# Patient Record
Sex: FEMALE | Race: WHITE | Employment: UNEMPLOYED | URBAN - METROPOLITAN AREA
[De-identification: names, ages, dates, MRNs, and addresses within clinical notes are randomized per-mention and may not be internally consistent; named-entity substitution may affect disease eponyms.]

---

## 2018-06-19 ENCOUNTER — HOSPITAL ENCOUNTER (OUTPATIENT)
Dept: MAMMOGRAPHY | Age: 58
Discharge: HOME | End: 2018-06-19
Attending: OBSTETRICS & GYNECOLOGY | Admitting: OBSTETRICS & GYNECOLOGY

## 2018-06-19 DIAGNOSIS — Z12.31 ENCOUNTER FOR SCREENING MAMMOGRAM FOR BREAST CANCER: ICD-10-CM

## 2018-10-19 ENCOUNTER — HOSPITAL ENCOUNTER (OUTPATIENT)
Age: 58
Setting detail: OUTPATIENT SURGERY
Discharge: HOME | End: 2018-10-19
Attending: INTERNAL MEDICINE | Admitting: INTERNAL MEDICINE
Payer: COMMERCIAL

## 2018-10-19 VITALS
SYSTOLIC BLOOD PRESSURE: 111 MMHG | TEMPERATURE: 97 F | BODY MASS INDEX: 26.13 KG/M2 | WEIGHT: 142 LBS | RESPIRATION RATE: 16 BRPM | DIASTOLIC BLOOD PRESSURE: 90 MMHG | OXYGEN SATURATION: 98 % | HEART RATE: 70 BPM | HEIGHT: 62 IN

## 2018-10-19 LAB
GLUCOSE BLD-MCNC: 272 MG/DL (ref 70–99)
PERFORMED ON: ABNORMAL

## 2018-10-19 PROCEDURE — 6370000000 HC RX 637 (ALT 250 FOR IP): Performed by: INTERNAL MEDICINE

## 2018-10-19 PROCEDURE — C1773 RET DEV, INSERTABLE: HCPCS | Performed by: INTERNAL MEDICINE

## 2018-10-19 PROCEDURE — 6360000002 HC RX W HCPCS: Performed by: INTERNAL MEDICINE

## 2018-10-19 PROCEDURE — 7100000011 HC PHASE II RECOVERY - ADDTL 15 MIN: Performed by: INTERNAL MEDICINE

## 2018-10-19 PROCEDURE — 7100000010 HC PHASE II RECOVERY - FIRST 15 MIN: Performed by: INTERNAL MEDICINE

## 2018-10-19 PROCEDURE — 99152 MOD SED SAME PHYS/QHP 5/>YRS: CPT | Performed by: INTERNAL MEDICINE

## 2018-10-19 PROCEDURE — 99153 MOD SED SAME PHYS/QHP EA: CPT | Performed by: INTERNAL MEDICINE

## 2018-10-19 PROCEDURE — 88305 TISSUE EXAM BY PATHOLOGIST: CPT

## 2018-10-19 PROCEDURE — 3609010600 HC COLONOSCOPY POLYPECTOMY SNARE/COLD BIOPSY: Performed by: INTERNAL MEDICINE

## 2018-10-19 PROCEDURE — 2709999900 HC NON-CHARGEABLE SUPPLY: Performed by: INTERNAL MEDICINE

## 2018-10-19 PROCEDURE — 2580000003 HC RX 258: Performed by: INTERNAL MEDICINE

## 2018-10-19 RX ORDER — LIDOCAINE HYDROCHLORIDE 10 MG/ML
0.1 INJECTION, SOLUTION EPIDURAL; INFILTRATION; INTRACAUDAL; PERINEURAL
Status: DISCONTINUED | OUTPATIENT
Start: 2018-10-19 | End: 2018-10-19 | Stop reason: HOSPADM

## 2018-10-19 RX ORDER — GEMFIBROZIL 600 MG/1
600 TABLET, FILM COATED ORAL 2 TIMES DAILY
COMMUNITY
End: 2021-07-30

## 2018-10-19 RX ORDER — MIDAZOLAM HYDROCHLORIDE 5 MG/ML
INJECTION INTRAMUSCULAR; INTRAVENOUS PRN
Status: DISCONTINUED | OUTPATIENT
Start: 2018-10-19 | End: 2018-10-19 | Stop reason: HOSPADM

## 2018-10-19 RX ORDER — FENTANYL CITRATE 50 UG/ML
INJECTION, SOLUTION INTRAMUSCULAR; INTRAVENOUS PRN
Status: DISCONTINUED | OUTPATIENT
Start: 2018-10-19 | End: 2018-10-19 | Stop reason: HOSPADM

## 2018-10-19 RX ORDER — SODIUM CHLORIDE, SODIUM LACTATE, POTASSIUM CHLORIDE, CALCIUM CHLORIDE 600; 310; 30; 20 MG/100ML; MG/100ML; MG/100ML; MG/100ML
INJECTION, SOLUTION INTRAVENOUS ONCE
Status: COMPLETED | OUTPATIENT
Start: 2018-10-19 | End: 2018-10-19

## 2018-10-19 RX ORDER — SIMETHICONE 20 MG/.3ML
EMULSION ORAL PRN
Status: DISCONTINUED | OUTPATIENT
Start: 2018-10-19 | End: 2018-10-19 | Stop reason: HOSPADM

## 2018-10-19 RX ADMIN — SODIUM CHLORIDE, POTASSIUM CHLORIDE, SODIUM LACTATE AND CALCIUM CHLORIDE: 600; 310; 30; 20 INJECTION, SOLUTION INTRAVENOUS at 09:18

## 2018-10-19 ASSESSMENT — PAIN - FUNCTIONAL ASSESSMENT: PAIN_FUNCTIONAL_ASSESSMENT: 0-10

## 2018-10-19 ASSESSMENT — PAIN SCALES - GENERAL: PAINLEVEL_OUTOF10: 0

## 2018-10-19 NOTE — H&P
Pre-sedation Assessment    History and Physical / Pre-Sedation Assessment  Patient:  Jonathan Banuelos   :   1960     Intended Procedure: CLS      HPI: h/o polyps  Nurses notes reviewed and agreed. Medications reviewed  Allergies: Allergies   Allergen Reactions    Codeine Nausea And Vomiting    Phenergan [Promethazine Hcl] Nausea And Vomiting    Vicodin [Hydrocodone-Acetaminophen]      itching    Hydrocodone-Acetaminophen Nausea And Vomiting    Oxycodone Nausea And Vomiting     hallucianitions    Promethazine Nausea And Vomiting           Physical Exam:  Vital Signs: BP (!) 153/75   Pulse 87   Temp 97 °F (36.1 °C) (Temporal)   Resp 20   Ht 5' 2\" (1.575 m)   Wt 142 lb (64.4 kg)   LMP 2012   SpO2 100%   BMI 25.97 kg/m²  Body mass index is 25.97 kg/m². Airway:Normal  Cardiac:Normal  Pulmonary:Normal  Abdomen:Normal  Specific to procedure: Mallampati 3      Pre-Procedure Assessment/Plan:  ASA 2 - Patient with mild systemic disease with no functional limitations    Level of Sedation Plan: Moderate sedation    Post Procedure plan: Return to same level of care    I assessed the patient and find that the patient is in satisfactory condition to proceed with the planned procedure and sedation plan. I have explained the risk, benefits, and alternatives to the procedure. The patient understands and agrees to proceed.   Yes    Siddhartha Proctor  9:56 AM 10/19/2018

## 2018-11-28 ENCOUNTER — HOSPITAL ENCOUNTER (OUTPATIENT)
Age: 58
Discharge: HOME | End: 2018-11-28
Payer: COMMERCIAL

## 2018-11-28 ENCOUNTER — HOSPITAL ENCOUNTER (OUTPATIENT)
Dept: GENERAL RADIOLOGY | Age: 58
Discharge: HOME | End: 2018-11-28
Payer: COMMERCIAL

## 2018-11-28 DIAGNOSIS — R91.8 LUNG NODULES: ICD-10-CM

## 2018-11-28 PROCEDURE — 71046 X-RAY EXAM CHEST 2 VIEWS: CPT

## 2019-01-03 ENCOUNTER — HOSPITAL ENCOUNTER (OUTPATIENT)
Age: 59
Discharge: HOME | End: 2019-01-03
Payer: COMMERCIAL

## 2019-01-03 ENCOUNTER — HOSPITAL ENCOUNTER (OUTPATIENT)
Dept: GENERAL RADIOLOGY | Age: 59
Discharge: HOME | End: 2019-01-03
Payer: COMMERCIAL

## 2019-01-03 DIAGNOSIS — M54.5 LOW BACK PAIN, UNSPECIFIED BACK PAIN LATERALITY, UNSPECIFIED CHRONICITY, WITH SCIATICA PRESENCE UNSPECIFIED: ICD-10-CM

## 2019-01-03 DIAGNOSIS — M99.03 LUMBOSACRAL DYSFUNCTION: ICD-10-CM

## 2019-01-03 PROCEDURE — 72100 X-RAY EXAM L-S SPINE 2/3 VWS: CPT

## 2019-03-25 ENCOUNTER — APPOINTMENT (OUTPATIENT)
Dept: CT IMAGING | Age: 59
End: 2019-03-25
Payer: COMMERCIAL

## 2019-03-25 ENCOUNTER — HOSPITAL ENCOUNTER (EMERGENCY)
Age: 59
Discharge: HOME | End: 2019-03-25
Attending: EMERGENCY MEDICINE
Payer: COMMERCIAL

## 2019-03-25 VITALS
RESPIRATION RATE: 18 BRPM | OXYGEN SATURATION: 100 % | HEIGHT: 62 IN | DIASTOLIC BLOOD PRESSURE: 94 MMHG | TEMPERATURE: 99.1 F | HEART RATE: 83 BPM | SYSTOLIC BLOOD PRESSURE: 125 MMHG | WEIGHT: 140 LBS | BODY MASS INDEX: 25.76 KG/M2

## 2019-03-25 DIAGNOSIS — R51.9 FACIAL PAIN: Primary | ICD-10-CM

## 2019-03-25 LAB
A/G RATIO: 1.2 (ref 1.1–2.2)
ALBUMIN SERPL-MCNC: 4.1 G/DL (ref 3.4–5)
ALP BLD-CCNC: 103 U/L (ref 40–129)
ALT SERPL-CCNC: 14 U/L (ref 10–40)
ANION GAP SERPL CALCULATED.3IONS-SCNC: 12 MMOL/L (ref 3–16)
AST SERPL-CCNC: 13 U/L (ref 15–37)
BASOPHILS ABSOLUTE: 0.1 K/UL (ref 0–0.2)
BASOPHILS RELATIVE PERCENT: 0.6 %
BILIRUB SERPL-MCNC: 0.5 MG/DL (ref 0–1)
BILIRUBIN URINE: NEGATIVE
BLOOD, URINE: NEGATIVE
BUN BLDV-MCNC: 18 MG/DL (ref 7–20)
CALCIUM SERPL-MCNC: 9.6 MG/DL (ref 8.3–10.6)
CHLORIDE BLD-SCNC: 97 MMOL/L (ref 99–110)
CLARITY: CLEAR
CO2: 26 MMOL/L (ref 21–32)
COLOR: YELLOW
CREAT SERPL-MCNC: 0.6 MG/DL (ref 0.6–1.1)
EOSINOPHILS ABSOLUTE: 0.1 K/UL (ref 0–0.6)
EOSINOPHILS RELATIVE PERCENT: 1.5 %
GFR AFRICAN AMERICAN: >60
GFR NON-AFRICAN AMERICAN: >60
GLOBULIN: 3.3 G/DL
GLUCOSE BLD-MCNC: 196 MG/DL (ref 70–99)
GLUCOSE BLD-MCNC: 541 MG/DL (ref 70–99)
GLUCOSE URINE: >=1000 MG/DL
HCT VFR BLD CALC: 43.2 % (ref 36–48)
HEMOGLOBIN: 14.9 G/DL (ref 12–16)
KETONES, URINE: NEGATIVE MG/DL
LEUKOCYTE ESTERASE, URINE: NEGATIVE
LYMPHOCYTES ABSOLUTE: 1.7 K/UL (ref 1–5.1)
LYMPHOCYTES RELATIVE PERCENT: 17.1 %
MCH RBC QN AUTO: 29.4 PG (ref 26–34)
MCHC RBC AUTO-ENTMCNC: 34.5 G/DL (ref 31–36)
MCV RBC AUTO: 85 FL (ref 80–100)
MICROSCOPIC EXAMINATION: ABNORMAL
MONOCYTES ABSOLUTE: 0.5 K/UL (ref 0–1.3)
MONOCYTES RELATIVE PERCENT: 4.9 %
NEUTROPHILS ABSOLUTE: 7.8 K/UL (ref 1.7–7.7)
NEUTROPHILS RELATIVE PERCENT: 75.9 %
NITRITE, URINE: NEGATIVE
PDW BLD-RTO: 14 % (ref 12.4–15.4)
PERFORMED ON: ABNORMAL
PH UA: 6 (ref 5–8)
PLATELET # BLD: 358 K/UL (ref 135–450)
PMV BLD AUTO: 6.2 FL (ref 5–10.5)
POTASSIUM REFLEX MAGNESIUM: 4.5 MMOL/L (ref 3.5–5.1)
PROTEIN UA: NEGATIVE MG/DL
RBC # BLD: 5.08 M/UL (ref 4–5.2)
SODIUM BLD-SCNC: 135 MMOL/L (ref 136–145)
SPECIFIC GRAVITY UA: <=1.005 (ref 1–1.03)
TOTAL PROTEIN: 7.4 G/DL (ref 6.4–8.2)
URINE TYPE: ABNORMAL
UROBILINOGEN, URINE: 0.2 E.U./DL
WBC # BLD: 10.2 K/UL (ref 4–11)

## 2019-03-25 PROCEDURE — 99283 EMERGENCY DEPT VISIT LOW MDM: CPT

## 2019-03-25 PROCEDURE — 6370000000 HC RX 637 (ALT 250 FOR IP): Performed by: EMERGENCY MEDICINE

## 2019-03-25 PROCEDURE — 81003 URINALYSIS AUTO W/O SCOPE: CPT

## 2019-03-25 PROCEDURE — 6360000002 HC RX W HCPCS: Performed by: EMERGENCY MEDICINE

## 2019-03-25 PROCEDURE — 80053 COMPREHEN METABOLIC PANEL: CPT

## 2019-03-25 PROCEDURE — 96361 HYDRATE IV INFUSION ADD-ON: CPT

## 2019-03-25 PROCEDURE — 70450 CT HEAD/BRAIN W/O DYE: CPT

## 2019-03-25 PROCEDURE — 96375 TX/PRO/DX INJ NEW DRUG ADDON: CPT

## 2019-03-25 PROCEDURE — 96374 THER/PROPH/DIAG INJ IV PUSH: CPT

## 2019-03-25 PROCEDURE — 85025 COMPLETE CBC W/AUTO DIFF WBC: CPT

## 2019-03-25 PROCEDURE — 2580000003 HC RX 258: Performed by: EMERGENCY MEDICINE

## 2019-03-25 RX ORDER — KETOROLAC TROMETHAMINE 30 MG/ML
15 INJECTION, SOLUTION INTRAMUSCULAR; INTRAVENOUS ONCE
Status: COMPLETED | OUTPATIENT
Start: 2019-03-25 | End: 2019-03-25

## 2019-03-25 RX ORDER — 0.9 % SODIUM CHLORIDE 0.9 %
1000 INTRAVENOUS SOLUTION INTRAVENOUS ONCE
Status: COMPLETED | OUTPATIENT
Start: 2019-03-25 | End: 2019-03-25

## 2019-03-25 RX ADMIN — SODIUM CHLORIDE 1000 ML: 9 INJECTION, SOLUTION INTRAVENOUS at 12:37

## 2019-03-25 RX ADMIN — KETOROLAC TROMETHAMINE 15 MG: 30 INJECTION, SOLUTION INTRAMUSCULAR; INTRAVENOUS at 12:59

## 2019-03-25 RX ADMIN — INSULIN HUMAN 5 UNITS: 100 INJECTION, SOLUTION PARENTERAL at 12:37

## 2019-03-25 ASSESSMENT — ENCOUNTER SYMPTOMS
VOMITING: 0
CHEST TIGHTNESS: 0
SHORTNESS OF BREATH: 0
STRIDOR: 0
WHEEZING: 0
ABDOMINAL PAIN: 0
TROUBLE SWALLOWING: 0
DIARRHEA: 0
RHINORRHEA: 0
SORE THROAT: 0
COUGH: 0

## 2019-03-25 ASSESSMENT — PAIN SCALES - GENERAL: PAINLEVEL_OUTOF10: 8

## 2019-03-25 ASSESSMENT — PAIN SCALES - WONG BAKER: WONGBAKER_NUMERICALRESPONSE: 8

## 2019-03-25 ASSESSMENT — PAIN DESCRIPTION - LOCATION: LOCATION: FACE

## 2020-04-28 LAB
ALBUMIN SERPL-MCNC: 4.8 G/DL
ALP BLD-CCNC: 128 U/L
ALT SERPL-CCNC: 13 U/L
ANION GAP SERPL CALCULATED.3IONS-SCNC: NORMAL MMOL/L
AST SERPL-CCNC: 19 U/L
BASOPHILS ABSOLUTE: 0.1 /ΜL
BASOPHILS RELATIVE PERCENT: 1 %
BILIRUB SERPL-MCNC: 0.4 MG/DL (ref 0.1–1.4)
BUN BLDV-MCNC: 16 MG/DL
CALCIUM SERPL-MCNC: 10 MG/DL
CHLORIDE BLD-SCNC: 97 MMOL/L
CO2: 24 MMOL/L
CREAT SERPL-MCNC: 0.66 MG/DL
EOSINOPHILS ABSOLUTE: 0.3 /ΜL
EOSINOPHILS RELATIVE PERCENT: 3 %
GFR CALCULATED: NORMAL
GLUCOSE BLD-MCNC: 176 MG/DL
HCT VFR BLD CALC: 44.3 % (ref 36–46)
HEMOGLOBIN: 15.1 G/DL (ref 12–16)
LYMPHOCYTES ABSOLUTE: 3.5 /ΜL
LYMPHOCYTES RELATIVE PERCENT: 34 %
MCH RBC QN AUTO: 28.3 PG
MCHC RBC AUTO-ENTMCNC: 34.1 G/DL
MCV RBC AUTO: 83 FL
MONOCYTES ABSOLUTE: 0.6 /ΜL
MONOCYTES RELATIVE PERCENT: 6 %
NEUTROPHILS ABSOLUTE: 5.8 /ΜL
NEUTROPHILS RELATIVE PERCENT: 56 %
PLATELET # BLD: 421 K/ΜL
PMV BLD AUTO: NORMAL FL
POTASSIUM SERPL-SCNC: 4.9 MMOL/L
RBC # BLD: 5.33 10^6/ΜL
SODIUM BLD-SCNC: 138 MMOL/L
TOTAL PROTEIN: 7.7
TROPONIN: 0.01
WBC # BLD: 10.2 10^3/ML

## 2020-04-29 ENCOUNTER — HOSPITAL ENCOUNTER (OUTPATIENT)
Age: 60
Discharge: HOME | End: 2020-04-29
Payer: MEDICARE

## 2020-04-29 ENCOUNTER — VIRTUAL VISIT (OUTPATIENT)
Dept: CARDIOLOGY CLINIC | Age: 60
End: 2020-04-29
Payer: MEDICARE

## 2020-04-29 ENCOUNTER — APPOINTMENT (OUTPATIENT)
Dept: GENERAL RADIOLOGY | Age: 60
End: 2020-04-29
Payer: MEDICARE

## 2020-04-29 ENCOUNTER — HOSPITAL ENCOUNTER (OUTPATIENT)
Dept: GENERAL RADIOLOGY | Age: 60
Discharge: HOME | End: 2020-04-29
Payer: MEDICARE

## 2020-04-29 VITALS — WEIGHT: 139 LBS | HEIGHT: 62 IN | BODY MASS INDEX: 25.58 KG/M2

## 2020-04-29 PROBLEM — R07.2 PRECORDIAL PAIN: Status: ACTIVE | Noted: 2020-04-29

## 2020-04-29 PROBLEM — R42 DIZZINESS: Status: ACTIVE | Noted: 2020-04-29

## 2020-04-29 PROBLEM — R94.31 ABNORMAL EKG: Status: ACTIVE | Noted: 2020-04-29

## 2020-04-29 LAB
PRO-BNP: 15 PG/ML (ref 0–124)
SEDIMENTATION RATE, ERYTHROCYTE: 24 MM/HR (ref 0–30)

## 2020-04-29 PROCEDURE — 36415 COLL VENOUS BLD VENIPUNCTURE: CPT

## 2020-04-29 PROCEDURE — 83880 ASSAY OF NATRIURETIC PEPTIDE: CPT

## 2020-04-29 PROCEDURE — 85652 RBC SED RATE AUTOMATED: CPT

## 2020-04-29 PROCEDURE — 99244 OFF/OP CNSLTJ NEW/EST MOD 40: CPT | Performed by: INTERNAL MEDICINE

## 2020-04-29 PROCEDURE — 71046 X-RAY EXAM CHEST 2 VIEWS: CPT

## 2020-04-29 PROCEDURE — G8427 DOCREV CUR MEDS BY ELIG CLIN: HCPCS | Performed by: INTERNAL MEDICINE

## 2020-04-29 NOTE — PATIENT INSTRUCTIONS
Plan:   Stress test- Olga Myoview   Echocardiogram   Chest Xray   Labs- Sed rate and BNP   Continue current medications   Check blood pressure at home weekly  Regular exercise and following a healthy diet encouraged   Follow up with me in 3 weeks

## 2020-04-29 NOTE — PROGRESS NOTES
MD Simona   Cholecalciferol (VITAMIN D) 2000 UNITS CAPS capsule Take 2,000 Units by mouth daily. Yes Historical Provider, MD   lisinopril (PRINIVIL;ZESTRIL) 2.5 MG tablet Take 2.5 mg by mouth daily. Yes Historical Provider, MD   Calcium Carbonate-Vitamin D (CALCIUM + D PO) Take  by mouth 2 times daily. 600mg calcium with vitamin d3 Yes Historical Provider, MD   metformin (GLUCOPHAGE) 500 MG tablet Take 1,000 mg by mouth 2 times daily (with meals).    Yes Historical Provider, MD   Insulin Detemir (LEVEMIR SC) Inject into the skin 30 units in am and 30 units hs  Historical Provider, MD       Social History     Tobacco Use    Smoking status: Never Smoker    Smokeless tobacco: Never Used   Substance Use Topics    Alcohol use: No    Drug use: No      Ht 5' 2\" (1.575 m)   Wt 139 lb (63 kg)   LMP 2012   BMI 25.42 kg/m²     Allergies   Allergen Reactions    Codeine Nausea And Vomiting    Phenergan [Promethazine Hcl] Nausea And Vomiting    Vicodin [Hydrocodone-Acetaminophen]      itching    Hydrocodone-Acetaminophen Nausea And Vomiting    Oxycodone Nausea And Vomiting     hallucianitions    Promethazine Nausea And Vomiting   ,   Past Medical History:   Diagnosis Date    Choking sensation     Hyperlipidemia     Nausea & vomiting     Prolonged emergence from general anesthesia    ,   Past Surgical History:   Procedure Laterality Date     SECTION      x3    CHOLECYSTECTOMY      COLONOSCOPY      COLONOSCOPY  13    normal    COLONOSCOPY  10/19/2018    1 polyp    COLONOSCOPY N/A 10/19/2018    COLONOSCOPY POLYPECTOMY SNARE/COLD BIOPSY performed by Tima Martinez MD at [address] [facility] HYSTEROSCOPY  2013    Laprascopic supracervical hysterectomy   ,   Social History     Tobacco Use    Smoking status: Never Smoker    Smokeless tobacco: Never Used   Substance Use Topics    Alcohol use: No    Drug use: No   ,   Family History   Problem Relation Age of Onset healthy diet encouraged   Follow up with me in 3 weeks           Puma Monaco is a 62 y.o. female being evaluated by a Virtual Visit (video visit) encounter to address concerns as mentioned above. A caregiver was present when appropriate. Due to this being a TeleHealth encounter (During UPIA-83 public health emergency), evaluation of the following organ systems was limited: Vitals/Constitutional/EENT/Resp/CV/GI//MS/Neuro/Skin/Heme-Lymph-Imm. Pursuant to the emergency declaration under the [address] [place] [address] authority and the [employer] Act, this Virtual Visit was conducted with patient's (and/or legal guardian's) consent, to reduce the patient's risk of exposure to COVID-19 and provide necessary medical care. The patient (and/or legal guardian) has also been advised to contact this office for worsening conditions or problems, and seek emergency medical treatment and/or call 911 if deemed necessary. Services were provided through a video synchronous discussion virtually to substitute for in-person clinic visit. Scribe's attestation: This note was scribed in the presence of Dr. Ilda Herrera M.D. By Zen Randall RN    The scribes documentation has been prepared under my direction and personally reviewed by me in its entirety. I confirm that the note above accurately reflects all work, treatment, procedures, and medical decision making performed by me. Dr. Ilda Herrera MD      I am seeing the patient today from my office and the patient from their home. --Zen Randall RN on 4/29/2020 at 2:32 PM    An electronic signature was used to authenticate this note.

## 2020-04-29 NOTE — LETTER
Argenis Bowman MD   Cholecalciferol (VITAMIN D) 2000 UNITS CAPS capsule Take 2,000 Units by mouth daily. Yes Historical Provider, MD   lisinopril (PRINIVIL;ZESTRIL) 2.5 MG tablet Take 2.5 mg by mouth daily. Yes Historical Provider, MD   Calcium Carbonate-Vitamin D (CALCIUM + D PO) Take  by mouth 2 times daily. 600mg calcium with vitamin d3 Yes Historical Provider, MD   metformin (GLUCOPHAGE) 500 MG tablet Take 1,000 mg by mouth 2 times daily (with meals).    Yes Historical Provider, MD   Insulin Detemir (LEVEMIR SC) Inject into the skin 30 units in am and 30 units hs  Historical Provider, MD       Social History     Tobacco Use    Smoking status: Never Smoker    Smokeless tobacco: Never Used   Substance Use Topics    Alcohol use: No    Drug use: No      Ht 5' 2\" (1.575 m)   Wt 139 lb (63 kg)   LMP 2012   BMI 25.42 kg/m²      Allergies   Allergen Reactions    Codeine Nausea And Vomiting    Phenergan [Promethazine Hcl] Nausea And Vomiting    Vicodin [Hydrocodone-Acetaminophen]      itching    Hydrocodone-Acetaminophen Nausea And Vomiting    Oxycodone Nausea And Vomiting     hallucianitions    Promethazine Nausea And Vomiting   ,   Past Medical History:   Diagnosis Date    Choking sensation     Hyperlipidemia     Nausea & vomiting     Prolonged emergence from general anesthesia    ,   Past Surgical History:   Procedure Laterality Date     SECTION      x3    CHOLECYSTECTOMY      COLONOSCOPY      COLONOSCOPY  13    normal    COLONOSCOPY  10/19/2018    1 polyp    COLONOSCOPY N/A 10/19/2018    COLONOSCOPY POLYPECTOMY SNARE/COLD BIOPSY performed by Shawna Champion MD at [address] [facility] HYSTEROSCOPY  2013    Laprascopic supracervical hysterectomy   ,   Social History     Tobacco Use    Smoking status: Never Smoker    Smokeless tobacco: Never Used   Substance Use Topics    Alcohol use: No    Drug use: No   ,   Family History [] Abnormal-       Neurological:        [x] No Facial Asymmetry (Cranial nerve 7 motor function) (limited exam to video visit)          [x] No gaze palsy        [] Abnormal-         Skin:        [x] No significant exanthematous lesions or discoloration noted on facial skin         [] Abnormal-            Psychiatric:       [x] Normal Affect [] No Hallucinations        [] Abnormal-     Other pertinent observable physical exam findings-       Stress echocardiogram 2012  Resting echocardiogram shows normal left ventricular systolic  function with an estimated ejection fraction of 60%. There were no  wall motion abnormalities. Following exercise the left ventricle  became uniformly hyperkinetic. There were no wall motion  abnormalities. There was appropriate increase in the ejection  fraction. ECHOCARDIOGRAPHIC CONCLUSION-  Normal exercise echocardiogram  negative for ischemia. Carotid dopplers 2014  Opinion: Normal study. No significant atherosclerotic disease noted in the neck. No significant common carotid artery or internal carotid artery stenosis suspected in the neck. Echocardiogram 2014  Summary   Overall left ventricular function is normal.   Ejection fraction is visually estimated to be 55-60 %. Diastolic filling parameters suggests grade I diastolic dysfunction . No obvious cardiac source of emboli noted. There is a small pericardial effusion noted.     Carotid dopplers 2/17/16  CONCLUSIONS    No significant obstructive lesions noted in the extracranial carotid system, bilaterally.     Vertebral arteries are patent demonstrating antegrade flow, bilaterally    Assessment:   Abnormal EKG - reviewed w/ pt  Chest pain - typical/atypical  Shortness of breath - possible cardiac  Dizziness   Hyperlipidemia   Family history of heart disease       Plan:   Stress test- Olga Myoview   Echocardiogram   Chest Xray   Labs- Sed rate and BNP   Continue current medications

## 2020-04-30 ENCOUNTER — TELEPHONE (OUTPATIENT)
Dept: CARDIOLOGY CLINIC | Age: 60
End: 2020-04-30

## 2020-05-08 ENCOUNTER — HOSPITAL ENCOUNTER (INPATIENT)
Age: 60
LOS: 4 days | Discharge: HOME | DRG: 175 | End: 2020-05-12
Attending: EMERGENCY MEDICINE | Admitting: INTERNAL MEDICINE
Payer: MEDICARE

## 2020-05-08 ENCOUNTER — APPOINTMENT (OUTPATIENT)
Dept: GENERAL RADIOLOGY | Age: 60
DRG: 175 | End: 2020-05-08
Payer: MEDICARE

## 2020-05-08 ENCOUNTER — HOSPITAL ENCOUNTER (OUTPATIENT)
Dept: CARDIOLOGY | Age: 60
Discharge: HOME | DRG: 175 | End: 2020-05-08
Payer: MEDICARE

## 2020-05-08 ENCOUNTER — TELEPHONE (OUTPATIENT)
Dept: CARDIOLOGY CLINIC | Age: 60
End: 2020-05-08

## 2020-05-08 PROBLEM — R07.9 CHEST PAIN: Status: ACTIVE | Noted: 2020-05-08

## 2020-05-08 LAB
A/G RATIO: 1.5 (ref 1.1–2.2)
ALBUMIN SERPL-MCNC: 4.3 G/DL (ref 3.4–5)
ALP BLD-CCNC: 96 U/L (ref 40–129)
ALT SERPL-CCNC: 10 U/L (ref 10–40)
ANION GAP SERPL CALCULATED.3IONS-SCNC: 10 MMOL/L (ref 3–16)
AST SERPL-CCNC: 12 U/L (ref 15–37)
BASOPHILS ABSOLUTE: 0 K/UL (ref 0–0.2)
BASOPHILS RELATIVE PERCENT: 0.4 %
BILIRUB SERPL-MCNC: 0.3 MG/DL (ref 0–1)
BUN BLDV-MCNC: 18 MG/DL (ref 7–20)
CALCIUM SERPL-MCNC: 9.1 MG/DL (ref 8.3–10.6)
CHLORIDE BLD-SCNC: 98 MMOL/L (ref 99–110)
CO2: 26 MMOL/L (ref 21–32)
CREAT SERPL-MCNC: <0.5 MG/DL (ref 0.6–1.1)
EOSINOPHILS ABSOLUTE: 0.3 K/UL (ref 0–0.6)
EOSINOPHILS RELATIVE PERCENT: 2.8 %
GFR AFRICAN AMERICAN: >60
GFR NON-AFRICAN AMERICAN: >60
GLOBULIN: 2.8 G/DL
GLUCOSE BLD-MCNC: 320 MG/DL (ref 70–99)
HCT VFR BLD CALC: 40 % (ref 36–48)
HEMOGLOBIN: 13.8 G/DL (ref 12–16)
INR BLD: 1.43 (ref 0.86–1.14)
LV EF: 55 %
LV EF: 60 %
LVEF MODALITY: NORMAL
LVEF MODALITY: NORMAL
LYMPHOCYTES ABSOLUTE: 3.1 K/UL (ref 1–5.1)
LYMPHOCYTES RELATIVE PERCENT: 27.9 %
MCH RBC QN AUTO: 29.1 PG (ref 26–34)
MCHC RBC AUTO-ENTMCNC: 34.6 G/DL (ref 31–36)
MCV RBC AUTO: 84 FL (ref 80–100)
MONOCYTES ABSOLUTE: 0.7 K/UL (ref 0–1.3)
MONOCYTES RELATIVE PERCENT: 6.5 %
NEUTROPHILS ABSOLUTE: 7 K/UL (ref 1.7–7.7)
NEUTROPHILS RELATIVE PERCENT: 62.4 %
PDW BLD-RTO: 13.7 % (ref 12.4–15.4)
PLATELET # BLD: 315 K/UL (ref 135–450)
PMV BLD AUTO: 6.2 FL (ref 5–10.5)
POTASSIUM REFLEX MAGNESIUM: 3.7 MMOL/L (ref 3.5–5.1)
PRO-BNP: 42 PG/ML (ref 0–124)
PROTHROMBIN TIME: 16.7 SEC (ref 10–13.2)
RBC # BLD: 4.76 M/UL (ref 4–5.2)
SODIUM BLD-SCNC: 134 MMOL/L (ref 136–145)
TOTAL PROTEIN: 7.1 G/DL (ref 6.4–8.2)
TROPONIN: <0.01 NG/ML
WBC # BLD: 11.2 K/UL (ref 4–11)

## 2020-05-08 PROCEDURE — 85025 COMPLETE CBC W/AUTO DIFF WBC: CPT

## 2020-05-08 PROCEDURE — 93306 TTE W/DOPPLER COMPLETE: CPT

## 2020-05-08 PROCEDURE — 71045 X-RAY EXAM CHEST 1 VIEW: CPT

## 2020-05-08 PROCEDURE — A9502 TC99M TETROFOSMIN: HCPCS | Performed by: INTERNAL MEDICINE

## 2020-05-08 PROCEDURE — 6360000002 HC RX W HCPCS: Performed by: INTERNAL MEDICINE

## 2020-05-08 PROCEDURE — 84484 ASSAY OF TROPONIN QUANT: CPT

## 2020-05-08 PROCEDURE — 96375 TX/PRO/DX INJ NEW DRUG ADDON: CPT

## 2020-05-08 PROCEDURE — 3430000000 HC RX DIAGNOSTIC RADIOPHARMACEUTICAL: Performed by: INTERNAL MEDICINE

## 2020-05-08 PROCEDURE — 93005 ELECTROCARDIOGRAM TRACING: CPT | Performed by: EMERGENCY MEDICINE

## 2020-05-08 PROCEDURE — 80053 COMPREHEN METABOLIC PANEL: CPT

## 2020-05-08 PROCEDURE — 6360000002 HC RX W HCPCS: Performed by: EMERGENCY MEDICINE

## 2020-05-08 PROCEDURE — 99285 EMERGENCY DEPT VISIT HI MDM: CPT

## 2020-05-08 PROCEDURE — 1200000000 HC SEMI PRIVATE

## 2020-05-08 PROCEDURE — 96374 THER/PROPH/DIAG INJ IV PUSH: CPT

## 2020-05-08 PROCEDURE — 83880 ASSAY OF NATRIURETIC PEPTIDE: CPT

## 2020-05-08 PROCEDURE — 78452 HT MUSCLE IMAGE SPECT MULT: CPT

## 2020-05-08 PROCEDURE — 85610 PROTHROMBIN TIME: CPT

## 2020-05-08 PROCEDURE — 93017 CV STRESS TEST TRACING ONLY: CPT

## 2020-05-08 RX ORDER — MORPHINE SULFATE 4 MG/ML
4 INJECTION, SOLUTION INTRAMUSCULAR; INTRAVENOUS ONCE
Status: COMPLETED | OUTPATIENT
Start: 2020-05-08 | End: 2020-05-08

## 2020-05-08 RX ORDER — ONDANSETRON 2 MG/ML
4 INJECTION INTRAMUSCULAR; INTRAVENOUS ONCE
Status: COMPLETED | OUTPATIENT
Start: 2020-05-08 | End: 2020-05-08

## 2020-05-08 RX ADMIN — ONDANSETRON 4 MG: 2 INJECTION INTRAMUSCULAR; INTRAVENOUS at 23:33

## 2020-05-08 RX ADMIN — TETROFOSMIN 32.1 MILLICURIE: 1.38 INJECTION, POWDER, LYOPHILIZED, FOR SOLUTION INTRAVENOUS at 08:45

## 2020-05-08 RX ADMIN — REGADENOSON 0.4 MG: 0.08 INJECTION, SOLUTION INTRAVENOUS at 08:45

## 2020-05-08 RX ADMIN — Medication 2 MG: at 23:32

## 2020-05-08 RX ADMIN — TETROFOSMIN 11.4 MILLICURIE: 1.38 INJECTION, POWDER, LYOPHILIZED, FOR SOLUTION INTRAVENOUS at 07:45

## 2020-05-08 ASSESSMENT — PAIN DESCRIPTION - LOCATION: LOCATION: CHEST;BACK

## 2020-05-08 ASSESSMENT — PAIN DESCRIPTION - PAIN TYPE: TYPE: ACUTE PAIN

## 2020-05-08 ASSESSMENT — PAIN SCALES - GENERAL
PAINLEVEL_OUTOF10: 1
PAINLEVEL_OUTOF10: 2

## 2020-05-08 ASSESSMENT — PAIN DESCRIPTION - ORIENTATION: ORIENTATION: MID;UPPER

## 2020-05-08 NOTE — TELEPHONE ENCOUNTER
Spoke with patient. Patient is scheduled with Dr. Forrest Miner for Left Heart Cath on 5/14/20 at [facility], arrival time of 7:30am to the Cath Lab. Please have patient arrive to the main entrance of [facility] at 7:15am and check in with the registration desk. Remind patient to be NPO after midnight (8 hours prior). Do not apply lotions/creams on skin the day of procedure. COVID-19 test scheduled 5/11.

## 2020-05-08 NOTE — TELEPHONE ENCOUNTER
Spoke with pt. Discussed results and recommendations. She is agreeable to an angiogram. Discussed medication instructions and COVID testing.

## 2020-05-09 LAB
EKG ATRIAL RATE: 89 BPM
EKG DIAGNOSIS: NORMAL
EKG P AXIS: 51 DEGREES
EKG P-R INTERVAL: 144 MS
EKG Q-T INTERVAL: 340 MS
EKG QRS DURATION: 82 MS
EKG QTC CALCULATION (BAZETT): 413 MS
EKG R AXIS: 16 DEGREES
EKG T AXIS: 105 DEGREES
EKG VENTRICULAR RATE: 89 BPM
GLUCOSE BLD-MCNC: 373 MG/DL (ref 70–99)
GLUCOSE BLD-MCNC: 377 MG/DL (ref 70–99)
GLUCOSE BLD-MCNC: 405 MG/DL (ref 70–99)
GLUCOSE BLD-MCNC: 93 MG/DL (ref 70–99)
HCT VFR BLD CALC: 38.5 % (ref 36–48)
HEMOGLOBIN: 13.5 G/DL (ref 12–16)
MCH RBC QN AUTO: 29.1 PG (ref 26–34)
MCHC RBC AUTO-ENTMCNC: 35 G/DL (ref 31–36)
MCV RBC AUTO: 83.1 FL (ref 80–100)
PDW BLD-RTO: 13.7 % (ref 12.4–15.4)
PERFORMED ON: ABNORMAL
PERFORMED ON: NORMAL
PLATELET # BLD: 349 K/UL (ref 135–450)
PMV BLD AUTO: 6.4 FL (ref 5–10.5)
RBC # BLD: 4.63 M/UL (ref 4–5.2)
TROPONIN: <0.01 NG/ML
TROPONIN: <0.01 NG/ML
WBC # BLD: 13.1 K/UL (ref 4–11)

## 2020-05-09 PROCEDURE — 99223 1ST HOSP IP/OBS HIGH 75: CPT | Performed by: INTERNAL MEDICINE

## 2020-05-09 PROCEDURE — 2580000003 HC RX 258: Performed by: INTERNAL MEDICINE

## 2020-05-09 PROCEDURE — 84484 ASSAY OF TROPONIN QUANT: CPT

## 2020-05-09 PROCEDURE — 6360000002 HC RX W HCPCS: Performed by: INTERNAL MEDICINE

## 2020-05-09 PROCEDURE — 6370000000 HC RX 637 (ALT 250 FOR IP): Performed by: INTERNAL MEDICINE

## 2020-05-09 PROCEDURE — 85027 COMPLETE CBC AUTOMATED: CPT

## 2020-05-09 PROCEDURE — 1200000000 HC SEMI PRIVATE

## 2020-05-09 PROCEDURE — 83036 HEMOGLOBIN GLYCOSYLATED A1C: CPT

## 2020-05-09 PROCEDURE — 93010 ELECTROCARDIOGRAM REPORT: CPT | Performed by: INTERNAL MEDICINE

## 2020-05-09 RX ORDER — NITROGLYCERIN 40 MG/1
1 PATCH TRANSDERMAL DAILY
Status: DISCONTINUED | OUTPATIENT
Start: 2020-05-09 | End: 2020-05-09

## 2020-05-09 RX ORDER — ATORVASTATIN CALCIUM 40 MG/1
40 TABLET, FILM COATED ORAL NIGHTLY
Status: DISCONTINUED | OUTPATIENT
Start: 2020-05-09 | End: 2020-05-12 | Stop reason: HOSPADM

## 2020-05-09 RX ORDER — ONDANSETRON 2 MG/ML
4 INJECTION INTRAMUSCULAR; INTRAVENOUS EVERY 6 HOURS PRN
Status: DISCONTINUED | OUTPATIENT
Start: 2020-05-09 | End: 2020-05-12 | Stop reason: HOSPADM

## 2020-05-09 RX ORDER — DEXTROSE MONOHYDRATE 25 G/50ML
12.5 INJECTION, SOLUTION INTRAVENOUS PRN
Status: DISCONTINUED | OUTPATIENT
Start: 2020-05-09 | End: 2020-05-12 | Stop reason: HOSPADM

## 2020-05-09 RX ORDER — SODIUM CHLORIDE 0.9 % (FLUSH) 0.9 %
10 SYRINGE (ML) INJECTION PRN
Status: DISCONTINUED | OUTPATIENT
Start: 2020-05-09 | End: 2020-05-12 | Stop reason: HOSPADM

## 2020-05-09 RX ORDER — NICOTINE POLACRILEX 4 MG
15 LOZENGE BUCCAL PRN
Status: DISCONTINUED | OUTPATIENT
Start: 2020-05-09 | End: 2020-05-12 | Stop reason: HOSPADM

## 2020-05-09 RX ORDER — NITROGLYCERIN 0.4 MG/1
0.4 TABLET SUBLINGUAL EVERY 5 MIN PRN
Status: DISCONTINUED | OUTPATIENT
Start: 2020-05-09 | End: 2020-05-12 | Stop reason: HOSPADM

## 2020-05-09 RX ORDER — SODIUM CHLORIDE 0.9 % (FLUSH) 0.9 %
10 SYRINGE (ML) INJECTION EVERY 12 HOURS SCHEDULED
Status: DISCONTINUED | OUTPATIENT
Start: 2020-05-09 | End: 2020-05-12 | Stop reason: HOSPADM

## 2020-05-09 RX ORDER — ATORVASTATIN CALCIUM 10 MG/1
10 TABLET, FILM COATED ORAL NIGHTLY
Status: DISCONTINUED | OUTPATIENT
Start: 2020-05-09 | End: 2020-05-10

## 2020-05-09 RX ORDER — GEMFIBROZIL 600 MG/1
600 TABLET, FILM COATED ORAL 2 TIMES DAILY WITH MEALS
Status: DISCONTINUED | OUTPATIENT
Start: 2020-05-09 | End: 2020-05-12 | Stop reason: HOSPADM

## 2020-05-09 RX ORDER — DEXTROSE MONOHYDRATE 50 MG/ML
100 INJECTION, SOLUTION INTRAVENOUS PRN
Status: DISCONTINUED | OUTPATIENT
Start: 2020-05-09 | End: 2020-05-12 | Stop reason: HOSPADM

## 2020-05-09 RX ORDER — LISINOPRIL 2.5 MG/1
2.5 TABLET ORAL DAILY
Status: DISCONTINUED | OUTPATIENT
Start: 2020-05-09 | End: 2020-05-12 | Stop reason: HOSPADM

## 2020-05-09 RX ORDER — POLYETHYLENE GLYCOL 3350 17 G/17G
17 POWDER, FOR SOLUTION ORAL DAILY PRN
Status: DISCONTINUED | OUTPATIENT
Start: 2020-05-09 | End: 2020-05-12 | Stop reason: HOSPADM

## 2020-05-09 RX ORDER — PROMETHAZINE HYDROCHLORIDE 25 MG/1
12.5 TABLET ORAL EVERY 6 HOURS PRN
Status: DISCONTINUED | OUTPATIENT
Start: 2020-05-09 | End: 2020-05-09

## 2020-05-09 RX ADMIN — ASPIRIN 325 MG: 325 TABLET, COATED ORAL at 11:57

## 2020-05-09 RX ADMIN — GEMFIBROZIL 600 MG: 600 TABLET ORAL at 11:57

## 2020-05-09 RX ADMIN — ENOXAPARIN SODIUM 40 MG: 40 INJECTION SUBCUTANEOUS at 11:57

## 2020-05-09 RX ADMIN — ATORVASTATIN CALCIUM 10 MG: 10 TABLET, FILM COATED ORAL at 20:53

## 2020-05-09 RX ADMIN — ATORVASTATIN CALCIUM 40 MG: 40 TABLET, FILM COATED ORAL at 20:54

## 2020-05-09 RX ADMIN — Medication 10 ML: at 20:55

## 2020-05-09 RX ADMIN — LISINOPRIL 2.5 MG: 2.5 TABLET ORAL at 11:57

## 2020-05-09 RX ADMIN — Medication 10 ML: at 08:29

## 2020-05-09 RX ADMIN — NITROGLYCERIN 0.5 INCH: 20 OINTMENT TOPICAL at 15:25

## 2020-05-09 RX ADMIN — ATORVASTATIN CALCIUM 40 MG: 40 TABLET, FILM COATED ORAL at 01:13

## 2020-05-09 RX ADMIN — INSULIN LISPRO 3 UNITS: 100 INJECTION, SOLUTION INTRAVENOUS; SUBCUTANEOUS at 20:57

## 2020-05-09 RX ADMIN — GEMFIBROZIL 600 MG: 600 TABLET ORAL at 18:06

## 2020-05-09 RX ADMIN — NITROGLYCERIN 0.5 INCH: 20 OINTMENT TOPICAL at 20:57

## 2020-05-09 RX ADMIN — INSULIN LISPRO 5 UNITS: 100 INJECTION, SOLUTION INTRAVENOUS; SUBCUTANEOUS at 18:06

## 2020-05-09 ASSESSMENT — PAIN SCALES - GENERAL
PAINLEVEL_OUTOF10: 2
PAINLEVEL_OUTOF10: 0
PAINLEVEL_OUTOF10: 0

## 2020-05-09 ASSESSMENT — PAIN DESCRIPTION - LOCATION: LOCATION: BACK;CHEST

## 2020-05-09 ASSESSMENT — PAIN DESCRIPTION - ORIENTATION: ORIENTATION: MID;UPPER

## 2020-05-09 ASSESSMENT — PAIN DESCRIPTION - PAIN TYPE: TYPE: ACUTE PAIN

## 2020-05-09 NOTE — ED PROVIDER NOTES
CHIEF COMPLAINT  Chest Pain (pt reports stress test this morning with abnormal results, pt instructed to follow up; pt reports chest pain on and off today; 324 asa and 1 nitro given by EMS )      [facility]  Gail Mendoza is a 62 y.o. female who presents to the ED with remittent chest and back pain. She had a stress test today that showed abnormal results in her nuclear stress test of perfusion defect in the apex of her heart. Dr. Shari Sloan is a cardiologist that reviewed this and is recommend the patient needs to be scheduled for cardiac cath. Patient is telling me that she is scheduled to have a cardiac catheterization on May 14, 2020 but was told to come to the ER if her symptoms are continuing or worsening which she said they are. Is mostly chest pain but sometimes into her back as well. No current shortness of breath or nausea vomiting or headache or chills or fevers recent travel or known exposure to COVID-19 positive individuals. No other complaints, modifying factors or associated symptoms. I have reviewed the following from the nursing documentation.     Past Medical History:   Diagnosis Date    Choking sensation     Hyperlipidemia     Nausea & vomiting     Prolonged emergence from general anesthesia      Past Surgical History:   Procedure Laterality Date     SECTION      x3    CHOLECYSTECTOMY      COLONOSCOPY      COLONOSCOPY  13    normal    COLONOSCOPY  10/19/2018    1 polyp    COLONOSCOPY N/A 10/19/2018    COLONOSCOPY POLYPECTOMY SNARE/COLD BIOPSY performed by Reyna Tapia MD at [facility] HYSTEROSCOPY  2013    Laprascopic supracervical hysterectomy     Family History   Problem Relation Age of Onset    Diabetes Mother     Heart Disease Mother     Cancer Father         colon     Social History     Socioeconomic History    Marital status:      Spouse name: Not on file    Number of children: Not on file    Years of education: Not on file    Highest education level: Not on file   Occupational History    Not on file   Social Needs    Financial resource strain: Not on file    Food insecurity     Worry: Not on file     Inability: Not on file    Transportation needs     Medical: Not on file     Non-medical: Not on file   Tobacco Use    Smoking status: Never Smoker    Smokeless tobacco: Never Used   Substance and Sexual Activity    Alcohol use: No    Drug use: No    Sexual activity: Not on file   Lifestyle    Physical activity     Days per week: Not on file     Minutes per session: Not on file    Stress: Not on file   Relationships    Social connections     Talks on phone: Not on file     Gets together: Not on file     Attends Yarsani service: Not on file     Active member of club or organization: Not on file     Attends meetings of clubs or organizations: Not on file     Relationship status: Not on file    Intimate partner violence     Fear of current or ex partner: Not on file     Emotionally abused: Not on file     Physically abused: Not on file     Forced sexual activity: Not on file   Other Topics Concern    Not on file   Social History Narrative    Not on file     Current Facility-Administered Medications   Medication Dose Route Frequency Provider Last Rate Last Dose    morphine (PF) injection 4 mg  4 mg Intravenous Once Finisar Drivers V, DO        ondansetron [facility]) injection 4 mg  4 mg Intravenous Once Jovita Cocking, DO         Current Outpatient Medications   Medication Sig Dispense Refill    Insulin Glargine (BASAGLAR KWIKPEN SC) Inject into the skin      Insulin Detemir (LEVEMIR SC) Inject into the skin 30 units in am and 30 units hs      gemfibrozil (LOPID) 600 MG tablet Take 600 mg by mouth      Misc. Devices (ACAPELLA) MISC 1 Device by Does not apply route 4 times daily. 1 each 0    aspirin 325 MG EC tablet Take 1 tablet by mouth daily.  30 tablet 3    famotidine (PEPCID) 20 MG tablet Take 1 tablet by mouth daily. 60 tablet 3    Cholecalciferol (VITAMIN D) 2000 UNITS CAPS capsule Take 2,000 Units by mouth daily.  lisinopril (PRINIVIL;ZESTRIL) 2.5 MG tablet Take 2.5 mg by mouth daily.  Calcium Carbonate-Vitamin D (CALCIUM + D PO) Take  by mouth 2 times daily. 600mg calcium with vitamin d3      metformin (GLUCOPHAGE) 500 MG tablet Take 1,000 mg by mouth 2 times daily (with meals). Allergies   Allergen Reactions    Codeine Nausea And Vomiting    Phenergan [Promethazine Hcl] Nausea And Vomiting    Vicodin [Hydrocodone-Acetaminophen]      itching    Hydrocodone-Acetaminophen Nausea And Vomiting    Oxycodone Nausea And Vomiting     hallucianitions    Promethazine Nausea And Vomiting       REVIEW OF SYSTEMS  10 systems reviewed, pertinent positives per HPI otherwise noted to be negative. PHYSICAL EXAM  /82   Pulse 83   Temp 97.8 °F (36.6 °C) (Oral)   Resp 16   Ht 5' 2\" (1.575 m)   Wt 139 lb (63 kg)   LMP 12/21/2012   SpO2 95%   BMI 25.42 kg/m²   GENERAL APPEARANCE: Awake and alert. Cooperative. No acute distress. HEAD: Normocephalic. Atraumatic. EYES: PERRL. EOM's grossly intact. ENT: Mucous membranes are moist.   NECK: Supple. HEART: RRR. CHEST/LUNGS: Chest atraumatic, nontender, respirations unlabored. CTAB. Good air exchange. Speaking comfortably in full sentences. BACK: No midline spinal tenderness or step-off. ABDOMEN: Soft. Non-distended. Non-tender. No guarding or rebound. Normal bowel sounds. EXTREMITIES: No peripheral edema. Moves all extremities equally. All extremities neurovascularly intact. RECTAL/: Deferred  SKIN: Warm and dry. No acute rashes. NEUROLOGICAL: Alert and oriented. CN 2-12 intact, No gross facial drooping. Strength 5/5, sensation intact. Normal coordination. Gait normal.   PSYCHIATRIC: Normal mood and affect. LABS  I have reviewed all labs for this visit.    Results for orders placed or performed °C), temperature source Oral, resp. rate 16, height 5' 2\" (1.575 m), weight 139 lb (63 kg), last menstrual period 12/21/2012, SpO2 95 %. [address] was admitted in stable condition. This chart was generated in part by using Dragon Dictation system and may contain errors related to that system including errors in grammar, punctuation, and spelling, as well as words and phrases that may be inappropriate. When dictating, effort is made to correct spelling/grammar errors. If there are any questions or concerns please feel free to contact the dictating provider for clarification.      Benson Dunn DO  ATTENDING, [address] [place] [address],   05/08/20 7851

## 2020-05-09 NOTE — CONSULTS
[address]  Advanced CHF/Pulmonary Hypertension   Cardiac Evaluation      Sharon Gillespie  YOB: 1960    Requesting PHysician:  Dr. Diana Mitchell      Chief Complaint   Patient presents with    Chest Pain     pt reports stress test this morning with abnormal results, pt instructed to follow up; pt reports chest pain on and off today; 324 asa and 1 nitro given by EMS         History of Present Illness:  Erika Givens is a 61 yo female who presented to the ED with intermittent chest and back pain. She was seen in our office earlier this week for the same symptoms and underwent stress testing yesterday with echo. The results are below, but basically the stress test showed a perfusion defect in the apex. Based upon the results of the stress test, she was being set up for cardiac cath this week. She was told to come to the ER if symptoms recurred/worsened. Last evening, the pain recurred with radiation into her back. No shortness of breath or nausea. No vomiting, headache, chills, or fever. No recent exposure to [address]. She is currently pain free. Her troponins are negative. EKG shows new T wave inversion in lateral leads. We are consulted for chest pain and abnormal stress. Her chest pain started in January of this year. It is described as a pressure with radiation to back and shoulders. Pain is worse at night when she lays down. The pain has been increasing in frequency over the past couple of months. She has a family history of heart disease in her mother. Denies weight gain, edema, palpitations, and syncope. Stress Myoview:  5/8/20:   Normal LVEF >60%    Anteroseptal/apical hypokinesis    Mixed anteroseptal/apical defect consistent with mixed ischemia/scar of this    territory        Overall, this would be considered an abnormal, high risk, study     Echo:  5/8/20:   Normal left ventricle systolic function with an estimated ejection fraction   of 55%.    No regional COLONOSCOPY      COLONOSCOPY  8/19/13    normal    COLONOSCOPY  10/19/2018    1 polyp    COLONOSCOPY N/A 10/19/2018    COLONOSCOPY POLYPECTOMY SNARE/COLD BIOPSY performed by Renetta Moctezuma MD at [facility] HYSTEROSCOPY  1/25/2013    Laprascopic supracervical hysterectomy     Family History   Problem Relation Age of Onset    Diabetes Mother     Heart Disease Mother     Cancer Father         colon     Social History     Socioeconomic History    Marital status:      Spouse name: Not on file    Number of children: Not on file    Years of education: Not on file    Highest education level: Not on file   Occupational History    Not on file   Social Needs    Financial resource strain: Not on file    Food insecurity     Worry: Not on file     Inability: Not on file    Transportation needs     Medical: Not on file     Non-medical: Not on file   Tobacco Use    Smoking status: Never Smoker    Smokeless tobacco: Never Used   Substance and Sexual Activity    Alcohol use: No    Drug use: No    Sexual activity: Not on file   Lifestyle    Physical activity     Days per week: Not on file     Minutes per session: Not on file    Stress: Not on file   Relationships    Social connections     Talks on phone: Not on file     Gets together: Not on file     Attends [employer]ism service: Not on file     Active member of club or organization: Not on file     Attends meetings of clubs or organizations: Not on file     Relationship status: Not on file    Intimate partner violence     Fear of current or ex partner: Not on file     Emotionally abused: Not on file     Physically abused: Not on file     Forced sexual activity: Not on file   Other Topics Concern    Not on file   Social History Narrative    Not on file       Review of Systems:   · Constitutional: there has been no unanticipated weight loss. There's been no change in energy level, sleep pattern, or activity level.

## 2020-05-09 NOTE — H&P
Prophylaxis: lovenox   Diet: Diet NPO, After Midnight  Code Status: Full Code      Dispo - inpt. Narayan Harris MD    Thank you Marga Benjamin for the opportunity to be involved in this patient's care. If you have any questions or concerns please feel free to contact me at [phone].

## 2020-05-09 NOTE — ED NOTES
[facility] @ 2830   Re: CP, recent abnormal stress  Dr. Rakesh Hamilton @ [facility]  05/08/20 [id number] [phone]

## 2020-05-10 LAB
ALBUMIN SERPL-MCNC: 4.1 G/DL (ref 3.4–5)
ANION GAP SERPL CALCULATED.3IONS-SCNC: 12 MMOL/L (ref 3–16)
BUN BLDV-MCNC: 16 MG/DL (ref 7–20)
CALCIUM SERPL-MCNC: 9.6 MG/DL (ref 8.3–10.6)
CHLORIDE BLD-SCNC: 100 MMOL/L (ref 99–110)
CO2: 26 MMOL/L (ref 21–32)
CREAT SERPL-MCNC: <0.5 MG/DL (ref 0.6–1.1)
ESTIMATED AVERAGE GLUCOSE: 243.2 MG/DL
GFR AFRICAN AMERICAN: >60
GFR NON-AFRICAN AMERICAN: >60
GLUCOSE BLD-MCNC: 288 MG/DL (ref 70–99)
GLUCOSE BLD-MCNC: 294 MG/DL (ref 70–99)
GLUCOSE BLD-MCNC: 299 MG/DL (ref 70–99)
GLUCOSE BLD-MCNC: 304 MG/DL (ref 70–99)
GLUCOSE BLD-MCNC: 354 MG/DL (ref 70–99)
HBA1C MFR BLD: 10.1 %
HCT VFR BLD CALC: 41 % (ref 36–48)
HEMOGLOBIN: 14.2 G/DL (ref 12–16)
MCH RBC QN AUTO: 29 PG (ref 26–34)
MCHC RBC AUTO-ENTMCNC: 34.6 G/DL (ref 31–36)
MCV RBC AUTO: 83.8 FL (ref 80–100)
PDW BLD-RTO: 13.8 % (ref 12.4–15.4)
PERFORMED ON: ABNORMAL
PHOSPHORUS: 3.3 MG/DL (ref 2.5–4.9)
PLATELET # BLD: 347 K/UL (ref 135–450)
PMV BLD AUTO: 6.7 FL (ref 5–10.5)
POTASSIUM SERPL-SCNC: 4.6 MMOL/L (ref 3.5–5.1)
RBC # BLD: 4.9 M/UL (ref 4–5.2)
SODIUM BLD-SCNC: 138 MMOL/L (ref 136–145)
WBC # BLD: 13.1 K/UL (ref 4–11)

## 2020-05-10 PROCEDURE — 1200000000 HC SEMI PRIVATE

## 2020-05-10 PROCEDURE — 2580000003 HC RX 258: Performed by: INTERNAL MEDICINE

## 2020-05-10 PROCEDURE — 6370000000 HC RX 637 (ALT 250 FOR IP): Performed by: NURSE PRACTITIONER

## 2020-05-10 PROCEDURE — 36415 COLL VENOUS BLD VENIPUNCTURE: CPT

## 2020-05-10 PROCEDURE — 2580000003 HC RX 258: Performed by: NURSE PRACTITIONER

## 2020-05-10 PROCEDURE — 6370000000 HC RX 637 (ALT 250 FOR IP): Performed by: INTERNAL MEDICINE

## 2020-05-10 PROCEDURE — 85027 COMPLETE CBC AUTOMATED: CPT

## 2020-05-10 PROCEDURE — 80069 RENAL FUNCTION PANEL: CPT

## 2020-05-10 PROCEDURE — 6360000002 HC RX W HCPCS: Performed by: INTERNAL MEDICINE

## 2020-05-10 PROCEDURE — 99232 SBSQ HOSP IP/OBS MODERATE 35: CPT | Performed by: NURSE PRACTITIONER

## 2020-05-10 RX ORDER — SODIUM CHLORIDE 9 MG/ML
INJECTION, SOLUTION INTRAVENOUS CONTINUOUS
Status: DISCONTINUED | OUTPATIENT
Start: 2020-05-11 | End: 2020-05-11

## 2020-05-10 RX ORDER — SODIUM CHLORIDE 0.9 % (FLUSH) 0.9 %
10 SYRINGE (ML) INJECTION PRN
Status: DISCONTINUED | OUTPATIENT
Start: 2020-05-10 | End: 2020-05-12 | Stop reason: HOSPADM

## 2020-05-10 RX ORDER — SODIUM CHLORIDE 0.9 % (FLUSH) 0.9 %
10 SYRINGE (ML) INJECTION EVERY 12 HOURS SCHEDULED
Status: DISCONTINUED | OUTPATIENT
Start: 2020-05-10 | End: 2020-05-12 | Stop reason: HOSPADM

## 2020-05-10 RX ADMIN — ASPIRIN 325 MG: 325 TABLET, COATED ORAL at 08:06

## 2020-05-10 RX ADMIN — NITROGLYCERIN 0.5 INCH: 20 OINTMENT TOPICAL at 22:40

## 2020-05-10 RX ADMIN — SODIUM CHLORIDE: 9 INJECTION, SOLUTION INTRAVENOUS at 23:53

## 2020-05-10 RX ADMIN — ONDANSETRON 4 MG: 2 INJECTION INTRAMUSCULAR; INTRAVENOUS at 22:48

## 2020-05-10 RX ADMIN — INSULIN LISPRO 3 UNITS: 100 INJECTION, SOLUTION INTRAVENOUS; SUBCUTANEOUS at 20:44

## 2020-05-10 RX ADMIN — METOPROLOL TARTRATE 12.5 MG: 25 TABLET, FILM COATED ORAL at 20:43

## 2020-05-10 RX ADMIN — Medication 10 ML: at 20:41

## 2020-05-10 RX ADMIN — ATORVASTATIN CALCIUM 40 MG: 40 TABLET, FILM COATED ORAL at 20:39

## 2020-05-10 RX ADMIN — LISINOPRIL 2.5 MG: 2.5 TABLET ORAL at 08:06

## 2020-05-10 RX ADMIN — GEMFIBROZIL 600 MG: 600 TABLET ORAL at 08:06

## 2020-05-10 RX ADMIN — ENOXAPARIN SODIUM 40 MG: 40 INJECTION SUBCUTANEOUS at 08:06

## 2020-05-10 RX ADMIN — INSULIN LISPRO 3 UNITS: 100 INJECTION, SOLUTION INTRAVENOUS; SUBCUTANEOUS at 12:05

## 2020-05-10 RX ADMIN — NITROGLYCERIN 0.5 INCH: 20 OINTMENT TOPICAL at 14:38

## 2020-05-10 RX ADMIN — INSULIN LISPRO 4 UNITS: 100 INJECTION, SOLUTION INTRAVENOUS; SUBCUTANEOUS at 08:06

## 2020-05-10 RX ADMIN — NITROGLYCERIN 0.5 INCH: 20 OINTMENT TOPICAL at 05:50

## 2020-05-10 RX ADMIN — Medication 10 ML: at 09:01

## 2020-05-10 RX ADMIN — GEMFIBROZIL 600 MG: 600 TABLET ORAL at 16:56

## 2020-05-10 RX ADMIN — Medication 10 ML: at 20:42

## 2020-05-10 RX ADMIN — INSULIN LISPRO 3 UNITS: 100 INJECTION, SOLUTION INTRAVENOUS; SUBCUTANEOUS at 16:57

## 2020-05-10 ASSESSMENT — ENCOUNTER SYMPTOMS
BACK PAIN: 1
SHORTNESS OF BREATH: 0

## 2020-05-10 NOTE — PROGRESS NOTES
Secure page sent to Aline Mederos NP \"Per [facility], pt had 9 beats of Vtach. Pt is asymptomatic. Please advise.  Thanks\"

## 2020-05-11 ENCOUNTER — APPOINTMENT (OUTPATIENT)
Dept: CARDIAC CATH/INVASIVE PROCEDURES | Age: 60
DRG: 175 | End: 2020-05-11
Payer: MEDICARE

## 2020-05-11 LAB
ALBUMIN SERPL-MCNC: 4 G/DL (ref 3.4–5)
ANION GAP SERPL CALCULATED.3IONS-SCNC: 12 MMOL/L (ref 3–16)
BUN BLDV-MCNC: 16 MG/DL (ref 7–20)
CALCIUM SERPL-MCNC: 9.5 MG/DL (ref 8.3–10.6)
CHLORIDE BLD-SCNC: 100 MMOL/L (ref 99–110)
CO2: 23 MMOL/L (ref 21–32)
CREAT SERPL-MCNC: <0.5 MG/DL (ref 0.6–1.1)
GFR AFRICAN AMERICAN: >60
GFR NON-AFRICAN AMERICAN: >60
GLUCOSE BLD-MCNC: 198 MG/DL (ref 70–99)
GLUCOSE BLD-MCNC: 233 MG/DL (ref 70–99)
GLUCOSE BLD-MCNC: 260 MG/DL (ref 70–99)
GLUCOSE BLD-MCNC: 266 MG/DL (ref 70–99)
GLUCOSE BLD-MCNC: 281 MG/DL (ref 70–99)
HCT VFR BLD CALC: 39.1 % (ref 36–48)
HEMOGLOBIN: 13.5 G/DL (ref 12–16)
LV EF: 60 %
LVEF MODALITY: NORMAL
MCH RBC QN AUTO: 28.9 PG (ref 26–34)
MCHC RBC AUTO-ENTMCNC: 34.4 G/DL (ref 31–36)
MCV RBC AUTO: 84 FL (ref 80–100)
PDW BLD-RTO: 13.7 % (ref 12.4–15.4)
PERFORMED ON: ABNORMAL
PHOSPHORUS: 3.4 MG/DL (ref 2.5–4.9)
PLATELET # BLD: 341 K/UL (ref 135–450)
PMV BLD AUTO: 6.5 FL (ref 5–10.5)
POTASSIUM SERPL-SCNC: 4.1 MMOL/L (ref 3.5–5.1)
RBC # BLD: 4.65 M/UL (ref 4–5.2)
SODIUM BLD-SCNC: 135 MMOL/L (ref 136–145)
WBC # BLD: 12.9 K/UL (ref 4–11)

## 2020-05-11 PROCEDURE — B2111ZZ FLUOROSCOPY OF MULTIPLE CORONARY ARTERIES USING LOW OSMOLAR CONTRAST: ICD-10-PCS | Performed by: INTERNAL MEDICINE

## 2020-05-11 PROCEDURE — C1874 STENT, COATED/COV W/DEL SYS: HCPCS

## 2020-05-11 PROCEDURE — 6370000000 HC RX 637 (ALT 250 FOR IP): Performed by: INTERNAL MEDICINE

## 2020-05-11 PROCEDURE — 6370000000 HC RX 637 (ALT 250 FOR IP): Performed by: NURSE PRACTITIONER

## 2020-05-11 PROCEDURE — C1769 GUIDE WIRE: HCPCS

## 2020-05-11 PROCEDURE — 027034Z DILATION OF CORONARY ARTERY, ONE ARTERY WITH DRUG-ELUTING INTRALUMINAL DEVICE, PERCUTANEOUS APPROACH: ICD-10-PCS | Performed by: INTERNAL MEDICINE

## 2020-05-11 PROCEDURE — B2151ZZ FLUOROSCOPY OF LEFT HEART USING LOW OSMOLAR CONTRAST: ICD-10-PCS | Performed by: INTERNAL MEDICINE

## 2020-05-11 PROCEDURE — 85027 COMPLETE CBC AUTOMATED: CPT

## 2020-05-11 PROCEDURE — 92928 PRQ TCAT PLMT NTRAC ST 1 LES: CPT | Performed by: INTERNAL MEDICINE

## 2020-05-11 PROCEDURE — 92928 PRQ TCAT PLMT NTRAC ST 1 LES: CPT

## 2020-05-11 PROCEDURE — 1200000000 HC SEMI PRIVATE

## 2020-05-11 PROCEDURE — 6360000002 HC RX W HCPCS: Performed by: INTERNAL MEDICINE

## 2020-05-11 PROCEDURE — 93458 L HRT ARTERY/VENTRICLE ANGIO: CPT | Performed by: INTERNAL MEDICINE

## 2020-05-11 PROCEDURE — C1894 INTRO/SHEATH, NON-LASER: HCPCS

## 2020-05-11 PROCEDURE — 4A023N7 MEASUREMENT OF CARDIAC SAMPLING AND PRESSURE, LEFT HEART, PERCUTANEOUS APPROACH: ICD-10-PCS | Performed by: INTERNAL MEDICINE

## 2020-05-11 PROCEDURE — 36415 COLL VENOUS BLD VENIPUNCTURE: CPT

## 2020-05-11 PROCEDURE — 6360000002 HC RX W HCPCS

## 2020-05-11 PROCEDURE — 2500000003 HC RX 250 WO HCPCS

## 2020-05-11 PROCEDURE — 85347 COAGULATION TIME ACTIVATED: CPT

## 2020-05-11 PROCEDURE — C1725 CATH, TRANSLUMIN NON-LASER: HCPCS

## 2020-05-11 PROCEDURE — 2580000003 HC RX 258: Performed by: INTERNAL MEDICINE

## 2020-05-11 PROCEDURE — 2709999900 HC NON-CHARGEABLE SUPPLY

## 2020-05-11 PROCEDURE — 93458 L HRT ARTERY/VENTRICLE ANGIO: CPT

## 2020-05-11 PROCEDURE — 80069 RENAL FUNCTION PANEL: CPT

## 2020-05-11 RX ORDER — SODIUM CHLORIDE 0.9 % (FLUSH) 0.9 %
10 SYRINGE (ML) INJECTION PRN
Status: DISCONTINUED | OUTPATIENT
Start: 2020-05-11 | End: 2020-05-12 | Stop reason: HOSPADM

## 2020-05-11 RX ORDER — MIDAZOLAM HYDROCHLORIDE 5 MG/ML
INJECTION INTRAMUSCULAR; INTRAVENOUS
Status: COMPLETED | OUTPATIENT
Start: 2020-05-11 | End: 2020-05-11

## 2020-05-11 RX ORDER — SODIUM CHLORIDE 0.9 % (FLUSH) 0.9 %
10 SYRINGE (ML) INJECTION EVERY 12 HOURS SCHEDULED
Status: DISCONTINUED | OUTPATIENT
Start: 2020-05-11 | End: 2020-05-12 | Stop reason: HOSPADM

## 2020-05-11 RX ORDER — ASPIRIN 81 MG/1
81 TABLET ORAL DAILY
Status: DISCONTINUED | OUTPATIENT
Start: 2020-05-12 | End: 2020-05-12 | Stop reason: HOSPADM

## 2020-05-11 RX ORDER — FENTANYL CITRATE 50 UG/ML
INJECTION, SOLUTION INTRAMUSCULAR; INTRAVENOUS
Status: COMPLETED | OUTPATIENT
Start: 2020-05-11 | End: 2020-05-11

## 2020-05-11 RX ADMIN — ASPIRIN 325 MG: 325 TABLET, COATED ORAL at 08:11

## 2020-05-11 RX ADMIN — ONDANSETRON 4 MG: 2 INJECTION INTRAMUSCULAR; INTRAVENOUS at 14:24

## 2020-05-11 RX ADMIN — FENTANYL CITRATE 25 MCG: 50 INJECTION, SOLUTION INTRAMUSCULAR; INTRAVENOUS at 11:17

## 2020-05-11 RX ADMIN — GEMFIBROZIL 600 MG: 600 TABLET ORAL at 16:44

## 2020-05-11 RX ADMIN — ONDANSETRON 4 MG: 2 INJECTION INTRAMUSCULAR; INTRAVENOUS at 14:25

## 2020-05-11 RX ADMIN — METOPROLOL TARTRATE 12.5 MG: 25 TABLET, FILM COATED ORAL at 20:39

## 2020-05-11 RX ADMIN — TICAGRELOR 180 MG: 90 TABLET ORAL at 12:01

## 2020-05-11 RX ADMIN — MIDAZOLAM HYDROCHLORIDE 2 MG: 5 INJECTION INTRAMUSCULAR; INTRAVENOUS at 11:17

## 2020-05-11 RX ADMIN — INSULIN LISPRO 3 UNITS: 100 INJECTION, SOLUTION INTRAVENOUS; SUBCUTANEOUS at 16:37

## 2020-05-11 RX ADMIN — GEMFIBROZIL 600 MG: 600 TABLET ORAL at 08:11

## 2020-05-11 RX ADMIN — Medication 10 ML: at 20:39

## 2020-05-11 RX ADMIN — LISINOPRIL 2.5 MG: 2.5 TABLET ORAL at 08:11

## 2020-05-11 RX ADMIN — ATORVASTATIN CALCIUM 40 MG: 40 TABLET, FILM COATED ORAL at 20:39

## 2020-05-11 RX ADMIN — TICAGRELOR 90 MG: 90 TABLET ORAL at 20:38

## 2020-05-11 RX ADMIN — INSULIN LISPRO 2 UNITS: 100 INJECTION, SOLUTION INTRAVENOUS; SUBCUTANEOUS at 20:39

## 2020-05-11 RX ADMIN — METOPROLOL TARTRATE 12.5 MG: 25 TABLET, FILM COATED ORAL at 08:11

## 2020-05-11 RX ADMIN — NITROGLYCERIN 0.5 INCH: 20 OINTMENT TOPICAL at 07:33

## 2020-05-11 ASSESSMENT — PAIN SCALES - GENERAL
PAINLEVEL_OUTOF10: 0

## 2020-05-11 NOTE — PROGRESS NOTES
Hospitalist Progress Note      PCP: Shad Moreno    Date of Admission: 5/8/2020    Chief Complaint: Chest Pain     Subjective: Pending angiogram this AM      Medications:  Reviewed    Infusion Medications    sodium chloride 75 mL/hr at 05/10/20 2353    dextrose       Scheduled Medications    [START ON 5/12/2020] aspirin  81 mg Oral Daily    metoprolol tartrate  12.5 mg Oral BID    sodium chloride flush  10 mL Intravenous 2 times per day    gemfibrozil  600 mg Oral BID WC    lisinopril  2.5 mg Oral Daily    sodium chloride flush  10 mL Intravenous 2 times per day    atorvastatin  40 mg Oral Nightly    enoxaparin  40 mg Subcutaneous Daily    insulin lispro  0-6 Units Subcutaneous TID WC    insulin lispro  0-3 Units Subcutaneous Nightly    nitroglycerin  0.5 inch Topical 3 times per day     PRN Meds: sodium chloride flush, sodium chloride flush, polyethylene glycol, [DISCONTINUED] promethazine **OR** ondansetron, nitroGLYCERIN, glucose, dextrose, glucagon (rDNA), dextrose      Intake/Output Summary (Last 24 hours) at 5/11/2020 1220  Last data filed at 5/10/2020 1845  Gross per 24 hour   Intake 360 ml   Output --   Net 360 ml       Physical Exam Performed:    /67   Pulse 78   Temp 98.1 °F (36.7 °C) (Oral)   Resp 16   Ht 5' 2\" (1.575 m)   Wt 138 lb (62.6 kg)   LMP 12/21/2012   SpO2 97%   BMI 25.24 kg/m²     General appearance: No apparent distress, appears stated age and cooperative. HEENT: Pupils equal, round, and reactive to light. Conjunctivae/corneas clear. Neck: Supple, with full range of motion. No jugular venous distention. Trachea midline. Respiratory:  Normal respiratory effort. Clear to auscultation, bilaterally without Rales/Wheezes/Rhonchi. Cardiovascular: Regular rate and rhythm with normal S1/S2 without murmurs, rubs or gallops. Abdomen: Soft, non-tender, non-distended with normal bowel sounds. Musculoskeletal: No clubbing, cyanosis or edema bilaterally.   Full range and med adjustment w/ PCP     Leukocytosis - of unclear etiology w/ equivocal CXR. Will follow clinically of ABX. Will continue to follow serial labs. Reviewed and documented as above.     DM2 - controlled on home oral antiGlycemics - held. Follow FSBS/SSI low regimen. Last HbA1c 8.7% dated Sept 2014. Anticipate resuming/continuing home regimen at discharge.        DVT Prophylaxis: LMWH  Diet: Diet NPO Time Specified  Code Status: Full Code      PT/OT Eval Status: not yet ordered. Dispo - Likely Monday/Tues pending Cardiology recs re: possible Cath.      DEV Curiel - CNP

## 2020-05-11 NOTE — PROCEDURES
CARDIAC CATHETERIZATION REPORT     Procedure Date:  2020  Patient Name: Karon Fisher  MRN: [id number] : 1960      INDICATION     Unstable angina    PROCEDURES PERFORMED     Left heart catheterization  LVgram  Coronary angiogam  Coronary cath  PCI of LAD with single drug eluting stent          PROCEDURE DESCRIPTION   Risks/benefits/alternatives/outcomes were discussed with patient and/or family and informed consent was obtained. Using the [facility] scale, the patient's right radial artery was found to be a level B. Patient was prepped draped in the usual sterile fashion. Local anaesthetic was applied over puncture site. Using a front wall technique, a 4/5 [place] Terumo sheath was inserted into right radial artery. Verapamil, nitroglycerin, nicardipine were administered through the sheath. Heparin was administered. Diagnostic 5fr [employer] pigtail and ultra catheters were used for diagnostic angiograms. Left sided angio could not be performed with diagnostic catheters, as such, guide catheter was used and attentino turned to PCI as noted below. At the conclusion of the procedure, a TR band was placed over the puncture site and hemostasis was obtained. There were no immediate complications. I supervised sedation with versed 2 mg/fentanyl 25 Mcg during the procedure. 200 cc contrast was utilized. <20cc EBL. I called  at number in chart, no answer, voicemail not set up. FINDINGS       LVGRAM    LVEDP  7   GRADIENT ACROSS AORTIC VALVE  none   LV FUNCTION EF 60 %   WALL MOTION  normal   MITRAL REGURGITATION  mild       CORONARY ARTERIES    LM  Short vessel, less than 10% rfrmmxml-oau-xfyizm stenosis       LAD Small to medium caliber vessel, there is proximal-mid 90% stenosis. Distal vessel is tortuous with a 50% stenosis. LCX  Large vessel, dominant, less than 10% desembli-nit-uoipiq stenosis.        RCA Nondominant, small, proximal spasm is noted which improved with IC

## 2020-05-11 NOTE — PLAN OF CARE
Pt A&Ox4 lying in bed. Pt orientated to room and call light. Bed is in lowest position with wheels locked. 2/4 siderails raised. Non-slip socks are on. Room is well lit. Call light within reach, pt educated on falls protocol, pt refuses bed check. Started pt on PO metoprolol, pt educated on side effects. Educated pt to call for assistance if any dizziness or light headedness, and not to ambulate unless pt feels she can do so safely without assistance. Pt verbalizes understanding and states will call RN if needs assistance ambulating. Will continue to monitor.

## 2020-05-11 NOTE — PROGRESS NOTES
on room air    Sedation/Anesthesia Plan:  Guard the patient's safety and welfare. Minimize physical discomfort and pain. Minimize negative psychological responses to treatment by providing sedation and analgesia and maximize the potential amnesia. Patient to meet pre-procedure discharge plan.     Medication Planned:  midazolam intravenously and fentanyl intravenously    Patient is an appropriate candidate for plan of sedation: yes      Electronically signed by Alex Boggs MD on 5/11/2020 at 9:13 AM

## 2020-05-12 ENCOUNTER — TELEPHONE (OUTPATIENT)
Dept: CARDIOLOGY | Age: 60
End: 2020-05-12

## 2020-05-12 VITALS
DIASTOLIC BLOOD PRESSURE: 74 MMHG | BODY MASS INDEX: 25.14 KG/M2 | WEIGHT: 136.6 LBS | HEIGHT: 62 IN | OXYGEN SATURATION: 98 % | RESPIRATION RATE: 18 BRPM | TEMPERATURE: 98 F | SYSTOLIC BLOOD PRESSURE: 119 MMHG | HEART RATE: 81 BPM

## 2020-05-12 PROBLEM — E11.9 TYPE 2 DIABETES MELLITUS WITHOUT COMPLICATION, WITH LONG-TERM CURRENT USE OF INSULIN (HCC): Status: ACTIVE | Noted: 2020-05-12

## 2020-05-12 PROBLEM — I25.110 CORONARY ARTERY DISEASE INVOLVING NATIVE CORONARY ARTERY OF NATIVE HEART WITH UNSTABLE ANGINA PECTORIS (HCC): Status: ACTIVE | Noted: 2020-05-12

## 2020-05-12 PROBLEM — E78.2 MIXED HYPERLIPIDEMIA: Status: ACTIVE | Noted: 2020-05-12

## 2020-05-12 PROBLEM — Z79.4 TYPE 2 DIABETES MELLITUS WITHOUT COMPLICATION, WITH LONG-TERM CURRENT USE OF INSULIN (HCC): Status: ACTIVE | Noted: 2020-05-12

## 2020-05-12 LAB
ANION GAP SERPL CALCULATED.3IONS-SCNC: 12 MMOL/L (ref 3–16)
BUN BLDV-MCNC: 13 MG/DL (ref 7–20)
CALCIUM SERPL-MCNC: 9.3 MG/DL (ref 8.3–10.6)
CHLORIDE BLD-SCNC: 101 MMOL/L (ref 99–110)
CHOLESTEROL, TOTAL: 138 MG/DL (ref 0–199)
CO2: 23 MMOL/L (ref 21–32)
CREAT SERPL-MCNC: <0.5 MG/DL (ref 0.6–1.1)
EKG ATRIAL RATE: 79 BPM
EKG DIAGNOSIS: NORMAL
EKG P AXIS: 55 DEGREES
EKG P-R INTERVAL: 138 MS
EKG Q-T INTERVAL: 370 MS
EKG QRS DURATION: 74 MS
EKG QTC CALCULATION (BAZETT): 424 MS
EKG R AXIS: 29 DEGREES
EKG T AXIS: 77 DEGREES
EKG VENTRICULAR RATE: 79 BPM
GFR AFRICAN AMERICAN: >60
GFR NON-AFRICAN AMERICAN: >60
GLUCOSE BLD-MCNC: 258 MG/DL (ref 70–99)
GLUCOSE BLD-MCNC: 261 MG/DL (ref 70–99)
GLUCOSE BLD-MCNC: 270 MG/DL (ref 70–99)
HCT VFR BLD CALC: 38.2 % (ref 36–48)
HDLC SERPL-MCNC: 37 MG/DL (ref 40–60)
HEMOGLOBIN: 13.3 G/DL (ref 12–16)
LDL CHOLESTEROL CALCULATED: 76 MG/DL
MCH RBC QN AUTO: 29 PG (ref 26–34)
MCHC RBC AUTO-ENTMCNC: 34.7 G/DL (ref 31–36)
MCV RBC AUTO: 83.6 FL (ref 80–100)
PDW BLD-RTO: 13.5 % (ref 12.4–15.4)
PERFORMED ON: ABNORMAL
PERFORMED ON: ABNORMAL
PLATELET # BLD: 323 K/UL (ref 135–450)
PMV BLD AUTO: 6.2 FL (ref 5–10.5)
POTASSIUM SERPL-SCNC: 4.1 MMOL/L (ref 3.5–5.1)
RBC # BLD: 4.57 M/UL (ref 4–5.2)
SODIUM BLD-SCNC: 136 MMOL/L (ref 136–145)
TRIGL SERPL-MCNC: 123 MG/DL (ref 0–150)
VLDLC SERPL CALC-MCNC: 25 MG/DL
WBC # BLD: 12.9 K/UL (ref 4–11)

## 2020-05-12 PROCEDURE — 6370000000 HC RX 637 (ALT 250 FOR IP): Performed by: INTERNAL MEDICINE

## 2020-05-12 PROCEDURE — 93005 ELECTROCARDIOGRAM TRACING: CPT | Performed by: INTERNAL MEDICINE

## 2020-05-12 PROCEDURE — 2580000003 HC RX 258: Performed by: INTERNAL MEDICINE

## 2020-05-12 PROCEDURE — 80048 BASIC METABOLIC PNL TOTAL CA: CPT

## 2020-05-12 PROCEDURE — 6370000000 HC RX 637 (ALT 250 FOR IP): Performed by: NURSE PRACTITIONER

## 2020-05-12 PROCEDURE — 99233 SBSQ HOSP IP/OBS HIGH 50: CPT | Performed by: NURSE PRACTITIONER

## 2020-05-12 PROCEDURE — 2580000003 HC RX 258: Performed by: NURSE PRACTITIONER

## 2020-05-12 PROCEDURE — 6360000002 HC RX W HCPCS: Performed by: INTERNAL MEDICINE

## 2020-05-12 PROCEDURE — 93010 ELECTROCARDIOGRAM REPORT: CPT | Performed by: INTERNAL MEDICINE

## 2020-05-12 PROCEDURE — 36415 COLL VENOUS BLD VENIPUNCTURE: CPT

## 2020-05-12 PROCEDURE — 80061 LIPID PANEL: CPT

## 2020-05-12 PROCEDURE — 85027 COMPLETE CBC AUTOMATED: CPT

## 2020-05-12 RX ORDER — ROSUVASTATIN CALCIUM 10 MG/1
10 TABLET, COATED ORAL DAILY
Qty: 30 TABLET | Refills: 3 | Status: SHIPPED | OUTPATIENT
Start: 2020-05-12 | End: 2020-09-09

## 2020-05-12 RX ORDER — ASPIRIN 81 MG/1
81 TABLET ORAL DAILY
Qty: 30 TABLET | Refills: 3 | Status: SHIPPED | OUTPATIENT
Start: 2020-05-13 | End: 2020-09-09

## 2020-05-12 RX ORDER — NITROGLYCERIN 0.4 MG/1
TABLET SUBLINGUAL
Qty: 25 TABLET | Refills: 3 | Status: SHIPPED | OUTPATIENT
Start: 2020-05-12 | End: 2021-07-07 | Stop reason: SDUPTHER

## 2020-05-12 RX ADMIN — METOPROLOL TARTRATE 12.5 MG: 25 TABLET, FILM COATED ORAL at 09:20

## 2020-05-12 RX ADMIN — LISINOPRIL 2.5 MG: 2.5 TABLET ORAL at 09:20

## 2020-05-12 RX ADMIN — INSULIN LISPRO 3 UNITS: 100 INJECTION, SOLUTION INTRAVENOUS; SUBCUTANEOUS at 12:33

## 2020-05-12 RX ADMIN — Medication 10 ML: at 09:23

## 2020-05-12 RX ADMIN — TICAGRELOR 90 MG: 90 TABLET ORAL at 09:20

## 2020-05-12 RX ADMIN — Medication 10 ML: at 09:21

## 2020-05-12 RX ADMIN — ASPIRIN 81 MG: 81 TABLET, COATED ORAL at 09:20

## 2020-05-12 RX ADMIN — GEMFIBROZIL 600 MG: 600 TABLET ORAL at 07:55

## 2020-05-12 RX ADMIN — INSULIN LISPRO 3 UNITS: 100 INJECTION, SOLUTION INTRAVENOUS; SUBCUTANEOUS at 07:56

## 2020-05-12 ASSESSMENT — PAIN SCALES - GENERAL: PAINLEVEL_OUTOF10: 0

## 2020-05-12 NOTE — TELEPHONE ENCOUNTER
I made a virtual visit appointment. Below information put on patient's AVS. Follow-up appointment on May 28th at [address] [place] [address] with Aristides Cruz. This will be a virtual visit the office will call you with instructions. You will need a smart phone or device with a video camera and microphone. You will need this link [email or web address] for your appointment. If possible check your temperature, weight, and blood pressure and have the information available. Also have a list of your current medications. If it is changed to an office visit you and your one visitor will need to have your mouth and nose covered. This can be with a mask, bandana, scarf, or T shirt. No children please. [facility],  [facility], [address] [place] [address], [address] [place] [address]. Office #: [phone]. If you are unable to make this appointment, please call to reschedule.

## 2020-05-12 NOTE — PLAN OF CARE
Problem: Falls - Risk of:  Goal: Will remain free from falls  Description: Will remain free from falls  5/12/2020 0959 by Mayank Gallegos RN  Outcome: Ongoing  5/11/2020 2149 by Sharad Draper RN  Outcome: Ongoing  Goal: Absence of physical injury  Description: Absence of physical injury  5/11/2020 2149 by Sharad Draper RN  Outcome: Ongoing     Problem: Pain:  Goal: Control of acute pain  Description: Control of acute pain  Outcome: Ongoing     Problem: Metabolic:  Goal: Ability to maintain appropriate glucose levels will improve  Description: Ability to maintain appropriate glucose levels will improve  Outcome: Ongoing

## 2020-05-12 NOTE — PROGRESS NOTES
[facility] 82   Daily Progress Note    Admit Date:  5/8/2020  HPI:    Chief Complaint   Patient presents with    Chest Pain     pt reports stress test this morning with abnormal results, pt instructed to follow up; pt reports chest pain on and off today; 324 asa and 1 nitro given by EMS       Presented with chest pain and back pain, negative troponin's, ECG with non-specific ST/ T wave changes. Was seen (VV) in office with Dr. Aziza Burrows last week leading to echo and stress test.  The stress test showed anterior/ apical defect. [facility] with PCI to LAD on 5/11/20. Subjective:  Ms. David Solomon sitting up in bed, complains of weakness and tiredness though improved over last 24 hours. Denies chest pain, back pain or shortness of breath. Tolerating medicines so far. Has multiple questions - spent > 25 minutes in direct discussion with patient. Objective:   /62   Pulse 89   Temp 98 °F (36.7 °C) (Oral)   Resp 18   Ht 5' 2\" (1.575 m)   Wt 136 lb 9.6 oz (62 kg)   LMP 12/21/2012   SpO2 100%   BMI 24.98 kg/m²       Intake/Output Summary (Last 24 hours) at 5/12/2020 0924  Last data filed at 5/12/2020 0749  Gross per 24 hour   Intake 360 ml   Output 1750 ml   Net -1390 ml     Wt Readings from Last 3 Encounters:   05/12/20 136 lb 9.6 oz (62 kg)   04/29/20 139 lb (63 kg)   03/25/19 140 lb (63.5 kg)       ASSESSMENT:   1. CAD with [place] - s/p PCI to LAD, on dual antiplatelet therapy, statin, BB  2. HTN  - stable  3. HLD - previously intolerant to Lipitor due to nausea, has been on Lopid per PCP  4. DM- stable, per IM/ PCP      PLAN:  1. Change atorvastatin to rosuvastatin 10 mg daily due to prior intolerance  2. Hold metformin for 48 hours post contrast  3. Okay for discharge from cardiac perspective. Will review cardiac medications on discharge medication reconciliation form. Patient has follow up appointment with Dr. Aziza Burrows in 2 weeks.     4. Cardiac rehab referral once re-opens (currently closed due to COVID19 pandemic)    DEV Loza - CNP, 5/12/2020, 9:24 AM  [facility]   [phone]       Telemetry: SR , isolated PVC's  NYHA: III    Physical Exam:  General:  Awake, alert, NAD  Skin:  Warm and dry  Neck:  JVP 8 cm  Chest:  Clear to auscultation   Cardiovascular:  RRR, normal S1S2, no m/g/r  Abdomen:  Soft, nontender, +bowel sounds  Extremities:  No BLE edema  right radial site without ooze or hematoma, mild ecchymosis, dressing C,D,I, 2+ pulse      Medications:    sodium chloride flush  10 mL Intravenous 2 times per day    ticagrelor  90 mg Oral BID    aspirin  81 mg Oral Daily    metoprolol tartrate  12.5 mg Oral BID    sodium chloride flush  10 mL Intravenous 2 times per day    gemfibrozil  600 mg Oral BID WC    lisinopril  2.5 mg Oral Daily    sodium chloride flush  10 mL Intravenous 2 times per day    atorvastatin  40 mg Oral Nightly    enoxaparin  40 mg Subcutaneous Daily    insulin lispro  0-6 Units Subcutaneous TID WC    insulin lispro  0-3 Units Subcutaneous Nightly    nitroglycerin  0.5 inch Topical 3 times per day      dextrose         Lab Data: Lab results independently reviewed by myself 5/12/20   CBC:   Recent Labs     05/10/20  0545 05/11/20  0532 05/12/20  0457   WBC 13.1* 12.9* 12.9*   HGB 14.2 13.5 13.3    341 323     BMP:    Recent Labs     05/10/20  0545 05/11/20  0532 05/12/20  0457    135* 136   K 4.6 4.1 4.1   CO2 26 23 23   BUN 16 16 13   CREATININE <0.5* <0.5* <0.5*     INR:  No results for input(s): INR in the last 72 hours. BNP:  No results for input(s): PROBNP in the last 72 hours. Cardiac Enzymes: No results for input(s): TROPONINI in the last 72 hours.   Lipids:   Lab Results   Component Value Date    TRIG 223 09/04/2014    HDL 44 09/04/2014    LDLCALC 112 09/04/2014       Cardiac Imaging:   CARDIAC CATH / PCI 5/12/20:   FINDINGS     LVGRAM   LVEDP  7   GRADIENT ACROSS AORTIC VALVE  none   LV FUNCTION EF 60 %   WALL MOTION

## 2020-05-12 NOTE — PROGRESS NOTES
Pt discharged home per order. IV removed, scripts sent to [facility], meds reviewed. Pt fully understand d/c instructions. Pt has ambulated to the exit with all personal belongings and left with spouse.

## 2020-05-12 NOTE — FLOWSHEET NOTE
05/12/20 0759   Assessment   Charting Type Shift assessment   Neurological   Neuro (WDL) WDL   Level of Consciousness 0   Orientation Level Oriented X4   Cognition Appropriate judgement; Appropriate safety awareness; Appropriate attention/concentration; Appropriate for developmental age; Follows commands   Language Clear   Gag Present   [place] Coma Scale   Eye Opening 4   Best Verbal Response 5   Best Motor Response 6   [place] Coma Scale Score 15   HEENT   HEENT (WDL) X   Right Eye Intact; Impaired vision   Left Eye Intact; Impaired vision   Respiratory   Respiratory (WDL) X   Respiratory Pattern Regular   Respiratory Depth Normal   Respiratory Quality/Effort Unlabored   Chest Assessment Chest expansion symmetrical;Trachea midline   L Breath Sounds Diminished   R Breath Sounds Diminished   Breath Sounds   Right Upper Lobe Diminished   Right Middle Lobe Diminished   Right Lower Lobe Diminished   Left Upper Lobe Diminished   Left Lower Lobe Diminished   Cardiac   Cardiac (WDL) WDL   Cardiac Regularity Regular   Heart Sounds S1, S2   Cardiac Rhythm NSR   Cardiac Monitor   Telemetry Monitor On Yes   Telemetry Audible Yes   Telemetry Alarms Set Yes   Gastrointestinal   Abdominal (WDL) WDL   Abdomen Inspection Rounded; Soft   Peripheral Vascular   Peripheral Vascular (WDL) X   Edema None   Skin Color/Condition   Skin Color/Condition (WDL) WDL   Skin Integrity   Skin Integrity (WDL) WDL   Musculoskeletal   Musculoskeletal (WDL) WDL   Genitourinary   Genitourinary (WDL) WDL   Flank Tenderness No   Suprapubic Tenderness No   Dysuria No   Anus/Rectum   Anus/Rectum (WDL) WDL  (per pt)   Psychosocial   Psychosocial (WDL) WDL

## 2020-05-13 LAB
POC ACT LR: 134 SEC
POC ACT LR: 204 SEC
POC ACT LR: >400 SEC

## 2020-05-17 NOTE — DISCHARGE SUMMARY
the examination, evaluation, counseling and review of medications and discharge plan. Signed:    DEV Garcia - CNP   5/17/2020      Thank you Ronaldo Haile for the opportunity to be involved in this patient's care. If you have any questions or concerns please feel free to contact me at [phone].

## 2020-05-28 ENCOUNTER — TELEPHONE (OUTPATIENT)
Dept: CARDIOLOGY CLINIC | Age: 60
End: 2020-05-28

## 2020-05-28 ENCOUNTER — VIRTUAL VISIT (OUTPATIENT)
Dept: CARDIOLOGY CLINIC | Age: 60
End: 2020-05-28
Payer: MEDICARE

## 2020-05-28 ENCOUNTER — HOSPITAL ENCOUNTER (OUTPATIENT)
Age: 60
Discharge: HOME | End: 2020-05-28
Payer: MEDICARE

## 2020-05-28 VITALS
WEIGHT: 136 LBS | HEART RATE: 75 BPM | DIASTOLIC BLOOD PRESSURE: 68 MMHG | SYSTOLIC BLOOD PRESSURE: 147 MMHG | HEIGHT: 62 IN | BODY MASS INDEX: 25.03 KG/M2

## 2020-05-28 LAB
ANION GAP SERPL CALCULATED.3IONS-SCNC: 13 MMOL/L (ref 3–16)
BUN BLDV-MCNC: 18 MG/DL (ref 7–20)
CALCIUM SERPL-MCNC: 10.1 MG/DL (ref 8.3–10.6)
CHLORIDE BLD-SCNC: 101 MMOL/L (ref 99–110)
CO2: 22 MMOL/L (ref 21–32)
CREAT SERPL-MCNC: 0.7 MG/DL (ref 0.6–1.1)
GFR AFRICAN AMERICAN: >60
GFR NON-AFRICAN AMERICAN: >60
GLUCOSE BLD-MCNC: 328 MG/DL (ref 70–99)
HCT VFR BLD CALC: 39.4 % (ref 36–48)
HEMOGLOBIN: 13.5 G/DL (ref 12–16)
MCH RBC QN AUTO: 28.9 PG (ref 26–34)
MCHC RBC AUTO-ENTMCNC: 34.2 G/DL (ref 31–36)
MCV RBC AUTO: 84.3 FL (ref 80–100)
PDW BLD-RTO: 13.4 % (ref 12.4–15.4)
PLATELET # BLD: 433 K/UL (ref 135–450)
PMV BLD AUTO: 7 FL (ref 5–10.5)
POTASSIUM SERPL-SCNC: 4.5 MMOL/L (ref 3.5–5.1)
PRO-BNP: 42 PG/ML (ref 0–124)
RBC # BLD: 4.68 M/UL (ref 4–5.2)
SODIUM BLD-SCNC: 136 MMOL/L (ref 136–145)
WBC # BLD: 10.2 K/UL (ref 4–11)

## 2020-05-28 PROCEDURE — 83880 ASSAY OF NATRIURETIC PEPTIDE: CPT

## 2020-05-28 PROCEDURE — 1111F DSCHRG MED/CURRENT MED MERGE: CPT | Performed by: INTERNAL MEDICINE

## 2020-05-28 PROCEDURE — G8427 DOCREV CUR MEDS BY ELIG CLIN: HCPCS | Performed by: INTERNAL MEDICINE

## 2020-05-28 PROCEDURE — 80048 BASIC METABOLIC PNL TOTAL CA: CPT

## 2020-05-28 PROCEDURE — 3017F COLORECTAL CA SCREEN DOC REV: CPT | Performed by: INTERNAL MEDICINE

## 2020-05-28 PROCEDURE — 85027 COMPLETE CBC AUTOMATED: CPT

## 2020-05-28 PROCEDURE — 99214 OFFICE O/P EST MOD 30 MIN: CPT | Performed by: INTERNAL MEDICINE

## 2020-05-28 PROCEDURE — 36415 COLL VENOUS BLD VENIPUNCTURE: CPT

## 2020-05-28 NOTE — PROGRESS NOTES
2020    Follow-up; Shortness of Breath (had 2 Zpacks); and Other (BP elevated. has not had AM meds)       TELEHEALTH EVALUATION -- Audio/Visual (During [facility]- public health emergency)    HPI: Marcella Talamantes is a NEW patient consult for interventional cardiology, referred by Leah Nunes for an abnormal EKG, shortness of breath, hyperlipidemia. She had an abnormal stress test on 2020. An echocardiogram showed her EF at 55%. He underwent a left heart cath with Dr. Rikki Hill on 2020, PCI of LAD with single drug eluting stent. Today she states she has been feeling well overall. She states she has been having SOB. No chest pain since her stent was placed. Leg swelling has also resolved. Her PCP prescribed her a Z pack and ordered a CT of the chest due to her SOB. Chest Xray showed possible fluid. She was having issues sleeping but this has now resolved. .Patient currently denies any weight gain,palpitations, chest pain, dizziness, and syncope. Marcella Talamantes (:  1960) has requested an audio/video evaluation for the following concern(s): new patient for abnormal EKG, S/P cath, chest pain. [x] Medications and dosages reviewed. ROS:  [x]Full ROS obtained and negative except as mentioned in HPI    Prior to Visit Medications    Medication Sig Taking? Authorizing Provider   aspirin 81 MG EC tablet Take 1 tablet by mouth daily Yes DEV Funk CNP   nitroGLYCERIN (NITROSTAT) 0.4 MG SL tablet up to max of 3 total doses. If no relief after 1 dose, call 911.  Yes DEV Funk CNP   metoprolol tartrate (LOPRESSOR) 25 MG tablet Take 0.5 tablets by mouth 2 times daily Yes DEV Funk CNP   ticagrelor (BRILINTA) 90 MG TABS tablet Take 1 tablet by mouth 2 times daily Yes DEV Funk CNP   rosuvastatin (CRESTOR) 10 MG tablet Take 1 tablet by mouth daily Yes DEV Funk CNP   Insulin Glargine (BASAGLAR KWIKPEN SC) Inject into the skin  HYSTEROSCOPY  1/25/2013    Laprascopic supracervical hysterectomy   ,   Social History     Tobacco Use    Smoking status: Never Smoker    Smokeless tobacco: Never Used   Substance Use Topics    Alcohol use: No    Drug use: No   ,   Family History   Problem Relation Age of Onset    Diabetes Mother     Heart Disease Mother     Cancer Father         colon   ,   There is no immunization history on file for this patient.,   Health Maintenance   Topic Date Due    Hepatitis C screen  1960    Pneumococcal 0-64 years Vaccine (1 of 1 - PPSV23) 04/15/1967    Diabetic foot exam  04/15/1971    Diabetic retinal exam  04/15/1971    HIV screen  04/15/1976    Diabetic microalbuminuria test  04/15/1979    Hepatitis B vaccine (1 of 3 - Risk 3-dose series) 04/15/1980    Cervical cancer screen  04/15/1982    Shingles Vaccine (1 of 2) 04/15/2011    Breast cancer screen  06/19/2020    A1C test (Diabetic or Prediabetic)  08/09/2020    Flu vaccine (Season Ended) 09/01/2020    Lipid screen  05/12/2021    Potassium monitoring  05/12/2021    Creatinine monitoring  05/12/2021    DTaP/Tdap/Td vaccine (3 - Td) 06/20/2027    Colon cancer screen colonoscopy  10/19/2028    Hepatitis A vaccine  Aged Out    Hib vaccine  Aged Out    Meningococcal (ACWY) vaccine  Aged Out       BP (!) 147/68   Pulse 75   Ht 5' 2\" (1.575 m)   Wt 136 lb (61.7 kg)   LMP 12/21/2012   BMI 24.87 kg/m²      PHYSICAL EXAMINATION:  [ INSTRUCTIONS:  \"[x]\" Indicates a positive item  \"[]\" Indicates a negative item  -- DELETE ALL ITEMS NOT EXAMINED]  Vital Signs: (As obtained by patient/caregiver or practitioner observation)    Blood pressure- 147/68 Heart rate- 75   Respiratory rate-    Temperature-  Pulse oximetry-     Constitutional: [x] Appears well-developed and well-nourished [] No apparent distress      [] Abnormal-   Mental status  [x] Alert and awake  [] Oriented to person/place/time []Able to follow commands      Eyes:  EOM noted. Carotid dopplers 2/17/16  CONCLUSIONS    No significant obstructive lesions noted in the extracranial carotid system, bilaterally.     Vertebral arteries are patent demonstrating antegrade flow, bilaterally    Stress test 5/8/2020  Summary  Normal LVEF >60%  Anteroseptal/apical hypokinesis  Mixed anteroseptal/apical defect consistent with mixed ischemia/scar of this  territory     Echocardiogram 5/8/2020  Summary   Normal left ventricle systolic function with an estimated ejection fraction   of 55%. No regional wall motion abnormalities are seen. Grade I diastolic dysfunction with normal filing pressure. Mild (eccentric) mitral regurgitation. Right ventricular hypertrophy. Trivial pericardial effusion with pericardial fat pad localized anteriorly      Left heart cath Dr. Shay Centers 5/11/2020  LVGRAM     LVEDP  7   GRADIENT ACROSS AORTIC VALVE  none   LV FUNCTION EF 60 %   WALL MOTION  normal   MITRAL REGURGITATION  mild         CORONARY ARTERIES     LM  Short vessel, less than 10% fswoonps-yjc-ifckqj stenosis         LAD Small to medium caliber vessel, there is proximal-mid 90% stenosis. Distal vessel is tortuous with a 50% stenosis.       LCX  Large vessel, dominant, less than 10% gnevioqs-zyt-scykhq stenosis.       RCA Nondominant, small, proximal spasm is noted which improved with IC vasodilators as well as withdrawal of catheter, there is less than 10% cgfpfyxs-ers-ksctue stenosis. Successful PCI of the LAD with single drug-eluting stent       Assessment:   Chest pain - typical/atypical  Shortness of breath - no sig change post PCI. Consider other noncardiac. Scheduled for CT per PCP  Dizziness   Hyperlipidemia - stable.  Results reviewed  CAD - stable post PCI LAD 5/2020  Angina - resolved post PCI      Plan:   Agree with CT of the chest   Labs- BNP, BMP and CBC  Continue current medications   Check blood pressure at home weekly  Regular exercise and following a healthy diet encouraged

## 2020-05-29 ENCOUNTER — TELEPHONE (OUTPATIENT)
Dept: CARDIOLOGY CLINIC | Age: 60
End: 2020-05-29

## 2020-05-29 NOTE — TELEPHONE ENCOUNTER
Created telephone encounter. Spoke with Fayne Phalen relayed message per [address] regarding labs. Pt verbalized understanding.

## 2020-05-29 NOTE — TELEPHONE ENCOUNTER
----- Message from Uriah Patel MD sent at 5/29/2020  9:44 AM EDT -----  Call  Blood sugar elev - should d/w pcp  O/w labs look ok

## 2020-06-01 ENCOUNTER — TELEPHONE (OUTPATIENT)
Dept: CARDIOLOGY CLINIC | Age: 60
End: 2020-06-01

## 2020-06-04 ENCOUNTER — HOSPITAL ENCOUNTER (OUTPATIENT)
Dept: CT IMAGING | Age: 60
Discharge: HOME | End: 2020-06-04
Payer: MEDICARE

## 2020-06-04 PROCEDURE — 71250 CT THORAX DX C-: CPT

## 2020-08-24 ENCOUNTER — OFFICE VISIT (OUTPATIENT)
Dept: ORTHOPEDIC SURGERY | Age: 60
End: 2020-08-24
Payer: MEDICARE

## 2020-08-24 VITALS — BODY MASS INDEX: 25.03 KG/M2 | HEIGHT: 62 IN | WEIGHT: 136 LBS

## 2020-08-24 PROBLEM — M19.049 HAND ARTHRITIS: Status: ACTIVE | Noted: 2020-08-24

## 2020-08-24 PROBLEM — M65.331 TRIGGER FINGER, RIGHT MIDDLE FINGER: Status: ACTIVE | Noted: 2020-08-24

## 2020-08-24 PROCEDURE — G8427 DOCREV CUR MEDS BY ELIG CLIN: HCPCS | Performed by: ORTHOPAEDIC SURGERY

## 2020-08-24 PROCEDURE — 99243 OFF/OP CNSLTJ NEW/EST LOW 30: CPT | Performed by: ORTHOPAEDIC SURGERY

## 2020-08-24 PROCEDURE — G8420 CALC BMI NORM PARAMETERS: HCPCS | Performed by: ORTHOPAEDIC SURGERY

## 2020-08-24 NOTE — PROGRESS NOTES
HISTORY OF PRESENT ILLNESS: The patient is a 41-year-old right-hand-dominant female who presents today for a new hand surgery specialty consultation at the request of Niru Parekh CNP, regarding her right hand. She has a long history of diabetes and also hand arthritis but is been noticing triggering and stiffness especially involving the right long finger for several months. She states that she has been working very diligently on all of her health issues including her diabetes management. More recently she has been awakening in finding it necessary to manually unlock her right long finger. She feels that she has become significantly stiff overall in the right hand. PAST MEDICAL HISTORY: Patient's medications, allergies, past medical, surgical, social and family histories were reviewed and updated as appropriate. ROS: Pertinent items are noted in HPI. Review of systems reviewed from patient history form dated on 8/24/20 and available in the patient's chart under the media tab. Denies fever, chills, confusion, bowel/bladder active change. PHYSICAL EXAMINATION: Examination reveals a pleasant individual in no acute distress. There appears to be satisfactory pain-free range of motion, strength, and stability of the cervical spine, shoulders, and elbows. Skin is intact without lymphadenopathy, discoloration, or abnormal temperature. There is intact, symmetric circulation in both upper extremities. Wrist, hand, and digital range of motion is satisfactory bilaterally in the unaffected digits. Tenderness exists at the A1 pulley of the right long finger with grade 3 triggering. DIAGNOSTIC TESTING: 3 views are taken today of the right hand. Findings demonstrate some widespread arthritic change mostly at multiple IP joints but without any acute fracture.       IMPRESSION AND PLAN: Right hand pain and stiffness with combination of baseline small joint arthritis but also a fairly established and stiff right long trigger digit. I talked with her about the options moving forward ranging from cortisone injection to definitive surgery. At this juncture she would favor just moving forward with outpatient right long trigger finger release surgery, and we would involve early postop hand therapy. She does have some history of medical issues and if these would prevent her from having her requested intraoperative sedation, she does understand that a straight local anesthetic technique might be more appropriate. We discussed the risks, benefits, limitations, alternatives, and postop recovery following the proposed procedure. We will begin the process of scheduling surgery if there are no other preoperative medical contraindications. Please note that this transcription was created using voice recognition software. Any errors are unintentional and may be due to voice recognition transcription.

## 2020-08-25 ENCOUNTER — OFFICE VISIT (OUTPATIENT)
Dept: PRIMARY CARE CLINIC | Age: 60
End: 2020-08-25
Payer: MEDICARE

## 2020-08-25 PROCEDURE — G8428 CUR MEDS NOT DOCUMENT: HCPCS | Performed by: NURSE PRACTITIONER

## 2020-08-25 PROCEDURE — G8420 CALC BMI NORM PARAMETERS: HCPCS | Performed by: NURSE PRACTITIONER

## 2020-08-25 PROCEDURE — 99211 OFF/OP EST MAY X REQ PHY/QHP: CPT | Performed by: NURSE PRACTITIONER

## 2020-08-26 ENCOUNTER — TELEPHONE (OUTPATIENT)
Dept: ORTHOPEDIC SURGERY | Age: 60
End: 2020-08-26

## 2020-08-26 LAB — SARS-COV-2, NAA: NOT DETECTED

## 2020-08-26 NOTE — PROGRESS NOTES
Obstructive Sleep Apnea (LIANG) Screening     Patient:  Cecilia Zarate    YOB: 1960      Medical Record #:  [id number]                     Date:  8/26/2020     1. Are you a loud and/or regular snorer? [x]  Yes       [] No    2. Have you been observed to gasp or stop breathing during sleep? [x]  Yes       [] No    3. Do you feel tired or groggy upon awakening or do you awaken with a headache?           []  Yes       [x] No    4. Are you often tired or fatigued during the wake time hours? []  Yes       [x] No    5. Do you fall asleep sitting, reading, watching TV or driving? [x]  Yes       [] No    6. Do you often have problems with memory or concentration? [x]  Yes       [] No    **If patient's score is ? 3 they are considered high risk for LIANG. An Anesthesia provider will evaluate the patient and develop a plan of care the day of surgery. Note:  If the patient's BMI is more than 35 kg m¯² , has neck circumference > 40 cm, and/or high blood pressure the risk is greater (© American Sleep Apnea Association, 2006).

## 2020-08-26 NOTE — PROGRESS NOTES
Clementeen Seashore    Age 62 y.o.    female    1960    MRN [id number]    8/31/2020  Arrival Time_____________  OR Time____________[address]     Procedure(s):  RIGHT LONG TRIGGER FINGER RELEASE                      Local    Surgeon(s):  Katarina Bran, MD       Phone [phone] ([place])     [address]  Cell Work  _________________________________________________________________  _________________________________________________________________  _________________________________________________________________  _________________________________________________________________  _________________________________________________________________      PCP _____________________________ Phone_________________       H&P__________________Bringing    Chart            Epic  DOS     Called_______  EKG__________________Bringing    Chart            Epic  DOS     Called_______  LAB__________________ Bringing    Chart            Epic  DOS     Called_______  Cardiac Clearance_______Bringing    Chart            Epic      DOS       Called_______    Cardiologist________________________ Phone___________________________      ? Cheondoism concerns / Waiver on Chart            [employer]_____________  ? Pre-op Instructions Given [place]          ______________________________  ? Directions to Surgery Center                          ______________________________  ? Transportation Home_______________      _______________________________  ?  Crutches/Walker__________________        _______________________________      ________Pre-op Orders   _______Transcribed    _______Wt.  ________Pharmacy          _______SCD  ______VTE     ______Beta Blocker  ________Consent             ________TED [place]

## 2020-08-27 ENCOUNTER — HOSPITAL ENCOUNTER (OUTPATIENT)
Dept: WOMENS IMAGING | Age: 60
Discharge: HOME | End: 2020-08-27
Payer: MEDICARE

## 2020-08-27 PROCEDURE — 77063 BREAST TOMOSYNTHESIS BI: CPT

## 2020-08-30 NOTE — H&P
HISTORY & PHYSICAL FOR LOCAL CASES    There were no vitals filed for this visit. History of present illness:  Right long trigger digit    All allergies & meds reviewed  RELEVANT EXAMS:  Airway:  normal    Pulmonary: normal    Cardiovascular: normal    Abdominal: normal    Specific to procedure: right long trigger    I have reviewed with the patient and/or family the risks, benefits and alternatives to the procedure.   ASA Class:  2    The patient condition is acceptable for planned local anesthesia:

## 2020-08-30 NOTE — OP NOTE
[address] [facility]  Procedure Note  [facility]  8/31/2020    PREOPERATIVE DIAGNOSIS(ES)   Right Middle Finger trigger digit     POSTOPERATIVE DIAGNOSIS(ES)   Same    PROCEDURE(S)     Right Middle Finger trigger release    SURGEON CHRIS HOANG    ANESTHESIA Local    EBL: less than 10TP    COMPLICATIONS None    INDICATIONS FOR PROCEDURE The patient has clinical evidence of triggering of the affected digit(s) and has failed appropriate conservative management. The patient understands the relevant risks, benefits, limitations, and healing process of the procedure. PROCEDURE The patient was brought to the operating room and anesthetized with local anesthetic. The identified and marked extremity was then prepped and draped in a standard fashion. A well-padded tourniquet was applied to the upper arm. The arm was exsanguinated and the tourniquet elevated to 250 mmHg. A standard transverse incision was made at the A-1 pulley level of the affected digit(s). Dissection was carried down carefully through the skin and subcutaneous tissue. Care was taken to protect the digital neurovascular bundles on both sides of the identified A-1 pulley(s). In each affected digit the pulley was incised longitudinally under direct visualization. Complete proximal and distal release was completed. The flexor tendon structures were withdrawn with a retractor and demonstrated full passive excursion. The tourniquet was released and the wound(s) irrigated with sterile saline. Skin was closed with Nylon suture. A soft, bulky dressing was applied and the patient transported to the recovery area in stable condition with good perfusion to all fingertips and no active triggering.         Aaron. Shania Fofana 134

## 2020-08-31 ENCOUNTER — HOSPITAL ENCOUNTER (OUTPATIENT)
Age: 60
Setting detail: OUTPATIENT SURGERY
Discharge: HOME | End: 2020-08-31
Attending: ORTHOPAEDIC SURGERY | Admitting: ORTHOPAEDIC SURGERY
Payer: MEDICARE

## 2020-08-31 VITALS
WEIGHT: 136 LBS | HEIGHT: 62 IN | RESPIRATION RATE: 16 BRPM | SYSTOLIC BLOOD PRESSURE: 151 MMHG | DIASTOLIC BLOOD PRESSURE: 51 MMHG | HEART RATE: 85 BPM | TEMPERATURE: 96.8 F | BODY MASS INDEX: 25.03 KG/M2 | OXYGEN SATURATION: 100 %

## 2020-08-31 LAB
GLUCOSE BLD-MCNC: 384 MG/DL (ref 70–99)
PERFORMED ON: ABNORMAL

## 2020-08-31 PROCEDURE — 7100000011 HC PHASE II RECOVERY - ADDTL 15 MIN: Performed by: ORTHOPAEDIC SURGERY

## 2020-08-31 PROCEDURE — 2580000003 HC RX 258: Performed by: ORTHOPAEDIC SURGERY

## 2020-08-31 PROCEDURE — 7100000010 HC PHASE II RECOVERY - FIRST 15 MIN: Performed by: ORTHOPAEDIC SURGERY

## 2020-08-31 PROCEDURE — 2500000003 HC RX 250 WO HCPCS: Performed by: ORTHOPAEDIC SURGERY

## 2020-08-31 PROCEDURE — 3600000012 HC SURGERY LEVEL 2 ADDTL 15MIN: Performed by: ORTHOPAEDIC SURGERY

## 2020-08-31 PROCEDURE — 3600000002 HC SURGERY LEVEL 2 BASE: Performed by: ORTHOPAEDIC SURGERY

## 2020-08-31 PROCEDURE — 2709999900 HC NON-CHARGEABLE SUPPLY: Performed by: ORTHOPAEDIC SURGERY

## 2020-08-31 RX ORDER — BUPIVACAINE HYDROCHLORIDE 5 MG/ML
INJECTION, SOLUTION EPIDURAL; INTRACAUDAL PRN
Status: DISCONTINUED | OUTPATIENT
Start: 2020-08-31 | End: 2020-08-31 | Stop reason: ALTCHOICE

## 2020-08-31 RX ORDER — IBUPROFEN 600 MG/1
600 TABLET ORAL EVERY 6 HOURS PRN
Qty: 60 TABLET | Refills: 1 | Status: SHIPPED | OUTPATIENT
Start: 2020-08-31 | End: 2021-05-02

## 2020-08-31 RX ORDER — MAGNESIUM HYDROXIDE 1200 MG/15ML
LIQUID ORAL CONTINUOUS PRN
Status: COMPLETED | OUTPATIENT
Start: 2020-08-31 | End: 2020-08-31

## 2020-08-31 ASSESSMENT — PAIN - FUNCTIONAL ASSESSMENT: PAIN_FUNCTIONAL_ASSESSMENT: 0-10

## 2020-08-31 NOTE — PROGRESS NOTES
Pt very nervous about local for this procedure. Did not tolerate the local injection well in pre op. Pre and post operative expectations and routines explained to patient, verbalized understanding.

## 2020-08-31 NOTE — PROGRESS NOTES
POCT      Blood Glucose:    384      (Normal Range 70-99)    PT:      (Normal Range 9.8 - 13)    INR:      (Normal Range 0.86-1.14)

## 2020-08-31 NOTE — PROGRESS NOTES
Dr. Pepper Connelly aware of  and pt. ate [employer] and doritos on the way in. No new orders obtained.   Pt instructed to take insulin as soon as she gets home per MD

## 2020-09-09 RX ORDER — ROSUVASTATIN CALCIUM 10 MG/1
10 TABLET, COATED ORAL DAILY
Qty: 90 TABLET | Refills: 3 | Status: SHIPPED | OUTPATIENT
Start: 2020-09-09 | End: 2021-07-30 | Stop reason: SDUPTHER

## 2020-09-09 RX ORDER — ASPIRIN 81 MG
TABLET,CHEWABLE ORAL
Qty: 90 TABLET | Refills: 3 | Status: SHIPPED | OUTPATIENT
Start: 2020-09-09 | End: 2021-07-30 | Stop reason: SDUPTHER

## 2020-09-11 ENCOUNTER — OFFICE VISIT (OUTPATIENT)
Dept: ORTHOPEDIC SURGERY | Age: 60
End: 2020-09-11

## 2020-09-11 VITALS — RESPIRATION RATE: 12 BRPM | BODY MASS INDEX: 25.03 KG/M2 | HEIGHT: 62 IN | WEIGHT: 136.02 LBS

## 2020-09-11 PROCEDURE — 99024 POSTOP FOLLOW-UP VISIT: CPT | Performed by: ORTHOPAEDIC SURGERY

## 2020-09-11 RX ORDER — INSULIN DEGLUDEC INJECTION 100 U/ML
INJECTION, SOLUTION SUBCUTANEOUS
COMMUNITY
Start: 2020-07-28

## 2020-09-11 RX ORDER — ATORVASTATIN CALCIUM 10 MG/1
TABLET, FILM COATED ORAL
COMMUNITY
Start: 2020-05-26 | End: 2020-11-19

## 2020-09-11 RX ORDER — PANTOPRAZOLE SODIUM 40 MG/1
TABLET, DELAYED RELEASE ORAL
COMMUNITY
Start: 2020-07-23

## 2020-09-14 ENCOUNTER — HOSPITAL ENCOUNTER (OUTPATIENT)
Dept: OCCUPATIONAL THERAPY | Age: 60
Setting detail: THERAPIES SERIES
Discharge: HOME | End: 2020-09-14
Payer: MEDICARE

## 2020-09-14 PROCEDURE — 97165 OT EVAL LOW COMPLEX 30 MIN: CPT | Performed by: OCCUPATIONAL THERAPIST

## 2020-09-14 PROCEDURE — 97110 THERAPEUTIC EXERCISES: CPT | Performed by: OCCUPATIONAL THERAPIST

## 2020-09-14 PROCEDURE — 97140 MANUAL THERAPY 1/> REGIONS: CPT | Performed by: OCCUPATIONAL THERAPIST

## 2020-09-14 NOTE — PLAN OF CARE
[place] [facility] [facility] [facility], [address]  Phone: [phone]  Fax [phone]    Occupational Nicole Claire Certification  Dear Referring Practitioner: Dr. Barber Alves    We had the pleasure of evaluating the following patient for occupational therapy services at [address] [place] [address]. A summary of our findings can be found in the initial assessment below. This includes our plan of care. If you have any questions or concerns regarding these findings, please do not hesitate to contact me at the office phone number checked above. Thank you for the referral.     Physician Signature:_______________________________Date:__________________  By signing above (or electronic signature), therapists plan is approved by physician      Patient: Rubens Wong   : 1960   MRN: [id number]  Referring Physician: Referring Practitioner: Dr. Barber Alves      Evaluation Date: 2020      Medical Diagnosis Information:  Diagnosis: I96.216 (ICD-10-CM) - Trigger finger, right middle finger   Treatment Diagnosis: M79.641 right hand pain                                         Insurance information:      Date of Injury: several month history of triggering prior to surgery  Date of Surgery: 20   Right Middle Finger trigger release    Date of Patient follow up with Physician: none scheduled    RESTRICTIONS/PRECAUTIONS: none    Latex Allergy:  [x]No      []Yes  Pacemaker:  [x] No       [] Yes     Preferred Language for Healthcare:   [x]English       []other:     Functional Scale: 64 % (Quick DASH)   Date assessed:  2020    SUBJECTIVE: Last week had infection in toe and saw family MD and he put her on an antibiotic. Also helped her hand feel.     Patient reported deficits/history of current problem: several month history of triggering prior to surgery     Pain Scale: 2-8/10  [x]Constant      [x]Intermittent    []other:  Pain Location:  Right scar and middle finger  Easing factors: rest  Provocative factors: movement     [x] Patient reported history, allergies, and medications reviewed - see intake form. Occupational Profile:  Home Enviroment: lives with  [x] spouse,  [x] family,  [] alone,  [] significant other,   [x] other: two teenage nieces she is raising    Occupation/School: unemployed    Recreational Activities/Meaningful Interests: nothing specific    Prior Level of Function: [x] Independent with ADLs/IADLs     [] Assistance needed (describe):    Patient-Identified Primary Performance Deficits (to be addressed in POC):   [] bathing    [x] household tasks (cooking/cleaning) - reports she cant open anything hard to grasp items, doesn't have full rom drops things   [] dressing    [] self feeding   [] grooming    [] work/education   [] functional mobility   [] sleeping/rest   [] toileting/hygiene   [] recreational activities   [] driving    [x] community/social participation  - shopping, hard to pick things up   [] other:     Comorbidities Affecting Functional Performance:     []Anxiety (F41.9)/Depression (F32.9)   [x]Diabetes Type 1(E10.65) or 2 (E11.65)   []Rheumatoid Arthritis (M05.9)  []Fibromyalgia (M79.7)  []Neuropathy(G60.9)  []Osteoarthritis(M19.91)  []None   [x]Other: sarcoidosis, heart stent    OBJECTIVE:   Date:  Hand Dominance:     []  Right    [] Left 2020     Objective Measures:    PAIN 2-8/10   Quick DASH  64%                              Long MP: 15/75  PIP: 5/75  DIP: 5/45    Digits tip to DPFC in cm Index: 1.5  Lon.0  Rin.0  Small:1.0   Edema in cm circumf. MCPJs R: 19.4  L:   Edema in cm circumf.   P1 middle R: 6.5  L:    strength in lbs R: defer at this time  L:   Pinch Strengthin lbs: lat  R:  L:   Pinch Strength in lbs:  3 point R:  L:     MMT:     Observations:  (including splints, bandages, incisions, scars): Healed incision in palm of hand with some scab and dry peeling skin evaluation/treatment:    [] Excellent [] Good [] Fair  [] Poor    PLAN OF CARE:  Interventions:   [x] Therapeutic Exercise [x] Therapeutic Activity    [x] Activities of Daily Living [x] Neuromuscular Re-education      [x] Patient Education  [x] Manual Therapy      [x] Modalities as needed, and not otherwise contraindicated, including: ultrasound,paraffin,moist heat/cold pack, electrical stimulation, contrast bath, iontophoresis  [] Splinting    Frequency/Duration:  1-2 days per week for 4-8 weeks      GOALS:  Patient stated goal: learn what to do for hand    [] Progressing: [] Met: [] Not Met: [] Adjusted    Therapist goals for Patient:   Short Term Goals: To be achieved in: 2 weeks  1. Independent in HEP and progression per patient tolerance, in order to prevent re-injury. [] Progressing: [] Met: [] Not Met: [] Adjusted   2. Patient will have a decrease in pain to facilitate improvement in movement, function, and ADLs as indicated by Functional Deficits. [] Progressing: [] Met: [] Not Met: [] Adjusted    Long Term Goals to be achieved in 8 weeks (through *11/9/20, including patient directed goals to address patient identified performance deficits:  1) Pt to be independent in graded HEP progression with a good level of effort and compliance. [] Progressing: [] Met: [] Not Met: [] Adjusted   2) Pt to report a score of </= 20 % on the Quick DASH disability questionnaire for increased performance with carrying, moving, and handling objects. [] Progressing: [] Met: [] Not Met: [] Adjusted   3) Pt will demonstrate increased ROM to right middle to 220 for improved independence with grasping small objects in hand. .  [] Progressing: [] Met: [] Not Met: [] Adjusted   4) Pt will demonstrate increased strength to right  70% of left for improved independence with holding onto household objects without dropping them.   [] Progressing: [] Met: [] Not Met: [] Adjusted   5) Pt will have a decrease in pain to 2/10 to facilitate shopping. [] Progressing: [] Met: [] Not Met: [] Adjusted        OCCUPATIONAL THERAPY EVALUATION COMPLEXITY JUSTIFICATION:    [] An occupational profile and medical/therapy history, which includes:   [x] a brief history including medical and/or therapy records relating to the     presenting problem   [] an expanded review of medical and/or therapy records and additional review     of physical, cognitive or psychosocial history related to current functional    performance   [] an extensive additional review of review of medical and/or therapy records   and physical, cognitive, or psychosocial history related to current    functional performance    [] An assessment that identifies performance deficits (relating to physical, cognitive, or psychosocial skills) that result in activity limitations and/or participation restrictions:   [x] 1-3 performance deficits   [] 3-5 performance deficits   [] 5 or more performance deficits    [] Clinical decision making of:   [x] low complexity, including analysis of occupational profile, data analysis from problem focused assessment, and consideration of a limited number of treatment options. No comorbidities affect occupational performance. No task modifications or assistance needed to complete evaluation. [] moderate complexity, including analysis of occupational profile, data analysis from detailed assessment and consideration of several treatment options. Comorbidities that affect occupational performance may be present. Minimal to moderate task modifications or assistance needed to complete assessment. [] high complexity, including analysis of occupational profile, analysis of data from comprehensive assessment and consideration of multiple treatment options. Multiple comorbidities present that affect occupational performance. Significant task modifications or assistance needed to complete assessment.     Evaluation Code:  [x] Low Complexity ABBE 71164 (typically 30 minutes face to face)  [] Mod Complexity EVAL 13363 (typically 45 minutes face to face)  [] High Complexity EVAL 27213 (typically 60 minutes face to face)    Electronically signed by:  Daniel Gayle March, [address] [place] [address]

## 2020-09-14 NOTE — FLOWSHEET NOTE
Also helped her hand feel.     Patient reported deficits/history of current problem: several month history of triggering prior to surgery      Pain Scale: 2-8/10       [x]? Constant                [x]? Intermittent              []?other:  Pain Location:  Right scar and middle finger  Easing factors: rest  Provocative factors: movement        OBJECTIVE:     Date:  Hand Dominance:     []? Right    []? Left 2020      Objective Measures:     PAIN 2-8/10   Quick DASH  64%                              Long MP: 15/75  PIP: 5/75  DIP: 5/45    Digits tip to DPFC in cm Index: 1.5  Lon.0  Rin.0  Small:1.0   Edema in cm circumf. MCPJs R: 19.4  L:   Edema in cm circumf. P1 middle R: 6.5  L:    strength in lbs R: defer at this time  L:   Pinch Strengthin lbs: lat  R:  L:   Pinch Strength in lbs:  3 point R:  L:      MMT:      Observations:  (including splints, bandages, incisions, scars): Healed incision in palm of hand with some scab and dry peeling          MODALITIES: 20      Fluidotherapy (21260)      Estim (61157/50057)      Paraffin (95044)      US (87638)      Iontophoresis (21804)      Hot Pack 10'     Cold Pack            INTERVENTIONS:      Therapeutic Exercise (66709) PROM digital flex/ext. AROM all tendon gliding positions to right hand                             Therapeutic Activity (22983)                  Manual Therapy (70411) Scar mobilization     (IASTM, Dry Needling, manual mobilization)            Neuromuscular Reeducation (70602)                  ADL Training (67821)                  HEP Training/Review See sheet(s)                 Splinting      Lcode:      Orthotic Mgmt, Subsequent Enc (35989)      Orthotic Mgmt & Training (31459)            Other: Trimmed some peeling skin away from incision.        Therapeutic Exercise & NMR:  [x] (21079) Provided verbal/tactile cueing for activities related to strengthening, flexibility, endurance, ROM  for improvements in scapular, scapulothoracic and UE control with self care, reaching, carrying, lifting, house/yardwork, driving/computer work.    [] (45709) Provided verbal/tactile cueing for activities related to improving balance, coordination, kinesthetic sense, posture, motor skill, proprioception  to assist with  scapular, scapulothoracic and UE control with self care, reaching, carrying, lifting, house/yardwork, driving/computer work.     Therapeutic Activities & NMR:    [] (17153 or 18527) Provided verbal/tactile cueing for activities related to improving balance, coordination, kinesthetic sense, posture, motor skill, proprioception and motor activation to allow for proper function of scapular, scapulothoracic and UE control with self care, carrying, lifting, driving/computer work    Home Exercise Program:    [] (29999) Reviewed/Progressed HEP activities related to strengthening, flexibility, endurance, ROM of scapular, scapulothoracic and UE control with self care, reaching, carrying, lifting, house/yardwork, driving/computer work  [] (45277) Reviewed/Progressed HEP activities related to improving balance, coordination, kinesthetic sense, posture, motor skill, proprioception of scapular, scapulothoracic and UE control with self care, reaching, carrying, lifting, house/yardwork, driving/computer work      Manual Treatments:  PROM / STM / Oscillations-Mobs:  G-I, II, III, IV (PA's, Inf., Post.)  [x] (16792) Provided manual therapy to mobilize soft tissue/joints of cervical/CT, scapular GHJ and UE for the purpose of modulating pain, promoting relaxation,  increasing ROM, reducing/eliminating soft tissue swelling/inflammation/restriction, improving soft tissue extensibility and allowing for proper ROM for normal function with self care, reaching, carrying, lifting, house/yardwork, driving/computer work    ADL Training:  [] (24061) Provided self-care/home management training related to activities of daily living and compensatory training, and/or use of adaptive equipment      Charges:  Timed Code Treatment Minutes: 25   Total Treatment Minutes: 45   Worker's Comp: Time In/Time Out     [x] EVAL (LOW) 17201 (typically 20 minutes face-to-face)    [] EVAL (MOD) 88904 (typically 30 minutes face-to-face)  [] EVAL (HIGH) 57710 (typically 45 minutes face-to-face)  [] OT Re-eval (64498)       [x] Magaly [phone]) x 1    [] LUGKF(53595)  [] NMR (60168) x      [] Estim (attended) (11215)   [x] Manual ([id number]) x  1    [] US (51514)  [] TA (84389) x      [] Paraffin (06868)  [] ADL  (45912) x     [] Splint/L code:    [] Estim (unattended) (19928)  [] Fluidotherapy (03534)  [] DN 1-2 (35549)   [] DN 3+ (00226)  [] Orthotic Mgmt, Subsequent Enc (12151)  [] Orthotic Mgmt & Training (32350)  [] Other:    ASSESSMENT:      GOALS: Patient stated goal: learn what to do for hand    []? Progressing: []? Met: []? Not Met: []? Adjusted     Therapist goals for Patient:   Short Term Goals: To be achieved in: 2 weeks  1. Independent in HEP and progression per patient tolerance, in order to prevent re-injury. []? Progressing: []? Met: []? Not Met: []? Adjusted   2. Patient will have a decrease in pain to facilitate improvement in movement, function, and ADLs as indicated by Functional Deficits. []? Progressing: []? Met: []? Not Met: []? Adjusted     Long Term Goals to be achieved in 8 weeks (through *11/9/20, including patient directed goals to address patient identified performance deficits:  1) Pt to be independent in graded HEP progression with a good level of effort and compliance. []? Progressing: []? Met: []? Not Met: []? Adjusted   2) Pt to report a score of </= 20 % on the Quick DASH disability questionnaire for increased performance with carrying, moving, and handling objects. []? Progressing: []? Met: []? Not Met: []? Adjusted   3) Pt will demonstrate increased ROM to right middle to 220 for improved independence with grasping small objects in hand. .  []? Progressing: []?  Met: []? Not Met: []? Adjusted   4) Pt will demonstrate increased strength to right  70% of left for improved independence with holding onto household objects without dropping them. []? Progressing: []? Met: []? Not Met: []? Adjusted   5) Pt will have a decrease in pain to 2/10 to facilitate shopping. []? Progressing: []? Met: []? Not Met: []? Adjusted  Overall Progression Towards Functional Goals/Treatment Progress Update:  [] Patient is progressing as expected towards functional goals listed. [] Progression is slowed due to complexities/impairments listed. [] Progression has been slowed due to co-morbidities. [x] Plan just implemented, too soon to assess goals progression <30 days  [] Goals require adjustment due to lack of progress  [] Patient is not progressing as expected and requires additional follow up with physician  [] All goals are met  [] Other:     Prognosis for POC: [x] Good [] Fair  [] Poor    Patient requires continued skilled intervention: [x] Yes  [] No    Treatment/Activity Tolerance:  [x] Patient able to complete treatment  [] Patient limited by fatigue  [] Patient limited by pain    [] Patient limited by other medical complications  [] Other:                  PLAN: See eval  [] Continue per plan of care [] Alter current plan (see comments above)  [x] Plan of care initiated [] Hold pending MD visit [] Discharge    Electronically signed by:  Fior Riley OTR\L, T - [id number]      Note: If patient does not return for scheduled/ recommended follow up visits, this note will serve as a discharge from care along with most recent update on progress.

## 2020-09-18 ENCOUNTER — HOSPITAL ENCOUNTER (OUTPATIENT)
Dept: OCCUPATIONAL THERAPY | Age: 60
Setting detail: THERAPIES SERIES
Discharge: HOME | End: 2020-09-18
Payer: MEDICARE

## 2020-09-18 PROCEDURE — 97110 THERAPEUTIC EXERCISES: CPT | Performed by: OCCUPATIONAL THERAPIST

## 2020-09-18 PROCEDURE — 97140 MANUAL THERAPY 1/> REGIONS: CPT | Performed by: OCCUPATIONAL THERAPIST

## 2020-09-18 PROCEDURE — 97112 NEUROMUSCULAR REEDUCATION: CPT | Performed by: OCCUPATIONAL THERAPIST

## 2020-09-18 PROCEDURE — 97035 APP MDLTY 1+ULTRASOUND EA 15: CPT | Performed by: OCCUPATIONAL THERAPIST

## 2020-09-18 NOTE — FLOWSHEET NOTE
[address] [facility], [address] [place] [address] Marin Fox  Phone: [phone]  Fax [phone]    Occupational Therapy Treatment Note/ Progress Report:     Is this a Progress Report:     []  Yes  [x]  No      If Yes:  Date Range for reporting period:  Beginning 20  Ending 2020  Progress report will be due (10 Rx or 30 days whichever is less): *    Recertification will be due (POC Duration  / 90 days whichever is less): 20   Date:  2020  Patient Name:  David Merino    :  1960  MRN: [id number]  Medical/Treatment Diagnosis Information:  · Diagnosis: M65.331 (ICD-10-CM) - Trigger finger, right middle finger   · Treatment Diagnosis: M79.641 right hand pain     Insurance/Certification information:     Physician Information:  Referring Practitioner: Dr. Sriram Parham  Has the plan of care been signed (Y/N):        []  Yes  [x]  No   Comorbidities Affecting Functional Performance:             []?Anxiety (F41.9)/Depression (F32.9)             [x]? Diabetes Type 1(E10.65) or 2 (E11.65)       []? Rheumatoid Arthritis (M05.9)  []? Fibromyalgia (M79.7)  []? Neuropathy(G60.9)  []? Osteoarthritis(M19.91)  []? None              [x]? Other: sarcoidosis, heart stent    Visit # Insurance Allowable Auth Required   2  []  Yes []  No    From 20  to 2020    Date of Injury: several month history of triggering prior to surgery  Date of Surgery: 20   Right Middle Finger trigger release     Date of Patient follow up with Physician: none scheduled     RESTRICTIONS/PRECAUTIONS: none     Latex Allergy:  [x]? No      []? Yes                    Pacemaker:  [x]? No       []? Yes      Preferred Language for Healthcare:   [x]? English       []? other:      Functional Scale: 64 % (Quick DASH)                                Date assessed:  2020     SUBJECTIVE: Pt reports cont stiffness and pain 2-5/10.  Pain increases with use.     Patient reported deficits/history of current problem: several month history of triggering prior to surgery      Pain Scale: 2-8/10       [x]? Constant                [x]? Intermittent              []?other:  Pain Location:  Right scar and middle finger  Easing factors: rest  Provocative factors: movement        OBJECTIVE:     Date:  Hand Dominance:     [x]? Right    []? Left 2020      Objective Measures:     PAIN 2-8/10   Quick DASH  64%                              Long MP: 15/75  PIP: 5/75  DIP: 5/45    Digits tip to DPFC in cm Index: 1.5  Lon.0  Rin.0  Small:1.0   Edema in cm circumf. MCPJs R: 19.4  L:   Edema in cm circumf. P1 middle R: 6.5  L:    strength in lbs R: defer at this time  L:   Pinch Strengthin lbs: lat  R:  L:   Pinch Strength in lbs:  3 point R:  L:      MMT:      Observations:  (including splints, bandages, incisions, scars): Healed incision in palm of hand with some scab and dry peeling          MODALITIES: 20    Fluidotherapy (42401)      Estim (67993/94638)      Paraffin (86602)      US (28063)  8'    Iontophoresis (87730)      Hot Pack 10' 5''    Cold Pack            INTERVENTIONS:      Therapeutic Exercise (70298) PROM digital flex/ext. AROM all tendon gliding positions to right hand PROM      Putty rolling, shaping      Power web      Kneading beans          Therapeutic Activity (99973)                  Manual Therapy (98121) Scar mobilization Scar mob, STM    (IASTM, Dry Needling, manual mobilization)            Neuromuscular Reeducation (25008)  Cues for exercise technique and HEP                ADL Training (13083)                  HEP Training/Review See sheet(s) Reviewed HEP                Splinting      Lcode:      Orthotic Mgmt, Subsequent Enc (88664)      Orthotic Mgmt & Training (35922)            Other: Trimmed some peeling skin away from incision.        Therapeutic Exercise & NMR:  [x] (61009) Provided verbal/tactile cueing for activities related to strengthening, flexibility, endurance, ROM  for improvements in scapular, scapulothoracic and UE control with self care, reaching, carrying, lifting, house/yardwork, driving/computer work.    [] (34860) Provided verbal/tactile cueing for activities related to improving balance, coordination, kinesthetic sense, posture, motor skill, proprioception  to assist with  scapular, scapulothoracic and UE control with self care, reaching, carrying, lifting, house/yardwork, driving/computer work.     Therapeutic Activities & NMR:    [] (97660 or 28307) Provided verbal/tactile cueing for activities related to improving balance, coordination, kinesthetic sense, posture, motor skill, proprioception and motor activation to allow for proper function of scapular, scapulothoracic and UE control with self care, carrying, lifting, driving/computer work    Home Exercise Program:    [] (55331) Reviewed/Progressed HEP activities related to strengthening, flexibility, endurance, ROM of scapular, scapulothoracic and UE control with self care, reaching, carrying, lifting, house/yardwork, driving/computer work  [] (19851) Reviewed/Progressed HEP activities related to improving balance, coordination, kinesthetic sense, posture, motor skill, proprioception of scapular, scapulothoracic and UE control with self care, reaching, carrying, lifting, house/yardwork, driving/computer work      Manual Treatments:  PROM / STM / Oscillations-Mobs:  G-I, II, III, IV (PA's, Inf., Post.)  [x] (80124) Provided manual therapy to mobilize soft tissue/joints of cervical/CT, scapular GHJ and UE for the purpose of modulating pain, promoting relaxation,  increasing ROM, reducing/eliminating soft tissue swelling/inflammation/restriction, improving soft tissue extensibility and allowing for proper ROM for normal function with self care, reaching, carrying, lifting, house/yardwork, driving/computer work    ADL Training:  [] (08492) Provided self-care/home management training related to activities of daily living and compensatory training, and/or use of adaptive equipment      Charges:  Timed Code Treatment Minutes: 54'   Total Treatment Minutes: 54'   Worker's Comp: Time In/Time Out     [] EVAL (LOW) 68024 (typically 20 minutes face-to-face)    [] EVAL (MOD) 43038 (typically 30 minutes face-to-face)  [] EVAL (HIGH) [phone] (typically 45 minutes face-to-face)  [] OT Re-eval (92662)       [x] Magaly [phone]) x 1    [] VYLIB(10202)  [x] NMR (01932) x   1   [] Estim (attended) (46321)   [x] Manual ([phone]) x  1    [x] US (81697)  [] TA (93337) x      [] Paraffin (58705)  [] ADL  (70547) x     [] Splint/L code:    [] Estim (unattended) [phone]  [] Fluidotherapy (07093)  [] DN 1-2 (10320)   [] DN 3+ (76012)  [] Orthotic Mgmt, Subsequent Enc (69896)  [] Orthotic Mgmt & Training (78474)  [] Other:    ASSESSMENT:      GOALS: Patient stated goal: learn what to do for hand    []? Progressing: []? Met: []? Not Met: []? Adjusted     Therapist goals for Patient:   Short Term Goals: To be achieved in: 2 weeks  1. Independent in HEP and progression per patient tolerance, in order to prevent re-injury. []? Progressing: []? Met: []? Not Met: []? Adjusted   2. Patient will have a decrease in pain to facilitate improvement in movement, function, and ADLs as indicated by Functional Deficits. []? Progressing: []? Met: []? Not Met: []? Adjusted     Long Term Goals to be achieved in 8 weeks (through *11/9/20, including patient directed goals to address patient identified performance deficits:  1) Pt to be independent in graded HEP progression with a good level of effort and compliance. []? Progressing: []? Met: []? Not Met: []? Adjusted   2) Pt to report a score of </= 20 % on the Quick DASH disability questionnaire for increased performance with carrying, moving, and handling objects. []? Progressing: []? Met: []? Not Met: []?  Adjusted   3) Pt will demonstrate increased ROM to right middle to 220 for improved independence with grasping small objects in hand. .  []? Progressing: []? Met: []? Not Met: []? Adjusted   4) Pt will demonstrate increased strength to right  70% of left for improved independence with holding onto household objects without dropping them. []? Progressing: []? Met: []? Not Met: []? Adjusted   5) Pt will have a decrease in pain to 2/10 to facilitate shopping. []? Progressing: []? Met: []? Not Met: []? Adjusted  Overall Progression Towards Functional Goals/Treatment Progress Update:  [] Patient is progressing as expected towards functional goals listed. [] Progression is slowed due to complexities/impairments listed. [] Progression has been slowed due to co-morbidities. [x] Plan just implemented, too soon to assess goals progression <30 days  [] Goals require adjustment due to lack of progress  [] Patient is not progressing as expected and requires additional follow up with physician  [] All goals are met  [] Other:     Prognosis for POC: [x] Good [] Fair  [] Poor    Patient requires continued skilled intervention: [x] Yes  [] No    Treatment/Activity Tolerance:  [x] Patient able to complete treatment  [] Patient limited by fatigue  [] Patient limited by pain    [] Patient limited by other medical complications  [] Other:                  PLAN: See eval  [] Continue per plan of care [] Alter current plan (see comments above)  [x] Plan of care initiated [] Hold pending MD visit [] Discharge    Electronically signed by:  Carlotta Madrigal OT/HIGINIO [id number]      Note: If patient does not return for scheduled/ recommended follow up visits, this note will serve as a discharge from care along with most recent update on progress.

## 2020-09-25 ENCOUNTER — HOSPITAL ENCOUNTER (OUTPATIENT)
Dept: OCCUPATIONAL THERAPY | Age: 60
Setting detail: THERAPIES SERIES
Discharge: HOME | End: 2020-09-25
Payer: MEDICARE

## 2020-09-25 PROCEDURE — 97112 NEUROMUSCULAR REEDUCATION: CPT | Performed by: OCCUPATIONAL THERAPIST

## 2020-09-25 PROCEDURE — 97035 APP MDLTY 1+ULTRASOUND EA 15: CPT | Performed by: OCCUPATIONAL THERAPIST

## 2020-09-25 PROCEDURE — 97140 MANUAL THERAPY 1/> REGIONS: CPT | Performed by: OCCUPATIONAL THERAPIST

## 2020-09-25 PROCEDURE — 97110 THERAPEUTIC EXERCISES: CPT | Performed by: OCCUPATIONAL THERAPIST

## 2020-09-25 NOTE — FLOWSHEET NOTE
[address] [facility], [address] [place] [address] [place] Fox  Phone: [phone]  Fax [phone]    Occupational Therapy Treatment Note/ Progress Report:     Is this a Progress Report:     []  Yes  [x]  No      If Yes:  Date Range for reporting period:  Beginning 20  Ending 2020  Progress report will be due (10 Rx or 30 days whichever is less): *    Recertification will be due (POC Duration  / 90 days whichever is less): 20   Date:  2020  Patient Name:  Rubens Wong    :  1960  MRN: [id number]  Medical/Treatment Diagnosis Information:  · Diagnosis: M65.331 (ICD-10-CM) - Trigger finger, right middle finger   · Treatment Diagnosis: M79.641 right hand pain     Insurance/Certification information:     Physician Information:  Referring Practitioner: Dr. Blandon Laser  Has the plan of care been signed (Y/N):        []  Yes  [x]  No   Comorbidities Affecting Functional Performance:             []?Anxiety (F41.9)/Depression (F32.9)             [x]? Diabetes Type 1(E10.65) or 2 (E11.65)       []? Rheumatoid Arthritis (M05.9)  []? Fibromyalgia (M79.7)  []? Neuropathy(G60.9)  []? Osteoarthritis(M19.91)  []? None              [x]? Other: sarcoidosis, heart stent    Visit # Insurance Allowable Auth Required   3  []  Yes []  No    From 20  to 2020    Date of Injury: several month history of triggering prior to surgery  Date of Surgery: 20   Right Middle Finger trigger release     Date of Patient follow up with Physician: none scheduled     RESTRICTIONS/PRECAUTIONS: none     Latex Allergy:  [x]? No      []? Yes                    Pacemaker:  [x]? No       []? Yes      Preferred Language for Healthcare:   [x]? English       []? other:      Functional Scale: 64 % (Quick DASH)                                Date assessed:  2020     SUBJECTIVE: Pt reports pain however somewhat better than last session     Patient reported deficits/history of current problem: several month history of triggering prior to surgery      Pain Scale: 2-8/10       [x]? Constant                [x]? Intermittent              []?other:  Pain Location:  Right scar and middle finger  Easing factors: rest  Provocative factors: movement        OBJECTIVE:     Date:  Hand Dominance:     [x]? Right    []? Left 2020   Objective Measures:      PAIN 2-8/10 2-4/10   Quick DASH  64%                               Long MP: 15  PIP: 5  DIP: 5/45     Digits tip to DPFC in cm Index: 1.5  Lon.0  Rin.0  Small:1.0    Edema in cm circumf. MCPJs R: 19.4  L:    Edema in cm circumf. P1 middle R: 6.5  L:     strength in lbs R: defer at this time  L:    Pinch Strengthin lbs: lat  R:  L:    Pinch Strength in lbs:  3 point R:  L:       MMT:       Observations:  (including splints, bandages, incisions, scars): Healed incision in palm of hand with some scab and dry peeling           MODALITIES: 20   Fluidotherapy (10861)  10' 10'   Estim (06381/20232)      Paraffin (71021)      US (63350)  8' 8'   Iontophoresis (45582)      Hot Pack 10' 5''    Cold Pack            INTERVENTIONS:      Therapeutic Exercise (95604) PROM digital flex/ext. AROM all tendon gliding positions to right hand PROM PROM fingers, wrist     Putty rolling, shaping Putty rolling, shaping     Power web      Kneading beans digiflex yellow x25      flexbar Red wrist and forearm x25   Therapeutic Activity (04910)                  Manual Therapy (14974) Scar mobilization Scar mob, STM Scar mob, STM   (IASTM, Dry Needling, manual mobilization)            Neuromuscular Reeducation (79478)  Cues for exercise technique and HEP Cues for exercise technique and  HEP               ADL Training ()                  HEP Training/Review See sheet(s) Reviewed HEP                Splinting      Lcode:      Orthotic Mgmt, Subsequent Enc (76446)      Orthotic Mgmt & Training (16416) Other: Trimmed some peeling skin away from incision. Therapeutic Exercise & NMR:  [x] (01298) Provided verbal/tactile cueing for activities related to strengthening, flexibility, endurance, ROM  for improvements in scapular, scapulothoracic and UE control with self care, reaching, carrying, lifting, house/yardwork, driving/computer work.    [] (02988) Provided verbal/tactile cueing for activities related to improving balance, coordination, kinesthetic sense, posture, motor skill, proprioception  to assist with  scapular, scapulothoracic and UE control with self care, reaching, carrying, lifting, house/yardwork, driving/computer work.     Therapeutic Activities & NMR:    [] (67231 or 81874) Provided verbal/tactile cueing for activities related to improving balance, coordination, kinesthetic sense, posture, motor skill, proprioception and motor activation to allow for proper function of scapular, scapulothoracic and UE control with self care, carrying, lifting, driving/computer work    Home Exercise Program:    [] (61769) Reviewed/Progressed HEP activities related to strengthening, flexibility, endurance, ROM of scapular, scapulothoracic and UE control with self care, reaching, carrying, lifting, house/yardwork, driving/computer work  [] (34990) Reviewed/Progressed HEP activities related to improving balance, coordination, kinesthetic sense, posture, motor skill, proprioception of scapular, scapulothoracic and UE control with self care, reaching, carrying, lifting, house/yardwork, driving/computer work      Manual Treatments:  PROM / STM / Oscillations-Mobs:  G-I, II, III, IV (PA's, Inf., Post.)  [x] (57592) Provided manual therapy to mobilize soft tissue/joints of cervical/CT, scapular GHJ and UE for the purpose of modulating pain, promoting relaxation,  increasing ROM, reducing/eliminating soft tissue swelling/inflammation/restriction, improving soft tissue extensibility and allowing for proper ROM for % on the Quick DASH disability questionnaire for increased performance with carrying, moving, and handling objects. []? Progressing: []? Met: []? Not Met: []? Adjusted   3) Pt will demonstrate increased ROM to right middle to 220 for improved independence with grasping small objects in hand. .  []? Progressing: []? Met: []? Not Met: []? Adjusted   4) Pt will demonstrate increased strength to right  70% of left for improved independence with holding onto household objects without dropping them. []? Progressing: []? Met: []? Not Met: []? Adjusted   5) Pt will have a decrease in pain to 2/10 to facilitate shopping. []? Progressing: []? Met: []? Not Met: []? Adjusted  Overall Progression Towards Functional Goals/Treatment Progress Update:  [] Patient is progressing as expected towards functional goals listed. [] Progression is slowed due to complexities/impairments listed. [] Progression has been slowed due to co-morbidities. [x] Plan just implemented, too soon to assess goals progression <30 days  [] Goals require adjustment due to lack of progress  [] Patient is not progressing as expected and requires additional follow up with physician  [] All goals are met  [] Other:     Prognosis for POC: [x] Good [] Fair  [] Poor    Patient requires continued skilled intervention: [x] Yes  [] No    Treatment/Activity Tolerance:  [x] Patient able to complete treatment  [] Patient limited by fatigue  [] Patient limited by pain    [] Patient limited by other medical complications  [] Other:                  PLAN: See eval  [] Continue per plan of care [] Alter current plan (see comments above)  [x] Plan of care initiated [] Hold pending MD visit [] Discharge    Electronically signed by:  Melissa Ceja OT/L [id number]      Note: If patient does not return for scheduled/ recommended follow up visits, this note will serve as a discharge from care along with most recent update on progress.

## 2020-09-30 ENCOUNTER — HOSPITAL ENCOUNTER (EMERGENCY)
Age: 60
Discharge: HOME | End: 2020-10-01
Payer: MEDICARE

## 2020-09-30 ENCOUNTER — APPOINTMENT (OUTPATIENT)
Dept: GENERAL RADIOLOGY | Age: 60
End: 2020-09-30
Payer: MEDICARE

## 2020-09-30 LAB
BASOPHILS ABSOLUTE: 0.1 K/UL (ref 0–0.2)
BASOPHILS RELATIVE PERCENT: 0.8 %
EOSINOPHILS ABSOLUTE: 0.3 K/UL (ref 0–0.6)
EOSINOPHILS RELATIVE PERCENT: 2.8 %
HCT VFR BLD CALC: 35.4 % (ref 36–48)
HEMOGLOBIN: 12.2 G/DL (ref 12–16)
LYMPHOCYTES ABSOLUTE: 2.9 K/UL (ref 1–5.1)
LYMPHOCYTES RELATIVE PERCENT: 25.6 %
MCH RBC QN AUTO: 28.8 PG (ref 26–34)
MCHC RBC AUTO-ENTMCNC: 34.5 G/DL (ref 31–36)
MCV RBC AUTO: 83.5 FL (ref 80–100)
MONOCYTES ABSOLUTE: 0.8 K/UL (ref 0–1.3)
MONOCYTES RELATIVE PERCENT: 6.9 %
NEUTROPHILS ABSOLUTE: 7.2 K/UL (ref 1.7–7.7)
NEUTROPHILS RELATIVE PERCENT: 63.9 %
PDW BLD-RTO: 14.3 % (ref 12.4–15.4)
PLATELET # BLD: 326 K/UL (ref 135–450)
PMV BLD AUTO: 6.5 FL (ref 5–10.5)
RBC # BLD: 4.25 M/UL (ref 4–5.2)
WBC # BLD: 11.3 K/UL (ref 4–11)

## 2020-09-30 PROCEDURE — 83605 ASSAY OF LACTIC ACID: CPT

## 2020-09-30 PROCEDURE — 85025 COMPLETE CBC W/AUTO DIFF WBC: CPT

## 2020-09-30 PROCEDURE — 99283 EMERGENCY DEPT VISIT LOW MDM: CPT

## 2020-09-30 PROCEDURE — 73630 X-RAY EXAM OF FOOT: CPT

## 2020-09-30 PROCEDURE — 80053 COMPREHEN METABOLIC PANEL: CPT

## 2020-09-30 RX ORDER — CEPHALEXIN 500 MG/1
CAPSULE ORAL
COMMUNITY
Start: 2020-09-08 | End: 2020-11-19 | Stop reason: ALTCHOICE

## 2020-09-30 ASSESSMENT — PAIN DESCRIPTION - PROGRESSION: CLINICAL_PROGRESSION: GRADUALLY WORSENING

## 2020-09-30 ASSESSMENT — PAIN SCALES - GENERAL: PAINLEVEL_OUTOF10: 4

## 2020-09-30 ASSESSMENT — PAIN DESCRIPTION - ONSET: ONSET: ON-GOING

## 2020-09-30 ASSESSMENT — PAIN DESCRIPTION - PAIN TYPE: TYPE: CHRONIC PAIN

## 2020-09-30 ASSESSMENT — PAIN DESCRIPTION - LOCATION: LOCATION: FOOT

## 2020-09-30 ASSESSMENT — PAIN DESCRIPTION - ORIENTATION: ORIENTATION: RIGHT;LEFT

## 2020-09-30 ASSESSMENT — PAIN DESCRIPTION - DESCRIPTORS: DESCRIPTORS: ACHING

## 2020-09-30 ASSESSMENT — PAIN DESCRIPTION - FREQUENCY: FREQUENCY: CONTINUOUS

## 2020-10-01 VITALS
BODY MASS INDEX: 24.84 KG/M2 | HEART RATE: 80 BPM | OXYGEN SATURATION: 99 % | TEMPERATURE: 97.8 F | DIASTOLIC BLOOD PRESSURE: 70 MMHG | RESPIRATION RATE: 15 BRPM | SYSTOLIC BLOOD PRESSURE: 136 MMHG | WEIGHT: 135 LBS | HEIGHT: 62 IN

## 2020-10-01 LAB
A/G RATIO: 1.6 (ref 1.1–2.2)
ALBUMIN SERPL-MCNC: 4.4 G/DL (ref 3.4–5)
ALP BLD-CCNC: 95 U/L (ref 40–129)
ALT SERPL-CCNC: 13 U/L (ref 10–40)
ANION GAP SERPL CALCULATED.3IONS-SCNC: 11 MMOL/L (ref 3–16)
AST SERPL-CCNC: 17 U/L (ref 15–37)
BILIRUB SERPL-MCNC: 0.3 MG/DL (ref 0–1)
BUN BLDV-MCNC: 17 MG/DL (ref 7–20)
CALCIUM SERPL-MCNC: 9.5 MG/DL (ref 8.3–10.6)
CHLORIDE BLD-SCNC: 100 MMOL/L (ref 99–110)
CO2: 22 MMOL/L (ref 21–32)
CREAT SERPL-MCNC: 0.8 MG/DL (ref 0.6–1.1)
GFR AFRICAN AMERICAN: >60
GFR NON-AFRICAN AMERICAN: >60
GLOBULIN: 2.8 G/DL
GLUCOSE BLD-MCNC: 329 MG/DL (ref 70–99)
LACTIC ACID: 1.6 MMOL/L (ref 0.4–2)
POTASSIUM REFLEX MAGNESIUM: 3.7 MMOL/L (ref 3.5–5.1)
SODIUM BLD-SCNC: 133 MMOL/L (ref 136–145)
TOTAL PROTEIN: 7.2 G/DL (ref 6.4–8.2)

## 2020-10-01 PROCEDURE — 6370000000 HC RX 637 (ALT 250 FOR IP): Performed by: NURSE PRACTITIONER

## 2020-10-01 RX ORDER — SULFAMETHOXAZOLE AND TRIMETHOPRIM 800; 160 MG/1; MG/1
1 TABLET ORAL ONCE
Status: COMPLETED | OUTPATIENT
Start: 2020-10-01 | End: 2020-10-01

## 2020-10-01 RX ORDER — SULFAMETHOXAZOLE AND TRIMETHOPRIM 800; 160 MG/1; MG/1
1 TABLET ORAL 2 TIMES DAILY
Qty: 20 TABLET | Refills: 0 | Status: SHIPPED | OUTPATIENT
Start: 2020-10-01 | End: 2020-10-11

## 2020-10-01 RX ADMIN — SULFAMETHOXAZOLE AND TRIMETHOPRIM 1 TABLET: 800; 160 TABLET ORAL at 01:17

## 2020-10-01 NOTE — ED NOTES
Discharge instructions reviewed with patient. Medications discussed. Encouraged to take medication exactly as prescribed and until the entire antibiotic prescription is finished. Patient advised to not stop the medication even if they begin feeling better. Patient voiced understanding.      Saqib Fletcher RN  10/01/20 0788

## 2020-10-01 NOTE — ED PROVIDER NOTES
Evaluated by [id number] [facility] Provider    [facility]  ED    CHIEF COMPLAINT  Leg Swelling (patient reports red streak and sores bilaterally to back of legs. Patient reports she is a diabetic and wore the wrong shoes to [employer]. Patient reports she is swelling because she retains water. hx of cellulitis )    HISTORY OF PRESENT ILLNESS  Jose G Koch is a 62 y.o. female who presents to the ED with complaints of redness/erythema to the bilateral heels. Has holes in her ankles from wearing bad shoes on . Today she saw a red streak on the right ankle and left ankle. Has swelling in her feet/ankles, they swell every day. Had a crack in her toe and was on antibiotics started on the  for cellulitis. Should have finished on the , has 6 pills left. She quit taking them but shouldn't have. Denies fevers, chills, sweats. Denies nausea, vomiting, diarrhea. She has been using bandaids and neosporin. The patient is currently rating their pain as 3/10 and describes it as an aching type of pain. Treatments tried prior to arrival in the ED: none. The patient denies other complaints, modifying factors or associated symptoms. The patient arrived to the ED via private car. Patient is accompanied by SO who is/are bedside for the visit. Nursing notes reviewed.    Past Medical History:   Diagnosis Date    CAD (coronary artery disease)     1 stent    Cerebral artery occlusion with cerebral infarction [facility]) 2013    Choking sensation     [facility] mellitus [facility]     DM2 (diabetes mellitus, type 2) ([facility]     Hyperlipidemia     Prolonged emergence from general anesthesia     Sarcoid     lungs     Past Surgical History:   Procedure Laterality Date    CARDIAC SURGERY  2020    stent     SECTION      x3    CHOLECYSTECTOMY      COLONOSCOPY      COLONOSCOPY  13    normal    COLONOSCOPY  10/19/2018    1 polyp    COLONOSCOPY N/A 10/19/2018    COLONOSCOPY POLYPECTOMY SNARE/COLD BIOPSY performed by Darnell Veliz MD at [facility]  [address] Right 8/31/2020    RIGHT LONG TRIGGER FINGER RELEASE performed by Rosa Rojas MD at [facility] HYSTEROSCOPY  1/25/2013    Laprascopic supracervical hysterectomy     Family History   Problem Relation Age of Onset    Diabetes Mother     Heart Disease Mother     Cancer Father         colon     Social History     Socioeconomic History    Marital status:      Spouse name: Not on file    Number of children: Not on file    Years of education: Not on file    Highest education level: Not on file   Occupational History    Not on file   Social Needs    Financial resource strain: Not on file    Food insecurity     Worry: Not on file     Inability: Not on file    Transportation needs     Medical: Not on file     Non-medical: Not on file   Tobacco Use    Smoking status: Never Smoker    Smokeless tobacco: Never Used   Substance and Sexual Activity    Alcohol use: No    Drug use: No    Sexual activity: Not on file   Lifestyle    Physical activity     Days per week: Not on file     Minutes per session: Not on file    Stress: Not on file   Relationships    Social connections     Talks on phone: Not on file     Gets together: Not on file     Attends Latter day service: Not on file     Active member of club or organization: Not on file     Attends meetings of clubs or organizations: Not on file     Relationship status: Not on file    Intimate partner violence     Fear of current or ex partner: Not on file     Emotionally abused: Not on file     Physically abused: Not on file     Forced sexual activity: Not on file   Other Topics Concern    Not on file   Social History Narrative    Not on file     No current facility-administered medications for this encounter.       Current Outpatient Medications   Medication Sig Dispense Refill    sulfamethoxazole-trimethoprim (BACTRIM DS) 800-160 MG per tablet Take 1 tablet by mouth 2 times daily for 10 days 20 tablet 0    TRESIBA FLEXTOUCH 100 UNIT/ML SOPN       atorvastatin (LIPITOR) 10 MG tablet Take by mouth      pantoprazole (PROTONIX) 40 MG tablet       rosuvastatin (CRESTOR) 10 MG tablet TAKE 1 TABLET BY MOUTH DAILY 90 tablet 3    ASPIRIN LOW DOSE 81 MG chewable tablet TAKE 1 TABLET BY MOUTH DAILY 90 tablet 3    ibuprofen (ADVIL;MOTRIN) 600 MG tablet Take 1 tablet by mouth every 6 hours as needed for Pain 60 tablet 1    nitroGLYCERIN (NITROSTAT) 0.4 MG SL tablet up to max of 3 total doses. If no relief after 1 dose, call 911. 25 tablet 3    metoprolol tartrate (LOPRESSOR) 25 MG tablet Take 0.5 tablets by mouth 2 times daily 60 tablet 3    ticagrelor (BRILINTA) 90 MG TABS tablet Take 1 tablet by mouth 2 times daily 60 tablet 11    Insulin Glargine (BASAGLAR KWIKPEN SC) Inject 60 Units into the skin every morning       gemfibrozil (LOPID) 600 MG tablet Take 600 mg by mouth 2 times daily       Misc. Devices (ACAPELLA) MISC 1 Device by Does not apply route 4 times daily. 1 each 0    famotidine (PEPCID) 20 MG tablet Take 1 tablet by mouth daily. 60 tablet 3    Cholecalciferol (VITAMIN D) 2000 UNITS CAPS capsule Take 2,000 Units by mouth daily.  lisinopril (PRINIVIL;ZESTRIL) 2.5 MG tablet Take 2.5 mg by mouth daily.       metformin (GLUCOPHAGE) 500 MG tablet Take 500 mg by mouth 2 times daily (with meals)       cephALEXin (KEFLEX) 500 MG capsule        Allergies   Allergen Reactions    Codeine Nausea And Vomiting    Phenergan [Promethazine Hcl] Nausea And Vomiting    Influenza Vaccines      Arm hot , red pain, sick    Pneumococcal Vaccines      Arm hot pain, sick    Vicodin [Hydrocodone-Acetaminophen]      itching    Hydrocodone-Acetaminophen Nausea And Vomiting    Oxycodone Nausea And Vomiting     hallucianitions    Promethazine Nausea And Vomiting       REVIEW OF SYSTEMS    10 systems reviewed, pertinent positives per HPI otherwise noted to be negative    PHYSICAL EXAM  Vitals:    10/01/20 0120   BP: 136/70   Pulse: 80   Resp: 15   Temp: 97.8 °F (36.6 °C)   SpO2: 99%       GENERAL: Patient is well-developed, well-nourished. Awake and alert. Cooperative. Resting in bed. No apparent distress. HEENT:  Normocephalic, atraumatic. Conjunctiva appear normal. Sclera is non-icteric. External ears are normal. Mucous membranes moist.  NECK: Supple with normal ROM. Trachea midline  LUNGS: Equal and symmetric chest rise. Breathing is unlabored. Speaking comfortably in full sentences. CADIOVASCULAR:  Regular rate and rhythm. GI: Non-distended. EXTREMITIES: Normal ROM, no edema, no tenderness, or deformity. Distal pulses palpable, +2. No gross deformities or trauma noted. Moving all extremities equally and appropriately. BACK: No tenderness. SKIN: Warm/dry. Skin is intact. To the bilateral posterior heels there is small open wounds without depth, these are both <0.5 cm in size. Wound beds are red and there is no active discharge. To both there is mild surrounding erythema with a small amount of streaking that is horizontal in nature, the streaking does not extend proximally into the legs. To the left heel there is maceration to the donovan-wound. PSYCHIATRIC: Mood and affect appropriate. Speech is clear and articulate. NEUROLOGIC: Alert and oriented. No focal motor or sensory deficits.  Gait was observed and is normal.    PROCEDURES  None    LABS  Results for orders placed or performed during the hospital encounter of 09/30/20   CBC Auto Differential   Result Value Ref Range    WBC 11.3 (H) 4.0 - 11.0 K/uL    RBC 4.25 4.00 - 5.20 M/uL    Hemoglobin 12.2 12.0 - 16.0 g/dL    Hematocrit 35.4 (L) 36.0 - 48.0 %    MCV 83.5 80.0 - 100.0 fL    MCH 28.8 26.0 - 34.0 pg    MCHC 34.5 31.0 - 36.0 g/dL    RDW 14.3 12.4 - 15.4 %    Platelets 808 500 - 120 K/uL    MPV 6.5 5.0 - 10.5 fL    Neutrophils % 63.9 %    Lymphocytes % 25.6 %    Monocytes % 6.9 %    Eosinophils % 2.8 %    Basophils % 0.8 %    Neutrophils Absolute 7.2 1.7 - 7.7 K/uL    Lymphocytes Absolute 2.9 1.0 - 5.1 K/uL    Monocytes Absolute 0.8 0.0 - 1.3 K/uL    Eosinophils Absolute 0.3 0.0 - 0.6 K/uL    Basophils Absolute 0.1 0.0 - 0.2 K/uL   Comprehensive Metabolic Panel w/ Reflex to MG   Result Value Ref Range    Sodium 133 (L) 136 - 145 mmol/L    Potassium reflex Magnesium 3.7 3.5 - 5.1 mmol/L    Chloride 100 99 - 110 mmol/L    CO2 22 21 - 32 mmol/L    Anion Gap 11 3 - 16    Glucose 329 (H) 70 - 99 mg/dL    BUN 17 7 - 20 mg/dL    CREATININE 0.8 0.6 - 1.1 mg/dL    GFR Non-African American >60 >60    GFR African American >60 >60    Calcium 9.5 8.3 - 10.6 mg/dL    Total Protein 7.2 6.4 - 8.2 g/dL    Alb 4.4 3.4 - 5.0 g/dL    Albumin/Globulin Ratio 1.6 1.1 - 2.2    Total Bilirubin 0.3 0.0 - 1.0 mg/dL    Alkaline Phosphatase 95 40 - 129 U/L    ALT 13 10 - 40 U/L    AST 17 15 - 37 U/L    Globulin 2.8 g/dL   Lactic Acid, Plasma   Result Value Ref Range    Lactic Acid 1.6 0.4 - 2.0 mmol/L       RADIOLOGY    Xr Foot Left (min 3 Views)    Result Date: 9/30/2020  EXAMINATION: THREE XRAY VIEWS OF THE LEFT FOOT 9/30/2020 10:39 pm COMPARISON: None. HISTORY: ORDERING SYSTEM PROVIDED HISTORY: wound TECHNOLOGIST PROVIDED HISTORY: Reason for exam:->wound Reason for Exam: sore on heel Acuity: Acute Type of Exam: Initial FINDINGS: Mild degenerative osteoarthritic change seen within the interphalangeal joints. The metatarsal-phalangeal joints appear intact. The Lisfranc joint appears intact. No fracture or dislocation is identified in the foot. Insertional enthesophytes at the Achilles insertion. Minimal osteophyte noted at the plantar aponeurosis. No osseous destructive process is identified. No metallic foreign body is seen. No significant soft tissue gas or radiopaque foreign body is identified. Calcaneal spurring.      Xr Foot Right (min 3 Views)    Result Date: 9/30/2020  EXAMINATION: XRAY VIEWS OF THE RIGHT FOOT 9/30/2020 10:39 pm COMPARISON: None. HISTORY: ORDERING SYSTEM PROVIDED HISTORY: wound TECHNOLOGIST PROVIDED HISTORY: Reason for exam:->wound Reason for Exam: sore on heel Acuity: Acute Type of Exam: Initial FINDINGS: No acute osseous fracture is identified. The Lisfranc joint appears intact. No osseous destructive changes are identified. No radiopaque foreign body is identified. No soft tissue gas is identified. Achilles insertional enthesophytes. No fracture, soft tissue gas or radiopaque foreign body is identified. Achilles insertional enthesophytes. ED COURSE/MDM  Patient seen and evaluated. Old records reviewed. Diagnostic testing reviewed and results discussed. I have evaluated this patient. My supervising physician was available for consultation. Donald Winters presented to the ED today with above noted complaints. Physical exam does reveal a erythematous area to the bilateral heels with what appears to be blisters that have opened. There is mild erythema to both areas. To the left heel there is maceration of the donovan-wound. The wounds are quite small in size. There is no streaking erythema proximally, there is some red streaking noted vertical to the wounds and appears to be consistent on both sides. consistent with cellulitis. This is without fluctuance or induration upon palpation. The patient is currently afebrile, without tachycardia, and BP is normotensive. Blood work shows a slight leukocytosis is WBC elevated at 11.3. No differential shift. Hemoglobin is normal.  Hematocrit low but stable at 35.4. No significant electrolyte abnormality. Glucose is elevated at 329 but anion gap is normal.  I feel risk of DKA is low given this finding. There is no evidence of MIGUEL ANGEL or transaminitis. Lactic acid level is normal.  X-ray of the right and left foot was obtained and without acute findings in either 1.   The patient will be started on Bactrim for empiric antibiotic coverage. The patient received first dose in the ED. I did refer the patient to the wound center as she is diabetic and may benefit from follow-up with them for wound healing. The patient was instructed on signs and symptoms of increasing infection, and when to return to a health care provider. Patient was given the following medications or treatments in the ED:   Medications   sulfamethoxazole-trimethoprim (BACTRIM DS;SEPTRA DS) 800-160 MG per tablet 1 tablet (1 tablet Oral Given 10/1/20 0117)       At this point I do not feel the patient requires further work up and it is reasonable to discharge the patient. Please refer to AVS for further details regarding discharge instructions. A discussion was had with the patient regarding diagnosis, diagnostic testing results, treatment/ plan of care, and follow up. All questions were answered. Patient will follow up as directed for further evaluation/treatment. The patient was given strict return precautions as we discussed symptoms that would necessitate return to the ED. Patient will return to ED for new/worsening symptoms. The patient verbalized their understanding and agreement with the above plan. Patient was sent home with a prescription for below medication/s. I did [place] patient on appropriate use of these medication. Discharge Medication List as of 10/1/2020  1:06 AM      START taking these medications    Details   sulfamethoxazole-trimethoprim (BACTRIM DS) 800-160 MG per tablet Take 1 tablet by mouth 2 times daily for 10 days, Disp-20 tablet,R-0Print             I estimate there is LOW risk for CELLULITIS, COMPARTMENT SYNDROME, NECROTIZING FASCIITIS, TENDON OR NEUROVASCULAR INJURY, or FOREIGN BODY, thus I consider the discharge disposition reasonable. Also, there is no evidence or peritonitis, sepsis, or toxicity. [address] and I have discussed the diagnosis and risks, and we agree with discharging home to follow-up with their primary doctor. We also discussed returning to the Emergency Department immediately if new or worsening symptoms occur. We have discussed the symptoms which are most concerning (e.g., changing or worsening pain, fever, numbness, weakness, cool or painful digits) that necessitate immediate return. CLINICAL IMPRESSION    1. Blister of left heel, initial encounter    2. Blister of right heel, initial encounter    3. Cellulitis of left lower extremity    4. Cellulitis of right lower extremity    5. Type 2 diabetes mellitus with other skin ulcer, without long-term current use of insulin ([facility])           Discharge Vitals:  Blood pressure 136/70, pulse 80, temperature 97.8 °F (36.6 °C), temperature source Oral, resp. rate 15, height 5' 2\" (1.575 m), weight 135 lb (61.2 kg), last menstrual period 12/21/2012, SpO2 99 %. [place] [address]  [phone]  Call in 1 day  For further evaluation    [place] [address]  [phone]    Call in 1 day  For further evaluation    [facility]  ED  [address]  Go to   If symptoms worsen      DISPOSITION  Patient was discharged to home in good condition. Comment: Please note this report has been produced using speech recognition software and may contain errors related to that system including errors in grammar, punctuation, and spelling, as well as words and phrases that may be inappropriate. If there are any questions or concerns please feel free to contact the dictating provider for clarification.               DEV Watson CNP  10/01/20 7124

## 2020-10-02 ENCOUNTER — HOSPITAL ENCOUNTER (OUTPATIENT)
Dept: OCCUPATIONAL THERAPY | Age: 60
Setting detail: THERAPIES SERIES
Discharge: HOME | End: 2020-10-02
Payer: MEDICARE

## 2020-10-02 PROCEDURE — 97022 WHIRLPOOL THERAPY: CPT | Performed by: OCCUPATIONAL THERAPIST

## 2020-10-02 PROCEDURE — 97035 APP MDLTY 1+ULTRASOUND EA 15: CPT | Performed by: OCCUPATIONAL THERAPIST

## 2020-10-02 PROCEDURE — 97110 THERAPEUTIC EXERCISES: CPT | Performed by: OCCUPATIONAL THERAPIST

## 2020-10-02 PROCEDURE — 97140 MANUAL THERAPY 1/> REGIONS: CPT | Performed by: OCCUPATIONAL THERAPIST

## 2020-10-02 NOTE — FLOWSHEET NOTE
[address] [facility], [address] [place] [address] [place]  Phone: [phone]  Fax [phone]    Occupational Therapy Treatment Note/ Progress Report:     Is this a Progress Report:     []  Yes  [x]  No      If Yes:  Date Range for reporting period:  Beginning 20  Ending 10/2/2020  Progress report will be due (10 Rx or 30 days whichever is less): *    Recertification will be due (POC Duration  / 90 days whichever is less): 20   Date:  10/2/2020  Patient Name:  Roni Short    :  1960  MRN: [id number]  Medical/Treatment Diagnosis Information:  · Diagnosis: M65.331 (ICD-10-CM) - Trigger finger, right middle finger   · Treatment Diagnosis: M79.641 right hand pain     Insurance/Certification information:     Physician Information:  Referring Practitioner: Dr. Mahnaz Zuniga  Has the plan of care been signed (Y/N):        []  Yes  [x]  No   Comorbidities Affecting Functional Performance:             []?Anxiety (F41.9)/Depression (F32.9)             [x]? Diabetes Type 1(E10.65) or 2 (E11.65)       []? Rheumatoid Arthritis (M05.9)  []? Fibromyalgia (M79.7)  []? Neuropathy(G60.9)  []? Osteoarthritis(M19.91)  []? None              [x]? Other: sarcoidosis, heart stent    Visit # Insurance Allowable Auth Required   4  []  Yes []  No    From 20  to 10/2/2020    Date of Injury: several month history of triggering prior to surgery  Date of Surgery: 20   Right Middle Finger trigger release     Date of Patient follow up with Physician: none scheduled     RESTRICTIONS/PRECAUTIONS: none     Latex Allergy:  [x]? No      []? Yes                    Pacemaker:  [x]? No       []? Yes      Preferred Language for Healthcare:   [x]? English       []? other:      Functional Scale: 64 % (Quick DASH)                                Date assessed:  2020     SUBJECTIVE: Pt reports she is using her hand more.  It is easier to do self care, drive etc.     Patient reported deficits/history of current problem: several month history of triggering prior to surgery      Pain Scale: 2-8/10       [x]? Constant                [x]? Intermittent              []?other:  Pain Location:  Right scar and middle finger  Easing factors: rest  Provocative factors: movement        OBJECTIVE:     Date:  Hand Dominance:     [x]? Right    []? Left 2020    Objective Measures:       PAIN 2-810 2-4/10    Quick DASH  64%                                Long MP: 15  PIP:   DIP: 45      Digits tip to DPFC in cm Index: 1.5  Lon.0  Rin.0  Small:1.0     Edema in cm circumf. MCPJs R: 19.4  L:     Edema in cm circumf. P1 middle R: 6.5  L:      strength in lbs R: defer at this time  L:     Pinch Strengthin lbs: lat  R:  L:     Pinch Strength in lbs:  3 point R:  L:        MMT:        Observations:  (including splints, bandages, incisions, scars): Healed incision in palm of hand with some scab and dry peeling            MODALITIES: 9/14/20  9/18/20 9/25/20 10/2/20   Fluidotherapy (45235)  10' 10' 15'   Estim (49268/55694)       Paraffin (40750)       US (90332)  8' 8' 8'   Iontophoresis (42346)       Hot Pack 10' 5''     Cold Pack              INTERVENTIONS:       Therapeutic Exercise (77420) PROM digital flex/ext. AROM all tendon gliding positions to right hand PROM PROM fingers, wrist PROM fingers, wrist     Putty rolling, shaping Putty rolling, shaping Putty rolling, shaping, mallet     Power web       Kneading beans digiflex yellow x25       flexbar Red wrist and forearm x25 flexbar Red wrist and forearm x25   Therapeutic Activity (55239)                     Manual Therapy (25954) Scar mobilization Scar mob, STM Scar mob, STM Scar mob, STM   (IASTM, Dry Needling, manual mobilization)              Neuromuscular Reeducation (85373)  Cues for exercise technique and HEP Cues for exercise technique and  HEP Verbal and tactile cues for technique ADL Training (00676)                     HEP Training/Review See sheet(s) Reviewed HEP                   Splinting       Lcode:       Orthotic Mgmt, Subsequent Enc (27702)       Orthotic Mgmt & Training (29309)              Other: Trimmed some peeling skin away from incision. Therapeutic Exercise & NMR:  [x] (13152) Provided verbal/tactile cueing for activities related to strengthening, flexibility, endurance, ROM  for improvements in scapular, scapulothoracic and UE control with self care, reaching, carrying, lifting, house/yardwork, driving/computer work.    [] (49055) Provided verbal/tactile cueing for activities related to improving balance, coordination, kinesthetic sense, posture, motor skill, proprioception  to assist with  scapular, scapulothoracic and UE control with self care, reaching, carrying, lifting, house/yardwork, driving/computer work.     Therapeutic Activities & NMR:    [] (26433 or 63246) Provided verbal/tactile cueing for activities related to improving balance, coordination, kinesthetic sense, posture, motor skill, proprioception and motor activation to allow for proper function of scapular, scapulothoracic and UE control with self care, carrying, lifting, driving/computer work    Home Exercise Program:    [] (04496) Reviewed/Progressed HEP activities related to strengthening, flexibility, endurance, ROM of scapular, scapulothoracic and UE control with self care, reaching, carrying, lifting, house/yardwork, driving/computer work  [] (07382) Reviewed/Progressed HEP activities related to improving balance, coordination, kinesthetic sense, posture, motor skill, proprioception of scapular, scapulothoracic and UE control with self care, reaching, carrying, lifting, house/yardwork, driving/computer work      Manual Treatments:  PROM / STM / Oscillations-Mobs:  G-I, II, III, IV (PA's, Inf., Post.)  [x] (39274) Provided manual therapy to mobilize soft tissue/joints of cervical/CT, scapular patient identified performance deficits:  1) Pt to be independent in graded HEP progression with a good level of effort and compliance. []? Progressing: []? Met: []? Not Met: []? Adjusted   2) Pt to report a score of </= 20 % on the Quick DASH disability questionnaire for increased performance with carrying, moving, and handling objects. []? Progressing: []? Met: []? Not Met: []? Adjusted   3) Pt will demonstrate increased ROM to right middle to 220 for improved independence with grasping small objects in hand. .  []? Progressing: []? Met: []? Not Met: []? Adjusted   4) Pt will demonstrate increased strength to right  70% of left for improved independence with holding onto household objects without dropping them. []? Progressing: []? Met: []? Not Met: []? Adjusted   5) Pt will have a decrease in pain to 2/10 to facilitate shopping. []? Progressing: []? Met: []? Not Met: []? Adjusted  Overall Progression Towards Functional Goals/Treatment Progress Update:  [] Patient is progressing as expected towards functional goals listed. [] Progression is slowed due to complexities/impairments listed. [] Progression has been slowed due to co-morbidities.   [x] Plan just implemented, too soon to assess goals progression <30 days  [] Goals require adjustment due to lack of progress  [] Patient is not progressing as expected and requires additional follow up with physician  [] All goals are met  [] Other:     Prognosis for POC: [x] Good [] Fair  [] Poor    Patient requires continued skilled intervention: [x] Yes  [] No    Treatment/Activity Tolerance:  [x] Patient able to complete treatment  [] Patient limited by fatigue  [] Patient limited by pain    [] Patient limited by other medical complications  [] Other:                  PLAN: See eval  [] Continue per plan of care [] Alter current plan (see comments above)  [x] Plan of care initiated [] Hold pending MD visit [] Discharge    Electronically signed by:  Clarisa Casiano OT/L [id number]      Note: If patient does not return for scheduled/ recommended follow up visits, this note will serve as a discharge from care along with most recent update on progress.

## 2020-10-09 ENCOUNTER — HOSPITAL ENCOUNTER (OUTPATIENT)
Dept: OCCUPATIONAL THERAPY | Age: 60
Setting detail: THERAPIES SERIES
Discharge: HOME | End: 2020-10-09
Payer: MEDICARE

## 2020-10-09 PROCEDURE — 97022 WHIRLPOOL THERAPY: CPT | Performed by: OCCUPATIONAL THERAPIST

## 2020-10-09 PROCEDURE — 97035 APP MDLTY 1+ULTRASOUND EA 15: CPT | Performed by: OCCUPATIONAL THERAPIST

## 2020-10-09 PROCEDURE — 97140 MANUAL THERAPY 1/> REGIONS: CPT | Performed by: OCCUPATIONAL THERAPIST

## 2020-10-09 PROCEDURE — 97110 THERAPEUTIC EXERCISES: CPT | Performed by: OCCUPATIONAL THERAPIST

## 2020-10-09 NOTE — FLOWSHEET NOTE
[address] [facility], [address] [place] [address] Marin Fox  Phone: [phone]  Fax [phone]    Occupational Therapy Treatment Note/ Progress Report:     Is this a Progress Report:     []  Yes  [x]  No      If Yes:  Date Range for reporting period:  Beginning 20  Ending 10/9/2020  Progress report will be due (10 Rx or 30 days whichever is less): *    Recertification will be due (POC Duration  / 90 days whichever is less): 20   Date:  10/9/2020  Patient Name:  Beni Ayers    :  1960  MRN: [id number]  Medical/Treatment Diagnosis Information:  · Diagnosis: M65.331 (ICD-10-CM) - Trigger finger, right middle finger   · Treatment Diagnosis: M79.641 right hand pain     Insurance/Certification information:     Physician Information:  Referring Practitioner: Dr. Stephanie Benavides  Has the plan of care been signed (Y/N):        []  Yes  [x]  No   Comorbidities Affecting Functional Performance:             []?Anxiety (F41.9)/Depression (F32.9)             [x]? Diabetes Type 1(E10.65) or 2 (E11.65)       []? Rheumatoid Arthritis (M05.9)  []? Fibromyalgia (M79.7)  []? Neuropathy(G60.9)  []? Osteoarthritis(M19.91)  []? None              [x]? Other: sarcoidosis, heart stent    Date of Injury: several month history of triggering prior to surgery  Date of Surgery: 20   Right Middle Finger trigger release     Date of Patient follow up with Physician: none scheduled     RESTRICTIONS/PRECAUTIONS: none     Latex Allergy:  [x]? No      []? Yes                    Pacemaker:  [x]? No       []? Yes      Preferred Language for Healthcare:   [x]? English       []? other:      Functional Scale: 64 % (Quick DASH)                                Date assessed:  2020    Visit # Insurance Allowable Auth Required   5  []  Yes []  No    From 20  to 10/9/2020     SUBJECTIVE: Pt reports her finger is numb and has been since surgery.  Stated it was not numb prior to surgery.      Patient reported deficits/history of current problem: several month history of triggering prior to surgery      Pain Scale: 2-8/10       [x]? Constant                [x]? Intermittent              []?other:  Pain Location:  Right scar and middle finger  Easing factors: rest  Provocative factors: movement        OBJECTIVE:     Date:  Hand Dominance:     [x]? Right    []? Left 2020    9/25/20 10/9/20    Objective Measures:       PAIN 2-810 2-4/10    Quick DASH  64%                                Long MP: 15/75  PIP:   DIP:    /  /  /   Digits tip to DPFC in cm Index: 1.5  Lon.0  Rin.0  Small:1.0     Edema in cm circumf. MCPJs R: 19.4  L:     Edema in cm circumf. P1 middle R: 6.5  L:      strength in lbs R: defer at this time  L:     Pinch Strengthin lbs: lat  R:  L:     Pinch Strength in lbs:  3 point R:  L:        MMT:        Observations:  (including splints, bandages, incisions, scars): Healed incision in palm of hand with some scab and dry peeling          MODALITIES: 9/14/20  9/18/20 9/25/20 10/2/20 10/9/20    Fluidotherapy (84137)  10' 10' 15' 12   Estim (66092/49515)        Paraffin (18970)        US (68947)  8' 8' 8' 8' 100% 1.4 w/cm2    Iontophoresis (73066)        Hot Pack 10' 5''      Cold Pack                INTERVENTIONS:        Therapeutic Exercise (76229) PROM digital flex/ext. AROM all tendon gliding positions to right hand PROM PROM fingers, wrist PROM fingers, wrist PROM fingers, wrist     Putty rolling, shaping Putty rolling, shaping Putty rolling, shaping, mallet Pink putty roll, pinch, press with mallet     Power web        Kneading beans digiflex yellow x25        flexbar Red wrist and forearm x25 flexbar Red wrist and forearm x25 Green flex bar   Therapeutic Activity (09569)                        Manual Therapy (67888) Scar mobilization Scar mob, STM Scar mob, STM Scar mob, STM Scar mob, STM   (IASTM, Dry Needling, manual mobilization) Neuromuscular Reeducation (37831)  Cues for exercise technique and HEP Cues for exercise technique and  HEP Verbal and tactile cues for technique                    ADL Training (50451)                        HEP Training/Review See sheet(s) Reviewed HEP                      Splinting        Lcode:        Orthotic Mgmt, Subsequent Enc (55822)        Orthotic Mgmt & Training (51489)                Other: Trimmed some peeling skin away from incision. Therapeutic Exercise & NMR:  [x] (99867) Provided verbal/tactile cueing for activities related to strengthening, flexibility, endurance, ROM  for improvements in scapular, scapulothoracic and UE control with self care, reaching, carrying, lifting, house/yardwork, driving/computer work.    [] (66017) Provided verbal/tactile cueing for activities related to improving balance, coordination, kinesthetic sense, posture, motor skill, proprioception  to assist with  scapular, scapulothoracic and UE control with self care, reaching, carrying, lifting, house/yardwork, driving/computer work.     Therapeutic Activities & NMR:    [] (70490 or 22010) Provided verbal/tactile cueing for activities related to improving balance, coordination, kinesthetic sense, posture, motor skill, proprioception and motor activation to allow for proper function of scapular, scapulothoracic and UE control with self care, carrying, lifting, driving/computer work    Home Exercise Program:    [] (16721) Reviewed/Progressed HEP activities related to strengthening, flexibility, endurance, ROM of scapular, scapulothoracic and UE control with self care, reaching, carrying, lifting, house/yardwork, driving/computer work  [] (55591) Reviewed/Progressed HEP activities related to improving balance, coordination, kinesthetic sense, posture, motor skill, proprioception of scapular, scapulothoracic and UE control with self care, reaching, carrying, lifting, house/yardwork, driving/computer work Adjusted     Long Term Goals to be achieved in 8 weeks (through *11/9/20, including patient directed goals to address patient identified performance deficits:  1) Pt to be independent in graded HEP progression with a good level of effort and compliance. []? Progressing: []? Met: []? Not Met: []? Adjusted   2) Pt to report a score of </= 20 % on the Quick DASH disability questionnaire for increased performance with carrying, moving, and handling objects. []? Progressing: []? Met: []? Not Met: []? Adjusted   3) Pt will demonstrate increased ROM to right middle to 220 for improved independence with grasping small objects in hand. .  []? Progressing: []? Met: []? Not Met: []? Adjusted   4) Pt will demonstrate increased strength to right  70% of left for improved independence with holding onto household objects without dropping them. []? Progressing: []? Met: []? Not Met: []? Adjusted   5) Pt will have a decrease in pain to 2/10 to facilitate shopping. []? Progressing: []? Met: []? Not Met: []? Adjusted      Overall Progression Towards Functional Goals/Treatment Progress Update:  [x] Patient is progressing as expected towards functional goals listed. [] Progression is slowed due to complexities/impairments listed. [] Progression has been slowed due to co-morbidities.   [] Plan just implemented, too soon to assess goals progression <30 days  [] Goals require adjustment due to lack of progress  [] Patient is not progressing as expected and requires additional follow up with physician  [] All goals are met  [] Other:     Prognosis for POC: [x] Good [] Fair  [] Poor    Patient requires continued skilled intervention: [x] Yes  [] No    Treatment/Activity Tolerance:  [x] Patient able to complete treatment  [] Patient limited by fatigue  [] Patient limited by pain    [] Patient limited by other medical complications  [] Other:                  PLAN: See eval  [] Continue per plan of care [] Alter current plan (see comments above)  [x] Plan of care initiated [] Hold pending MD visit [] Discharge    Electronically signed by:  Lucia Covington OTR/HIGINIO, DOMINIQUE [id number]      Note: If patient does not return for scheduled/ recommended follow up visits, this note will serve as a discharge from care along with most recent update on progress.

## 2020-10-16 ENCOUNTER — HOSPITAL ENCOUNTER (OUTPATIENT)
Dept: OCCUPATIONAL THERAPY | Age: 60
Setting detail: THERAPIES SERIES
Discharge: HOME | End: 2020-10-16
Payer: MEDICARE

## 2020-10-16 PROCEDURE — 97022 WHIRLPOOL THERAPY: CPT | Performed by: OCCUPATIONAL THERAPIST

## 2020-10-16 PROCEDURE — 97110 THERAPEUTIC EXERCISES: CPT | Performed by: OCCUPATIONAL THERAPIST

## 2020-10-16 PROCEDURE — 97140 MANUAL THERAPY 1/> REGIONS: CPT | Performed by: OCCUPATIONAL THERAPIST

## 2020-10-16 NOTE — FLOWSHEET NOTE
[address] [facility], [address] [place] [address]  Phone: [phone]  Fax [phone]    Occupational Therapy Treatment Note/ Progress Report:     Is this a Progress Report:     []  Yes  [x]  No      If Yes:  Date Range for reporting period:  Beginning 20  Ending 10/16/2020  Progress report will be due (10 Rx or 30 days whichever is less): *    Recertification will be due (POC Duration  / 90 days whichever is less): 20   Date:  10/16/2020  Patient Name:  Lida Cueva    :  1960  MRN: [id number]  Medical/Treatment Diagnosis Information:  · Diagnosis: M65.331 (ICD-10-CM) - Trigger finger, right middle finger   · Treatment Diagnosis: M79.641 right hand pain     Insurance/Certification information:     Physician Information:  Referring Practitioner: Dr. Britni Molina  Has the plan of care been signed (Y/N):        []  Yes  [x]  No   Comorbidities Affecting Functional Performance:             []?Anxiety (F41.9)/Depression (F32.9)             [x]? Diabetes Type 1(E10.65) or 2 (E11.65)       []? Rheumatoid Arthritis (M05.9)  []? Fibromyalgia (M79.7)  []? Neuropathy(G60.9)  []? Osteoarthritis(M19.91)  []? None              [x]? Other: sarcoidosis, heart stent    Date of Injury: several month history of triggering prior to surgery  Date of Surgery: 20   Right Middle Finger trigger release     Date of Patient follow up with Physician: none scheduled     RESTRICTIONS/PRECAUTIONS: none     Latex Allergy:  [x]? No      []? Yes                    Pacemaker:  [x]? No       []? Yes      Preferred Language for Healthcare:   [x]? English       []? other:      Functional Scale: 64 % (Quick DASH)                                Date assessed:  2020    Visit # Insurance Allowable Auth Required   6  []  Yes []  No    From 20  to 10/9/2020     SUBJECTIVE: Using my hand more.  I can open cans now with can opener       Patient reported deficits/history of current problem: several month history of triggering prior to surgery      Pain Scale: 3-5/10       [x]? Constant                [x]? Intermittent              []?other:  Pain Location:  Right scar and middle finger  Easing factors: rest  Provocative factors: movement        OBJECTIVE:     Date:  Hand Dominance:     [x]? Right    []? Left 2020    9/25/20 10/9/20  10/16/20    Objective Measures:        PAIN 2-810 2-4/10     Quick DASH  64%                                 Long MP: 15/75  PIP:   DIP:   /  /    Digits tip to DPFC in cm Index: 1.5  Lon.0  Rin.0  Small:1.0      Edema in cm circumf. MCPJs R: 19.4  L:      Edema in cm circumf. P1 middle R: 6.5  L:       strength in lbs R: defer at this time  L:   R: 31#  L: 35#   Pinch Strengthin lbs: lat  R:  L:      Pinch Strength in lbs:  3 point R:  L:         MMT:         Observations:  (including splints, bandages, incisions, scars): Healed incision in palm of hand with some scab and dry peeling           MODALITIES: 9/14/20  9/18/20 9/25/20 10/2/20 10/9/20  10/16/20    Fluidotherapy (75103)  10' 10' 15' 12 12   Estim (19980/41344)         Paraffin (53951)         US (48558)  8' 8' 8' 8' 100% 1.4 w/cm2     Iontophoresis (64165)         Hot Pack 10' 5''       Cold Pack                  INTERVENTIONS:         Therapeutic Exercise (97552) PROM digital flex/ext. AROM all tendon gliding positions to right hand PROM PROM fingers, wrist PROM fingers, wrist PROM fingers, wrist PROM wrist fingers      Power web red x 10     Red digiflex x 20      Putty rolling, shaping Putty rolling, shaping Putty rolling, shaping, mallet Pink putty roll, pinch, press with mallet Putty with mallet and  5'     Power web         Kneading beans digiflex yellow x25         flexbar Red wrist and forearm x25 flexbar Red wrist and forearm x25 Green flex bar Green x 20    Therapeutic Activity (26929)                           Manual Therapy (74123) Scar mobilization Scar mob, STM Scar mob, STM Scar mob, STM Scar mob, STM 10'   (IASTM, Dry Needling, manual mobilization)                  Neuromuscular Reeducation (25254)  Cues for exercise technique and HEP Cues for exercise technique and  HEP Verbal and tactile cues for technique                       ADL Training (93662)                           HEP Training/Review See sheet(s) Reviewed HEP                         Splinting         Lcode:         Orthotic Mgmt, Subsequent Enc (74839)         Orthotic Mgmt & Training (94289)                  Other: Trimmed some peeling skin away from incision. Therapeutic Exercise & NMR:  [x] (76249) Provided verbal/tactile cueing for activities related to strengthening, flexibility, endurance, ROM  for improvements in scapular, scapulothoracic and UE control with self care, reaching, carrying, lifting, house/yardwork, driving/computer work.    [] (71905) Provided verbal/tactile cueing for activities related to improving balance, coordination, kinesthetic sense, posture, motor skill, proprioception  to assist with  scapular, scapulothoracic and UE control with self care, reaching, carrying, lifting, house/yardwork, driving/computer work.     Therapeutic Activities & NMR:    [] (64947 or 69218) Provided verbal/tactile cueing for activities related to improving balance, coordination, kinesthetic sense, posture, motor skill, proprioception and motor activation to allow for proper function of scapular, scapulothoracic and UE control with self care, carrying, lifting, driving/computer work    Home Exercise Program:    [] (44878) Reviewed/Progressed HEP activities related to strengthening, flexibility, endurance, ROM of scapular, scapulothoracic and UE control with self care, reaching, carrying, lifting, house/yardwork, driving/computer work  [] (79189) Reviewed/Progressed HEP activities related to improving balance, coordination, kinesthetic sense, posture, motor skill, proprioception of scapular, scapulothoracic and UE control with self care, reaching, carrying, lifting, house/yardwork, driving/computer work      Manual Treatments:  PROM / STM / Oscillations-Mobs:  G-I, II, III, IV (PA's, Inf., Post.)  [x] (97918) Provided manual therapy to mobilize soft tissue/joints of cervical/CT, scapular GHJ and UE for the purpose of modulating pain, promoting relaxation,  increasing ROM, reducing/eliminating soft tissue swelling/inflammation/restriction, improving soft tissue extensibility and allowing for proper ROM for normal function with self care, reaching, carrying, lifting, house/yardwork, driving/computer work    ADL Training:  [] (16474) Provided self-care/home management training related to activities of daily living and compensatory training, and/or use of adaptive equipment      Charges:  Timed Code Treatment Minutes: 30   Total Treatment Minutes: 45   Worker's Comp: Time In/Time Out     [] EVAL (LOW) 70632 (typically 20 minutes face-to-face)    [] EVAL (MOD) 56085 (typically 30 minutes face-to-face)  [] EVAL (HIGH) 15788 (typically 45 minutes face-to-face)  [] OT Re-eval (32852)       [x] Magaly ([id number]) x 1    [] FZNCH(24827)  [] NMR (32692) x    [] Estim (attended) (77182)   [x] Manual ([address] [facility]) x  1    [] US (25550)  [] TA (35422) x      [] Paraffin (88490)  [] ADL  (39 [phone]) x     [] Splint/L code:    [] Estim (unattended) 33 93 31)  [x] Fluidotherapy (66896)  [] DN 1-2 (22476)   [] DN 3+ (25061)  [] Orthotic Mgmt, Subsequent Enc (19903)  [] Orthotic Mgmt & Training (90593)  [] Other:    ASSESSMENT:      GOALS: Patient stated goal: learn what to do for hand    []? Progressing: []? Met: []? Not Met: []? Adjusted     Therapist goals for Patient:   Short Term Goals: To be achieved in: 2 weeks  1. Independent in HEP and progression per patient tolerance, in order to prevent re-injury. [x]? Progressing: []? Met: []? Not Met: []? Adjusted   2.  Patient will have a decrease in pain Patient limited by other medical complications  [] Other:                  PLAN: See eval  [x] Continue per plan of care [] Alter current plan (see comments above)  [] Plan of care initiated [] Hold pending MD visit [] Discharge    Electronically signed by:  CHARLY Palacios/HIGINIO [id number]      Note: If patient does not return for scheduled/ recommended follow up visits, this note will serve as a discharge from care along with most recent update on progress.

## 2020-10-19 ENCOUNTER — APPOINTMENT (OUTPATIENT)
Dept: OCCUPATIONAL THERAPY | Age: 60
End: 2020-10-19
Payer: MEDICARE

## 2020-10-23 ENCOUNTER — HOSPITAL ENCOUNTER (OUTPATIENT)
Dept: OCCUPATIONAL THERAPY | Age: 60
Setting detail: THERAPIES SERIES
Discharge: HOME | End: 2020-10-23
Payer: MEDICARE

## 2020-10-23 NOTE — FLOWSHEET NOTE
[facility],  [address]  Phone: [phone]  Fax [phone]      Occupational Therapy  Cancellation/No-show Note  Patient Name:  Joanne Castillo   :  1960   Date:  10/23/2020  Cancelled visits to date: 1  No-shows to date: 0    For today's appointment patient:  [x]    Cancelled  []    Rescheduled appointment  []    No-show     Reason given by patient:  []    Patient ill  []    Conflicting appointment  []    No transportation    []    Conflict with work  [x]    No reason given  []    Other:     Comments:      Electronically signed by:  Jey Lancaster, OTR/L [id number]

## 2020-10-30 ENCOUNTER — HOSPITAL ENCOUNTER (OUTPATIENT)
Dept: OCCUPATIONAL THERAPY | Age: 60
Setting detail: THERAPIES SERIES
Discharge: HOME | End: 2020-10-30
Payer: MEDICARE

## 2020-10-30 PROCEDURE — 97140 MANUAL THERAPY 1/> REGIONS: CPT | Performed by: OCCUPATIONAL THERAPIST

## 2020-10-30 PROCEDURE — 97018 PARAFFIN BATH THERAPY: CPT | Performed by: OCCUPATIONAL THERAPIST

## 2020-10-30 PROCEDURE — 97110 THERAPEUTIC EXERCISES: CPT | Performed by: OCCUPATIONAL THERAPIST

## 2020-10-30 NOTE — FLOWSHEET NOTE
problem: several month history of triggering prior to surgery      Pain Scale: 3-5/10       [x]? Constant                [x]? Intermittent              []?other:  Pain Location:  Right scar and middle finger  Easing factors: rest  Provocative factors: movement        OBJECTIVE:     Date:  Hand Dominance:     [x]? Right    []? Left 2020    9/25/20 10/9/20  10/16/20  10/30/20    Objective Measures:         PAIN 2-810 2-4/10      Quick DASH  64%                                  Long MP: 15/75  PIP:   DIP:   /     Digits tip to DPFC in cm Index: 1.5  Lon.0  Rin.0  Small:1.0       Edema in cm circumf. MCPJs R: 19.4  L:       Edema in cm circumf. P1 middle R: 6.5  L:        strength in lbs R: defer at this time  L:   R: 31#  L: 35# 48.9  53.7   Pinch Strengthin lbs: lat  R:  L:       Pinch Strength in lbs:  3 point R:  L:          MMT:          Observations:  (including splints, bandages, incisions, scars): Healed incision in palm of hand with some scab and dry peeling            MODALITIES: 9/14/20  9/18/20 9/25/20 10/2/20 10/9/20  10/16/20  10/30/20    Fluidotherapy (30534)  10' 10' 15' 12 12    Estim (28104/18546)          Paraffin (23315)       10'   US (02700)  8' 8' 8' 8' 100% 1.4 w/cm2      Iontophoresis (21196)          Hot Pack 10' 5''        Cold Pack                    INTERVENTIONS:          Therapeutic Exercise (77940) PROM digital flex/ext. AROM all tendon gliding positions to right hand PROM PROM fingers, wrist PROM fingers, wrist PROM fingers, wrist PROM wrist fingers      Power web red x 10     Red digiflex x 20  PROM wrist and fingers           Green x 20      Putty rolling, shaping Putty rolling, shaping Putty rolling, shaping, mallet Pink putty roll, pinch, press with mallet Putty with mallet and  5' tspn with foam blocks     Prayer stretch x 10      Power web          Kneading beans digiflex yellow x25          flexbar Red wrist and forearm x25 flexbar Red wrist and forearm x25 Green flex bar Green x 20  Green x 20    Therapeutic Activity (18680)                              Manual Therapy (62788) Scar mobilization Scar mob, STM Scar mob, STM Scar mob, STM Scar mob, STM 10' 10'   (IASTM, Dry Needling, manual mobilization)                    Neuromuscular Reeducation (73188)  Cues for exercise technique and HEP Cues for exercise technique and  HEP Verbal and tactile cues for technique                          ADL Training (78833)                              HEP Training/Review See sheet(s) Reviewed HEP                            Splinting          Lcode:          Orthotic Mgmt, Subsequent Enc (69774)          Orthotic Mgmt & Training (49580)                    Other: Trimmed some peeling skin away from incision. Therapeutic Exercise & NMR:  [x] (48819) Provided verbal/tactile cueing for activities related to strengthening, flexibility, endurance, ROM  for improvements in scapular, scapulothoracic and UE control with self care, reaching, carrying, lifting, house/yardwork, driving/computer work.    [] (41379) Provided verbal/tactile cueing for activities related to improving balance, coordination, kinesthetic sense, posture, motor skill, proprioception  to assist with  scapular, scapulothoracic and UE control with self care, reaching, carrying, lifting, house/yardwork, driving/computer work.     Therapeutic Activities & NMR:    [] (69778 or 63069) Provided verbal/tactile cueing for activities related to improving balance, coordination, kinesthetic sense, posture, motor skill, proprioception and motor activation to allow for proper function of scapular, scapulothoracic and UE control with self care, carrying, lifting, driving/computer work    Home Exercise Program:    [] (56745) Reviewed/Progressed HEP activities related to strengthening, flexibility, endurance, ROM of scapular, scapulothoracic and UE control with self care, reaching, carrying, lifting, Independent in HEP and progression per patient tolerance, in order to prevent re-injury. [x]? Progressing: []? Met: []? Not Met: []? Adjusted   2. Patient will have a decrease in pain to facilitate improvement in movement, function, and ADLs as indicated by Functional Deficits. [x]? Progressing: []? Met: []? Not Met: []? Adjusted     Long Term Goals to be achieved in 8 weeks (through *11/9/20, including patient directed goals to address patient identified performance deficits:  1) Pt to be independent in graded HEP progression with a good level of effort and compliance. [x]? Progressing: []? Met: []? Not Met: []? Adjusted   2) Pt to report a score of </= 20 % on the Quick DASH disability questionnaire for increased performance with carrying, moving, and handling objects. []? Progressing: []? Met: []? Not Met: []? Adjusted   3) Pt will demonstrate increased ROM to right middle to 220 for improved independence with grasping small objects in hand. .  []? Progressing: []? Met: []? Not Met: []? Adjusted   4) Pt will demonstrate increased strength to right  70% of left for improved independence with holding onto household objects without dropping them. []? Progressing: [x]? Met: []? Not Met: []? Adjusted   5) Pt will have a decrease in pain to 2/10 to facilitate shopping. [x]? Progressing: []? Met: []? Not Met: []? Adjusted      Overall Progression Towards Functional Goals/Treatment Progress Update:  [x] Patient is progressing as expected towards functional goals listed. [] Progression is slowed due to complexities/impairments listed. [] Progression has been slowed due to co-morbidities.   [] Plan just implemented, too soon to assess goals progression <30 days  [] Goals require adjustment due to lack of progress  [] Patient is not progressing as expected and requires additional follow up with physician  [] All goals are met  [] Other:     Prognosis for POC: [x] Good [] Fair  [] Poor    Patient requires continued skilled intervention: [x] Yes  [] No    Treatment/Activity Tolerance:  [x] Patient able to complete treatment  [] Patient limited by fatigue  [] Patient limited by pain    [] Patient limited by other medical complications  [] Other:                  PLAN: See eval  [x] Continue per plan of care [] Alter current plan (see comments above)  [] Plan of care initiated [] Hold pending MD visit [] Discharge    Electronically signed by:  Nena EdwardsOTR/L [id number]      Note: If patient does not return for scheduled/ recommended follow up visits, this note will serve as a discharge from care along with most recent update on progress.

## 2020-11-06 ENCOUNTER — HOSPITAL ENCOUNTER (OUTPATIENT)
Dept: OCCUPATIONAL THERAPY | Age: 60
Setting detail: THERAPIES SERIES
Discharge: HOME | End: 2020-11-06
Payer: MEDICARE

## 2020-11-06 PROCEDURE — 97018 PARAFFIN BATH THERAPY: CPT | Performed by: OCCUPATIONAL THERAPIST

## 2020-11-06 PROCEDURE — 97140 MANUAL THERAPY 1/> REGIONS: CPT | Performed by: OCCUPATIONAL THERAPIST

## 2020-11-06 PROCEDURE — 97110 THERAPEUTIC EXERCISES: CPT | Performed by: OCCUPATIONAL THERAPIST

## 2020-11-06 NOTE — FLOWSHEET NOTE
[address] [facility], [address] [place] [address] Marin Fox  Phone: [phone]  Fax [phone]    Occupational Therapy Treatment Note/ Progress Report:     Is this a Progress Report:     [x]  Yes  []  No      If Yes:  Date Range for reporting period:  Beginning 20  Ending 2020  Progress report will be due (10 Rx or 30 days whichever is less): *    Recertification will be due (POC Duration  / 90 days whichever is less): 20   Date:  2020  Patient Name:  Ethan Court    :  1960  MRN: [id number]  Medical/Treatment Diagnosis Information:  · Diagnosis: M65.331 (ICD-10-CM) - Trigger finger, right middle finger   · Treatment Diagnosis: M79.641 right hand pain     Insurance/Certification information:     Physician Information:  Referring Practitioner: Dr. Priti Tovar  Has the plan of care been signed (Y/N):        []  Yes  [x]  No   Comorbidities Affecting Functional Performance:             []?Anxiety (F41.9)/Depression (F32.9)             [x]? Diabetes Type 1(E10.65) or 2 (E11.65)       []? Rheumatoid Arthritis (M05.9)  []? Fibromyalgia (M79.7)  []? Neuropathy(G60.9)  []? Osteoarthritis(M19.91)  []? None              [x]? Other: sarcoidosis, heart stent    Date of Injury: several month history of triggering prior to surgery  Date of Surgery: 20   Right Middle Finger trigger release     Date of Patient follow up with Physician: none scheduled     RESTRICTIONS/PRECAUTIONS: none     Latex Allergy:  [x]? No      []? Yes                    Pacemaker:  [x]? No       []? Yes      Preferred Language for Healthcare:   [x]? English       []? other:      Functional Scale: 64 % (Quick DASH)                                Date assessed:  2020    Visit # Insurance Allowable Auth Required   8  []  Yes []  No    From 20  to 10/9/2020     SUBJECTIVE: tingling in middle finger and index finger        Patient reported deficits/history of current problem: several month history of triggering prior to surgery      Pain Scale: 3-5/10       [x]? Constant                [x]? Intermittent              []?other:  Pain Location:  Right scar and middle finger  Easing factors: rest  Provocative factors: movement        OBJECTIVE:     Date:  Hand Dominance:     [x]? Right    []? Left 2020    9/25/20 10/9/20  10/16/20  10/30/20  11/6/20    Objective Measures:          PAIN 2-8/10 2-410    010   Quick DASH  64%     9%                              Long MP: 15/75  PIP:   DIP:   /  /55      Digits tip to DPFC in cm Index: 1.5  Lon.0  Rin.0  Small:1.0        Edema in cm circumf. MCPJs R: 19.4  L:        Edema in cm circumf. P1 middle R: 6.5  L:         strength in lbs R: defer at this time  L:   R: 31#  L: 35# 48.9  53.7    Pinch Strengthin lbs: lat  R:  L:        Pinch Strength in lbs:  3 point R:  L:           MMT:           Observations:  (including splints, bandages, incisions, scars): Healed incision in palm of hand with some scab and dry peeling             MODALITIES: 9/14/20  9/18/20 9/25/20 10/2/20 10/9/20  10/16/20  10/30/20  11/6/20    Fluidotherapy (50380)  10' 10' 15' 12 12     Estim (83461/90193)           Paraffin (58309)       10' 10'   US (01028)  8' 8' 8' 8' 100% 1.4 w/cm2       Iontophoresis (44924)           Hot Pack 10' 5''         Cold Pack                      INTERVENTIONS:           Therapeutic Exercise (70094) PROM digital flex/ext. AROM all tendon gliding positions to right hand PROM PROM fingers, wrist PROM fingers, wrist PROM fingers, wrist PROM wrist fingers      Power web red x 10     Red digiflex x 20  PROM wrist and fingers           Green x 20  PROM fingers             Green x 20          Putty rolling, shaping Putty rolling, shaping Putty rolling, shaping, mallet Pink putty roll, pinch, press with mallet Putty with mallet and  5' tspn with foam blocks     Prayer stretch x 10 X 5'      Prayer stretch x 10      Power web           Kneading beans digiflex yellow x25           flexbar Red wrist and forearm x25 flexbar Red wrist and forearm x25 Green flex bar Green x 20  Green x 20  Green x 20    Therapeutic Activity (35749)                                 Manual Therapy (86159) Scar mobilization Scar mob, STM Scar mob, STM Scar mob, STM Scar mob, STM 10' 10' 10'   (IASTM, Dry Needling, manual mobilization)                      Neuromuscular Reeducation (69279)  Cues for exercise technique and HEP Cues for exercise technique and  HEP Verbal and tactile cues for technique                             ADL Training (21300)                                 HEP Training/Review See sheet(s) Reviewed HEP                               Splinting           Lcode:           Orthotic Mgmt, Subsequent Enc (51933)           Orthotic Mgmt & Training (15731)                      Other: Trimmed some peeling skin away from incision. Therapeutic Exercise & NMR:  [x] (07524) Provided verbal/tactile cueing for activities related to strengthening, flexibility, endurance, ROM  for improvements in scapular, scapulothoracic and UE control with self care, reaching, carrying, lifting, house/yardwork, driving/computer work.    [] (98868) Provided verbal/tactile cueing for activities related to improving balance, coordination, kinesthetic sense, posture, motor skill, proprioception  to assist with  scapular, scapulothoracic and UE control with self care, reaching, carrying, lifting, house/yardwork, driving/computer work.     Therapeutic Activities & NMR:    [] (25034 or 43487) Provided verbal/tactile cueing for activities related to improving balance, coordination, kinesthetic sense, posture, motor skill, proprioception and motor activation to allow for proper function of scapular, scapulothoracic and UE control with self care, carrying, lifting, driving/computer work    Home Exercise Program:    [] (71001) goal: learn what to do for hand    []? Progressing: [x]? Met: []? Not Met: []? Adjusted     Therapist goals for Patient:   Short Term Goals: To be achieved in: 2 weeks  1. Independent in HEP and progression per patient tolerance, in order to prevent re-injury. [x]? Progressing: [x]? Met: []? Not Met: []? Adjusted   2. Patient will have a decrease in pain to facilitate improvement in movement, function, and ADLs as indicated by Functional Deficits. [x]? Progressing: [x]? Met: []? Not Met: []? Adjusted     Long Term Goals to be achieved in 8 weeks (through *11/9/20, including patient directed goals to address patient identified performance deficits:  1) Pt to be independent in graded HEP progression with a good level of effort and compliance. [x]? Progressing: [x]? Met: []? Not Met: []? Adjusted   2) Pt to report a score of </= 20 % on the Quick DASH disability questionnaire for increased performance with carrying, moving, and handling objects. []? Progressing: [x]? Met: []? Not Met: []? Adjusted   3) Pt will demonstrate increased ROM to right middle to 220 for improved independence with grasping small objects in hand. .  []? Progressing: [x]? Met: []? Not Met: []? Adjusted   4) Pt will demonstrate increased strength to right  70% of left for improved independence with holding onto household objects without dropping them. []? Progressing: [x]? Met: []? Not Met: []? Adjusted   5) Pt will have a decrease in pain to 2/10 to facilitate shopping. [x]? Progressing: [x]? Met: []? Not Met: []? Adjusted      Overall Progression Towards Functional Goals/Treatment Progress Update:  [] Patient is progressing as expected towards functional goals listed. [] Progression is slowed due to complexities/impairments listed. [] Progression has been slowed due to co-morbidities.   [] Plan just implemented, too soon to assess goals progression <30 days  [] Goals require adjustment due to lack of progress  [] Patient is not progressing as expected and requires additional follow up with physician  [x] All goals are met  [] Other:     Prognosis for POC: [x] Good [] Fair  [] Poor    Patient requires continued skilled intervention: [x] Yes  [] No    Treatment/Activity Tolerance:  [x] Patient able to complete treatment  [] Patient limited by fatigue  [] Patient limited by pain    [] Patient limited by other medical complications  [] Other:                  PLAN: See eval  [] Continue per plan of care [] Alter current plan (see comments above)  [] Plan of care initiated [] Hold pending MD visit [x] Discharge    Electronically signed by:  Gloria Lopes OTR/HIGINIO [id number]      Note: If patient does not return for scheduled/ recommended follow up visits, this note will serve as a discharge from care along with most recent update on progress.

## 2020-11-19 ENCOUNTER — OFFICE VISIT (OUTPATIENT)
Dept: CARDIOLOGY CLINIC | Age: 60
End: 2020-11-19
Payer: MEDICARE

## 2020-11-19 VITALS
OXYGEN SATURATION: 100 % | WEIGHT: 137 LBS | HEIGHT: 62 IN | SYSTOLIC BLOOD PRESSURE: 106 MMHG | DIASTOLIC BLOOD PRESSURE: 70 MMHG | HEART RATE: 87 BPM | BODY MASS INDEX: 25.21 KG/M2

## 2020-11-19 PROBLEM — I10 ESSENTIAL HYPERTENSION: Status: ACTIVE | Noted: 2020-11-19

## 2020-11-19 PROCEDURE — 99213 OFFICE O/P EST LOW 20 MIN: CPT | Performed by: NURSE PRACTITIONER

## 2020-11-19 RX ORDER — INSULIN GLARGINE 100 [IU]/ML
INJECTION, SOLUTION SUBCUTANEOUS NIGHTLY
COMMUNITY
End: 2021-05-07

## 2020-11-19 ASSESSMENT — ENCOUNTER SYMPTOMS
BLOOD IN STOOL: 0
SHORTNESS OF BREATH: 1
COUGH: 1
WHEEZING: 1

## 2020-11-19 NOTE — LETTER
[address]   Cardiac Evaluation    Primary Care Doctor:  Alexx Wagner    Chief Complaint   Patient presents with    6 Month Follow-Up     no cardiac complaints     Sarcoidosis     recently diagnosed by Chito Mast M.D. History of Present Illness:   I had the pleasure of seeing Catalina Mitchell in follow up for CAD s/p PCILAD in May 2020, hypertension, hyperlipidemia, diabetes mellitus. At last visit she continued to have shortness of breath. She was undergoing further testing for PCP with a CT of her chest and referral to pulmonology. Blood work in the interim (May in September) reveals stable renal function liver function and blood counts. She reports being diagnosed with pulmonary sarcoidosis since last visit. She has not been started on new treatment. She still has shortness of breath worse at night. This is improved with nicks vapor rub under her nose. She had blood work per PCP about 4 months ago, A1c coming down. Also had lipids done. She is not currently working. 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 She has dyspnea on exertion with walking in from car. She has had 2 falls in last 3 months (knocked down by dog on a walk and tripped over toy). She is doing some yoga and stability exercises as well as walking and pedal.  No chest pain with this activity. Has not ever taken a dose of sl nitro. Her appetite is okay. She drinks hot lemon water with honey. She takes ibuprofen only as needed, rare though did take it over the weekend for cold symptoms, no fever. She was treated for cellulitis of right heal with antibiotics in September. Catalina Mitchell describes symptoms including dyspnea, fatigue but denies chest pain, palpitations, orthopnea, PND, early saiety, edema, syncope.      NYHA:   IIb  ACC/ AHA Stage:    C    Past Medical History: has a past medical history of CAD (coronary artery disease), Cerebral artery occlusion with cerebral infarction ([facility] [address] Choking sensation, Diabetes mellitus ([facility] [address] DM2 (diabetes mellitus, type 2) ([facility] [address] Hyperlipidemia, Prolonged emergence from general anesthesia, and Sarcoid. Surgical History:   has a past surgical history that includes Cholecystectomy;  section; hysteroscopy (2013); Hysterectomy; Colonoscopy; Colonoscopy (13); Colonoscopy (10/19/2018); Colonoscopy (N/A, 10/19/2018); Cardiac surgery (2020); and Finger trigger release (Right, 2020). Social History:   reports that she has never smoked. She has never used smokeless tobacco. She reports that she does not drink alcohol or use drugs. Family History:   Family History   Problem Relation Age of Onset    Diabetes Mother     Heart Disease Mother     Cancer Father         colon       Home Medications:  Prior to Admission medications    Medication Sig Start Date End Date Taking? Authorizing Provider   insulin glargine (LANTUS) 100 UNIT/ML injection vial Inject into the skin nightly   Yes Historical Provider, MD   cephALEXin (KEFLEX) 500 MG capsule  20  Yes Historical Provider, MD   TRESIBA FLEXTOUCH 100 UNIT/ML SOPN  20  Yes Historical Provider, MD   atorvastatin (LIPITOR) 10 MG tablet Take by mouth 20  Yes Historical Provider, MD   pantoprazole (PROTONIX) 40 MG tablet  20  Yes Historical Provider, MD   rosuvastatin (CRESTOR) 10 MG tablet TAKE 1 TABLET BY MOUTH DAILY 20  Yes Chad Sprague MD   ASPIRIN LOW DOSE 81 MG chewable tablet TAKE 1 TABLET BY MOUTH DAILY 20  Yes Chad Sprageu MD   ibuprofen (ADVIL;MOTRIN) 600 MG tablet Take 1 tablet by mouth every 6 hours as needed for Pain 20  Yes Silva Villarreal MD   nitroGLYCERIN (NITROSTAT) 0.4 MG SL tablet up to max of 3 total doses.  If no relief after 1 dose, call 911. 20  Yes Rodríguez Serrano, APRN - CNP metoprolol tartrate (LOPRESSOR) 25 MG tablet Take 0.5 tablets by mouth 2 times daily 5/12/20  Yes Tish Vang Juan LuisDEV - CNP   ticagrelor (BRILINTA) 90 MG TABS tablet Take 1 tablet by mouth 2 times daily 5/12/20  Yes Tish HIGINIO King, APRN - CNP   gemfibrozil (LOPID) 600 MG tablet Take 600 mg by mouth 2 times daily    Yes Historical Provider, MD [employer]. Devices (ACAPELLA) MISC 1 Device by Does not apply route 4 times daily. 9/18/14  Yes Joaquín Bridges MD   famotidine (PEPCID) 20 MG tablet Take 1 tablet by mouth daily. 9/6/14  Yes Gabi Ritchie MD   Cholecalciferol (VITAMIN D) 2000 UNITS CAPS capsule Take 2,000 Units by mouth daily. Yes Historical Provider, MD   lisinopril (PRINIVIL;ZESTRIL) 2.5 MG tablet Take 2.5 mg by mouth daily. Yes Historical Provider, MD   metformin (GLUCOPHAGE) 500 MG tablet Take 500 mg by mouth 2 times daily (with meals)    Yes Historical Provider, MD   Insulin Glargine (BASAGLAR KWIKPEN SC) Inject 60 Units into the skin every morning     Historical Provider, MD        Allergies:  Codeine; Phenergan [promethazine hcl]; Influenza vaccines; Pneumococcal vaccines; Vicodin [hydrocodone-acetaminophen]; Hydrocodone-acetaminophen; Oxycodone; and Promethazine     Review of Systems:   Review of Systems   Constitutional: Positive for fatigue. Negative for activity change, appetite change, diaphoresis and fever. HENT: Negative for hearing loss and nosebleeds. Respiratory: Positive for cough, shortness of breath and wheezing. Cardiovascular: Positive for leg swelling. Negative for chest pain and palpitations. Gastrointestinal: Negative for blood in stool. Genitourinary: Negative for hematuria. Musculoskeletal: Positive for arthralgias. Neurological: Positive for weakness. Negative for dizziness, syncope and light-headedness. Psychiatric/Behavioral: Negative for sleep disturbance.         Physical Examination:    Vitals:    11/19/20 0951   BP: 106/70   Pulse: 87   SpO2: 100% Weight: 137 lb (62.1 kg)   Height: 5' 2\" (1.575 m)        Constitutional and General Appearance: Warm and dry, no apparent distress, normal coloration  HEENT:  Normocephalic, atraumatic  Respiratory:  · Normal excursion and expansion without use of accessory muscles  · Resp Auscultation: Clear to auscultation   Cardiovascular:  · The apical impulses not displaced  · Heart tones are crisp and normal  · JVP 8 cm H2O  · Regular rate and rhythm, normal S1S2, no m/g/r  · Peripheral pulses are symmetrical and full  · There is no clubbing, cyanosis of the extremities.   · No BLE edema  · Pedal Pulses: 2+ and equal   Abdomen:  · No masses or tenderness  · Liver/Spleen: No Abnormalities Noted  Neurological/Psychiatric:  · Alert and oriented in all spheres  · Moves all extremities well  · Exhibits normal gait balance and coordination  · No abnormalities of mood, affect, memory, mentation, or behavior are noted    Lab Data:  Most recent lab results below reviewed in office    CBC:   Lab Results   Component Value Date    WBC 11.3 09/30/2020    WBC 10.2 05/28/2020    WBC 12.9 05/12/2020    RBC 4.25 09/30/2020    RBC 4.68 05/28/2020    RBC 4.57 05/12/2020    HGB 12.2 09/30/2020    HGB 13.5 05/28/2020    HGB 13.3 05/12/2020    HCT 35.4 09/30/2020    HCT 39.4 05/28/2020    HCT 38.2 05/12/2020    MCV 83.5 09/30/2020    MCV 84.3 05/28/2020    MCV 83.6 05/12/2020    RDW 14.3 09/30/2020    RDW 13.4 05/28/2020    RDW 13.5 05/12/2020     09/30/2020     05/28/2020     05/12/2020     BMP:  Lab Results   Component Value Date     09/30/2020     05/28/2020     05/12/2020    K 3.7 09/30/2020    K 4.5 05/28/2020    K 4.1 05/12/2020    K 4.1 05/11/2020    K 3.7 05/08/2020    K 4.5 03/25/2019     09/30/2020     05/28/2020     05/12/2020    CO2 22 09/30/2020    CO2 22 05/28/2020    CO2 23 05/12/2020    PHOS 3.4 05/11/2020    PHOS 3.3 05/10/2020    PHOS 4.2 01/22/2013    BUN 17 09/30/2020 BUN 18 05/28/2020    BUN 13 05/12/2020    CREATININE 0.8 09/30/2020    CREATININE 0.7 05/28/2020    CREATININE <0.5 05/12/2020     BNP:   Lab Results   Component Value Date    PROBNP 42 05/28/2020    PROBNP 42 05/08/2020    PROBNP 15 04/29/2020     LIPID:   Lab Results   Component Value Date    TRIG 123 05/12/2020    TRIG 223 09/04/2014    HDL 37 05/12/2020    HDL 44 09/04/2014    LDLCALC 76 05/12/2020    LDLCALC 112 09/04/2014       Cardiac Imaging:  CARDIAC CATH / PCI 5/12/20:   FINDINGS      LVGRAM   LVEDP  7   GRADIENT ACROSS AORTIC VALVE  none   LV FUNCTION EF 60 %   WALL MOTION  normal   MITRAL REGURGITATION  mild      CORONARY ARTERIES   LM  Short vessel, less than 10% proximalmiddistal stenosis         LAD Small to medium caliber vessel, there is proximalmid 90% stenosis. Distal vessel is tortuous with a 50% stenosis. LCX  Large vessel, dominant, less than 10% proximalmiddistal stenosis. RCA Nondominant, small, proximal spasm is noted which improved with IC vasodilators as well as withdrawal of catheter, there is less than 10% proximalmiddistal stenosis. PERCUTANEOUS INTERVENTION DESCRIPTION   Heparin was used for anticoagulation, a 6 Solicorera MLA 3.5 guiding catheter was used to intubate the left-sided coronary vessels, this selectively cannulated the left circumflex artery and was able to be pulled back for diagnostic angiography. Then a choice floppy wire was advanced into the circumflex for anchoring and with this the guide catheter was withdrawn and then a Medtronic cougar wire was used to cross into the LAD once the guide was withdrawn from the left main. Wire was used to jump the gap between the guide catheter in the left main and then was advanced successfully into the LAD across the lesion. Lesion in the LAD was then dilated with a 2.5 mm balloon and then the lesion was then stented with a Abbott Xience Ирина 2.5 x 28 mm drug-eluting stent.   After ballooning, a LAD dissection was noted which was not unexpected from balloon dilation and this resolved once stenting was accomplished. Stent was postdilated with a 2.5 mm noncompliant balloon. There was 0% residual stenosis. There was NATI 3 flow before and after PCI. Distal LAD and the tortuous segment was felt to be best treated medically     CONCLUSIONS:   Successful PCI of the LAD with single drug-eluting stent    Echo 5/8/2020:   Summary   Normal left ventricle systolic function with an estimated ejection fraction   of 55%. No regional wall motion abnormalities are seen. Grade I diastolic dysfunction with normal filing pressure. Mild (eccentric) mitral regurgitation. Right ventricular hypertrophy. Trivial pericardial effusion with pericardial fat pad localized anteriorly. Stress Test 5/9/2020:  Summary  Normal LVEF >60%  Anteroseptal/apical hypokinesis  Mixed anteroseptal/apical defect consistent with mixed ischemia/scar of this  territory    Overall, this would be considered an abnormal, high risk, study        Carotid dopplers 2/17/16  CONCLUSIONS    No significant obstructive lesions noted in the extracranial carotid system, bilaterally. Vertebral arteries are patent demonstrating antegrade flow, bilaterally    Carotid dopplers 2014  Opinion: Normal study. No significant atherosclerotic disease noted in the neck. No significant common carotid artery or internal carotid artery stenosis suspected in the neck. Echocardiogram 2014  Summary   Overall left ventricular function is normal.   Ejection fraction is visually estimated to be 55-60 %. Diastolic filling parameters suggests grade I diastolic dysfunction . No obvious cardiac source of emboli noted. There is a small pericardial effusion noted. Stress echocardiogram 2012  Resting echocardiogram shows normal left ventricular systolic  function with an estimated ejection fraction of 60%.   There were no wall motion abnormalities. Following exercise the left ventricle  became uniformly hyperkinetic. There were no wall motion  abnormalities. There was appropriate increase in the ejection  fraction. ECHOCARDIOGRAPHIC CONCLUSION-  Normal exercise echocardiogram  negative for ischemia. Assessment:    1. Coronary artery disease involving native coronary artery of native heart without angina pectoris    2. Essential hypertension    3. Mixed hyperlipidemia    4. Type 2 diabetes mellitus without complication, with long-term current use of insulin ([address]          Plan:   1. Will call and get recent blood test results from PCP  2. No change in heart medicines  3. Follow up with me or Dr. Arpit Martinez in 6 months - consider decreasing or stopping Brilinta (1 year post)       I appreciate the opportunity of cooperating in the care of this individual.    Barrera Garcia, APRN - CNP, 11/19/2020, 10:02 AM       QUALITY MEASURES  1. Tobacco Cessation Counseling: NA  2. Retake of BP if >140/90:   NA  3. Documentation to PCP/referring for new patient:  Sent to PCP at close of office visit  4. CAD patient on anti-platelet: Yes  5. CAD patient on STATIN therapy:  Yes  6.  Patient with CHF and aFib on anticoagulation:  NA

## 2020-11-19 NOTE — PROGRESS NOTES
[address]   Cardiac Evaluation    Primary Care Doctor:  Alexx Wagner    Chief Complaint   Patient presents with    6 Month Follow-Up     no cardiac complaints     Sarcoidosis     recently diagnosed by Chito Mast M.D. History of Present Illness:   I had the pleasure of seeing Catalina Mitchell in follow up for CAD s/p PCI-LAD in May 2020, hypertension, hyperlipidemia, diabetes mellitus. At last visit she continued to have shortness of breath. She was undergoing further testing for PCP with a CT of her chest and referral to pulmonology. Blood work in the interim (May in September) reveals stable renal function liver function and blood counts. She reports being diagnosed with pulmonary sarcoidosis since last visit. She has not been started on new treatment. She still has shortness of breath worse at night. This is improved with nicks vapor rub under her nose. She had blood work per PCP about 4 months ago, A1c coming down. Also had lipids done. She is not currently working. 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 Had to stop this due to swelling and shortness of breath. She has dyspnea on exertion with walking in from car. She has had 2 falls in last 3 months (knocked down by dog on a walk and tripped over toy). She is doing some yoga and stability exercises as well as walking and pedal.  No chest pain with this activity. Has not ever taken a dose of sl nitro. Her appetite is okay. She drinks hot lemon water with honey. She takes ibuprofen only as needed, rare though did take it over the weekend for cold symptoms, no fever. She was treated for cellulitis of right heal with antibiotics in September. Catalina Mitchell describes symptoms including dyspnea, fatigue but denies chest pain, palpitations, orthopnea, PND, early saiety, edema, syncope.      NYHA:   IIb  ACC/ AHA Stage:    C    Past Medical History:   has a past medical history of CAD (coronary artery disease), Cerebral artery occlusion with cerebral infarction ([facility] [address] Choking sensation, Diabetes mellitus ([facility] [address] DM2 (diabetes mellitus, type 2) ([facility] [address] Hyperlipidemia, Prolonged emergence from general anesthesia, and Sarcoid. Surgical History:   has a past surgical history that includes Cholecystectomy;  section; hysteroscopy (2013); Hysterectomy; Colonoscopy; Colonoscopy (13); Colonoscopy (10/19/2018); Colonoscopy (N/A, 10/19/2018); Cardiac surgery (2020); and Finger trigger release (Right, 2020). Social History:   reports that she has never smoked. She has never used smokeless tobacco. She reports that she does not drink alcohol or use drugs. Family History:   Family History   Problem Relation Age of Onset    Diabetes Mother     Heart Disease Mother     Cancer Father         colon       Home Medications:  Prior to Admission medications    Medication Sig Start Date End Date Taking? Authorizing Provider   insulin glargine (LANTUS) 100 UNIT/ML injection vial Inject into the skin nightly   Yes Historical Provider, MD   cephALEXin (KEFLEX) 500 MG capsule  20  Yes Historical Provider, MD HERNANDEZ FLEXTOUCH 100 UNIT/ML SOPN  20  Yes Historical Provider, MD   atorvastatin (LIPITOR) 10 MG tablet Take by mouth 20  Yes Historical Provider, MD   pantoprazole (PROTONIX) 40 MG tablet  20  Yes Historical Provider, MD   rosuvastatin (CRESTOR) 10 MG tablet TAKE 1 TABLET BY MOUTH DAILY 20  Yes Elizabeth Mccall MD   ASPIRIN LOW DOSE 81 MG chewable tablet TAKE 1 TABLET BY MOUTH DAILY 20  Yes Elizabeth Mccall MD   ibuprofen (ADVIL;MOTRIN) 600 MG tablet Take 1 tablet by mouth every 6 hours as needed for Pain 20  Yes Sal Trevizo MD   nitroGLYCERIN (NITROSTAT) 0.4 MG SL tablet up to max of 3 total doses.  If no relief after 1 dose, call 911. 20  Yes DEV Arteaga CNP   metoprolol tartrate (LOPRESSOR) 25 MG tablet Take 0.5 tablets by mouth 2 times daily 5/12/20  Yes Tish Saddie Flattery, APRN - CNP   ticagrelor (BRILINTA) 90 MG TABS tablet Take 1 tablet by mouth 2 times daily 5/12/20  Yes Tish Saddie Flattery, APRN - CNP   gemfibrozil (LOPID) 600 MG tablet Take 600 mg by mouth 2 times daily    Yes Historical Provider, MD [employer]. Devices (ACAPELLA) MISC 1 Device by Does not apply route 4 times daily. 9/18/14  Yes Joaquín Bridges MD   famotidine (PEPCID) 20 MG tablet Take 1 tablet by mouth daily. 9/6/14  Yes Gabi Ritchie MD   Cholecalciferol (VITAMIN D) 2000 UNITS CAPS capsule Take 2,000 Units by mouth daily. Yes Historical Provider, MD   lisinopril (PRINIVIL;ZESTRIL) 2.5 MG tablet Take 2.5 mg by mouth daily. Yes Historical Provider, MD   metformin (GLUCOPHAGE) 500 MG tablet Take 500 mg by mouth 2 times daily (with meals)    Yes Historical Provider, MD   Insulin Glargine (BASAGLAR KWIKPEN SC) Inject 60 Units into the skin every morning     Historical Provider, MD        Allergies:  Codeine; Phenergan [promethazine hcl]; Influenza vaccines; Pneumococcal vaccines; Vicodin [hydrocodone-acetaminophen]; Hydrocodone-acetaminophen; Oxycodone; and Promethazine     Review of Systems:   Review of Systems   Constitutional: Positive for fatigue. Negative for activity change, appetite change, diaphoresis and fever. HENT: Negative for hearing loss and nosebleeds. Respiratory: Positive for cough, shortness of breath and wheezing. Cardiovascular: Positive for leg swelling. Negative for chest pain and palpitations. Gastrointestinal: Negative for blood in stool. Genitourinary: Negative for hematuria. Musculoskeletal: Positive for arthralgias. Neurological: Positive for weakness. Negative for dizziness, syncope and light-headedness. Psychiatric/Behavioral: Negative for sleep disturbance.         Physical Examination:    Vitals:    11/19/20 0951   BP: 106/70   Pulse: 87   SpO2: 100%   Weight: 137 lb (62.1 kg)   Height: 5' 2\" (1.575 m)        Constitutional and General Appearance: Warm and dry, no apparent distress, normal coloration  HEENT:  Normocephalic, atraumatic  Respiratory:  · Normal excursion and expansion without use of accessory muscles  · Resp Auscultation: Clear to auscultation   Cardiovascular:  · The apical impulses not displaced  · Heart tones are crisp and normal  · JVP 8 cm H2O  · Regular rate and rhythm, normal S1S2, no m/g/r  · Peripheral pulses are symmetrical and full  · There is no clubbing, cyanosis of the extremities.   · No BLE edema  · Pedal Pulses: 2+ and equal   Abdomen:  · No masses or tenderness  · Liver/Spleen: No Abnormalities Noted  Neurological/Psychiatric:  · Alert and oriented in all spheres  · Moves all extremities well  · Exhibits normal gait balance and coordination  · No abnormalities of mood, affect, memory, mentation, or behavior are noted    Lab Data:  Most recent lab results below reviewed in office    CBC:   Lab Results   Component Value Date    WBC 11.3 09/30/2020    WBC 10.2 05/28/2020    WBC 12.9 05/12/2020    RBC 4.25 09/30/2020    RBC 4.68 05/28/2020    RBC 4.57 05/12/2020    HGB 12.2 09/30/2020    HGB 13.5 05/28/2020    HGB 13.3 05/12/2020    HCT 35.4 09/30/2020    HCT 39.4 05/28/2020    HCT 38.2 05/12/2020    MCV 83.5 09/30/2020    MCV 84.3 05/28/2020    MCV 83.6 05/12/2020    RDW 14.3 09/30/2020    RDW 13.4 05/28/2020    RDW 13.5 05/12/2020     09/30/2020     05/28/2020     05/12/2020     BMP:  Lab Results   Component Value Date     09/30/2020     05/28/2020     05/12/2020    K 3.7 09/30/2020    K 4.5 05/28/2020    K 4.1 05/12/2020    K 4.1 05/11/2020    K 3.7 05/08/2020    K 4.5 03/25/2019     09/30/2020     05/28/2020     05/12/2020    CO2 22 09/30/2020    CO2 22 05/28/2020    CO2 23 05/12/2020    PHOS 3.4 05/11/2020    PHOS 3.3 05/10/2020    PHOS 4.2 01/22/2013    BUN 17 09/30/2020    BUN 18 05/28/2020    BUN 13 05/12/2020    CREATININE 0.8 09/30/2020 CREATININE 0.7 05/28/2020    CREATININE <0.5 05/12/2020     BNP:   Lab Results   Component Value Date    PROBNP 42 05/28/2020    PROBNP 42 05/08/2020    PROBNP 15 04/29/2020     LIPID:   Lab Results   Component Value Date    TRIG 123 05/12/2020    TRIG 223 09/04/2014    HDL 37 05/12/2020    HDL 44 09/04/2014    LDLCALC 76 05/12/2020    LDLCALC 112 09/04/2014       Cardiac Imaging:  CARDIAC CATH / PCI 5/12/20:   FINDINGS      LVGRAM   LVEDP  7   GRADIENT ACROSS AORTIC VALVE  none   LV FUNCTION EF 60 %   WALL MOTION  normal   MITRAL REGURGITATION  mild      CORONARY ARTERIES   LM  Short vessel, less than 10% [id number] stenosis         LAD Small to medium caliber vessel, there is proximal-mid 90% stenosis. Distal vessel is tortuous with a 50% stenosis. LCX  Large vessel, dominant, less than 10% dmqsfzad-cav-cdsjsh stenosis. RCA Nondominant, small, proximal spasm is noted which improved with IC vasodilators as well as withdrawal of catheter, there is less than 10% sxnvzwvs-ada-lrxvji stenosis. PERCUTANEOUS INTERVENTION DESCRIPTION   Heparin was used for anticoagulation, a 6 [employer] MLA 3.5 guiding catheter was used to intubate the left-sided coronary vessels, this selectively cannulated the left circumflex artery and was able to be pulled back for diagnostic angiography. Then a choice floppy wire was advanced into the circumflex for anchoring and with this the guide catheter was withdrawn and then a BTCJam cougar wire was used to cross into the LAD once the guide was withdrawn from the left main. Wire was used to jump the gap between the guide catheter in the left main and then was advanced successfully into the LAD across the lesion. Lesion in the LAD was then dilated with a 2.5 mm balloon and then the lesion was then stented with a Abbott Xience Ирина 2.5 x 28 mm drug-eluting stent.   After ballooning, a LAD dissection was noted which was not unexpected from balloon dilation and this resolved once stenting was accomplished. Stent was postdilated with a 2.5 mm noncompliant balloon. There was 0% residual stenosis. There was NATI 3 flow before and after PCI. Distal LAD and the tortuous segment was felt to be best treated medically     CONCLUSIONS:   Successful PCI of the LAD with single drug-eluting stent    Echo 5/8/2020:   Summary   Normal left ventricle systolic function with an estimated ejection fraction   of 55%. No regional wall motion abnormalities are seen. Grade I diastolic dysfunction with normal filing pressure. Mild (eccentric) mitral regurgitation. Right ventricular hypertrophy. Trivial pericardial effusion with pericardial fat pad localized anteriorly. Stress Test 5/9/2020:  Summary  Normal LVEF >60%  Anteroseptal/apical hypokinesis  Mixed anteroseptal/apical defect consistent with mixed ischemia/scar of this  territory    Overall, this would be considered an abnormal, high risk, study        Carotid dopplers 2/17/16  CONCLUSIONS    No significant obstructive lesions noted in the extracranial carotid system, bilaterally. Vertebral arteries are patent demonstrating antegrade flow, bilaterally    Carotid dopplers 2014  Opinion: Normal study. No significant atherosclerotic disease noted in the neck. No significant common carotid artery or internal carotid artery stenosis suspected in the neck. Echocardiogram 2014  Summary   Overall left ventricular function is normal.   Ejection fraction is visually estimated to be 55-60 %. Diastolic filling parameters suggests grade I diastolic dysfunction . No obvious cardiac source of emboli noted. There is a small pericardial effusion noted. Stress echocardiogram 2012  Resting echocardiogram shows normal left ventricular systolic  function with an estimated ejection fraction of 60%. There were no  wall motion abnormalities. Following exercise the left ventricle  became uniformly hyperkinetic.   There were no wall motion  abnormalities. There was appropriate increase in the ejection  fraction. ECHOCARDIOGRAPHIC CONCLUSION-  Normal exercise echocardiogram  negative for ischemia. Assessment:    1. Coronary artery disease involving native coronary artery of native heart without angina pectoris    2. Essential hypertension    3. Mixed hyperlipidemia    4. Type 2 diabetes mellitus without complication, with long-term current use of insulin ([facility] [address]          Plan:   1. Will call and get recent blood test results from PCP  2. No change in heart medicines  3. Follow up with me or Dr. Zafar Ortiz in 6 months - consider decreasing or stopping Brilinta (1 year post)       I appreciate the opportunity of cooperating in the care of this individual.    Aspen Frey, APRN - CNP, 11/19/2020, 10:02 AM       QUALITY MEASURES  1. Tobacco Cessation Counseling: NA  2. Retake of BP if >140/90:   NA  3. Documentation to PCP/referring for new patient:  Sent to PCP at close of office visit  4. CAD patient on anti-platelet: Yes  5. CAD patient on STATIN therapy:  Yes  6.  Patient with CHF and aFib on anticoagulation:  NA

## 2021-03-30 LAB
CHOLESTEROL, TOTAL: 160 MG/DL
CHOLESTEROL/HDL RATIO: NORMAL
HDLC SERPL-MCNC: 59 MG/DL (ref 35–70)
LDL CHOLESTEROL CALCULATED: 77 MG/DL (ref 0–160)
NONHDLC SERPL-MCNC: NORMAL MG/DL
TRIGL SERPL-MCNC: 137 MG/DL
VLDLC SERPL CALC-MCNC: 24 MG/DL

## 2021-05-02 ENCOUNTER — HOSPITAL ENCOUNTER (EMERGENCY)
Age: 61
Discharge: HOME | End: 2021-05-02
Attending: EMERGENCY MEDICINE
Payer: MEDICARE

## 2021-05-02 VITALS
DIASTOLIC BLOOD PRESSURE: 81 MMHG | TEMPERATURE: 97.8 F | HEART RATE: 62 BPM | SYSTOLIC BLOOD PRESSURE: 118 MMHG | RESPIRATION RATE: 18 BRPM | OXYGEN SATURATION: 100 %

## 2021-05-02 DIAGNOSIS — R42 DIZZINESS: ICD-10-CM

## 2021-05-02 DIAGNOSIS — H72.91 PERFORATION OF RIGHT TYMPANIC MEMBRANE: ICD-10-CM

## 2021-05-02 DIAGNOSIS — R42 VERTIGO: Primary | ICD-10-CM

## 2021-05-02 LAB
GLUCOSE BLD-MCNC: 150 MG/DL (ref 70–99)
PERFORMED ON: ABNORMAL

## 2021-05-02 PROCEDURE — 6370000000 HC RX 637 (ALT 250 FOR IP): Performed by: EMERGENCY MEDICINE

## 2021-05-02 PROCEDURE — 99284 EMERGENCY DEPT VISIT MOD MDM: CPT

## 2021-05-02 RX ORDER — MECLIZINE HCL 12.5 MG/1
25 TABLET ORAL ONCE
Status: COMPLETED | OUTPATIENT
Start: 2021-05-02 | End: 2021-05-02

## 2021-05-02 RX ORDER — MECLIZINE HCL 12.5 MG/1
12.5 TABLET ORAL 3 TIMES DAILY PRN
Qty: 15 TABLET | Refills: 0 | Status: SHIPPED | OUTPATIENT
Start: 2021-05-02 | End: 2021-05-12

## 2021-05-02 RX ORDER — IBUPROFEN 600 MG/1
600 TABLET ORAL EVERY 8 HOURS PRN
Qty: 40 TABLET | Refills: 0 | Status: SHIPPED | OUTPATIENT
Start: 2021-05-02

## 2021-05-02 RX ADMIN — MECLIZINE 25 MG: 12.5 TABLET ORAL at 11:40

## 2021-05-02 ASSESSMENT — ENCOUNTER SYMPTOMS
COUGH: 0
VOMITING: 0
WHEEZING: 0
RHINORRHEA: 0
NAUSEA: 0
PHOTOPHOBIA: 0
BACK PAIN: 0
SHORTNESS OF BREATH: 0
DIARRHEA: 0
ABDOMINAL DISTENTION: 0

## 2021-05-02 ASSESSMENT — PAIN - FUNCTIONAL ASSESSMENT: PAIN_FUNCTIONAL_ASSESSMENT: ACTIVITIES ARE NOT PREVENTED

## 2021-05-02 ASSESSMENT — PAIN DESCRIPTION - ORIENTATION: ORIENTATION: RIGHT

## 2021-05-02 ASSESSMENT — PAIN SCALES - GENERAL: PAINLEVEL_OUTOF10: 4

## 2021-05-02 ASSESSMENT — PAIN DESCRIPTION - FREQUENCY: FREQUENCY: CONTINUOUS

## 2021-05-02 ASSESSMENT — PAIN DESCRIPTION - ONSET: ONSET: GRADUAL

## 2021-05-02 ASSESSMENT — PAIN DESCRIPTION - PAIN TYPE: TYPE: ACUTE PAIN

## 2021-05-02 ASSESSMENT — PAIN DESCRIPTION - LOCATION: LOCATION: EAR

## 2021-05-02 NOTE — ED NOTES
Pt wanted to wait to leave until the medication started working. Pt D/C ambulatory. Went over D/C instructions and medications with pt, pt verbalized understanding and all questions were answered.         Preet Sherman RN  05/02/21 [id number]

## 2021-05-02 NOTE — ED PROVIDER NOTES
Emergency Department Provider Note  Location: [facility]  ED  5/2/2021     Patient Identification  Jimy El is a 62 y.o. female    Chief Complaint  Otalgia (states her ear needs irrigated and she feels dizzy like vertigo ) and Dizziness          HPI  (History provided by patient)  Patient is a 11year-old female who presents with irritation and ongoing symptoms in her right ear and episode of vertigo this morning. Patient woke up this morning sat up and had sensation of room spinning around her. Resolve spontaneously for the time she came to the hospital.  She had one episode of vomiting. Nonbloody nonbilious. Patient denies any numbness weakness paralysis or persistent vision changes. Unsure if is positional.  She reports that 3 weeks ago she had her right ear irrigated and had onset of pain and ringing in her ears following the procedure. She was given eardrops and sent home. Patient reports she has been partially compliant with the eardrops. She is unsure if she was told that she had a perforated eardrum from the irrigation 3 weeks ago. I have reviewed the following nursing documentation:  Allergies: Allergies   Allergen Reactions    Codeine Nausea And Vomiting    Phenergan [Promethazine Hcl] Nausea And Vomiting    Influenza Vaccines      Arm hot , red pain, sick    Pneumococcal Vaccines      Arm hot pain, sick    Vicodin [Hydrocodone-Acetaminophen]      itching    Hydrocodone-Acetaminophen Nausea And Vomiting    Oxycodone Nausea And Vomiting     hallucianitions    Promethazine Nausea And Vomiting       Past medical history:  has a past medical history of CAD (coronary artery disease), Cerebral artery occlusion with cerebral infarction ([address] (2013), Choking sensation, Diabetes mellitus ([address] DM2 (diabetes mellitus, type 2) ([address] Hyperlipidemia, Prolonged emergence from general anesthesia, and Sarcoid.     Past surgical history:  has a past surgical history that includes Cholecystectomy;  section; hysteroscopy (2013); Hysterectomy; Colonoscopy; Colonoscopy (13); Colonoscopy (10/19/2018); Colonoscopy (N/A, 10/19/2018); Cardiac surgery (2020); and Finger trigger release (Right, 2020). Home medications:   Prior to Admission medications    Medication Sig Start Date End Date Taking? Authorizing Provider   ibuprofen (ADVIL;MOTRIN) 600 MG tablet Take 1 tablet by mouth every 8 hours as needed for Pain 21  Yes Tiffanie Narayanan MD   meclizine (ANTIVERT) 12.5 MG tablet Take 1 tablet by mouth 3 times daily as needed for Dizziness or Nausea 21 Yes Tiffanie Narayanan MD   insulin glargine (LANTUS) 100 UNIT/ML injection vial Inject into the skin nightly    Historical Provider, MD   metoprolol tartrate (LOPRESSOR) 25 MG tablet Take 0.5 tablets by mouth 2 times daily 20   DEV Rendon CNP   TRESIBA FLEXTOUCH 100 UNIT/ML SOPN  20   Historical Provider, MD   pantoprazole (PROTONIX) 40 MG tablet  20   Historical Provider, MD   rosuvastatin (CRESTOR) 10 MG tablet TAKE 1 TABLET BY MOUTH DAILY 20   Ilda Mckay MD   ASPIRIN LOW DOSE 81 MG chewable tablet TAKE 1 TABLET BY MOUTH DAILY 20   Ilda Mckay MD   nitroGLYCERIN (NITROSTAT) 0.4 MG SL tablet up to max of 3 total doses. If no relief after 1 dose, call 911. 20   DEV Rendon CNP   ticagrelor (BRILINTA) 90 MG TABS tablet Take 1 tablet by mouth 2 times daily 20   DEV Rendon CNP   Insulin Glargine (BASAGLAR KWIKPEN SC) Inject 60 Units into the skin every morning     Historical Provider, MD   gemfibrozil (LOPID) 600 MG tablet Take 600 mg by mouth 2 times daily     Historical Provider, MD   Misc. Devices (ACAPELLA) MISC 1 Device by Does not apply route 4 times daily. 14   Marquis Merlyn MD   famotidine (PEPCID) 20 MG tablet Take 1 tablet by mouth daily.  14   Alexy Catalan MD   Cholecalciferol (VITAMIN D) 2000 UNITS CAPS capsule Take 2,000 Units by mouth daily. Historical Provider, MD   lisinopril (PRINIVIL;ZESTRIL) 2.5 MG tablet Take 2.5 mg by mouth daily. Historical Provider, MD   metformin (GLUCOPHAGE) 500 MG tablet Take 500 mg by mouth 2 times daily (with meals)     Historical Provider, MD       Social history:  reports that she has never smoked. She has never used smokeless tobacco. She reports that she does not drink alcohol or use drugs. Family history:    Family History   Problem Relation Age of Onset    Diabetes Mother     Heart Disease Mother     Cancer Father         colon         ROS  Review of Systems   Constitutional: Negative for chills and fever. HENT: Negative for congestion and rhinorrhea. Eyes: Negative for photophobia and visual disturbance. Respiratory: Negative for cough, shortness of breath and wheezing. Cardiovascular: Negative for chest pain and palpitations. Gastrointestinal: Negative for abdominal distention, diarrhea, nausea and vomiting. Genitourinary: Negative for dysuria and hematuria. Musculoskeletal: Negative for back pain and neck pain. Skin: Negative for rash and wound. Neurological: Positive for dizziness. Negative for syncope, speech difficulty, weakness, light-headedness and numbness. Psychiatric/Behavioral: Negative for agitation and confusion. Exam  ED Triage Vitals [05/02/21 1037]   BP Temp Temp Source Pulse Resp SpO2 Height Weight   138/70 97.8 °F (36.6 °C) Oral 70 18 100 % -- --       Physical Exam  Vitals signs and nursing note reviewed. Constitutional:       General: She is not in acute distress. Appearance: She is well-developed. HENT:      Head: Normocephalic and atraumatic. Right Ear: Ear canal normal.      Left Ear: Tympanic membrane and ear canal normal.      Ears:      Comments: The right TM has scab and what appears to be partially healed perforation.   There is no redness noted     Nose: Nose normal. No congestion. Eyes:      Extraocular Movements: Extraocular movements intact. Pupils: Pupils are equal, round, and reactive to light. Comments: No nystagmus noted   Neck:      Musculoskeletal: Normal range of motion and neck supple. Cardiovascular:      Rate and Rhythm: Normal rate and regular rhythm. Heart sounds: No murmur. Pulmonary:      Effort: Pulmonary effort is normal.      Breath sounds: Normal breath sounds. Abdominal:      General: There is no distension. Palpations: Abdomen is soft. Tenderness: There is no abdominal tenderness. Musculoskeletal: Normal range of motion. General: No deformity. Skin:     General: Skin is warm. Findings: No rash. Neurological:      Mental Status: She is alert and oriented to person, place, and time. Motor: No abnormal muscle tone. Coordination: Coordination normal.      Comments: NIH stroke scale 0, no cranial nerve deficits appreciated, strength 5 out of 5 all extremities, sensation is intact, no central ataxia, no cerebellar signs present, no deviation of vertical skew. Psychiatric:         Mood and Affect: Mood normal.         Behavior: Behavior normal.           ED Course    ED Medication Orders (From admission, onward)    Start Ordered     Status Ordering Provider    05/02/21 1115 05/02/21 1111  meclizine (ANTIVERT) tablet 25 mg  ONCE      Last MAR action: Given - by Marco A Malik on 05/02/21 at [address] [facility], [id number] [address]            Radiology  No results found. Labs  Results for orders placed or performed during the hospital encounter of 05/02/21   POCT Glucose   Result Value Ref Range    POC Glucose 150 (H) 70 - 99 mg/dl    Performed on ACCU-CHEK          MDM  Patient seen and evaluated. Relevant records reviewed. 16-year-old female who presents with episode of vertigo now improved in the setting of recent sinus congestion symptoms and recent ear irrigation.   On exam here she is well-appearing no acute distress reassuring vital signs. She is currently without symptoms. She has no focal deficits on exam.  She has evidence of a healing tympanic rupture on the right side that I suspect is causing inflammatory changes and responsible for her vertigo. No concern for central vertigo at this point. I did discuss obtaining basic labs as a screening measure for patient however she is declined this and wishes and is agreeable to only 2 fingerstick glucose. Given meclizine, ENT follow-up return precautions discussed. Patient agreeable to plan expressed understanding of plan. She is ambulatory in the emergency department. Clinical Impression:  1. Vertigo    2. Dizziness    3. Perforation of right tympanic membrane          Disposition:  Discharge to home in good condition. Blood pressure 138/70, pulse 70, temperature 97.8 °F (36.6 °C), temperature source Oral, resp. rate 18, last menstrual period 12/21/2012, SpO2 100 %. Patient was given scripts for the following medications. I counseled patient how to take these medications. New Prescriptions    IBUPROFEN (ADVIL;MOTRIN) 600 MG TABLET    Take 1 tablet by mouth every 8 hours as needed for Pain    MECLIZINE (ANTIVERT) 12.5 MG TABLET    Take 1 tablet by mouth 3 times daily as needed for Dizziness or Nausea       Disposition referral (if applicable):  Christopher Arias  [address] [address]  [phone]    Schedule an appointment as soon as possible for a visit       Kaykay Hernandez [id number] [address]  [address] [place] [address] [id number]    Schedule an appointment as soon as possible for a visit           Total critical care time is 0 minutes, which excludes separately billable procedures and updating family. Time spent is specifically for management of the presenting complaint and symptoms initially, direct bedside care, reevaluation, review of records, and consultation.   There was a high probability of clinically significant life-threatening deterioration in the patient's condition, which required my urgent intervention. This chart was generated in part by using Dragon Dictation system and may contain errors related to that system including errors in grammar, punctuation, and spelling, as well as words and phrases that may be inappropriate. If there are any questions or concerns please feel free to contact the dictating provider for clarification.      Kylah Brady MD  [address] MD  05/02/21 [id number]

## 2021-05-02 NOTE — ED TRIAGE NOTES
Pt came in for complaints of ear pain. States that she has been being treated for an ear infection in her right ear. Pt states that she has been using ear drops for the past 3 weeks. PT states that today she woke up nauseous and dizzy. Thinks its like vertigo.  States that she stopped the ear drops once she started feeling better instead of Using the full prescription

## 2021-05-02 NOTE — ED NOTES
Pt D/C ambulatory. Went over D/C instructions and medications with pt, pt verbalized understanding and all questions were answered.  CECILIA Montenegro RN  05/02/21 [id number]

## 2021-05-07 ENCOUNTER — OFFICE VISIT (OUTPATIENT)
Dept: ENT CLINIC | Age: 61
End: 2021-05-07
Payer: MEDICARE

## 2021-05-07 VITALS
TEMPERATURE: 97.3 F | DIASTOLIC BLOOD PRESSURE: 59 MMHG | WEIGHT: 144.4 LBS | SYSTOLIC BLOOD PRESSURE: 101 MMHG | HEIGHT: 61 IN | HEART RATE: 88 BPM | BODY MASS INDEX: 27.26 KG/M2

## 2021-05-07 DIAGNOSIS — H92.01 OTALGIA, RIGHT: ICD-10-CM

## 2021-05-07 DIAGNOSIS — H91.91 HEARING LOSS OF RIGHT EAR, UNSPECIFIED HEARING LOSS TYPE: ICD-10-CM

## 2021-05-07 DIAGNOSIS — H72.91 PERFORATION OF RIGHT TYMPANIC MEMBRANE: Primary | ICD-10-CM

## 2021-05-07 PROCEDURE — 99203 OFFICE O/P NEW LOW 30 MIN: CPT | Performed by: OTOLARYNGOLOGY

## 2021-05-07 PROCEDURE — G8419 CALC BMI OUT NRM PARAM NOF/U: HCPCS | Performed by: OTOLARYNGOLOGY

## 2021-05-07 PROCEDURE — 3017F COLORECTAL CA SCREEN DOC REV: CPT | Performed by: OTOLARYNGOLOGY

## 2021-05-07 PROCEDURE — G8427 DOCREV CUR MEDS BY ELIG CLIN: HCPCS | Performed by: OTOLARYNGOLOGY

## 2021-05-07 PROCEDURE — 1036F TOBACCO NON-USER: CPT | Performed by: OTOLARYNGOLOGY

## 2021-05-07 ASSESSMENT — ENCOUNTER SYMPTOMS
SORE THROAT: 0
VOICE CHANGE: 0
SINUS PRESSURE: 0
TROUBLE SWALLOWING: 0
FACIAL SWELLING: 0
SHORTNESS OF BREATH: 0
APNEA: 0
EYE ITCHING: 0
COUGH: 0

## 2021-05-07 NOTE — PROGRESS NOTES
Shonda Paul [address], [address], [address] [place] [address]  P: [phone]       Patient     Angela Decker  1960    ChiefComplaint     Chief Complaint   Patient presents with    Ear Problem     States got ears cleaned, since then has had pain in ear. right ear is the worse. History of Present Illness     Mao Soriano is a 21-year-old female here today for evaluation of right ear. Proximally 1 month ago her PCP irrigated her ears she noted immediate right-sided ear pain with irrigation and sudden change in hearing. PCP informed her of new perforation was prescribed eardrops. Pain and hearing change have persisted. Denies previous ear surgery or hearing loss.     Past Medical History     Past Medical History:   Diagnosis Date    CAD (coronary artery disease)     1 stent    Cerebral artery occlusion with cerebral infarction [facility])     Choking sensation     Diabetes mellitus ([address]     DM2 (diabetes mellitus, type 2) ([address]     Hyperlipidemia     Prolonged emergence from general anesthesia     Sarcoid     lungs       Past Surgical History     Past Surgical History:   Procedure Laterality Date    CARDIAC SURGERY  2020    stent     SECTION      x3    CHOLECYSTECTOMY      COLONOSCOPY      COLONOSCOPY  13    normal    COLONOSCOPY  10/19/2018    1 polyp    COLONOSCOPY N/A 10/19/2018    COLONOSCOPY POLYPECTOMY SNARE/COLD BIOPSY performed by Wero Alves MD at [address] Right 2020    RIGHT LONG TRIGGER FINGER RELEASE performed by Jose R Ball MD at [facility] HYSTEROSCOPY  2013    Laprascopic supracervical hysterectomy       Family History     Family History   Problem Relation Age of Onset    Diabetes Mother     Heart Disease Mother     Cancer Father         colon       Social History     Social History     Tobacco Use    Smoking status: Never Smoker    Smokeless tobacco: Never Used Substance Use Topics    Alcohol use: No    Drug use: No        Allergies     Allergies   Allergen Reactions    Codeine Nausea And Vomiting    Phenergan [Promethazine Hcl] Nausea And Vomiting    Influenza Vaccines      Arm hot , red pain, sick    Pneumococcal Vaccines      Arm hot pain, sick    Vicodin [Hydrocodone-Acetaminophen]      itching    Hydrocodone-Acetaminophen Nausea And Vomiting    Oxycodone Nausea And Vomiting     hallucianitions    Promethazine Nausea And Vomiting       Medications     Current Outpatient Medications   Medication Sig Dispense Refill    ibuprofen (ADVIL;MOTRIN) 600 MG tablet Take 1 tablet by mouth every 8 hours as needed for Pain 40 tablet 0    meclizine (ANTIVERT) 12.5 MG tablet Take 1 tablet by mouth 3 times daily as needed for Dizziness or Nausea 15 tablet 0    metoprolol tartrate (LOPRESSOR) 25 MG tablet Take 0.5 tablets by mouth 2 times daily 90 tablet 3    TRESIBA FLEXTOUCH 100 UNIT/ML SOPN       pantoprazole (PROTONIX) 40 MG tablet       rosuvastatin (CRESTOR) 10 MG tablet TAKE 1 TABLET BY MOUTH DAILY 90 tablet 3    ASPIRIN LOW DOSE 81 MG chewable tablet TAKE 1 TABLET BY MOUTH DAILY 90 tablet 3    nitroGLYCERIN (NITROSTAT) 0.4 MG SL tablet up to max of 3 total doses. If no relief after 1 dose, call 911. 25 tablet 3    ticagrelor (BRILINTA) 90 MG TABS tablet Take 1 tablet by mouth 2 times daily 60 tablet 11    gemfibrozil (LOPID) 600 MG tablet Take 600 mg by mouth 2 times daily       famotidine (PEPCID) 20 MG tablet Take 1 tablet by mouth daily. 60 tablet 3    Cholecalciferol (VITAMIN D) 2000 UNITS CAPS capsule Take 2,000 Units by mouth daily.  lisinopril (PRINIVIL;ZESTRIL) 2.5 MG tablet Take 2.5 mg by mouth daily.  metformin (GLUCOPHAGE) 500 MG tablet Take 500 mg by mouth 2 times daily (with meals)        No current facility-administered medications for this visit.         Review of Systems     Review of Systems   Constitutional: Negative for motion without pain. Thyroid: No thyroid mass or thyromegaly. Trachea: Trachea and phonation normal.   Cardiovascular:      Pulses: Normal pulses. Pulmonary:      Effort: Pulmonary effort is normal. No accessory muscle usage or respiratory distress. Breath sounds: No stridor. Lymphadenopathy:      Head:      Right side of head: No submental or submandibular adenopathy. Left side of head: No submental or submandibular adenopathy. Cervical: No cervical adenopathy. Right cervical: No superficial, deep or posterior cervical adenopathy. Left cervical: No superficial, deep or posterior cervical adenopathy. Skin:     General: Skin is warm and dry. Neurological:      Mental Status: She is alert and oriented to person, place, and time. Cranial Nerves: No cranial nerve deficit. Coordination: Coordination normal.      Gait: Gait normal.   Psychiatric:         Thought Content: Thought content normal.           Assessment and Plan     1. Perforation of right tympanic membrane  -Secondary traumatic irrigation 1 month ago  -Pain improving and no otorrhea  -Discussed 90% will heal within 3 months without intervention    2. Hearing loss of right ear, unspecified hearing loss type  -Likely secondary to perforation    3. Otalgia, right  -Improving since traumatic event  -Use NSAIDs as needed      Return in 2 months for reevaluation. Will obtain hearing test at that time. If perforation still present we will proceed with an office myringoplasty. Jasvir Patton,   5/7/21      Portions of this note were dictated using Dragon.  There may be linguistic errors secondary to the use of this program.

## 2021-05-19 NOTE — PROGRESS NOTES
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 She ran out of Brilinta last week. Has been 1 year since last PCI. She is also out of gemfibrozil and PCP did not refill. She is having some shortness of breath but felt due to her sarcoidosis. Worse over the past 2 months   No chest pain. She has feet and leg edema. , fatigue and abdominal swelling/ distention. The feet swelling gets better overnight and worse throughout the day. She had ears irrigated by PCP with complication of ruptured ear drum on right, will take 3 months to heal.  Can't take antivert due to side effects. Diabetes is under good control. Appetite is good. Had blood work at PCP office last month. Sravanthi Givens describes symptoms including dyspnea, fatigue, edema but denies chest pain, palpitations, orthopnea, PND, early saiety, syncope. NYHA:   III  ACC/ AHA Stage:    C    Past Medical History:   has a past medical history of CAD (coronary artery disease), Cerebral artery occlusion with cerebral infarction ([address] Choking sensation, Diabetes mellitus ([address] DM2 (diabetes mellitus, type 2) ([address] Hyperlipidemia, Prolonged emergence from general anesthesia, and Sarcoid. Surgical History:   has a past surgical history that includes Cholecystectomy;  section; hysteroscopy (2013); Hysterectomy; Colonoscopy; Colonoscopy (13); Colonoscopy (10/19/2018); Colonoscopy (N/A, 10/19/2018); Cardiac surgery (2020); and Finger trigger release (Right, 2020). Social History:   reports that she has never smoked.  She has never used smokeless tobacco. She reports that she does not drink alcohol and does not use drugs. Family History:   Family History   Problem Relation Age of Onset    Diabetes Mother     Heart Disease Mother     Cancer Father         colon       Home Medications:  Prior to Admission medications    Medication Sig Start Date End Date Taking? Authorizing Provider   ibuprofen (ADVIL;MOTRIN) 600 MG tablet Take 1 tablet by mouth every 8 hours as needed for Pain 5/2/21  Yes Zaheer Stafford MD   metoprolol tartrate (LOPRESSOR) 25 MG tablet Take 0.5 tablets by mouth 2 times daily 11/19/20  Yes Colletta Roads, APRN - CNP   pantoprazole (PROTONIX) 40 MG tablet  7/23/20  Yes Historical Provider, MD   rosuvastatin (CRESTOR) 10 MG tablet TAKE 1 TABLET BY MOUTH DAILY 9/9/20  Yes Estefani Parry MD   ASPIRIN LOW DOSE 81 MG chewable tablet TAKE 1 TABLET BY MOUTH DAILY 9/9/20  Yes Estefani Parry MD   nitroGLYCERIN (NITROSTAT) 0.4 MG SL tablet up to max of 3 total doses. If no relief after 1 dose, call 911. 5/12/20  Yes Colletta Roads, APRN - CNP   ticagrelor (BRILINTA) 90 MG TABS tablet Take 1 tablet by mouth 2 times daily 5/12/20  Yes DEV Webber CNP   gemfibrozil (LOPID) 600 MG tablet Take 600 mg by mouth 2 times daily    Yes Historical Provider, MD   famotidine (PEPCID) 20 MG tablet Take 1 tablet by mouth daily. 9/6/14  Yes Michael Ortega MD   Cholecalciferol (VITAMIN D) 2000 UNITS CAPS capsule Take 2,000 Units by mouth daily. Yes Historical Provider, MD   lisinopril (PRINIVIL;ZESTRIL) 2.5 MG tablet Take 2.5 mg by mouth daily.    Yes Historical Provider, MD   metformin (GLUCOPHAGE) 500 MG tablet Take 500 mg by mouth 2 times daily (with meals)    Yes Historical Provider, MD HERNANDEZ FLEXTOUCH 100 UNIT/ML SOPN  7/28/20   Historical Provider, MD        Allergies:  Antivert [meclizine], Codeine, Phenergan [promethazine hcl], Influenza vaccines, Pneumococcal vaccines, Vicodin [hydrocodone-acetaminophen], Hydrocodone-acetaminophen, Oxycodone, and Promethazine     Physical Examination:    Vitals:    05/20/21 0955   BP: 124/60   Pulse: 73   SpO2: 98%   Weight: 144 lb (65.3 kg)   Height: 5' 0.6\" (1.539 m)        Constitutional and General Appearance: Warm and dry, no apparent distress, normal coloration  HEENT:  Normocephalic, atraumatic  Respiratory:  · Normal excursion and expansion without use of accessory muscles  · Resp Auscultation: Clear to auscultation   Cardiovascular:  · The apical impulses not displaced  · Heart tones are crisp and normal  · JVP 8 cm H2O  · Regular rate and rhythm, normal S1S2, no m/g/r  · Peripheral pulses are symmetrical and full  · There is no clubbing, cyanosis of the extremities.   · 1+ pitting BLE pretibial edema  · Pedal Pulses: 2+ and equal   Abdomen:  · No masses or tenderness, soft but distended  · Liver/Spleen: No Abnormalities Noted  Neurological/Psychiatric:  · Alert and oriented in all spheres  · Moves all extremities well  · Exhibits normal gait balance and coordination  · No abnormalities of mood, affect, memory, mentation, or behavior are noted    Lab Data:  Most recent lab results below reviewed in office    CBC:   Lab Results   Component Value Date    WBC 11.3 09/30/2020    WBC 10.2 05/28/2020    WBC 12.9 05/12/2020    RBC 4.25 09/30/2020    RBC 4.68 05/28/2020    RBC 4.57 05/12/2020    HGB 12.2 09/30/2020    HGB 13.5 05/28/2020    HGB 13.3 05/12/2020    HCT 35.4 09/30/2020    HCT 39.4 05/28/2020    HCT 38.2 05/12/2020    MCV 83.5 09/30/2020    MCV 84.3 05/28/2020    MCV 83.6 05/12/2020    RDW 14.3 09/30/2020    RDW 13.4 05/28/2020    RDW 13.5 05/12/2020     09/30/2020     05/28/2020     05/12/2020     BMP:  Lab Results   Component Value Date     09/30/2020     05/28/2020     05/12/2020    K 3.7 09/30/2020    K 4.5 05/28/2020    K 4.1 05/12/2020    K 4.1 05/11/2020    K 3.7 05/08/2020    K 4.5 03/25/2019     09/30/2020     05/28/2020     05/12/2020    CO2 22 09/30/2020    CO2 22 05/28/2020    CO2 23 05/12/2020    PHOS 3.4 05/11/2020    PHOS 3.3 05/10/2020    PHOS 4.2 01/22/2013    BUN 17 09/30/2020    BUN 18 05/28/2020    BUN 13 05/12/2020    CREATININE 0.8 09/30/2020    CREATININE 0.7 05/28/2020    CREATININE <0.5 05/12/2020     BNP:   Lab Results   Component Value Date    PROBNP 42 05/28/2020    PROBNP 42 05/08/2020    PROBNP 15 04/29/2020     LIPID:   Lab Results   Component Value Date    TRIG 123 05/12/2020    TRIG 223 09/04/2014    HDL 37 05/12/2020    HDL 44 09/04/2014    LDLCALC 76 05/12/2020    LDLCALC 112 09/04/2014       Cardiac Imaging:  CARDIAC CATH / PCI 5/12/20:   FINDINGS      LVGRAM   LVEDP  7   GRADIENT ACROSS AORTIC VALVE  none   LV FUNCTION EF 60 %   WALL MOTION  normal   MITRAL REGURGITATION  mild      CORONARY ARTERIES   LM  Short vessel, less than 10% proximalmiddistal stenosis         LAD Small to medium caliber vessel, there is proximalmid 90% stenosis. Distal vessel is tortuous with a 50% stenosis. LCX  Large vessel, dominant, less than 10% proximalmiddistal stenosis. RCA Nondominant, small, proximal spasm is noted which improved with IC vasodilators as well as withdrawal of catheter, there is less than 10% proximalmiddistal stenosis. PERCUTANEOUS INTERVENTION DESCRIPTION   Heparin was used for anticoagulation, a 6 [place] MLA 3.5 guiding catheter was used to intubate the left-sided coronary vessels, this selectively cannulated the left circumflex artery and was able to be pulled back for diagnostic angiography. Then a choice floppy wire was advanced into the circumflex for anchoring and with this the guide catheter was withdrawn and then a [place] wire was used to cross into the LAD once the guide was withdrawn from the left main.   Wire was used to jump the gap between the guide catheter in the left main and then was advanced successfully into the LAD across the echocardiogram 2012  Resting echocardiogram shows normal left ventricular systolic function with an estimated ejection fraction of 60%. There were no wall motion abnormalities. Following exercise the left ventricle became uniformly hyperkinetic. There were no wall motion abnormalities. There was appropriate increase in the ejection fraction. ECHOCARDIOGRAPHIC CONCLUSION-  Normal exercise echocardiogram negative for ischemia. Assessment:    1. Coronary artery disease involving native coronary artery of native heart without angina pectoris    2. Essential hypertension    3. Mixed hyperlipidemia    4. Type 2 diabetes mellitus without complication, with long-term current use of insulin ([address]          Plan:   1. Start clopidogrel (Plavix) 75 mg once daily instead of the Brilinta  2. Continue the aspirin 81 mg daily, metoprolol 25 mg half tablet twice daily and rosuvastatin (Crestor) 10 mg daily  3. Continue the lisinopril as well  4. Will call and get blood test results from [facility]   5. Echocardiogram to look at heart function, heart pressures, valves  6.  Follow up with me in 3 months      I appreciate the opportunity of cooperating in the care of this individual.    Alan Root, DEV - CNP, 5/20/2021, 10:21 AM

## 2021-05-20 ENCOUNTER — OFFICE VISIT (OUTPATIENT)
Dept: CARDIOLOGY CLINIC | Age: 61
End: 2021-05-20
Payer: MEDICARE

## 2021-05-20 VITALS
OXYGEN SATURATION: 98 % | DIASTOLIC BLOOD PRESSURE: 60 MMHG | HEIGHT: 61 IN | HEART RATE: 73 BPM | SYSTOLIC BLOOD PRESSURE: 124 MMHG | WEIGHT: 144 LBS | BODY MASS INDEX: 27.19 KG/M2

## 2021-05-20 DIAGNOSIS — I10 ESSENTIAL HYPERTENSION: ICD-10-CM

## 2021-05-20 DIAGNOSIS — I25.10 CORONARY ARTERY DISEASE INVOLVING NATIVE CORONARY ARTERY OF NATIVE HEART WITHOUT ANGINA PECTORIS: Primary | ICD-10-CM

## 2021-05-20 DIAGNOSIS — Z79.4 TYPE 2 DIABETES MELLITUS WITHOUT COMPLICATION, WITH LONG-TERM CURRENT USE OF INSULIN (HCC): ICD-10-CM

## 2021-05-20 DIAGNOSIS — E78.2 MIXED HYPERLIPIDEMIA: ICD-10-CM

## 2021-05-20 DIAGNOSIS — E11.9 TYPE 2 DIABETES MELLITUS WITHOUT COMPLICATION, WITH LONG-TERM CURRENT USE OF INSULIN (HCC): ICD-10-CM

## 2021-05-20 PROCEDURE — 3046F HEMOGLOBIN A1C LEVEL >9.0%: CPT | Performed by: NURSE PRACTITIONER

## 2021-05-20 PROCEDURE — 2022F DILAT RTA XM EVC RTNOPTHY: CPT | Performed by: NURSE PRACTITIONER

## 2021-05-20 PROCEDURE — 3017F COLORECTAL CA SCREEN DOC REV: CPT | Performed by: NURSE PRACTITIONER

## 2021-05-20 PROCEDURE — G8427 DOCREV CUR MEDS BY ELIG CLIN: HCPCS | Performed by: NURSE PRACTITIONER

## 2021-05-20 PROCEDURE — G8419 CALC BMI OUT NRM PARAM NOF/U: HCPCS | Performed by: NURSE PRACTITIONER

## 2021-05-20 PROCEDURE — 1036F TOBACCO NON-USER: CPT | Performed by: NURSE PRACTITIONER

## 2021-05-20 PROCEDURE — 99214 OFFICE O/P EST MOD 30 MIN: CPT | Performed by: NURSE PRACTITIONER

## 2021-05-20 RX ORDER — CLOPIDOGREL BISULFATE 75 MG/1
75 TABLET ORAL DAILY
Qty: 90 TABLET | Refills: 3 | Status: SHIPPED | OUTPATIENT
Start: 2021-05-20 | End: 2021-07-30 | Stop reason: SDUPTHER

## 2021-05-20 NOTE — LETTER
[facility]   Cardiac Evaluation    Primary Care Doctor:  Chavo Fulton    Chief Complaint   Patient presents with    6 Month Follow-Up    Shortness of Breath     Scarcodius    Fatigue    Edema        History of Present Illness:   I had the pleasure of seeing Jimy El in follow up for CAD s/p PCI to LAD in May 2020, hypertension, hyperlipidemia as well as diabetes mellitus and questionable pulmonary sarcoid. She ran out of Brilinta last week. Has been 1 year since last PCI. She is also out of gemfibrozil and PCP did not refill. She is having some shortness of breath but felt due to her sarcoidosis. Worse over the past 2 months   No chest pain. She has feet and leg edema. , fatigue and abdominal swelling/ distention. The feet swelling gets better overnight and worse throughout the day. She had ears irrigated by PCP with complication of ruptured ear drum on right, will take 3 months to heal.  Can't take antivert due to side effects. Diabetes is under good control. Appetite is good. Had blood work at PCP office last month. Jimy El describes symptoms including dyspnea, fatigue, edema but denies chest pain, palpitations, orthopnea, PND, early saiety, syncope. NYHA:   III  ACC/ AHA Stage:    C    Past Medical History:   has a past medical history of CAD (coronary artery disease), Cerebral artery occlusion with cerebral infarction ([address] Choking sensation, Diabetes mellitus ([address] DM2 (diabetes mellitus, type 2) ([address] Hyperlipidemia, Prolonged emergence from general anesthesia, and Sarcoid. Surgical History:   has a past surgical history that includes Cholecystectomy;  section; hysteroscopy (2013); Hysterectomy; Colonoscopy; Colonoscopy (13); Colonoscopy (10/19/2018); Colonoscopy (N/A, 10/19/2018); Cardiac surgery (2020); and Finger trigger release (Right, 2020). Social History:   reports that she has never smoked.  She has never used smokeless tobacco. She reports that she does not drink alcohol and does not use drugs. Family History:   Family History   Problem Relation Age of Onset    Diabetes Mother     Heart Disease Mother     Cancer Father         colon       Home Medications:  Prior to Admission medications    Medication Sig Start Date End Date Taking? Authorizing Provider   ibuprofen (ADVIL;MOTRIN) 600 MG tablet Take 1 tablet by mouth every 8 hours as needed for Pain 5/2/21  Yes Huber Arias MD   metoprolol tartrate (LOPRESSOR) 25 MG tablet Take 0.5 tablets by mouth 2 times daily 11/19/20  Yes DEV Cochran CNP   pantoprazole (PROTONIX) 40 MG tablet  7/23/20  Yes Historical Provider, MD   rosuvastatin (CRESTOR) 10 MG tablet TAKE 1 TABLET BY MOUTH DAILY 9/9/20  Yes Estrellita Schmidt MD   ASPIRIN LOW DOSE 81 MG chewable tablet TAKE 1 TABLET BY MOUTH DAILY 9/9/20  Yes Estrellita Schmidt MD   nitroGLYCERIN (NITROSTAT) 0.4 MG SL tablet up to max of 3 total doses. If no relief after 1 dose, call 911. 5/12/20  Yes DEV Cochran CNP   ticagrelor (BRILINTA) 90 MG TABS tablet Take 1 tablet by mouth 2 times daily 5/12/20  Yes DEV Chiang CNP   gemfibrozil (LOPID) 600 MG tablet Take 600 mg by mouth 2 times daily    Yes Historical Provider, MD   famotidine (PEPCID) 20 MG tablet Take 1 tablet by mouth daily. 9/6/14  Yes Renetta Fishman MD   Cholecalciferol (VITAMIN D) 2000 UNITS CAPS capsule Take 2,000 Units by mouth daily. Yes Historical Provider, MD   lisinopril (PRINIVIL;ZESTRIL) 2.5 MG tablet Take 2.5 mg by mouth daily.    Yes Historical Provider, MD   metformin (GLUCOPHAGE) 500 MG tablet Take 500 mg by mouth 2 times daily (with meals)    Yes Historical Provider, MD HERNANDEZ FLEXTOUCH 100 UNIT/ML SOPN  7/28/20   Historical Provider, MD        Allergies:  Antivert [meclizine], Codeine, Phenergan [promethazine hcl], Influenza vaccines, Pneumococcal vaccines, Vicodin [hydrocodone-acetaminophen], Hydrocodone-acetaminophen, Oxycodone, and Promethazine     Physical Examination:    Vitals:    05/20/21 0955   BP: 124/60   Pulse: 73   SpO2: 98%   Weight: 144 lb (65.3 kg)   Height: 5' 0.6\" (1.539 m)        Constitutional and General Appearance: Warm and dry, no apparent distress, normal coloration  HEENT:  Normocephalic, atraumatic  Respiratory:  · Normal excursion and expansion without use of accessory muscles  · Resp Auscultation: Clear to auscultation   Cardiovascular:  · The apical impulses not displaced  · Heart tones are crisp and normal  · JVP 8 cm H2O  · Regular rate and rhythm, normal S1S2, no m/g/r  · Peripheral pulses are symmetrical and full  · There is no clubbing, cyanosis of the extremities.   · 1+ pitting BLE pretibial edema  · Pedal Pulses: 2+ and equal   Abdomen:  · No masses or tenderness, soft but distended  · Liver/Spleen: No Abnormalities Noted  Neurological/Psychiatric:  · Alert and oriented in all spheres  · Moves all extremities well  · Exhibits normal gait balance and coordination  · No abnormalities of mood, affect, memory, mentation, or behavior are noted    Lab Data:  Most recent lab results below reviewed in office    CBC:   Lab Results   Component Value Date    WBC 11.3 09/30/2020    WBC 10.2 05/28/2020    WBC 12.9 05/12/2020    RBC 4.25 09/30/2020    RBC 4.68 05/28/2020    RBC 4.57 05/12/2020    HGB 12.2 09/30/2020    HGB 13.5 05/28/2020    HGB 13.3 05/12/2020    HCT 35.4 09/30/2020    HCT 39.4 05/28/2020    HCT 38.2 05/12/2020    MCV 83.5 09/30/2020    MCV 84.3 05/28/2020    MCV 83.6 05/12/2020    RDW 14.3 09/30/2020    RDW 13.4 05/28/2020    RDW 13.5 05/12/2020     09/30/2020     05/28/2020     05/12/2020     BMP:  Lab Results   Component Value Date     09/30/2020     05/28/2020     05/12/2020    K 3.7 09/30/2020    K 4.5 05/28/2020    K 4.1 05/12/2020    K 4.1 05/11/2020    K 3.7 05/08/2020    K 4.5 03/25/2019  09/30/2020     05/28/2020     05/12/2020    CO2 22 09/30/2020    CO2 22 05/28/2020    CO2 23 05/12/2020    PHOS 3.4 05/11/2020    PHOS 3.3 05/10/2020    PHOS 4.2 01/22/2013    BUN 17 09/30/2020    BUN 18 05/28/2020    BUN 13 05/12/2020    CREATININE 0.8 09/30/2020    CREATININE 0.7 05/28/2020    CREATININE <0.5 05/12/2020     BNP:   Lab Results   Component Value Date    PROBNP 42 05/28/2020    PROBNP 42 05/08/2020    PROBNP 15 04/29/2020     LIPID:   Lab Results   Component Value Date    TRIG 123 05/12/2020    TRIG 223 09/04/2014    HDL 37 05/12/2020    HDL 44 09/04/2014    LDLCALC 76 05/12/2020    LDLCALC 112 09/04/2014       Cardiac Imaging:  CARDIAC CATH / PCI 5/12/20:   FINDINGS      LVGRAM   LVEDP  7   GRADIENT ACROSS AORTIC VALVE  none   LV FUNCTION EF 60 %   WALL MOTION  normal   MITRAL REGURGITATION  mild      CORONARY ARTERIES   LM  Short vessel, less than 10% proximalmiddistal stenosis         LAD Small to medium caliber vessel, there is proximalmid 90% stenosis. Distal vessel is tortuous with a 50% stenosis. LCX  Large vessel, dominant, less than 10% proximalmiddistal stenosis. RCA Nondominant, small, proximal spasm is noted which improved with IC vasodilators as well as withdrawal of catheter, there is less than 10% proximalmiddistal stenosis. PERCUTANEOUS INTERVENTION DESCRIPTION   Heparin was used for anticoagulation, a 6 [place] MLA 3.5 guiding catheter was used to intubate the left-sided coronary vessels, this selectively cannulated the left circumflex artery and was able to be pulled back for diagnostic angiography. Then a choice floppy wire was advanced into the circumflex for anchoring and with this the guide catheter was withdrawn and then a [employer] wire was used to cross into the LAD once the guide was withdrawn from the left main.   Wire was used to jump the gap between the guide catheter in the left main and then was advanced successfully into the LAD across the lesion. Lesion in the LAD was then dilated with a 2.5 mm balloon and then the lesion was then stented with a Abbott Xience Ирина 2.5 x 28 mm drug-eluting stent. After ballooning, a LAD dissection was noted which was not unexpected from balloon dilation and this resolved once stenting was accomplished. Stent was postdilated with a 2.5 mm noncompliant balloon. There was 0% residual stenosis. There was NATI 3 flow before and after PCI. Distal LAD and the tortuous segment was felt to be best treated medically   CONCLUSIONS:   Successful PCI of the LAD with single drug-eluting stent    Stress Test 5/9/2020:  Summary  Normal LVEF >60%  Anteroseptal/apical hypokinesis  Mixed anteroseptal/apical defect consistent with mixed ischemia/scar of this  territory    Overall, this would be considered an abnormal, high risk, study       Echo 5/8/2020:   Summary   Normal left ventricle systolic function with an estimated ejection fraction of 55%. No regional wall motion abnormalities are seen. Grade I diastolic dysfunction with normal filing pressure. Mild (eccentric) mitral regurgitation. Right ventricular hypertrophy. Trivial pericardial effusion with pericardial fat pad localized anteriorly. Carotid dopplers 2/17/16  CONCLUSIONS    No significant obstructive lesions noted in the extracranial carotid system, bilaterally. Vertebral arteries are patent demonstrating antegrade flow, bilaterally     Carotid dopplers 2014  Opinion: Normal study. No significant atherosclerotic disease noted in the neck. No significant common carotid artery or internal carotid artery stenosis suspected in the neck. Echocardiogram 2014  Summary   Overall left ventricular function is normal.   Ejection fraction is visually estimated to be 55-60 %. Diastolic filling parameters suggests grade I diastolic dysfunction . No obvious cardiac source of emboli noted.    There is a small pericardial effusion noted.    Stress echocardiogram 2012  Resting echocardiogram shows normal left ventricular systolic function with an estimated ejection fraction of 60%. There were no wall motion abnormalities. Following exercise the left ventricle became uniformly hyperkinetic. There were no wall motion abnormalities. There was appropriate increase in the ejection fraction. ECHOCARDIOGRAPHIC CONCLUSION-  Normal exercise echocardiogram negative for ischemia. Assessment:    1. Coronary artery disease involving native coronary artery of native heart without angina pectoris    2. Essential hypertension    3. Mixed hyperlipidemia    4. Type 2 diabetes mellitus without complication, with long-term current use of insulin ([address]          Plan:   1. Start clopidogrel (Plavix) 75 mg once daily instead of the Brilinta  2. Continue the aspirin 81 mg daily, metoprolol 25 mg half tablet twice daily and rosuvastatin (Crestor) 10 mg daily  3. Continue the lisinopril as well  4. Will call and get blood test results from [facility]   5. Echocardiogram to look at heart function, heart pressures, valves  6.  Follow up with me in 3 months      I appreciate the opportunity of cooperating in the care of this individual.    DEV Chambers - CAROL, 5/20/2021, 10:21 AM

## 2021-05-21 ENCOUNTER — TELEPHONE (OUTPATIENT)
Dept: CARDIOLOGY CLINIC | Age: 61
End: 2021-05-21

## 2021-05-21 NOTE — TELEPHONE ENCOUNTER
Called pt and informed her Triglycerides are now normal, That she does NOT need the Gemfibrozil, but to continue the rouvstatin  10 mg daily for cholesterol. Ivonne wanted you to know she has her Echocardiogram scheduled for next Friday.

## 2021-05-28 ENCOUNTER — HOSPITAL ENCOUNTER (OUTPATIENT)
Dept: CARDIOLOGY | Age: 61
Discharge: HOME | End: 2021-05-28
Payer: MEDICARE

## 2021-05-28 DIAGNOSIS — I10 ESSENTIAL HYPERTENSION: ICD-10-CM

## 2021-05-28 DIAGNOSIS — I25.10 CORONARY ARTERY DISEASE INVOLVING NATIVE CORONARY ARTERY OF NATIVE HEART WITHOUT ANGINA PECTORIS: ICD-10-CM

## 2021-05-28 DIAGNOSIS — E78.2 MIXED HYPERLIPIDEMIA: ICD-10-CM

## 2021-05-28 LAB
LV EF: 55 %
LVEF MODALITY: NORMAL

## 2021-05-28 PROCEDURE — 93306 TTE W/DOPPLER COMPLETE: CPT

## 2021-05-28 NOTE — RESULT ENCOUNTER NOTE
Covering for [employer]. Please call patient. Heart function is normal on echo. Mild right ventricle enlargement similar to prior echo. Small fluid around the heart which is not new. Mild to moderate leaking of the mitral valve. No interventions needed at this time. Continue current regimen and keep follow up.

## 2021-06-01 ENCOUNTER — TELEPHONE (OUTPATIENT)
Dept: CARDIOLOGY CLINIC | Age: 61
End: 2021-06-01

## 2021-06-01 NOTE — TELEPHONE ENCOUNTER
----- Message from DEV Vieira CNP sent at 5/28/2021  4:53 PM EDT -----  Covering for [employer]. Please call patient. Heart function is normal on echo. Mild right ventricle enlargement similar to prior echo. Small fluid around the heart which is not new. Mild to moderate leaking of the mitral valve. No interventions needed at this time. Continue current regimen and keep follow up.

## 2021-06-07 ENCOUNTER — TELEPHONE (OUTPATIENT)
Dept: CARDIOLOGY CLINIC | Age: 61
End: 2021-06-07

## 2021-06-07 NOTE — TELEPHONE ENCOUNTER
Pt returned call to office. She had a few questions regarding the message left by Cecil Graham on 6/1. Answered questions to the best of my ability. Pt seemed to be pleased with my answers given.

## 2021-07-07 ENCOUNTER — OFFICE VISIT (OUTPATIENT)
Dept: ENT CLINIC | Age: 61
End: 2021-07-07
Payer: MEDICARE

## 2021-07-07 VITALS — HEIGHT: 61 IN | BODY MASS INDEX: 26.62 KG/M2 | WEIGHT: 141 LBS | TEMPERATURE: 97.4 F

## 2021-07-07 DIAGNOSIS — H92.01 OTALGIA, RIGHT: ICD-10-CM

## 2021-07-07 DIAGNOSIS — M26.609 TMJ (TEMPOROMANDIBULAR JOINT DISORDER): ICD-10-CM

## 2021-07-07 DIAGNOSIS — H72.91 PERFORATION OF RIGHT TYMPANIC MEMBRANE: Primary | ICD-10-CM

## 2021-07-07 DIAGNOSIS — E78.2 MIXED HYPERLIPIDEMIA: ICD-10-CM

## 2021-07-07 DIAGNOSIS — H91.91 HEARING LOSS OF RIGHT EAR, UNSPECIFIED HEARING LOSS TYPE: ICD-10-CM

## 2021-07-07 DIAGNOSIS — I10 ESSENTIAL HYPERTENSION: ICD-10-CM

## 2021-07-07 DIAGNOSIS — I25.110 CORONARY ARTERY DISEASE INVOLVING NATIVE CORONARY ARTERY OF NATIVE HEART WITH UNSTABLE ANGINA PECTORIS (HCC): Primary | ICD-10-CM

## 2021-07-07 DIAGNOSIS — R06.02 SOB (SHORTNESS OF BREATH): ICD-10-CM

## 2021-07-07 PROCEDURE — 99214 OFFICE O/P EST MOD 30 MIN: CPT | Performed by: OTOLARYNGOLOGY

## 2021-07-07 PROCEDURE — 1036F TOBACCO NON-USER: CPT | Performed by: OTOLARYNGOLOGY

## 2021-07-07 PROCEDURE — G8419 CALC BMI OUT NRM PARAM NOF/U: HCPCS | Performed by: OTOLARYNGOLOGY

## 2021-07-07 PROCEDURE — G8427 DOCREV CUR MEDS BY ELIG CLIN: HCPCS | Performed by: OTOLARYNGOLOGY

## 2021-07-07 PROCEDURE — 3017F COLORECTAL CA SCREEN DOC REV: CPT | Performed by: OTOLARYNGOLOGY

## 2021-07-07 RX ORDER — NITROGLYCERIN 0.4 MG/1
TABLET SUBLINGUAL
Qty: 25 TABLET | Refills: 3 | Status: SHIPPED | OUTPATIENT
Start: 2021-07-07

## 2021-07-07 ASSESSMENT — ENCOUNTER SYMPTOMS
COUGH: 0
SINUS PRESSURE: 0
SORE THROAT: 0
TROUBLE SWALLOWING: 0
APNEA: 0
VOICE CHANGE: 0
FACIAL SWELLING: 0
SHORTNESS OF BREATH: 0
EYE ITCHING: 0

## 2021-07-07 NOTE — TELEPHONE ENCOUNTER
Pt stopped into office, since Sunday, she has been having sharp pains in her chest, in the middle of her chest, sometimes radiates out towards her breast some. On Sunday the episodes sometimes last 30 min, yesterday and today lasted around 5 min. Pt rubs the painful areas to try to help it. Feels like its coming from the inside. Did have some SOB when the episodes were occurring. Hasn't taken any medications to help with the pain. Pt stated that when she is having the incident she is having some radial pain in her left arm from elbow to wrist.     Pt also noted that she has been having some leg cramps since starting the plavix. Did speak with Nneka RN about symptoms, did say that since she isn't actively having symptoms, wouldn't necessarily go to the ER, but did suggest if she starts having symptoms to go to ER. Pt VU. Pt wanted to moved 8/24 appt with [employer] appt sooner, but no availability. Wanted to make sure she was supposed to be on plavix and aspirin (her bottle of plavix says that she shouldn't be on both)    Please advise.

## 2021-07-07 NOTE — PROGRESS NOTES
[address], [address], [address] [place] [address]  P: [phone]       Patient     Shaneka Berg  1960    ChiefComplaint     Chief Complaint   Patient presents with    Follow-up     Right tm perforation. She states her symptoms have improved, but not resolved. She complains of hearing loss. History of Present Illness     Emelina Cuevas 11year-old female here today for follow-up regarding right spinning membrane perforation and hearing loss. Since last seen by me she reports improvement in her hearing and believes perforation has resolved. She does have ongoing intermittent ear pain that is worsened with certain jaw motions. She is able to cause significant clicking and grinding of the joint which she does intentionally. Pain is reproduced with this motion.     Past Medical History     Past Medical History:   Diagnosis Date    CAD (coronary artery disease)     1 stent    Cerebral artery occlusion with cerebral infarction [facility]) 2013    Choking sensation     Diabetes mellitus [address] [address]     DM2 (diabetes mellitus, type 2) ([facility] [address]     Hyperlipidemia     Prolonged emergence from general anesthesia     Sarcoid     lungs       Past Surgical History     Past Surgical History:   Procedure Laterality Date    CARDIAC SURGERY  2020    stent     SECTION      x3    CHOLECYSTECTOMY      COLONOSCOPY      COLONOSCOPY  13    normal    COLONOSCOPY  10/19/2018    1 polyp    COLONOSCOPY N/A 10/19/2018    COLONOSCOPY POLYPECTOMY SNARE/COLD BIOPSY performed by Guerda Carrera MD at [facility] Right 2020    RIGHT LONG TRIGGER FINGER RELEASE performed by Nesha Davila MD at [facility] HYSTEROSCOPY  2013    Laprascopic supracervical hysterectomy       Family History     Family History   Problem Relation Age of Onset    Diabetes Mother     Heart Disease Mother     Cancer Father         colon       Social History     Social History     Tobacco Use    Smoking status: Never Smoker    Smokeless tobacco: Never Used   Vaping Use    Vaping Use: Never used   Substance Use Topics    Alcohol use: No    Drug use: No        Allergies     Allergies   Allergen Reactions    Antivert [Meclizine]     Codeine Nausea And Vomiting    Phenergan [Promethazine Hcl] Nausea And Vomiting    Influenza Vaccines      Arm hot , red pain, sick    Pneumococcal Vaccines      Arm hot pain, sick    Vicodin [Hydrocodone-Acetaminophen]      itching    Hydrocodone-Acetaminophen Nausea And Vomiting    Oxycodone Nausea And Vomiting     hallucianitions    Promethazine Nausea And Vomiting       Medications     Current Outpatient Medications   Medication Sig Dispense Refill    clopidogrel (PLAVIX) 75 MG tablet Take 1 tablet by mouth daily 90 tablet 3    ibuprofen (ADVIL;MOTRIN) 600 MG tablet Take 1 tablet by mouth every 8 hours as needed for Pain 40 tablet 0    metoprolol tartrate (LOPRESSOR) 25 MG tablet Take 0.5 tablets by mouth 2 times daily 90 tablet 3    TRESIBA FLEXTOUCH 100 UNIT/ML SOPN       pantoprazole (PROTONIX) 40 MG tablet       rosuvastatin (CRESTOR) 10 MG tablet TAKE 1 TABLET BY MOUTH DAILY 90 tablet 3    ASPIRIN LOW DOSE 81 MG chewable tablet TAKE 1 TABLET BY MOUTH DAILY 90 tablet 3    nitroGLYCERIN (NITROSTAT) 0.4 MG SL tablet up to max of 3 total doses. If no relief after 1 dose, call 911. 25 tablet 3    gemfibrozil (LOPID) 600 MG tablet Take 600 mg by mouth 2 times daily       famotidine (PEPCID) 20 MG tablet Take 1 tablet by mouth daily. 60 tablet 3    Cholecalciferol (VITAMIN D) 2000 UNITS CAPS capsule Take 2,000 Units by mouth daily.  lisinopril (PRINIVIL;ZESTRIL) 2.5 MG tablet Take 2.5 mg by mouth daily.  metformin (GLUCOPHAGE) 500 MG tablet Take 500 mg by mouth 2 times daily (with meals)        No current facility-administered medications for this visit.        Review of Systems     Review of Systems Constitutional: Negative for appetite change, chills, fatigue, fever and unexpected weight change. HENT: Positive for ear pain. Negative for congestion, ear discharge, facial swelling, hearing loss, nosebleeds, postnasal drip, sinus pressure, sneezing, sore throat, tinnitus, trouble swallowing and voice change. Eyes: Negative for itching. Respiratory: Negative for apnea, cough and shortness of breath. Gastrointestinal:        Negative for dysphasia   Endocrine: Negative for cold intolerance and heat intolerance. Musculoskeletal: Negative for myalgias and neck pain. Skin: Negative for rash. Allergic/Immunologic: Negative for environmental allergies. Neurological: Negative for dizziness and headaches. Psychiatric/Behavioral: Negative for confusion, decreased concentration and sleep disturbance. PhysicalExam     Vitals:    07/07/21 1014   Temp: 97.4 °F (36.3 °C)   Weight: 141 lb (64 kg)   Height: 5' 0.5\" (1.537 m)       Physical Exam  Constitutional:       General: She is not in acute distress. Appearance: She is well-developed. HENT:      Head: Normocephalic and atraumatic. Jaw: Tenderness and pain on movement present. Right Ear: Tympanic membrane, ear canal and external ear normal. No drainage. No middle ear effusion. Tympanic membrane is not bulging. Tympanic membrane has normal mobility. Left Ear: Tympanic membrane, ear canal and external ear normal. No drainage. No middle ear effusion. Tympanic membrane is not bulging. Tympanic membrane has normal mobility. Ears:        Nose: No septal deviation, mucosal edema or rhinorrhea. Mouth/Throat:      Lips: Pink. Mouth: Mucous membranes are moist.      Tongue: No lesions. Palate: No mass. Pharynx: Uvula midline. Eyes:      Pupils: Pupils are equal, round, and reactive to light. Neck:      Thyroid: No thyroid mass or thyromegaly.       Trachea: Trachea and phonation normal.   Cardiovascular: Pulses: Normal pulses. Pulmonary:      Effort: Pulmonary effort is normal. No accessory muscle usage or respiratory distress. Breath sounds: No stridor. Musculoskeletal:      Cervical back: Full passive range of motion without pain. Lymphadenopathy:      Head:      Right side of head: No submental or submandibular adenopathy. Left side of head: No submental or submandibular adenopathy. Cervical: No cervical adenopathy. Right cervical: No superficial, deep or posterior cervical adenopathy. Left cervical: No superficial, deep or posterior cervical adenopathy. Skin:     General: Skin is warm and dry. Neurological:      Mental Status: She is alert and oriented to person, place, and time. Cranial Nerves: No cranial nerve deficit. Coordination: Coordination normal.      Gait: Gait normal.   Psychiatric:         Thought Content: Thought content normal.         Assessment and Plan     1. Perforation of right tympanic membrane  -resolved  -monomeric membrane at site of previous perforation- discussed that this may be miss diagnosed as persistent perforation in the future     2. Hearing loss of right ear, unspecified hearing loss type  -resovled    3. Otalgia, right  -reproduced with movement of jaw joint    4. TMJ (temporomandibular joint disorder)  -discussed soft diet, warm compresses and NSAID (if able to take)  -recommend obtaining bite guard to prevent clenching       Return as needed    Mic Patton DO  7/7/21      Portions of this note were dictated using Dragon.  There may be linguistic errors secondary to the use of this program.

## 2021-07-07 NOTE — TELEPHONE ENCOUNTER
Yes, she should be on both aspirin and Plavix (just like she took both aspirin and Brilinta). She should go to the ED if she has recurrent pain. Make sure she has a current prescription for sublingual nitroglycerin. Let's do a Lexiscan nuclear stress test ASAP. I have openings on 7/30/2021.    Ezra Keller, DEV - CNP

## 2021-07-20 ENCOUNTER — APPOINTMENT (OUTPATIENT)
Dept: GENERAL RADIOLOGY | Age: 61
End: 2021-07-20
Payer: MEDICARE

## 2021-07-20 ENCOUNTER — HOSPITAL ENCOUNTER (EMERGENCY)
Age: 61
Discharge: HOME | End: 2021-07-21
Attending: EMERGENCY MEDICINE
Payer: MEDICARE

## 2021-07-20 DIAGNOSIS — R07.9 CHEST PAIN, UNSPECIFIED TYPE: Primary | ICD-10-CM

## 2021-07-20 LAB
A/G RATIO: 1.5 (ref 1.1–2.2)
ALBUMIN SERPL-MCNC: 4.6 G/DL (ref 3.4–5)
ALP BLD-CCNC: 99 U/L (ref 40–129)
ALT SERPL-CCNC: 13 U/L (ref 10–40)
ANION GAP SERPL CALCULATED.3IONS-SCNC: 15 MMOL/L (ref 3–16)
AST SERPL-CCNC: 17 U/L (ref 15–37)
BASOPHILS ABSOLUTE: 0.1 K/UL (ref 0–0.2)
BASOPHILS RELATIVE PERCENT: 0.5 %
BILIRUB SERPL-MCNC: 0.4 MG/DL (ref 0–1)
BUN BLDV-MCNC: 14 MG/DL (ref 7–20)
CALCIUM SERPL-MCNC: 9.8 MG/DL (ref 8.3–10.6)
CHLORIDE BLD-SCNC: 103 MMOL/L (ref 99–110)
CO2: 21 MMOL/L (ref 21–32)
CREAT SERPL-MCNC: 0.9 MG/DL (ref 0.6–1.2)
EOSINOPHILS ABSOLUTE: 0.4 K/UL (ref 0–0.6)
EOSINOPHILS RELATIVE PERCENT: 3.1 %
GFR AFRICAN AMERICAN: >60
GFR NON-AFRICAN AMERICAN: >60
GLOBULIN: 3 G/DL
GLUCOSE BLD-MCNC: 178 MG/DL (ref 70–99)
HCT VFR BLD CALC: 40.6 % (ref 36–48)
HEMOGLOBIN: 14.1 G/DL (ref 12–16)
LYMPHOCYTES ABSOLUTE: 3.5 K/UL (ref 1–5.1)
LYMPHOCYTES RELATIVE PERCENT: 28.5 %
MCH RBC QN AUTO: 27.8 PG (ref 26–34)
MCHC RBC AUTO-ENTMCNC: 34.7 G/DL (ref 31–36)
MCV RBC AUTO: 80.1 FL (ref 80–100)
MONOCYTES ABSOLUTE: 0.8 K/UL (ref 0–1.3)
MONOCYTES RELATIVE PERCENT: 6.5 %
NEUTROPHILS ABSOLUTE: 7.5 K/UL (ref 1.7–7.7)
NEUTROPHILS RELATIVE PERCENT: 61.4 %
PDW BLD-RTO: 15 % (ref 12.4–15.4)
PLATELET # BLD: 294 K/UL (ref 135–450)
PMV BLD AUTO: 6.2 FL (ref 5–10.5)
POTASSIUM SERPL-SCNC: 3.9 MMOL/L (ref 3.5–5.1)
RBC # BLD: 5.07 M/UL (ref 4–5.2)
SODIUM BLD-SCNC: 139 MMOL/L (ref 136–145)
TOTAL PROTEIN: 7.6 G/DL (ref 6.4–8.2)
TROPONIN: <0.01 NG/ML
WBC # BLD: 12.3 K/UL (ref 4–11)

## 2021-07-20 PROCEDURE — 99283 EMERGENCY DEPT VISIT LOW MDM: CPT

## 2021-07-20 PROCEDURE — 93005 ELECTROCARDIOGRAM TRACING: CPT | Performed by: EMERGENCY MEDICINE

## 2021-07-20 PROCEDURE — 84484 ASSAY OF TROPONIN QUANT: CPT

## 2021-07-20 PROCEDURE — 80053 COMPREHEN METABOLIC PANEL: CPT

## 2021-07-20 PROCEDURE — 71046 X-RAY EXAM CHEST 2 VIEWS: CPT

## 2021-07-20 PROCEDURE — 85025 COMPLETE CBC W/AUTO DIFF WBC: CPT

## 2021-07-20 ASSESSMENT — PAIN DESCRIPTION - LOCATION: LOCATION: CHEST

## 2021-07-20 ASSESSMENT — PAIN DESCRIPTION - PAIN TYPE: TYPE: ACUTE PAIN

## 2021-07-20 ASSESSMENT — PAIN SCALES - GENERAL: PAINLEVEL_OUTOF10: 5

## 2021-07-21 ENCOUNTER — TELEPHONE (OUTPATIENT)
Dept: CARDIOLOGY CLINIC | Age: 61
End: 2021-07-21

## 2021-07-21 VITALS
HEART RATE: 67 BPM | BODY MASS INDEX: 26.89 KG/M2 | DIASTOLIC BLOOD PRESSURE: 70 MMHG | WEIGHT: 140 LBS | OXYGEN SATURATION: 99 % | TEMPERATURE: 97.8 F | RESPIRATION RATE: 16 BRPM | SYSTOLIC BLOOD PRESSURE: 121 MMHG

## 2021-07-21 LAB
EKG ATRIAL RATE: 72 BPM
EKG DIAGNOSIS: NORMAL
EKG P AXIS: 63 DEGREES
EKG P-R INTERVAL: 146 MS
EKG Q-T INTERVAL: 400 MS
EKG QRS DURATION: 86 MS
EKG QTC CALCULATION (BAZETT): 438 MS
EKG R AXIS: 26 DEGREES
EKG T AXIS: 13 DEGREES
EKG VENTRICULAR RATE: 72 BPM

## 2021-07-21 PROCEDURE — 93010 ELECTROCARDIOGRAM REPORT: CPT | Performed by: INTERNAL MEDICINE

## 2021-07-21 ASSESSMENT — HEART SCORE: ECG: 0

## 2021-07-21 NOTE — ED PROVIDER NOTES
CHIEF COMPLAINT  Chest Pain (started hurting around . pt states left jaw started hurt but does not anymore. now pain down shoulder and back. had stent placed last year)      [facility]  Jonathan Ortiz is a 62 y.o. female with a history of CAD status post stenting; CVA; choking sensation; type 2 diabetes; hyperlipidemia; sarcoidosis; who presents to the ED complaining of waxing and waning chest pain since 8:30 tonight; no shortness of breath; no exertional quality;, no diaphoresis, pain radiates from the chest to the arm and to her jaw she has difficulty characterizing it severity. She has had this pain for some degree over the last few weeks. She is uncertain if her arm pain is musculoskeletal or not; has difficulty quantifying the severity; currently she is pain-free. Moving her arm does affect her pain. She denies any associated symptoms such as diaphoresis; nausea; vomiting; shortness of breath. She reports he has a baseline shortness of breath secondary to sarcoidosis. Symptoms last sometimes very very briefly a few minutes and other times come back and last for about 15 to 20 minutes. She is scheduled for a stress test next week. No cough. No fever. No shortness of breath that is atypical for her. No other complaints, modifying factors or associated symptoms. I have reviewed the following from the nursing documentation.     Past Medical History:   Diagnosis Date    CAD (coronary artery disease)     1 stent    Cerebral artery occlusion with cerebral infarction [facility]) 2013    Choking sensation     Diabetes mellitus [facility]     DM2 (diabetes mellitus, type 2) ([facility]     Hyperlipidemia     Prolonged emergence from general anesthesia     Sarcoid     lungs     Past Surgical History:   Procedure Laterality Date    CARDIAC SURGERY  2020    stent     SECTION      x3    CHOLECYSTECTOMY      COLONOSCOPY      COLONOSCOPY  13    normal    COLONOSCOPY  10/19/2018    1 polyp    COLONOSCOPY N/A 10/19/2018    COLONOSCOPY POLYPECTOMY SNARE/COLD BIOPSY performed by Flores Souza MD at [address] Right 8/31/2020    RIGHT LONG TRIGGER FINGER RELEASE performed by Fletcher Loco MD at [address]      HYSTEROSCOPY  1/25/2013    Laprascopic supracervical hysterectomy     Family History   Problem Relation Age of Onset    Diabetes Mother     Heart Disease Mother     Cancer Father         colon     Social History     Socioeconomic History    Marital status:      Spouse name: Not on file    Number of children: Not on file    Years of education: Not on file    Highest education level: Not on file   Occupational History    Not on file   Tobacco Use    Smoking status: Never Smoker    Smokeless tobacco: Never Used   Vaping Use    Vaping Use: Never used   Substance and Sexual Activity    Alcohol use: No    Drug use: No    Sexual activity: Not on file   Other Topics Concern    Not on file   Social History Narrative    Not on file     Social Determinants of Health     Financial Resource Strain:     Difficulty of Paying Living Expenses:    Food Insecurity:     Worried About Running Out of Food in the Last Year:     [address] in the Last Year:    Transportation Needs:     Lack of Transportation (Medical):      Lack of Transportation (Non-Medical):    Physical Activity:     Days of Exercise per Week:     Minutes of Exercise per Session:    Stress:     Feeling of Stress :    Social Connections:     Frequency of Communication with Friends and Family:     Frequency of Social Gatherings with Friends and Family:     Attends Jain Services:     Active Member of Clubs or Organizations:     Attends Club or Organization Meetings:     Marital Status:    Intimate Partner Violence:     Fear of Current or Ex-Partner:     Emotionally Abused:     Physically Abused:     Sexually Abused:      No current facility-administered medications for this encounter. Current Outpatient Medications   Medication Sig Dispense Refill    metoprolol tartrate (LOPRESSOR) 25 MG tablet TAKE 1/2 TABLET BY MOUTH 2 TIMES DAILY 90 tablet 3    nitroGLYCERIN (NITROSTAT) 0.4 MG SL tablet up to max of 3 total doses. If no relief after 1 dose, call 911. 25 tablet 3    clopidogrel (PLAVIX) 75 MG tablet Take 1 tablet by mouth daily 90 tablet 3    ibuprofen (ADVIL;MOTRIN) 600 MG tablet Take 1 tablet by mouth every 8 hours as needed for Pain 40 tablet 0    TRESIBA FLEXTOUCH 100 UNIT/ML SOPN       pantoprazole (PROTONIX) 40 MG tablet       rosuvastatin (CRESTOR) 10 MG tablet TAKE 1 TABLET BY MOUTH DAILY 90 tablet 3    ASPIRIN LOW DOSE 81 MG chewable tablet TAKE 1 TABLET BY MOUTH DAILY 90 tablet 3    gemfibrozil (LOPID) 600 MG tablet Take 600 mg by mouth 2 times daily       famotidine (PEPCID) 20 MG tablet Take 1 tablet by mouth daily. 60 tablet 3    Cholecalciferol (VITAMIN D) 2000 UNITS CAPS capsule Take 2,000 Units by mouth daily.  lisinopril (PRINIVIL;ZESTRIL) 2.5 MG tablet Take 2.5 mg by mouth daily.  metformin (GLUCOPHAGE) 500 MG tablet Take 500 mg by mouth 2 times daily (with meals)        Allergies   Allergen Reactions    Antivert [Meclizine]     Codeine Nausea And Vomiting    Phenergan [Promethazine Hcl] Nausea And Vomiting    Influenza Vaccines      Arm hot , red pain, sick    Pneumococcal Vaccines      Arm hot pain, sick    Vicodin [Hydrocodone-Acetaminophen]      itching    Hydrocodone-Acetaminophen Nausea And Vomiting    Oxycodone Nausea And Vomiting     hallucianitions    Promethazine Nausea And Vomiting       REVIEW OF SYSTEMS  10 systems reviewed, pertinent positives per HPI otherwise noted to be negative. PHYSICAL EXAM  /70   Pulse 67   Temp 97.8 °F (36.6 °C) (Oral)   Resp 16   Wt 140 lb (63.5 kg)   LMP 12/21/2012   SpO2 99%   BMI 26.89 kg/m²   GENERAL APPEARANCE: Awake and alert. Cooperative. No acute distress. HEAD: Normocephalic. Atraumatic. EYES: PERRL. EOM's grossly intact. ENT: Mucous membranes are moist.   NECK: Supple, trachea midline. HEART: RRR. Normal S1S2, no rubs, gallops, or murmurs noted  LUNGS: Respirations unlabored. CTAB. Good air exchange. No wheezes, rales, or rhonchi. Speaking comfortably in full sentences. ABDOMEN: Soft. Non-distended. Non-tender. No guarding or rebound. Normal bowel sounds. EXTREMITIES: No peripheral edema. MAEE. No acute deformities. SKIN: Warm and dry. No acute rashes. NEUROLOGICAL: Alert and oriented X 3. CN II-XII intact. No gross facial drooping. Strength 5/5, sensation intact. Normal coordination. No pronator drift. No truncal instability;Gait normal.   PSYCHIATRIC: Normal mood and affect. LABS  I have reviewed all labs for this visit.    Results for orders placed or performed during the hospital encounter of 07/20/21   CBC Auto Differential   Result Value Ref Range    WBC 12.3 (H) 4.0 - 11.0 K/uL    RBC 5.07 4.00 - 5.20 M/uL    Hemoglobin 14.1 12.0 - 16.0 g/dL    Hematocrit 40.6 36.0 - 48.0 %    MCV 80.1 80.0 - 100.0 fL    MCH 27.8 26.0 - 34.0 pg    MCHC 34.7 31.0 - 36.0 g/dL    RDW 15.0 12.4 - 15.4 %    Platelets 012 261 - 985 K/uL    MPV 6.2 5.0 - 10.5 fL    Neutrophils % 61.4 %    Lymphocytes % 28.5 %    Monocytes % 6.5 %    Eosinophils % 3.1 %    Basophils % 0.5 %    Neutrophils Absolute 7.5 1.7 - 7.7 K/uL    Lymphocytes Absolute 3.5 1.0 - 5.1 K/uL    Monocytes Absolute 0.8 0.0 - 1.3 K/uL    Eosinophils Absolute 0.4 0.0 - 0.6 K/uL    Basophils Absolute 0.1 0.0 - 0.2 K/uL   Comprehensive metabolic panel   Result Value Ref Range    Sodium 139 136 - 145 mmol/L    Potassium 3.9 3.5 - 5.1 mmol/L    Chloride 103 99 - 110 mmol/L    CO2 21 21 - 32 mmol/L    Anion Gap 15 3 - 16    Glucose 178 (H) 70 - 99 mg/dL    BUN 14 7 - 20 mg/dL    CREATININE 0.9 0.6 - 1.2 mg/dL    GFR Non-African American >60 >60    GFR  >60 >60 Calcium 9.8 8.3 - 10.6 mg/dL    Total Protein 7.6 6.4 - 8.2 g/dL    Albumin 4.6 3.4 - 5.0 g/dL    Albumin/Globulin Ratio 1.5 1.1 - 2.2    Total Bilirubin 0.4 0.0 - 1.0 mg/dL    Alkaline Phosphatase 99 40 - 129 U/L    ALT 13 10 - 40 U/L    AST 17 15 - 37 U/L    Globulin 3.0 g/dL   Troponin   Result Value Ref Range    Troponin <0.01 <0.01 ng/mL   EKG 12 Lead   Result Value Ref Range    Ventricular Rate 72 BPM    Atrial Rate 72 BPM    P-R Interval 146 ms    QRS Duration 86 ms    Q-T Interval 400 ms    QTc Calculation (Bazett) 438 ms    P Axis 63 degrees    R Axis 26 degrees    T Axis 13 degrees    Diagnosis       Normal sinus rhythmNormal ECGConfirmed by DANYEL Rivera MD ([id number]) on 7/21/2021 5:02:08 PM       EKG  The Ekg interpreted by myself  normal sinus rhythm with a rate of 72  Axis is   Normal  QTc is  438  Intervals and Durations are unremarkable. No specific ST-T wave changes appreciated. No evidence of acute ischemia. Significant change from prior EKG dated May 12, 2020: Flipped T waves anterolaterally have resolved    Cardiac Monitoring: Sinus rhythm without ectopy      HEART SCORE:    Heart Score      Heart score: 3 This falls under the following category: Score of 0-3, which indicates a very low risk for major adverse cardiac event and supports early discharge     RADIOLOGY  X-RAYS:  I have reviewed radiologic plain film image(s). ALL OTHER NON-PLAIN FILM IMAGES SUCH AS CT, ULTRASOUND AND MRI HAVE BEEN READ BY THE RADIOLOGIST. XR CHEST (2 VW)   Final Result   No acute disease. Rechecks: Physical assessment performed. Patient is pain-free in the emergency department. ED COURSE/MDM  Patient seen and evaluated. Old records reviewed. Labs and imaging reviewed and results discussed with patient. Patient was given education in the ED with good symptomatic relief.   Limits of the evaluation were explained to her and she is more concerned about getting home with her  ( who is

## 2021-07-21 NOTE — TELEPHONE ENCOUNTER
Patient went to the ED (and) last night. ROLDAN told her that if her CP returned she should go to the ED. She states she was treated very rude by the ED physician, not RN or other staff, states she was treated well by everyone else. She states the physician asked her what she wanted to do, stay or go home. She said she was feeling better and will just go home. She states the physician became irritated and  stated  there were EKG changes. She said the paperwork she was dc'd with said she left refusing to be admitted, she is very upset. She said she was absolutely given the choice. She feels better now and sees ROLDAN in office next week, she just wanted her experience documented. She knows ROLDAN is OOT this week.

## 2021-07-21 NOTE — ED NOTES
Pt ok to d/c to home. Pt given d/c instructions. Pt verbalized understating including Rx and follow up care. Pt ambulated to [facility] for ride home.  0 s/s of distress at time of d/c.          Sariah Matthews RN  07/21/21 0198

## 2021-07-28 ENCOUNTER — HOSPITAL ENCOUNTER (OUTPATIENT)
Dept: CARDIOLOGY | Age: 61
Discharge: HOME | End: 2021-07-28
Payer: MEDICARE

## 2021-07-28 LAB
LV EF: 60 %
LVEF MODALITY: NORMAL

## 2021-07-28 PROCEDURE — 93017 CV STRESS TEST TRACING ONLY: CPT

## 2021-07-28 PROCEDURE — 6360000002 HC RX W HCPCS: Performed by: NURSE PRACTITIONER

## 2021-07-28 PROCEDURE — A9502 TC99M TETROFOSMIN: HCPCS | Performed by: NURSE PRACTITIONER

## 2021-07-28 PROCEDURE — 78452 HT MUSCLE IMAGE SPECT MULT: CPT

## 2021-07-28 PROCEDURE — 3430000000 HC RX DIAGNOSTIC RADIOPHARMACEUTICAL: Performed by: NURSE PRACTITIONER

## 2021-07-28 RX ADMIN — TETROFOSMIN 34 MILLICURIE: 1.38 INJECTION, POWDER, LYOPHILIZED, FOR SOLUTION INTRAVENOUS at 09:50

## 2021-07-28 RX ADMIN — TETROFOSMIN 11 MILLICURIE: 1.38 INJECTION, POWDER, LYOPHILIZED, FOR SOLUTION INTRAVENOUS at 08:40

## 2021-07-28 RX ADMIN — REGADENOSON 0.4 MG: 0.08 INJECTION, SOLUTION INTRAVENOUS at 09:50

## 2021-07-29 ENCOUNTER — TELEPHONE (OUTPATIENT)
Dept: CARDIOLOGY CLINIC | Age: 61
End: 2021-07-29

## 2021-07-29 NOTE — TELEPHONE ENCOUNTER
----- Message from DEV Antony CNP sent at 7/28/2021  3:53 PM EDT -----  Stress test is normal.  Keep appointment with me on Friday and will discuss further.    Thank you, Sridevi Martin

## 2021-07-30 ENCOUNTER — OFFICE VISIT (OUTPATIENT)
Dept: CARDIOLOGY CLINIC | Age: 61
End: 2021-07-30
Payer: MEDICARE

## 2021-07-30 VITALS
SYSTOLIC BLOOD PRESSURE: 96 MMHG | HEART RATE: 72 BPM | HEIGHT: 61 IN | DIASTOLIC BLOOD PRESSURE: 52 MMHG | BODY MASS INDEX: 26.43 KG/M2 | WEIGHT: 140 LBS | OXYGEN SATURATION: 98 %

## 2021-07-30 DIAGNOSIS — J84.9 INTERSTITIAL LUNG DISEASE (HCC): ICD-10-CM

## 2021-07-30 DIAGNOSIS — Z79.4 TYPE 2 DIABETES MELLITUS WITHOUT COMPLICATION, WITH LONG-TERM CURRENT USE OF INSULIN (HCC): ICD-10-CM

## 2021-07-30 DIAGNOSIS — D86.9 SARCOIDOSIS: ICD-10-CM

## 2021-07-30 DIAGNOSIS — I25.110 CORONARY ARTERY DISEASE INVOLVING NATIVE CORONARY ARTERY OF NATIVE HEART WITH UNSTABLE ANGINA PECTORIS (HCC): Primary | ICD-10-CM

## 2021-07-30 DIAGNOSIS — I10 ESSENTIAL HYPERTENSION: ICD-10-CM

## 2021-07-30 DIAGNOSIS — E11.9 TYPE 2 DIABETES MELLITUS WITHOUT COMPLICATION, WITH LONG-TERM CURRENT USE OF INSULIN (HCC): ICD-10-CM

## 2021-07-30 DIAGNOSIS — E78.2 MIXED HYPERLIPIDEMIA: ICD-10-CM

## 2021-07-30 PROCEDURE — 99214 OFFICE O/P EST MOD 30 MIN: CPT | Performed by: NURSE PRACTITIONER

## 2021-07-30 PROCEDURE — 3046F HEMOGLOBIN A1C LEVEL >9.0%: CPT | Performed by: NURSE PRACTITIONER

## 2021-07-30 PROCEDURE — 3017F COLORECTAL CA SCREEN DOC REV: CPT | Performed by: NURSE PRACTITIONER

## 2021-07-30 PROCEDURE — G8427 DOCREV CUR MEDS BY ELIG CLIN: HCPCS | Performed by: NURSE PRACTITIONER

## 2021-07-30 PROCEDURE — 2022F DILAT RTA XM EVC RTNOPTHY: CPT | Performed by: NURSE PRACTITIONER

## 2021-07-30 PROCEDURE — 1036F TOBACCO NON-USER: CPT | Performed by: NURSE PRACTITIONER

## 2021-07-30 PROCEDURE — G8419 CALC BMI OUT NRM PARAM NOF/U: HCPCS | Performed by: NURSE PRACTITIONER

## 2021-07-30 RX ORDER — CLOPIDOGREL BISULFATE 75 MG/1
75 TABLET ORAL DAILY
Qty: 90 TABLET | Refills: 3 | Status: SHIPPED | OUTPATIENT
Start: 2021-07-30 | End: 2022-05-27

## 2021-07-30 RX ORDER — ASPIRIN 81 MG/1
81 TABLET, CHEWABLE ORAL DAILY
Qty: 90 TABLET | Refills: 3 | Status: SHIPPED | OUTPATIENT
Start: 2021-07-30 | End: 2022-01-20 | Stop reason: ALTCHOICE

## 2021-07-30 RX ORDER — ROSUVASTATIN CALCIUM 10 MG/1
10 TABLET, COATED ORAL DAILY
Qty: 90 TABLET | Refills: 3 | Status: SHIPPED | OUTPATIENT
Start: 2021-07-30 | End: 2022-08-30

## 2021-07-30 NOTE — PROGRESS NOTES
[address]   Cardiac Evaluation    Primary Care Doctor:  Ivette Gerber    Chief Complaint   Patient presents with    3 Month Follow-Up    Chest Pain     dull ache going down left arm and up neck    Edema        History of Present Illness:   I had the pleasure of seeing Kevin Zimmerman in follow up for CAD s/p PCI to LAD in May 2020, HTN, HLD as well as DM, ILD and questionable pulmonary sarcoid. In May she reported symptoms of shortness of breath, fatigue and edema. She had been out of Brilinta and then was started on Plavix due to cost and once daily dosing. We received her blood work from her PCP, cholesterol panel was stable. We also checked an echocardiogram which showed right ventricular enlargement and mild to moderate MR, otherwise stable. On July 7, 2020 she presented to the office reporting episodes of chest pain as well as left forearm pain that was not having this currently. She was referred to the ED if she had recurrent chest pain and a Lexiscan nuclear stress test was ordered. On 7/20/2020 she went to the ED with chest pain. ECG and troponin were negative for ischemia. Blood work and chest x-ray also reviewed and stable. She was discharged home. The Lexiscan nuclear stress test was completed this week showing no evidence of inducible ischemia and normal LV function. She had a sharp chest pain on Monday sitting at table. This resolved with taking a deep breath and leaning forward. She has been under increased stress.  has been ill. Her weight is down 4 lbs since May. She has changed her diet with husbands illness (pancreatitis). Her diabetes is better controlled. A1c went from > 11 to ~ 7. Kevin Zimmerman describes symptoms including chest pain, dyspnea, fatigue, edema but denies palpitations, orthopnea, PND, early saiety, syncope.        NYHA:   II  ACC/ AHA Stage:    c    Past Medical History:   has a past medical history of CAD (coronary artery disease), Cerebral artery occlusion with cerebral infarction ([facility] Choking sensation, Diabetes mellitus ([facility] DM2 (diabetes mellitus, type 2) ([facility] Hyperlipidemia, Prolonged emergence from general anesthesia, and Sarcoid. Surgical History:   has a past surgical history that includes Cholecystectomy;  section; hysteroscopy (2013); Hysterectomy; Colonoscopy; Colonoscopy (13); Colonoscopy (10/19/2018); Colonoscopy (N/A, 10/19/2018); Cardiac surgery (2020); and Finger trigger release (Right, 2020). Social History:   reports that she has never smoked. She has never used smokeless tobacco. She reports that she does not drink alcohol and does not use drugs. Family History:   Family History   Problem Relation Age of Onset    Diabetes Mother     Heart Disease Mother     Cancer Father         colon       Home Medications:  Prior to Admission medications    Medication Sig Start Date End Date Taking? Authorizing Provider   metoprolol tartrate (LOPRESSOR) 25 MG tablet TAKE 1/2 TABLET BY MOUTH 2 TIMES DAILY 21  Yes George Ortiz APRN - CNP   nitroGLYCERIN (NITROSTAT) 0.4 MG SL tablet up to max of 3 total doses. If no relief after 1 dose, call 911. 21  Yes DEV Machado CNP   clopidogrel (PLAVIX) 75 MG tablet Take 1 tablet by mouth daily 21  Yes George Ortiz APRN - CNP   ibuprofen (ADVIL;MOTRIN) 600 MG tablet Take 1 tablet by mouth every 8 hours as needed for Pain 21  Yes Rosalva Joaquin MD   TRESIBA FLEXTOUCH 100 UNIT/ML SOPN  20  Yes Historical Provider, MD   pantoprazole (PROTONIX) 40 MG tablet  20  Yes Historical Provider, MD   rosuvastatin (CRESTOR) 10 MG tablet TAKE 1 TABLET BY MOUTH DAILY 20  Yes Conner Clarke MD   ASPIRIN LOW DOSE 81 MG chewable tablet TAKE 1 TABLET BY MOUTH DAILY 20  Yes Conner Clarke MD   famotidine (PEPCID) 20 MG tablet Take 1 tablet by mouth daily.  14  Yes Jl Avila MD Cholecalciferol (VITAMIN D) 2000 UNITS CAPS capsule Take 2,000 Units by mouth daily. Yes Historical Provider, MD   lisinopril (PRINIVIL;ZESTRIL) 2.5 MG tablet Take 2.5 mg by mouth daily. Yes Historical Provider, MD   metformin (GLUCOPHAGE) 500 MG tablet Take 500 mg by mouth 2 times daily (with meals)    Yes Historical Provider, MD   gemfibrozil (LOPID) 600 MG tablet Take 600 mg by mouth 2 times daily   Patient not taking: Reported on 7/30/2021    Historical Provider, MD        Allergies:  Antivert [meclizine], Codeine, Phenergan [promethazine hcl], Influenza vaccines, Pneumococcal vaccines, Vicodin [hydrocodone-acetaminophen], Hydrocodone-acetaminophen, Oxycodone, and Promethazine     Physical Examination:    Vitals:    07/30/21 1040 07/30/21 1047   BP: (!) 96/52 (!) 96/52   Pulse: 72 72   SpO2: 98% 98%   Weight: 140 lb (63.5 kg) 140 lb (63.5 kg)   Height: 5' 1\" (1.549 m) 5' 1\" (1.549 m)      Constitutional and General Appearance: Warm and dry, no apparent distress, normal coloration  HEENT:  Normocephalic, atraumatic  Respiratory:  · Normal excursion and expansion without use of accessory muscles  · Resp Auscultation: Clear to auscultation   Cardiovascular:  · The apical impulses not displaced  · Heart tones are crisp and normal  · JVP 8 cm H2O  · Regular rate and rhythm, normal S1s2, no m/g/r  · Peripheral pulses are symmetrical and full  · There is no clubbing, cyanosis of the extremities.   · Trace BLE ankle edema  · Pedal Pulses: 2+ and equal   Abdomen:  · No masses or tenderness  · Liver/Spleen: No Abnormalities Noted  Neurological/Psychiatric:  · Alert and oriented in all spheres  · Moves all extremities well  · Exhibits normal gait balance and coordination  · No abnormalities of mood, affect, memory, mentation, or behavior are noted    Lab Data:  Most recent lab results below reviewed in office    CBC:   Lab Results   Component Value Date    WBC 12.3 07/20/2021    WBC 11.3 09/30/2020    WBC 10.2 05/28/2020    RBC 5.07 07/20/2021    RBC 4.25 09/30/2020    RBC 4.68 05/28/2020    HGB 14.1 07/20/2021    HGB 12.2 09/30/2020    HGB 13.5 05/28/2020    HCT 40.6 07/20/2021    HCT 35.4 09/30/2020    HCT 39.4 05/28/2020    MCV 80.1 07/20/2021    MCV 83.5 09/30/2020    MCV 84.3 05/28/2020    RDW 15.0 07/20/2021    RDW 14.3 09/30/2020    RDW 13.4 05/28/2020     07/20/2021     09/30/2020     05/28/2020     BMP:  Lab Results   Component Value Date     07/20/2021     09/30/2020     05/28/2020    K 3.9 07/20/2021    K 3.7 09/30/2020    K 4.5 05/28/2020    K 4.1 05/12/2020    K 3.7 05/08/2020    K 4.5 03/25/2019     07/20/2021     09/30/2020     05/28/2020    CO2 21 07/20/2021    CO2 22 09/30/2020    CO2 22 05/28/2020    PHOS 3.4 05/11/2020    PHOS 3.3 05/10/2020    PHOS 4.2 01/22/2013    BUN 14 07/20/2021    BUN 17 09/30/2020    BUN 18 05/28/2020    CREATININE 0.9 07/20/2021    CREATININE 0.8 09/30/2020    CREATININE 0.7 05/28/2020     BNP:   Lab Results   Component Value Date    PROBNP 42 05/28/2020    PROBNP 42 05/08/2020    PROBNP 15 04/29/2020     LIPID:   Lab Results   Component Value Date    TRIG 123 05/12/2020    TRIG 223 09/04/2014    HDL 37 05/12/2020    HDL 44 09/04/2014    LDLCALC 76 05/12/2020    LDLCALC 112 09/04/2014       Cardiac Imaging:  LEXISCAN NUCLEAR STRESS 7/28/2021:       Summary    Normal LV function.   Marcus Sabina is normal isotope uptake at stress and rest. There is no evidence of    myocardial ischemia or scar.    Normal myocardial perfusion study.             ECHO 5/28/2021:    Summary   Normal left ventricle systolic function with an estimated ejection fraction of 55%. No regional wall motion abnormalities are seen. Normal left ventricular diastolic filling pressure. Mild right ventricular hypertrophy. Mild to moderate eccentric mitral regurgitation.    There is a small circumferential pericardial effusion with pericardial fat pad localized anteriorly. CARDIAC CATH / PCI 5/12/20:   FINDINGS      LVGRAM   LVEDP  7   GRADIENT ACROSS AORTIC VALVE  none   LV FUNCTION EF 60 %   WALL MOTION  normal   MITRAL REGURGITATION  mild      CORONARY ARTERIES   LM  Short vessel, less than 10% [id number] stenosis         LAD Small to medium caliber vessel, there is proximal-mid 90% stenosis. Distal vessel is tortuous with a 50% stenosis. LCX  Large vessel, dominant, less than 10% [id number] stenosis. RCA Nondominant, small, proximal spasm is noted which improved with IC vasodilators as well as withdrawal of catheter, there is less than 10% [id number] stenosis. PERCUTANEOUS INTERVENTION DESCRIPTION   Heparin was used for anticoagulation, a 6 [employer] MLA 3.5 guiding catheter was used to intubate the left-sided coronary vessels, this selectively cannulated the left circumflex artery and was able to be pulled back for diagnostic angiography. Then a choice floppy wire was advanced into the circumflex for anchoring and with this the guide catheter was withdrawn and then a [employer] wire was used to cross into the LAD once the guide was withdrawn from the left main. Wire was used to jump the gap between the guide catheter in the left main and then was advanced successfully into the LAD across the lesion. Lesion in the LAD was then dilated with a 2.5 mm balloon and then the lesion was then stented with a Abbott Xience Ирина 2.5 x 28 mm drug-eluting stent. After ballooning, a LAD dissection was noted which was not unexpected from balloon dilation and this resolved once stenting was accomplished. Stent was postdilated with a 2.5 mm noncompliant balloon. There was 0% residual stenosis. There was NATI 3 flow before and after PCI.     Distal LAD and the tortuous segment was felt to be best treated medically   CONCLUSIONS:   Successful PCI of the LAD with single drug-eluting stent    Stress Test 5/9/2020:  Summary  Normal LVEF >60%  Anteroseptal/apical hypokinesis  Mixed anteroseptal/apical defect consistent with mixed ischemia/scar of this  territory    Overall, this would be considered an abnormal, high risk, study       Echo 5/8/2020:   Summary   Normal left ventricle systolic function with an estimated ejection fraction of 55%. No regional wall motion abnormalities are seen. Grade I diastolic dysfunction with normal filing pressure. Mild (eccentric) mitral regurgitation. Right ventricular hypertrophy. Carotid dopplers 2/17/16  CONCLUSIONS    No significant obstructive lesions noted in the extracranial carotid system, bilaterally. Vertebral arteries are patent demonstrating antegrade flow, bilaterally     Carotid dopplers 2014  Opinion: Normal study. No significant atherosclerotic disease noted in the neck. No significant common carotid artery or internal carotid artery stenosis suspected in the neck. Echocardiogram 2014  Summary   Overall left ventricular function is normal.   Ejection fraction is visually estimated to be 55-60 %. Diastolic filling parameters suggests grade I diastolic dysfunction . No obvious cardiac source of emboli noted. There is a small pericardial effusion noted. Stress echocardiogram 2012  Resting echocardiogram shows normal left ventricular systolic function with an estimated ejection fraction of 60%. There were no wall motion abnormalities. Following exercise the left ventricle became uniformly hyperkinetic. There were no wall motion abnormalities. There was appropriate increase in the ejection fraction. ECHOCARDIOGRAPHIC CONCLUSION-  Normal exercise echocardiogram negative for ischemia. Assessment:    1. Coronary artery disease involving native coronary artery of native heart with unstable angina pectoris ([address]    2. Essential hypertension    3. Mixed hyperlipidemia    4.  Type 2 diabetes mellitus without complication, with long-term current use of insulin ([facility]    5. Sarcoidosis    6. Interstitial lung disease ([facility]          Plan:   1. Continue current heart medicines (aspirin, Plavix, rosuvastatin, metoprolol and lisinopril)  2. No further testing from heart perspective - chest pain is not cardiac/ angina  3.  Follow up with Dr. Michelle Geronimo in 6 months       I appreciate the opportunity of cooperating in the care of this individual.    Teetee Groves, DEV - CNP, 7/30/2021, 11:30 AM

## 2021-07-30 NOTE — PATIENT INSTRUCTIONS
1. Continue current heart medicines (aspirin, Plavix, rosuvastatin, metoprolol and lisinopril)  2. No further testing from heart perspective  3.  Follow up with Dr. Rosey Catalan in 6 months

## 2021-08-16 ENCOUNTER — HOSPITAL ENCOUNTER (OUTPATIENT)
Age: 61
Discharge: HOME | DRG: 137 | End: 2021-08-16
Payer: MEDICARE

## 2021-08-16 ENCOUNTER — APPOINTMENT (OUTPATIENT)
Dept: GENERAL RADIOLOGY | Age: 61
DRG: 137 | End: 2021-08-16
Payer: MEDICARE

## 2021-08-16 ENCOUNTER — HOSPITAL ENCOUNTER (OUTPATIENT)
Dept: GENERAL RADIOLOGY | Age: 61
Discharge: HOME | DRG: 137 | End: 2021-08-16
Payer: MEDICARE

## 2021-08-16 ENCOUNTER — HOSPITAL ENCOUNTER (INPATIENT)
Age: 61
LOS: 15 days | Discharge: NURSING, REHAB OR LONG-TERM CARE | DRG: 137 | End: 2021-09-01
Attending: EMERGENCY MEDICINE | Admitting: INTERNAL MEDICINE
Payer: MEDICARE

## 2021-08-16 DIAGNOSIS — J12.82 PNEUMONIA DUE TO COVID-19 VIRUS: Primary | ICD-10-CM

## 2021-08-16 DIAGNOSIS — U07.1 PNEUMONIA DUE TO COVID-19 VIRUS: Primary | ICD-10-CM

## 2021-08-16 DIAGNOSIS — R09.02 HYPOXIA: ICD-10-CM

## 2021-08-16 DIAGNOSIS — B34.9 VIRAL ILLNESS: ICD-10-CM

## 2021-08-16 LAB
A/G RATIO: 1.1 (ref 1.1–2.2)
ALBUMIN SERPL-MCNC: 3.9 G/DL (ref 3.4–5)
ALP BLD-CCNC: 85 U/L (ref 40–129)
ALT SERPL-CCNC: 12 U/L (ref 10–40)
ANION GAP SERPL CALCULATED.3IONS-SCNC: 17 MMOL/L (ref 3–16)
AST SERPL-CCNC: 26 U/L (ref 15–37)
BASE EXCESS VENOUS: -5.8 MMOL/L (ref -3–3)
BASOPHILS ABSOLUTE: 0.1 K/UL (ref 0–0.2)
BASOPHILS RELATIVE PERCENT: 0.6 %
BILIRUB SERPL-MCNC: 0.5 MG/DL (ref 0–1)
BUN BLDV-MCNC: 22 MG/DL (ref 7–20)
CALCIUM SERPL-MCNC: 8.9 MG/DL (ref 8.3–10.6)
CARBOXYHEMOGLOBIN: 2.7 % (ref 0–1.5)
CHLORIDE BLD-SCNC: 101 MMOL/L (ref 99–110)
CO2: 19 MMOL/L (ref 21–32)
CREAT SERPL-MCNC: 0.8 MG/DL (ref 0.6–1.2)
EOSINOPHILS ABSOLUTE: 0 K/UL (ref 0–0.6)
EOSINOPHILS RELATIVE PERCENT: 0 %
GFR AFRICAN AMERICAN: >60
GFR NON-AFRICAN AMERICAN: >60
GLOBULIN: 3.5 G/DL
GLUCOSE BLD-MCNC: 125 MG/DL (ref 70–99)
HCO3 VENOUS: 18.4 MMOL/L (ref 23–29)
HCT VFR BLD CALC: 39.4 % (ref 36–48)
HEMOGLOBIN: 13.3 G/DL (ref 12–16)
LACTIC ACID: 1 MMOL/L (ref 0.4–2)
LYMPHOCYTES ABSOLUTE: 1.1 K/UL (ref 1–5.1)
LYMPHOCYTES RELATIVE PERCENT: 13.5 %
MCH RBC QN AUTO: 27.6 PG (ref 26–34)
MCHC RBC AUTO-ENTMCNC: 33.8 G/DL (ref 31–36)
MCV RBC AUTO: 81.5 FL (ref 80–100)
METHEMOGLOBIN VENOUS: 0.3 %
MONOCYTES ABSOLUTE: 0.5 K/UL (ref 0–1.3)
MONOCYTES RELATIVE PERCENT: 5.7 %
NEUTROPHILS ABSOLUTE: 6.8 K/UL (ref 1.7–7.7)
NEUTROPHILS RELATIVE PERCENT: 80.2 %
O2 SAT, VEN: 91 %
O2 THERAPY: ABNORMAL
PCO2, VEN: 32.2 MMHG (ref 40–50)
PDW BLD-RTO: 15.6 % (ref 12.4–15.4)
PH VENOUS: 7.37 (ref 7.35–7.45)
PLATELET # BLD: 182 K/UL (ref 135–450)
PMV BLD AUTO: 6.4 FL (ref 5–10.5)
PO2, VEN: 60.5 MMHG (ref 25–40)
POTASSIUM REFLEX MAGNESIUM: 3.9 MMOL/L (ref 3.5–5.1)
RBC # BLD: 4.83 M/UL (ref 4–5.2)
SODIUM BLD-SCNC: 137 MMOL/L (ref 136–145)
TCO2 CALC VENOUS: 19 MMOL/L
TOTAL PROTEIN: 7.4 G/DL (ref 6.4–8.2)
WBC # BLD: 8.4 K/UL (ref 4–11)

## 2021-08-16 PROCEDURE — 83036 HEMOGLOBIN GLYCOSYLATED A1C: CPT

## 2021-08-16 PROCEDURE — 83605 ASSAY OF LACTIC ACID: CPT

## 2021-08-16 PROCEDURE — 85025 COMPLETE CBC W/AUTO DIFF WBC: CPT

## 2021-08-16 PROCEDURE — 82803 BLOOD GASES ANY COMBINATION: CPT

## 2021-08-16 PROCEDURE — 71045 X-RAY EXAM CHEST 1 VIEW: CPT

## 2021-08-16 PROCEDURE — 36415 COLL VENOUS BLD VENIPUNCTURE: CPT

## 2021-08-16 PROCEDURE — 71046 X-RAY EXAM CHEST 2 VIEWS: CPT

## 2021-08-16 PROCEDURE — 99284 EMERGENCY DEPT VISIT MOD MDM: CPT

## 2021-08-16 PROCEDURE — 80053 COMPREHEN METABOLIC PANEL: CPT

## 2021-08-16 ASSESSMENT — PAIN SCALES - GENERAL: PAINLEVEL_OUTOF10: 5

## 2021-08-17 PROBLEM — U07.1 ACUTE RESPIRATORY FAILURE DUE TO SEVERE ACUTE RESPIRATORY SYNDROME CORONAVIRUS 2 (SARS-COV-2) INFECTION (HCC): Status: ACTIVE | Noted: 2021-08-17

## 2021-08-17 PROBLEM — J96.00 ACUTE RESPIRATORY FAILURE DUE TO SEVERE ACUTE RESPIRATORY SYNDROME CORONAVIRUS 2 (SARS-COV-2) INFECTION (HCC): Status: ACTIVE | Noted: 2021-08-17

## 2021-08-17 PROBLEM — Z98.61 CAD S/P PERCUTANEOUS CORONARY ANGIOPLASTY: Status: ACTIVE | Noted: 2021-08-17

## 2021-08-17 PROBLEM — I25.10 CAD S/P PERCUTANEOUS CORONARY ANGIOPLASTY: Status: ACTIVE | Noted: 2021-08-17

## 2021-08-17 LAB
APTT: 37.3 SEC (ref 26.2–38.6)
BACTERIA: ABNORMAL /HPF
BILIRUBIN URINE: NEGATIVE
BLOOD, URINE: ABNORMAL
C-REACTIVE PROTEIN: 40.2 MG/L (ref 0–5.1)
CLARITY: CLEAR
COLOR: YELLOW
D DIMER: <200 NG/ML DDU (ref 0–229)
EPITHELIAL CELLS, UA: ABNORMAL /HPF (ref 0–5)
FERRITIN: 514.5 NG/ML (ref 15–150)
FIBRINOGEN: 582 MG/DL (ref 200–397)
GLUCOSE BLD-MCNC: 133 MG/DL (ref 70–99)
GLUCOSE BLD-MCNC: 145 MG/DL (ref 70–99)
GLUCOSE BLD-MCNC: 221 MG/DL (ref 70–99)
GLUCOSE BLD-MCNC: 249 MG/DL (ref 70–99)
GLUCOSE URINE: >=1000 MG/DL
INR BLD: 1.34 (ref 0.88–1.12)
KETONES, URINE: 40 MG/DL
LACTATE DEHYDROGENASE: 343 U/L (ref 100–190)
LEUKOCYTE ESTERASE, URINE: NEGATIVE
MICROSCOPIC EXAMINATION: YES
NITRITE, URINE: NEGATIVE
PERFORMED ON: ABNORMAL
PH UA: 5.5 (ref 5–8)
PROCALCITONIN: 0.06 NG/ML (ref 0–0.15)
PROTEIN UA: NEGATIVE MG/DL
PROTHROMBIN TIME: 15.3 SEC (ref 9.9–12.7)
RBC UA: ABNORMAL /HPF (ref 0–4)
SARS-COV-2, NAAT: DETECTED
SPECIFIC GRAVITY UA: 1.02 (ref 1–1.03)
URINE REFLEX TO CULTURE: ABNORMAL
URINE TYPE: ABNORMAL
UROBILINOGEN, URINE: 0.2 E.U./DL
WBC UA: ABNORMAL /HPF (ref 0–5)

## 2021-08-17 PROCEDURE — 86140 C-REACTIVE PROTEIN: CPT

## 2021-08-17 PROCEDURE — 6360000002 HC RX W HCPCS: Performed by: EMERGENCY MEDICINE

## 2021-08-17 PROCEDURE — 85730 THROMBOPLASTIN TIME PARTIAL: CPT

## 2021-08-17 PROCEDURE — 6360000002 HC RX W HCPCS: Performed by: INTERNAL MEDICINE

## 2021-08-17 PROCEDURE — 96374 THER/PROPH/DIAG INJ IV PUSH: CPT

## 2021-08-17 PROCEDURE — 6370000000 HC RX 637 (ALT 250 FOR IP): Performed by: EMERGENCY MEDICINE

## 2021-08-17 PROCEDURE — 83615 LACTATE (LD) (LDH) ENZYME: CPT

## 2021-08-17 PROCEDURE — G0378 HOSPITAL OBSERVATION PER HR: HCPCS

## 2021-08-17 PROCEDURE — 96361 HYDRATE IV INFUSION ADD-ON: CPT

## 2021-08-17 PROCEDURE — 96375 TX/PRO/DX INJ NEW DRUG ADDON: CPT

## 2021-08-17 PROCEDURE — 84145 PROCALCITONIN (PCT): CPT

## 2021-08-17 PROCEDURE — 6370000000 HC RX 637 (ALT 250 FOR IP): Performed by: INTERNAL MEDICINE

## 2021-08-17 PROCEDURE — 2580000003 HC RX 258: Performed by: INTERNAL MEDICINE

## 2021-08-17 PROCEDURE — 85610 PROTHROMBIN TIME: CPT

## 2021-08-17 PROCEDURE — 96376 TX/PRO/DX INJ SAME DRUG ADON: CPT

## 2021-08-17 PROCEDURE — 82728 ASSAY OF FERRITIN: CPT

## 2021-08-17 PROCEDURE — 96372 THER/PROPH/DIAG INJ SC/IM: CPT

## 2021-08-17 PROCEDURE — 85384 FIBRINOGEN ACTIVITY: CPT

## 2021-08-17 PROCEDURE — 87635 SARS-COV-2 COVID-19 AMP PRB: CPT

## 2021-08-17 PROCEDURE — 1200000000 HC SEMI PRIVATE

## 2021-08-17 PROCEDURE — 94640 AIRWAY INHALATION TREATMENT: CPT

## 2021-08-17 PROCEDURE — 81001 URINALYSIS AUTO W/SCOPE: CPT

## 2021-08-17 PROCEDURE — 85379 FIBRIN DEGRADATION QUANT: CPT

## 2021-08-17 PROCEDURE — 2580000003 HC RX 258: Performed by: EMERGENCY MEDICINE

## 2021-08-17 PROCEDURE — 36415 COLL VENOUS BLD VENIPUNCTURE: CPT

## 2021-08-17 RX ORDER — GUAIFENESIN/DEXTROMETHORPHAN 100-10MG/5
5 SYRUP ORAL EVERY 4 HOURS PRN
Status: DISCONTINUED | OUTPATIENT
Start: 2021-08-17 | End: 2021-09-01 | Stop reason: HOSPADM

## 2021-08-17 RX ORDER — ASPIRIN 81 MG/1
81 TABLET, CHEWABLE ORAL DAILY
Status: DISCONTINUED | OUTPATIENT
Start: 2021-08-17 | End: 2021-09-01 | Stop reason: HOSPADM

## 2021-08-17 RX ORDER — NICOTINE POLACRILEX 4 MG
15 LOZENGE BUCCAL PRN
Status: DISCONTINUED | OUTPATIENT
Start: 2021-08-17 | End: 2021-09-01 | Stop reason: HOSPADM

## 2021-08-17 RX ORDER — VITAMIN B COMPLEX
2000 TABLET ORAL DAILY
Status: DISCONTINUED | OUTPATIENT
Start: 2021-08-17 | End: 2021-09-01 | Stop reason: HOSPADM

## 2021-08-17 RX ORDER — POTASSIUM CHLORIDE 20 MEQ/1
40 TABLET, EXTENDED RELEASE ORAL PRN
Status: DISCONTINUED | OUTPATIENT
Start: 2021-08-17 | End: 2021-09-01 | Stop reason: HOSPADM

## 2021-08-17 RX ORDER — ROSUVASTATIN CALCIUM 10 MG/1
10 TABLET, COATED ORAL DAILY
Status: DISCONTINUED | OUTPATIENT
Start: 2021-08-17 | End: 2021-09-01 | Stop reason: HOSPADM

## 2021-08-17 RX ORDER — ALBUTEROL SULFATE 90 UG/1
2 AEROSOL, METERED RESPIRATORY (INHALATION) EVERY 4 HOURS PRN
Status: DISCONTINUED | OUTPATIENT
Start: 2021-08-17 | End: 2021-09-01 | Stop reason: HOSPADM

## 2021-08-17 RX ORDER — ACETAMINOPHEN 650 MG/1
650 SUPPOSITORY RECTAL EVERY 6 HOURS PRN
Status: DISCONTINUED | OUTPATIENT
Start: 2021-08-17 | End: 2021-09-01 | Stop reason: HOSPADM

## 2021-08-17 RX ORDER — SODIUM CHLORIDE 9 MG/ML
25 INJECTION, SOLUTION INTRAVENOUS PRN
Status: DISCONTINUED | OUTPATIENT
Start: 2021-08-17 | End: 2021-09-01 | Stop reason: HOSPADM

## 2021-08-17 RX ORDER — INSULIN GLARGINE 100 [IU]/ML
0.25 INJECTION, SOLUTION SUBCUTANEOUS NIGHTLY
Status: DISCONTINUED | OUTPATIENT
Start: 2021-08-17 | End: 2021-08-20

## 2021-08-17 RX ORDER — LANOLIN ALCOHOL/MO/W.PET/CERES
3 CREAM (GRAM) TOPICAL NIGHTLY PRN
Status: DISCONTINUED | OUTPATIENT
Start: 2021-08-17 | End: 2021-09-01 | Stop reason: HOSPADM

## 2021-08-17 RX ORDER — POTASSIUM CHLORIDE 7.45 MG/ML
10 INJECTION INTRAVENOUS PRN
Status: DISCONTINUED | OUTPATIENT
Start: 2021-08-17 | End: 2021-09-01 | Stop reason: HOSPADM

## 2021-08-17 RX ORDER — 0.9 % SODIUM CHLORIDE 0.9 %
1000 INTRAVENOUS SOLUTION INTRAVENOUS ONCE
Status: COMPLETED | OUTPATIENT
Start: 2021-08-17 | End: 2021-08-17

## 2021-08-17 RX ORDER — DEXTROSE MONOHYDRATE 25 G/50ML
12.5 INJECTION, SOLUTION INTRAVENOUS PRN
Status: DISCONTINUED | OUTPATIENT
Start: 2021-08-17 | End: 2021-09-01 | Stop reason: HOSPADM

## 2021-08-17 RX ORDER — PANTOPRAZOLE SODIUM 40 MG/1
40 TABLET, DELAYED RELEASE ORAL
Status: DISCONTINUED | OUTPATIENT
Start: 2021-08-17 | End: 2021-09-01 | Stop reason: HOSPADM

## 2021-08-17 RX ORDER — IPRATROPIUM BROMIDE AND ALBUTEROL SULFATE 2.5; .5 MG/3ML; MG/3ML
1 SOLUTION RESPIRATORY (INHALATION) ONCE
Status: COMPLETED | OUTPATIENT
Start: 2021-08-17 | End: 2021-08-17

## 2021-08-17 RX ORDER — DEXAMETHASONE SODIUM PHOSPHATE 10 MG/ML
10 INJECTION INTRAMUSCULAR; INTRAVENOUS ONCE
Status: COMPLETED | OUTPATIENT
Start: 2021-08-17 | End: 2021-08-17

## 2021-08-17 RX ORDER — MAGNESIUM SULFATE 1 G/100ML
1000 INJECTION INTRAVENOUS PRN
Status: DISCONTINUED | OUTPATIENT
Start: 2021-08-17 | End: 2021-09-01 | Stop reason: HOSPADM

## 2021-08-17 RX ORDER — DEXTROSE MONOHYDRATE 50 MG/ML
100 INJECTION, SOLUTION INTRAVENOUS PRN
Status: DISCONTINUED | OUTPATIENT
Start: 2021-08-17 | End: 2021-09-01 | Stop reason: HOSPADM

## 2021-08-17 RX ORDER — METHYLPREDNISOLONE SODIUM SUCCINATE 40 MG/ML
40 INJECTION, POWDER, LYOPHILIZED, FOR SOLUTION INTRAMUSCULAR; INTRAVENOUS EVERY 12 HOURS
Status: DISCONTINUED | OUTPATIENT
Start: 2021-08-17 | End: 2021-08-20

## 2021-08-17 RX ORDER — SODIUM CHLORIDE 0.9 % (FLUSH) 0.9 %
10 SYRINGE (ML) INJECTION PRN
Status: DISCONTINUED | OUTPATIENT
Start: 2021-08-17 | End: 2021-09-01 | Stop reason: HOSPADM

## 2021-08-17 RX ORDER — CLOPIDOGREL BISULFATE 75 MG/1
75 TABLET ORAL DAILY
Status: DISCONTINUED | OUTPATIENT
Start: 2021-08-17 | End: 2021-09-01 | Stop reason: HOSPADM

## 2021-08-17 RX ORDER — BENZONATATE 100 MG/1
200 CAPSULE ORAL 3 TIMES DAILY PRN
Status: DISCONTINUED | OUTPATIENT
Start: 2021-08-17 | End: 2021-09-01 | Stop reason: HOSPADM

## 2021-08-17 RX ORDER — ACETAMINOPHEN 325 MG/1
650 TABLET ORAL EVERY 6 HOURS PRN
Status: DISCONTINUED | OUTPATIENT
Start: 2021-08-17 | End: 2021-09-01 | Stop reason: HOSPADM

## 2021-08-17 RX ORDER — ONDANSETRON 2 MG/ML
4 INJECTION INTRAMUSCULAR; INTRAVENOUS EVERY 6 HOURS PRN
Status: DISCONTINUED | OUTPATIENT
Start: 2021-08-17 | End: 2021-09-01 | Stop reason: HOSPADM

## 2021-08-17 RX ORDER — ONDANSETRON 4 MG/1
4 TABLET, ORALLY DISINTEGRATING ORAL EVERY 8 HOURS PRN
Status: DISCONTINUED | OUTPATIENT
Start: 2021-08-17 | End: 2021-09-01 | Stop reason: HOSPADM

## 2021-08-17 RX ADMIN — PANTOPRAZOLE SODIUM 40 MG: 40 TABLET, DELAYED RELEASE ORAL at 10:05

## 2021-08-17 RX ADMIN — ENOXAPARIN SODIUM 30 MG: 30 INJECTION SUBCUTANEOUS at 21:27

## 2021-08-17 RX ADMIN — BENZONATATE 200 MG: 100 CAPSULE ORAL at 16:29

## 2021-08-17 RX ADMIN — Medication 10 ML: at 21:25

## 2021-08-17 RX ADMIN — ASPIRIN 81 MG: 81 TABLET, CHEWABLE ORAL at 10:05

## 2021-08-17 RX ADMIN — Medication 2000 UNITS: at 10:06

## 2021-08-17 RX ADMIN — INSULIN LISPRO 2 UNITS: 100 INJECTION, SOLUTION INTRAVENOUS; SUBCUTANEOUS at 16:34

## 2021-08-17 RX ADMIN — ENOXAPARIN SODIUM 30 MG: 30 INJECTION SUBCUTANEOUS at 10:06

## 2021-08-17 RX ADMIN — ROSUVASTATIN CALCIUM 10 MG: 10 TABLET, FILM COATED ORAL at 10:06

## 2021-08-17 RX ADMIN — Medication 10 ML: at 16:30

## 2021-08-17 RX ADMIN — SODIUM CHLORIDE 1000 ML: 9 INJECTION, SOLUTION INTRAVENOUS at 02:33

## 2021-08-17 RX ADMIN — INSULIN LISPRO 1 UNITS: 100 INJECTION, SOLUTION INTRAVENOUS; SUBCUTANEOUS at 21:35

## 2021-08-17 RX ADMIN — METHYLPREDNISOLONE SODIUM SUCCINATE 40 MG: 40 INJECTION, POWDER, LYOPHILIZED, FOR SOLUTION INTRAMUSCULAR; INTRAVENOUS at 21:25

## 2021-08-17 RX ADMIN — INSULIN LISPRO 5 UNITS: 100 INJECTION, SOLUTION INTRAVENOUS; SUBCUTANEOUS at 16:34

## 2021-08-17 RX ADMIN — CLOPIDOGREL BISULFATE 75 MG: 75 TABLET ORAL at 10:05

## 2021-08-17 RX ADMIN — METHYLPREDNISOLONE SODIUM SUCCINATE 40 MG: 40 INJECTION, POWDER, LYOPHILIZED, FOR SOLUTION INTRAMUSCULAR; INTRAVENOUS at 10:07

## 2021-08-17 RX ADMIN — METOPROLOL TARTRATE 25 MG: 25 TABLET, FILM COATED ORAL at 21:28

## 2021-08-17 RX ADMIN — BENZONATATE 200 MG: 100 CAPSULE ORAL at 21:27

## 2021-08-17 RX ADMIN — IPRATROPIUM BROMIDE AND ALBUTEROL SULFATE 1 AMPULE: .5; 3 SOLUTION RESPIRATORY (INHALATION) at 02:39

## 2021-08-17 RX ADMIN — DEXAMETHASONE SODIUM PHOSPHATE 10 MG: 10 INJECTION INTRAMUSCULAR; INTRAVENOUS at 02:34

## 2021-08-17 RX ADMIN — METOPROLOL TARTRATE 25 MG: 25 TABLET, FILM COATED ORAL at 10:05

## 2021-08-17 ASSESSMENT — PAIN SCALES - GENERAL
PAINLEVEL_OUTOF10: 0
PAINLEVEL_OUTOF10: 0

## 2021-08-17 NOTE — RT PROTOCOL NOTE
RT Inhaler-Nebulizer Bronchodilator Protocol Note    There is a bronchodilator order in the chart from a provider indicating to follow the RT Bronchodilator Protocol and there is an Initiate RT Bronchodilator Protocol order as well (see protocol at bottom of note). The findings from the last RT Protocol Assessment were as follows:  Smoking: Non smoker  Surgical Status: No surgery  Xray: Chronic changes  Respiratory Pattern: RR 12-20  Mental Status: Alert and Oriented  Breath Sounds: Diminished and/or crackles  Cough: Strong, spontaneous, non-productive  Activity Level: Walking unassisted  Oxygen Requirement: Room Air - 2LNC/28% or home setting  Indication for Bronchodilator Therapy: None  Bronchodilator Assessment Score: 2     Plan: Albuterol MDI Q4H PRN. Re-evaluate as needed. Aerosolized bronchodilator medication orders have been revised according to the RT Bronchodilator Protocol. RT Inhaler-Nebulizer Bronchodilator Protocol:    Respiratory Therapist to perform RT Therapy Protocol Assessment then follow the protocol. No Indications - adjust the frequency to every 6 hours PRN wheezing or bronchospasm, if no treatments needed after 48 hours then discontinue using Per Protocol order mode. If indication present, adjust the RT bronchodilator orders based on the Bronchodilator Assessment Score as follows:    0-6 - enter or revise RT bronchodilator order to Albuterol Inhaler order with frequency of every 2 hours PRN for wheezing or increased work of breathing using Per Protocol order mode. If Albuterol Inhaler not tolerated or not effective, then discontinue the Albuterol Inhaler order and enter Albuterol Nebulizer order with same frequency and PRN reasons. Repeat RT Therapy Protocol Assessment as needed.     7-10 - discontinue any other Inpatient aerosolized bronchodilator medication orders and enter or revise two Albuterol Inhaler orders, one with BID frequency and one with frequency of every 2 hours PRN wheezing or increased work of breathing using Per Protocol order mode. Repeat RT Therapy Protocol Assessment with second treatment then BID and as needed. If Albuterol Inhaler not tolerated or not effective, then discontinue the Albuterol Inhaler orders and enter two Albuterol Nebulizer orders with same frequencies and PRN reasons. 11-13 - discontinue any other Inpatient aerosolized bronchodilator medication orders and enter DuoNeb Nebulizer orders QID frequency and an Albuterol Nebulizer order every 2 hours PRN wheezing or increased work of breathing using Per Protocol order mode. Repeat RT Therapy Protocol Assessment with second treatment then QID and as needed. Greater than 13 - discontinue any other Inpatient bronchodilator aerosolized medication orders and enter DuoNeb Nebulizer order every 4 hours frequency and Albuterol Nebulizer every 2 hours PRN wheezing or increased work of breathing using Per Protocol order mode. Repeat RT Therapy Protocol Assessment with second treatment then every 4 hours and as needed. RT to enter RT Home Evaluation for COPD & MDI Assessment order using Per Protocol order mode.     Electronically signed by Romelia Shafer RCP, RRT, RRT-ACCS on 8/17/2021 at 11:56 AM

## 2021-08-17 NOTE — CARE COORDINATION
CASE MANAGEMENT INITIAL ASSESSMENT    Reviewed chart and completed assessment via telephone with: Patient re droplet plus     Explained Case Management role/services. Primary contact information: Jose Harris spouse     Health Care Decision Maker :   Primary Decision Maker: Sylvester Arellano Spouse - [phone]    Secondary Decision Maker: Laila Melvin - Child - [phone]          Can this person be reached and be able to respond quickly, such as within a few minutes or hours? Yes      Admit date/status:08/17/2021 Inpatient   Diagnosis: Acute respiratory failure due to severe acute respiratory syndrome coronavirus 2 (SARS-CoV-2) infection ([facility] [address]  Is this a Readmission?:  Yes  ED 07/20-07/21 re chest pain     Insurance:[employer]    Precert required for SNF: Yes       3 night stay required: No    Living arrangements, Adls, care needs, prior to admission: Home with spouse 3 steps to enter, [employer]: spouse supports if needed, normally self      Durable Medical Equipment at home: Denies    Services in the home and/or outpatient, prior to admission: Denies    PT/OT recs: [address] [place] [address] [address] Notification (HEN):NA    Barriers to discharge: Denies     Plan/comments: Patient reports lives at home with spouse and two teenage children. Per Patient children are staying with older child re COVID. Patient reports IPTA but does not a little unsteady on her feet. Patient is open to home care if recs but does not feel it is needed at this time. SW will follow for DCP needs. CORNELIA Alaniz     ECOC on chart for MD signature

## 2021-08-17 NOTE — PLAN OF CARE
Problem: Airway Clearance - Ineffective  Goal: Achieve or maintain patent airway  Outcome: Ongoing     Problem: Gas Exchange - Impaired  Goal: Absence of hypoxia  Outcome: Ongoing  Goal: Promote optimal lung function  Outcome: Ongoing     Problem: Breathing Pattern - Ineffective  Goal: Ability to achieve and maintain a regular respiratory rate  Outcome: Ongoing    Regular, non-labored breathing, on RA, saturations above 95%.

## 2021-08-17 NOTE — ED NOTES
Report called to the floor RN, she states she is unable to get PT for 20 minutes      Ronaldo Neri RN  08/17/21 1000

## 2021-08-17 NOTE — ACP (ADVANCE CARE PLANNING)
Advance Care Planning     General Advance Care Planning (ACP) Conversation    Date of Conversation: 8/16/2021  Conducted with: Patient with Decision Making Capacity    Healthcare Decision Maker:      Primary Decision Maker: Rosario Jacobo Spouse - [phone]    Secondary Decision Maker: Vilma Veliz - [phone]      Today we documented Decision Maker(s) consistent with Legal Next of Kin hierarchy. Content/Action Overview:  Has NO ACP documents/care preferences - information provided, considering goals and options  Reviewed DNR/DNI and patient elects Full Code (Attempt Resuscitation)    Has paperwork at home not completed at this time, decline  consult.     Length of Voluntary ACP Conversation in minutes:  <16 minutes (Non-Billable)    CORNELIA Jolley

## 2021-08-17 NOTE — PROGRESS NOTES
4 Eyes Skin Assessment     The patient is being assess for  Admission    I agree that 2 RN's have performed a thorough Head to Toe Skin Assessment on the patient. ALL assessment sites listed below have been assessed. Areas assessed by both nurses:   [x]   Head, Face, and Ears   [x]   Shoulders, Back, and Chest  [x]   Arms, Elbows, and Hands   [x]   Coccyx, Sacrum, and Ischum  [x]   Legs, Feet, and Heels        Does the Patient have Skin Breakdown?   No         Jeremiah Prevention initiated:  NA   Wound Care Orders initiated:  NA      [facility] nurse consulted for Pressure Injury (Stage 3,4, Unstageable, DTI, NWPT, and Complex wounds):  NA      Nurse 1 eSignature: Electronically signed by Tosha Harrison RN on 8/17/21 at 6:21 PM EDT    **SHARE this note so that the co-signing nurse is able to place an eSignature**    Nurse 2 eSignature: Electronically signed by Corrina Conrad RN on 8/19/21 at 3:36 AM EDT

## 2021-08-17 NOTE — PROGRESS NOTES
Shift assessments completed and charted. Pt a/o x4, VSS, on RA, saturations maintained above 95%, dyspnea on exertion, non-productive cough. Reports malaise and fatigue, some nausea at times. B/L lung sounds clear to auscultation t/o lung fields. Denies any needs at this time, will continue to monitor.

## 2021-08-17 NOTE — ED NOTES
Resting eyes  Closed easily aroused IV started, additional blood to the lab. RT now at bedside.        Amrita Hoyos RN  08/17/21 3873

## 2021-08-17 NOTE — ED NOTES
Sitting up in room with a moist non-productive cough.  MD informed of positive covid result will order RT tx and monitoring sats for need for O2      Dion Sandoval RN  08/17/21 0112

## 2021-08-17 NOTE — H&P
Hospital Medicine History & Physical      Patient:  Jarocho Diane  :   1960  MRN:   [id number]  Date of Service: 21    Chief Complaint   Patient presents with    Concern For COVID-19     thinkis she has COVID but she cannot tell me her Sx. But states they started today,     Fever     did not take it but my sister did the finger test and that said she had a temperature,  (no idea what the finger test is)        HISTORY OF PRESENT ILLNESS:    Jarocho Diane is a 62 y.o. female. She presents feeling generally ill. Symptoms just began Thursday night. She has felt very fatigued w/ poor appetite, loss of taste, generalized myalgias, and worsening nonproductive cough. Nausea and diarrhea have not been prominent. She apparently also had a fever at home tonight. History is somewhat limited because patient seems very fatigued and does not volunteer much information. She does respond appropriately to direct questioning though. Review of Systems:  All pertinent positives and negatives are as noted in the HPI section. All other systems were reviewed and are negative.     Past Medical History:   Diagnosis Date    CAD (coronary artery disease)     1 stent    Cerebral artery occlusion with cerebral infarction [facility]) 2013    Choking sensation     [facility] mellitus ([facility] [address]     DM2 (diabetes mellitus, type 2) ([facility] [address]     Hyperlipidemia     Prolonged emergence from general anesthesia     Sarcoid     lungs       Past Surgical History:   Procedure Laterality Date    CARDIAC SURGERY  2020    stent     SECTION      x3    CHOLECYSTECTOMY      COLONOSCOPY      COLONOSCOPY  13    normal    COLONOSCOPY  10/19/2018    1 polyp    COLONOSCOPY N/A 10/19/2018    COLONOSCOPY POLYPECTOMY SNARE/COLD BIOPSY performed by Lula Riedel, MD at [address] Right 2020    RIGHT LONG TRIGGER FINGER RELEASE performed by Jessica Lawson MD at [facility] HYSTERECTOMY      HYSTEROSCOPY  1/25/2013    Laprascopic supracervical hysterectomy         Prior to Admission medications    Medication Sig Start Date End Date Taking? Authorizing Provider   aspirin (ASPIRIN LOW DOSE) 81 MG chewable tablet Take 1 tablet by mouth daily 7/30/21   DEV Zaman CNP   clopidogrel (PLAVIX) 75 MG tablet Take 1 tablet by mouth daily 7/30/21   DEV Zaman CNP   metoprolol tartrate (LOPRESSOR) 25 MG tablet Take 1 tablet by mouth 2 times daily 7/30/21   DEV Zaman CNP   rosuvastatin (CRESTOR) 10 MG tablet Take 1 tablet by mouth daily 7/30/21   DEV Zaman CNP   nitroGLYCERIN (NITROSTAT) 0.4 MG SL tablet up to max of 3 total doses. If no relief after 1 dose, call 911. 7/7/21   DEV Zaman CNP   ibuprofen (ADVIL;MOTRIN) 600 MG tablet Take 1 tablet by mouth every 8 hours as needed for Pain 5/2/21   Cheri Garcia MD   TRESIBA FLEXTOUCH 100 UNIT/ML SOPN  7/28/20   Historical Provider, MD   pantoprazole (PROTONIX) 40 MG tablet  7/23/20   Historical Provider, MD   famotidine (PEPCID) 20 MG tablet Take 1 tablet by mouth daily. 9/6/14   Alexy Jarvis MD   Cholecalciferol (VITAMIN D) 2000 UNITS CAPS capsule Take 2,000 Units by mouth daily. Historical Provider, MD   lisinopril (PRINIVIL;ZESTRIL) 2.5 MG tablet Take 2.5 mg by mouth daily. Historical Provider, MD   metformin (GLUCOPHAGE) 500 MG tablet Take 500 mg by mouth 2 times daily (with meals)     Historical Provider, MD       Allergies:   Antivert [meclizine], Codeine, Phenergan [promethazine hcl], Influenza vaccines, Pneumococcal vaccines, Vicodin [hydrocodone-acetaminophen], Hydrocodone-acetaminophen, Oxycodone, and Promethazine    Social:   reports that she has never smoked. She has never used smokeless tobacco.   reports no history of alcohol use.   Social History     Substance and Sexual Activity   Drug Use No       Family History   Problem Relation Age of Onset    Component Value Date    TROPONINI <0.01 07/20/2021     No results for input(s): PHART, RWU1WEL, PO2ART in the last 72 hours. IMAGING:  XR CHEST (2 VW)    Result Date: 8/16/2021  EXAMINATION: TWO XRAY VIEWS OF THE CHEST 8/16/2021 11:15 am COMPARISON: None. HISTORY: ORDERING SYSTEM PROVIDED HISTORY: Viral illness TECHNOLOGIST PROVIDED HISTORY: Reason for Exam: viral illness cough since wednesday Acuity: Acute Type of Exam: Initial FINDINGS: Lower lung volumes on today's study. Heart size felt to be stable when accounting for differences in lung volumes. There are chronic reticular interstitial opacities in the lower lung zones. There are new ground-glass opacities in the peripheral right lower lung. No obvious change is noted on the left. Costophrenic angles are sharp     Ground-glass opacities in the peripheral right lower lung suspicious for atypical pneumonia, on a background of chronic lower lung zone interstitial disease     XR CHEST PORTABLE    Result Date: 8/16/2021  EXAMINATION: ONE XRAY VIEW OF THE CHEST 8/16/2021 10:48 pm COMPARISON: Prior studies including 08/16/2021 at 1124 hours HISTORY: ORDERING SYSTEM PROVIDED HISTORY: concern for covid TECHNOLOGIST PROVIDED HISTORY: Reason for exam:->concern for covid Reason for Exam: concern for COVID-19, cough Acuity: Acute Type of Exam: Initial FINDINGS: Study was obtained at low lung volumes with crowding of lung markings at the bases. Bibasilar ill-defined airspace disease has not changed significantly from earlier today taking into account varying degrees of inspiration. There is no pneumothorax or pleural fluid. Heart size is within normal limits. Bibasilar airspace disease not significantly changed from earlier today. Correlate clinically for possible COVID disease. I directly reviewed all recent imaging studies as well as pertinent prior studies. Radiology reports may or may not be available at the time of my review.        LAVERNE/Terese Marsh [id number]

## 2021-08-18 LAB
A/G RATIO: 1.2 (ref 1.1–2.2)
ALBUMIN SERPL-MCNC: 3.7 G/DL (ref 3.4–5)
ALP BLD-CCNC: 80 U/L (ref 40–129)
ALT SERPL-CCNC: 11 U/L (ref 10–40)
ANION GAP SERPL CALCULATED.3IONS-SCNC: 17 MMOL/L (ref 3–16)
APTT: 33.7 SEC (ref 26.2–38.6)
AST SERPL-CCNC: 19 U/L (ref 15–37)
BASOPHILS ABSOLUTE: 0 K/UL (ref 0–0.2)
BASOPHILS RELATIVE PERCENT: 0.1 %
BILIRUB SERPL-MCNC: 0.5 MG/DL (ref 0–1)
BUN BLDV-MCNC: 30 MG/DL (ref 7–20)
C-REACTIVE PROTEIN: 16.4 MG/L (ref 0–5.1)
CALCIUM SERPL-MCNC: 9 MG/DL (ref 8.3–10.6)
CHLORIDE BLD-SCNC: 104 MMOL/L (ref 99–110)
CO2: 18 MMOL/L (ref 21–32)
CREAT SERPL-MCNC: 0.8 MG/DL (ref 0.6–1.2)
D DIMER: <200 NG/ML DDU (ref 0–229)
EOSINOPHILS ABSOLUTE: 0 K/UL (ref 0–0.6)
EOSINOPHILS RELATIVE PERCENT: 0 %
ESTIMATED AVERAGE GLUCOSE: 188.6 MG/DL
FIBRINOGEN: 582 MG/DL (ref 200–397)
GFR AFRICAN AMERICAN: >60
GFR NON-AFRICAN AMERICAN: >60
GLOBULIN: 3.1 G/DL
GLUCOSE BLD-MCNC: 222 MG/DL (ref 70–99)
GLUCOSE BLD-MCNC: 226 MG/DL (ref 70–99)
GLUCOSE BLD-MCNC: 229 MG/DL (ref 70–99)
GLUCOSE BLD-MCNC: 242 MG/DL (ref 70–99)
GLUCOSE BLD-MCNC: 270 MG/DL (ref 70–99)
HBA1C MFR BLD: 8.2 %
HCT VFR BLD CALC: 38.3 % (ref 36–48)
HEMOGLOBIN: 12.8 G/DL (ref 12–16)
INR BLD: 1.4 (ref 0.88–1.12)
LYMPHOCYTES ABSOLUTE: 0.8 K/UL (ref 1–5.1)
LYMPHOCYTES RELATIVE PERCENT: 8.3 %
MCH RBC QN AUTO: 27.1 PG (ref 26–34)
MCHC RBC AUTO-ENTMCNC: 33.4 G/DL (ref 31–36)
MCV RBC AUTO: 81.2 FL (ref 80–100)
MONOCYTES ABSOLUTE: 0.4 K/UL (ref 0–1.3)
MONOCYTES RELATIVE PERCENT: 3.7 %
NEUTROPHILS ABSOLUTE: 8.6 K/UL (ref 1.7–7.7)
NEUTROPHILS RELATIVE PERCENT: 87.9 %
PDW BLD-RTO: 15.3 % (ref 12.4–15.4)
PERFORMED ON: ABNORMAL
PLATELET # BLD: 218 K/UL (ref 135–450)
PMV BLD AUTO: 6.4 FL (ref 5–10.5)
POTASSIUM REFLEX MAGNESIUM: 4.6 MMOL/L (ref 3.5–5.1)
PROCALCITONIN: 0.08 NG/ML (ref 0–0.15)
PROTHROMBIN TIME: 16 SEC (ref 9.9–12.7)
RBC # BLD: 4.71 M/UL (ref 4–5.2)
SODIUM BLD-SCNC: 139 MMOL/L (ref 136–145)
TOTAL PROTEIN: 6.8 G/DL (ref 6.4–8.2)
WBC # BLD: 9.8 K/UL (ref 4–11)

## 2021-08-18 PROCEDURE — 2700000000 HC OXYGEN THERAPY PER DAY

## 2021-08-18 PROCEDURE — 85610 PROTHROMBIN TIME: CPT

## 2021-08-18 PROCEDURE — 84145 PROCALCITONIN (PCT): CPT

## 2021-08-18 PROCEDURE — 94761 N-INVAS EAR/PLS OXIMETRY MLT: CPT

## 2021-08-18 PROCEDURE — 2580000003 HC RX 258: Performed by: INTERNAL MEDICINE

## 2021-08-18 PROCEDURE — 96376 TX/PRO/DX INJ SAME DRUG ADON: CPT

## 2021-08-18 PROCEDURE — G0378 HOSPITAL OBSERVATION PER HR: HCPCS

## 2021-08-18 PROCEDURE — 86140 C-REACTIVE PROTEIN: CPT

## 2021-08-18 PROCEDURE — 85384 FIBRINOGEN ACTIVITY: CPT

## 2021-08-18 PROCEDURE — 1200000000 HC SEMI PRIVATE

## 2021-08-18 PROCEDURE — 85025 COMPLETE CBC W/AUTO DIFF WBC: CPT

## 2021-08-18 PROCEDURE — 6360000002 HC RX W HCPCS: Performed by: INTERNAL MEDICINE

## 2021-08-18 PROCEDURE — 6370000000 HC RX 637 (ALT 250 FOR IP): Performed by: INTERNAL MEDICINE

## 2021-08-18 PROCEDURE — 36415 COLL VENOUS BLD VENIPUNCTURE: CPT

## 2021-08-18 PROCEDURE — 94640 AIRWAY INHALATION TREATMENT: CPT

## 2021-08-18 PROCEDURE — 80053 COMPREHEN METABOLIC PANEL: CPT

## 2021-08-18 PROCEDURE — 85379 FIBRIN DEGRADATION QUANT: CPT

## 2021-08-18 PROCEDURE — 96372 THER/PROPH/DIAG INJ SC/IM: CPT

## 2021-08-18 PROCEDURE — 85730 THROMBOPLASTIN TIME PARTIAL: CPT

## 2021-08-18 RX ORDER — ALBUTEROL SULFATE 2.5 MG/3ML
2.5 SOLUTION RESPIRATORY (INHALATION) EVERY 6 HOURS PRN
Status: DISCONTINUED | OUTPATIENT
Start: 2021-08-18 | End: 2021-08-19

## 2021-08-18 RX ORDER — GUAIFENESIN 600 MG/1
600 TABLET, EXTENDED RELEASE ORAL 2 TIMES DAILY
Status: DISCONTINUED | OUTPATIENT
Start: 2021-08-18 | End: 2021-09-01 | Stop reason: HOSPADM

## 2021-08-18 RX ADMIN — CLOPIDOGREL BISULFATE 75 MG: 75 TABLET ORAL at 10:05

## 2021-08-18 RX ADMIN — PANTOPRAZOLE SODIUM 40 MG: 40 TABLET, DELAYED RELEASE ORAL at 06:23

## 2021-08-18 RX ADMIN — ENOXAPARIN SODIUM 30 MG: 30 INJECTION SUBCUTANEOUS at 22:18

## 2021-08-18 RX ADMIN — ASPIRIN 81 MG: 81 TABLET, CHEWABLE ORAL at 10:04

## 2021-08-18 RX ADMIN — INSULIN LISPRO 2 UNITS: 100 INJECTION, SOLUTION INTRAVENOUS; SUBCUTANEOUS at 10:00

## 2021-08-18 RX ADMIN — ENOXAPARIN SODIUM 30 MG: 30 INJECTION SUBCUTANEOUS at 10:03

## 2021-08-18 RX ADMIN — ROSUVASTATIN CALCIUM 10 MG: 10 TABLET, FILM COATED ORAL at 10:04

## 2021-08-18 RX ADMIN — INSULIN LISPRO 5 UNITS: 100 INJECTION, SOLUTION INTRAVENOUS; SUBCUTANEOUS at 10:00

## 2021-08-18 RX ADMIN — Medication 2000 UNITS: at 10:02

## 2021-08-18 RX ADMIN — METOPROLOL TARTRATE 25 MG: 25 TABLET, FILM COATED ORAL at 22:14

## 2021-08-18 RX ADMIN — GUAIFENESIN 600 MG: 600 TABLET, EXTENDED RELEASE ORAL at 12:12

## 2021-08-18 RX ADMIN — Medication 2 PUFF: at 10:25

## 2021-08-18 RX ADMIN — METHYLPREDNISOLONE SODIUM SUCCINATE 40 MG: 40 INJECTION, POWDER, LYOPHILIZED, FOR SOLUTION INTRAMUSCULAR; INTRAVENOUS at 10:02

## 2021-08-18 RX ADMIN — BENZONATATE 200 MG: 100 CAPSULE ORAL at 10:21

## 2021-08-18 RX ADMIN — INSULIN LISPRO 5 UNITS: 100 INJECTION, SOLUTION INTRAVENOUS; SUBCUTANEOUS at 17:56

## 2021-08-18 RX ADMIN — INSULIN LISPRO 1 UNITS: 100 INJECTION, SOLUTION INTRAVENOUS; SUBCUTANEOUS at 22:26

## 2021-08-18 RX ADMIN — METOPROLOL TARTRATE 25 MG: 25 TABLET, FILM COATED ORAL at 10:04

## 2021-08-18 RX ADMIN — INSULIN GLARGINE 15 UNITS: 100 INJECTION, SOLUTION SUBCUTANEOUS at 22:25

## 2021-08-18 RX ADMIN — INSULIN LISPRO 2 UNITS: 100 INJECTION, SOLUTION INTRAVENOUS; SUBCUTANEOUS at 17:57

## 2021-08-18 RX ADMIN — INSULIN LISPRO 5 UNITS: 100 INJECTION, SOLUTION INTRAVENOUS; SUBCUTANEOUS at 12:17

## 2021-08-18 RX ADMIN — Medication 10 ML: at 10:03

## 2021-08-18 RX ADMIN — BENZONATATE 200 MG: 100 CAPSULE ORAL at 22:14

## 2021-08-18 RX ADMIN — INSULIN LISPRO 2 UNITS: 100 INJECTION, SOLUTION INTRAVENOUS; SUBCUTANEOUS at 12:19

## 2021-08-18 RX ADMIN — METHYLPREDNISOLONE SODIUM SUCCINATE 40 MG: 40 INJECTION, POWDER, LYOPHILIZED, FOR SOLUTION INTRAMUSCULAR; INTRAVENOUS at 22:15

## 2021-08-18 RX ADMIN — GUAIFENESIN 600 MG: 600 TABLET, EXTENDED RELEASE ORAL at 22:13

## 2021-08-18 RX ADMIN — Medication 10 ML: at 22:16

## 2021-08-18 ASSESSMENT — PAIN SCALES - GENERAL
PAINLEVEL_OUTOF10: 0

## 2021-08-18 NOTE — PROGRESS NOTES
Hospitalist Progress Note    Date of Admission: 8/16/2021    Chief Complaint: Shortness of breath    Hospital Course:   Catalina Mitchell is a 62 y.o. female. She presents feeling generally ill. Symptoms just began Thursday night. She has felt very fatigued w/ poor appetite, loss of taste, generalized myalgias, and worsening nonproductive cough. Subjective: Minimal shortness of breath. Still has significant coughing. Appetite is stable. Labs:   Recent Labs     08/16/21 2230 08/18/21  0617   WBC 8.4 9.8   HGB 13.3 12.8   HCT 39.4 38.3    218     Recent Labs     08/16/21 2230 08/18/21  0617    139   K 3.9 4.6    104   CO2 19* 18*   BUN 22* 30*   CREATININE 0.8 0.8   CALCIUM 8.9 9.0     Recent Labs     08/16/21 2230 08/18/21  0617   AST 26 19   ALT 12 11   BILITOT 0.5 0.5   ALKPHOS 85 80     Recent Labs     08/17/21  0230 08/18/21  0617   INR 1.34* 1.40*       Physical Exam Performed:    /75   Pulse 82   Temp 98.1 °F (36.7 °C) (Oral)   Resp 19   Ht 5' 1\" (1.549 m)   Wt 134 lb 6.4 oz (61 kg)   LMP 12/21/2012   SpO2 92%   BMI 25.39 kg/m²     General appearance: No apparent distress, appears stated age and cooperative. HEENT: Pupils equal, round, and reactive to light. Conjunctivae/corneas clear. Neck: Supple, no jugular venous distention. Trachea midline with full range of motion. Respiratory:  Normal respiratory effort. Clear to auscultation, bilaterally without Rales/Wheezes/Rhonchi. Cardiovascular: Regular rate and rhythm with normal S1/S2 without murmurs, rubs or gallops. Abdomen: Soft, non-tender, non-distended with normal bowel sounds. Musculoskelatal: No clubbing, cyanosis or edema bilaterally. Full range of motion without deformity. Neurologic:  Neurovascularly intact without any focal sensory/motor deficits.  Cranial nerves: II-XII intact, grossly non-focal.  Psychiatric: Alert and oriented, thought content appropriate, normal insight  Skin: Skin color, texture, turgor normal.  No rashes or lesions. Capillary Refill: Brisk,< 3 seconds   Peripheral Pulses: +2 palpable, equal bilaterally       Assessment/Plan:    Active Hospital Problems    Diagnosis     CAD S/P percutaneous coronary angioplasty [I25.10, Z98.61]     Acute respiratory failure due to severe acute respiratory syndrome coronavirus 2 (SARS-CoV-2) infection ([facility]) [U07.1, J96.00]     Sarcoidosis [D86.9]     Essential hypertension [I10]     Type 2 diabetes mellitus without complication, with long-term current use of insulin ([facility]) [E11.9, Z79.4]        COVID-19 Pneumonia, hx of Pulmonary sarcoidosis  -  Symptom onset:  8/12/21.  -  On presentation was as low as 90% on room air while at rest.    -  Has not had any significant worsening of hypoxia. Continue to monitor off of supplemental oxygen. -  Continue solumedrol 40mg IV q12h and Remdesivir.  -  Pulmonary toilet    CAD s/p PCI  -  S/p CHRISTIAN to LAD 5/2020.  -  Normal Olga/MPI on 7/28/21.  -  Continue ASA, plavix, statin, metoprolol.  -  Hold lisinopril while acutely ill. DM2  -  Holding all oral antidiabetic agents. Continue Insulin regimen. DVT Prophylaxis: Lovenox  Diet: ADULT DIET; Regular; 4 carb choices (60 gm/meal);  Low Fat/Low Chol/High Fiber/DMITRI  Code Status: Full Code  PT/OT Eval Status: NA    Dispo - 1-2 days     Angus Ramirez MD

## 2021-08-18 NOTE — PLAN OF CARE
Problem: Gas Exchange - Impaired  Goal: Absence of hypoxia  8/18/2021 0136 by Emanuel Brady RN  Outcome: Ongoing  8/17/2021 1542 by Keerthi Reaves RN  Outcome: Ongoing     Pt's O2 sats are 91% on room air. Lungs are clear but diminished. Pt encouraged to prone.

## 2021-08-18 NOTE — PROGRESS NOTES
Pt is alert and oriented x 4. Vitals signs are stable. Assessment is as charted. Lungs are clear but diminished.

## 2021-08-19 LAB
A/G RATIO: 0.9 (ref 1.1–2.2)
ALBUMIN SERPL-MCNC: 3.3 G/DL (ref 3.4–5)
ALP BLD-CCNC: 73 U/L (ref 40–129)
ALT SERPL-CCNC: 9 U/L (ref 10–40)
ANION GAP SERPL CALCULATED.3IONS-SCNC: 11 MMOL/L (ref 3–16)
APTT: 33 SEC (ref 26.2–38.6)
AST SERPL-CCNC: 17 U/L (ref 15–37)
BASOPHILS ABSOLUTE: 0 K/UL (ref 0–0.2)
BASOPHILS RELATIVE PERCENT: 0.1 %
BILIRUB SERPL-MCNC: 0.5 MG/DL (ref 0–1)
BUN BLDV-MCNC: 29 MG/DL (ref 7–20)
C-REACTIVE PROTEIN: 18.6 MG/L (ref 0–5.1)
CALCIUM SERPL-MCNC: 8.7 MG/DL (ref 8.3–10.6)
CHLORIDE BLD-SCNC: 103 MMOL/L (ref 99–110)
CO2: 22 MMOL/L (ref 21–32)
CREAT SERPL-MCNC: 0.8 MG/DL (ref 0.6–1.2)
D DIMER: <200 NG/ML DDU (ref 0–229)
EOSINOPHILS ABSOLUTE: 0 K/UL (ref 0–0.6)
EOSINOPHILS RELATIVE PERCENT: 0 %
FIBRINOGEN: 648 MG/DL (ref 200–397)
GFR AFRICAN AMERICAN: >60
GFR NON-AFRICAN AMERICAN: >60
GLOBULIN: 3.5 G/DL
GLUCOSE BLD-MCNC: 186 MG/DL (ref 70–99)
GLUCOSE BLD-MCNC: 215 MG/DL (ref 70–99)
GLUCOSE BLD-MCNC: 218 MG/DL (ref 70–99)
GLUCOSE BLD-MCNC: 240 MG/DL (ref 70–99)
GLUCOSE BLD-MCNC: 304 MG/DL (ref 70–99)
GLUCOSE BLD-MCNC: 330 MG/DL (ref 70–99)
HCT VFR BLD CALC: 36.1 % (ref 36–48)
HEMOGLOBIN: 12.2 G/DL (ref 12–16)
INR BLD: 1.34 (ref 0.88–1.12)
LYMPHOCYTES ABSOLUTE: 0.7 K/UL (ref 1–5.1)
LYMPHOCYTES RELATIVE PERCENT: 5.4 %
MCH RBC QN AUTO: 27.2 PG (ref 26–34)
MCHC RBC AUTO-ENTMCNC: 33.8 G/DL (ref 31–36)
MCV RBC AUTO: 80.6 FL (ref 80–100)
MONOCYTES ABSOLUTE: 0.3 K/UL (ref 0–1.3)
MONOCYTES RELATIVE PERCENT: 2.7 %
NEUTROPHILS ABSOLUTE: 11.5 K/UL (ref 1.7–7.7)
NEUTROPHILS RELATIVE PERCENT: 91.8 %
PDW BLD-RTO: 15.8 % (ref 12.4–15.4)
PERFORMED ON: ABNORMAL
PLATELET # BLD: 219 K/UL (ref 135–450)
PMV BLD AUTO: 6.2 FL (ref 5–10.5)
POTASSIUM REFLEX MAGNESIUM: 4.6 MMOL/L (ref 3.5–5.1)
PROTHROMBIN TIME: 15.3 SEC (ref 9.9–12.7)
RBC # BLD: 4.48 M/UL (ref 4–5.2)
SODIUM BLD-SCNC: 136 MMOL/L (ref 136–145)
TOTAL PROTEIN: 6.8 G/DL (ref 6.4–8.2)
WBC # BLD: 12.6 K/UL (ref 4–11)

## 2021-08-19 PROCEDURE — 96376 TX/PRO/DX INJ SAME DRUG ADON: CPT

## 2021-08-19 PROCEDURE — 2700000000 HC OXYGEN THERAPY PER DAY

## 2021-08-19 PROCEDURE — 85025 COMPLETE CBC W/AUTO DIFF WBC: CPT

## 2021-08-19 PROCEDURE — 85379 FIBRIN DEGRADATION QUANT: CPT

## 2021-08-19 PROCEDURE — G0378 HOSPITAL OBSERVATION PER HR: HCPCS

## 2021-08-19 PROCEDURE — 94761 N-INVAS EAR/PLS OXIMETRY MLT: CPT

## 2021-08-19 PROCEDURE — 6370000000 HC RX 637 (ALT 250 FOR IP): Performed by: INTERNAL MEDICINE

## 2021-08-19 PROCEDURE — 96372 THER/PROPH/DIAG INJ SC/IM: CPT

## 2021-08-19 PROCEDURE — 2580000003 HC RX 258: Performed by: INTERNAL MEDICINE

## 2021-08-19 PROCEDURE — 6360000002 HC RX W HCPCS: Performed by: INTERNAL MEDICINE

## 2021-08-19 PROCEDURE — 85384 FIBRINOGEN ACTIVITY: CPT

## 2021-08-19 PROCEDURE — 80053 COMPREHEN METABOLIC PANEL: CPT

## 2021-08-19 PROCEDURE — 86140 C-REACTIVE PROTEIN: CPT

## 2021-08-19 PROCEDURE — 85730 THROMBOPLASTIN TIME PARTIAL: CPT

## 2021-08-19 PROCEDURE — 85610 PROTHROMBIN TIME: CPT

## 2021-08-19 PROCEDURE — 1200000000 HC SEMI PRIVATE

## 2021-08-19 PROCEDURE — 94640 AIRWAY INHALATION TREATMENT: CPT

## 2021-08-19 RX ORDER — ALBUTEROL SULFATE 90 UG/1
2 AEROSOL, METERED RESPIRATORY (INHALATION) EVERY 6 HOURS PRN
Status: DISCONTINUED | OUTPATIENT
Start: 2021-08-19 | End: 2021-09-01 | Stop reason: HOSPADM

## 2021-08-19 RX ADMIN — ROSUVASTATIN CALCIUM 10 MG: 10 TABLET, FILM COATED ORAL at 09:13

## 2021-08-19 RX ADMIN — ENOXAPARIN SODIUM 30 MG: 30 INJECTION SUBCUTANEOUS at 09:13

## 2021-08-19 RX ADMIN — INSULIN LISPRO 4 UNITS: 100 INJECTION, SOLUTION INTRAVENOUS; SUBCUTANEOUS at 16:18

## 2021-08-19 RX ADMIN — METOPROLOL TARTRATE 25 MG: 25 TABLET, FILM COATED ORAL at 09:13

## 2021-08-19 RX ADMIN — METOPROLOL TARTRATE 25 MG: 25 TABLET, FILM COATED ORAL at 22:58

## 2021-08-19 RX ADMIN — INSULIN GLARGINE 15 UNITS: 100 INJECTION, SOLUTION SUBCUTANEOUS at 22:58

## 2021-08-19 RX ADMIN — ENOXAPARIN SODIUM 30 MG: 30 INJECTION SUBCUTANEOUS at 22:58

## 2021-08-19 RX ADMIN — INSULIN LISPRO 5 UNITS: 100 INJECTION, SOLUTION INTRAVENOUS; SUBCUTANEOUS at 16:18

## 2021-08-19 RX ADMIN — ASPIRIN 81 MG: 81 TABLET, CHEWABLE ORAL at 09:13

## 2021-08-19 RX ADMIN — GUAIFENESIN 600 MG: 600 TABLET, EXTENDED RELEASE ORAL at 22:58

## 2021-08-19 RX ADMIN — INSULIN LISPRO 2 UNITS: 100 INJECTION, SOLUTION INTRAVENOUS; SUBCUTANEOUS at 09:19

## 2021-08-19 RX ADMIN — INSULIN LISPRO 4 UNITS: 100 INJECTION, SOLUTION INTRAVENOUS; SUBCUTANEOUS at 11:50

## 2021-08-19 RX ADMIN — METHYLPREDNISOLONE SODIUM SUCCINATE 40 MG: 40 INJECTION, POWDER, LYOPHILIZED, FOR SOLUTION INTRAMUSCULAR; INTRAVENOUS at 22:57

## 2021-08-19 RX ADMIN — INSULIN LISPRO 5 UNITS: 100 INJECTION, SOLUTION INTRAVENOUS; SUBCUTANEOUS at 09:19

## 2021-08-19 RX ADMIN — GUAIFENESIN 600 MG: 600 TABLET, EXTENDED RELEASE ORAL at 09:13

## 2021-08-19 RX ADMIN — Medication 10 ML: at 22:59

## 2021-08-19 RX ADMIN — CLOPIDOGREL BISULFATE 75 MG: 75 TABLET ORAL at 09:13

## 2021-08-19 RX ADMIN — PANTOPRAZOLE SODIUM 40 MG: 40 TABLET, DELAYED RELEASE ORAL at 05:41

## 2021-08-19 RX ADMIN — Medication 2 PUFF: at 09:44

## 2021-08-19 RX ADMIN — INSULIN LISPRO 1 UNITS: 100 INJECTION, SOLUTION INTRAVENOUS; SUBCUTANEOUS at 22:57

## 2021-08-19 RX ADMIN — INSULIN LISPRO 5 UNITS: 100 INJECTION, SOLUTION INTRAVENOUS; SUBCUTANEOUS at 11:50

## 2021-08-19 RX ADMIN — METHYLPREDNISOLONE SODIUM SUCCINATE 40 MG: 40 INJECTION, POWDER, LYOPHILIZED, FOR SOLUTION INTRAMUSCULAR; INTRAVENOUS at 09:13

## 2021-08-19 RX ADMIN — Medication 2000 UNITS: at 09:13

## 2021-08-19 NOTE — PROGRESS NOTES
Pt is alert and oriented x 4. Vitals signs are stable. Pt placed on 2L O2. Assessment is as charted. Lungs are clear but diminished. Pt with non-productive cough. Pt reports dyspnea on exertion only.

## 2021-08-19 NOTE — PROGRESS NOTES
Pt walked from chair to couch, about 5 feet, and oxygen dropped to 76% on 1L NC. Once sitting she quickly came up to 88-89% but would not come up passed that. Turned to 2L NC. Will wean as tolerated. Pt states she is coughing up yellow phlegm and using IS.

## 2021-08-19 NOTE — CARE COORDINATION
Chart reviewed. Day 2. Patient with increased O2 needs. Currently on 5LNC and 92%. From home with  IPTA.  Tamiko Frederick RN

## 2021-08-19 NOTE — PROGRESS NOTES
Pt a/o. VSS other than 88% on 2L NC. 90-91% on 3L NC. Lungs wheezy. Pt states she feels better but is also anxious to go home. Pt c/o cough that sometimes produces sputum. Pt denies n/v and pain and states she is only SOB when coughing. Encouraged pt to cough up sputum, use IS, drink plenty of fluids and move around as much as possible. Pt states her appetite is ok but the food is not good. Pt stable and denied any other needs when writer left.

## 2021-08-19 NOTE — PROGRESS NOTES
Pt walked to ans from BR, about 30 feet, and saturation was 70% on 1L NC, 80% 2L NC, 85, 3L NC, 90% 5L NC. Pt c/o cough with deep breathing and SOB when moving.

## 2021-08-20 PROBLEM — J12.82 PNEUMONIA DUE TO COVID-19 VIRUS: Status: ACTIVE | Noted: 2021-08-17

## 2021-08-20 PROBLEM — D86.0 PULMONARY SARCOIDOSIS (HCC): Status: ACTIVE | Noted: 2021-07-30

## 2021-08-20 LAB
A/G RATIO: 0.9 (ref 1.1–2.2)
ALBUMIN SERPL-MCNC: 3.3 G/DL (ref 3.4–5)
ALP BLD-CCNC: 72 U/L (ref 40–129)
ALT SERPL-CCNC: 9 U/L (ref 10–40)
ANION GAP SERPL CALCULATED.3IONS-SCNC: 14 MMOL/L (ref 3–16)
APTT: 31 SEC (ref 26.2–38.6)
AST SERPL-CCNC: 14 U/L (ref 15–37)
BASOPHILS ABSOLUTE: 0 K/UL (ref 0–0.2)
BASOPHILS RELATIVE PERCENT: 0.1 %
BILIRUB SERPL-MCNC: 0.5 MG/DL (ref 0–1)
BUN BLDV-MCNC: 27 MG/DL (ref 7–20)
C-REACTIVE PROTEIN: 17.5 MG/L (ref 0–5.1)
CALCIUM SERPL-MCNC: 8.8 MG/DL (ref 8.3–10.6)
CHLORIDE BLD-SCNC: 103 MMOL/L (ref 99–110)
CO2: 22 MMOL/L (ref 21–32)
CREAT SERPL-MCNC: 0.6 MG/DL (ref 0.6–1.2)
D DIMER: 205 NG/ML DDU (ref 0–229)
EOSINOPHILS ABSOLUTE: 0 K/UL (ref 0–0.6)
EOSINOPHILS RELATIVE PERCENT: 0 %
FIBRINOGEN: 648 MG/DL (ref 200–397)
GFR AFRICAN AMERICAN: >60
GFR NON-AFRICAN AMERICAN: >60
GLOBULIN: 3.6 G/DL
GLUCOSE BLD-MCNC: 148 MG/DL (ref 70–99)
GLUCOSE BLD-MCNC: 195 MG/DL (ref 70–99)
GLUCOSE BLD-MCNC: 205 MG/DL (ref 70–99)
GLUCOSE BLD-MCNC: 212 MG/DL (ref 70–99)
GLUCOSE BLD-MCNC: 256 MG/DL (ref 70–99)
GLUCOSE BLD-MCNC: 350 MG/DL (ref 70–99)
HCT VFR BLD CALC: 36.7 % (ref 36–48)
HEMOGLOBIN: 12.4 G/DL (ref 12–16)
INR BLD: 1.36 (ref 0.88–1.12)
LYMPHOCYTES ABSOLUTE: 0.8 K/UL (ref 1–5.1)
LYMPHOCYTES RELATIVE PERCENT: 5.8 %
MCH RBC QN AUTO: 27 PG (ref 26–34)
MCHC RBC AUTO-ENTMCNC: 33.8 G/DL (ref 31–36)
MCV RBC AUTO: 79.8 FL (ref 80–100)
MONOCYTES ABSOLUTE: 0.6 K/UL (ref 0–1.3)
MONOCYTES RELATIVE PERCENT: 4.2 %
NEUTROPHILS ABSOLUTE: 11.9 K/UL (ref 1.7–7.7)
NEUTROPHILS RELATIVE PERCENT: 89.9 %
PDW BLD-RTO: 15.4 % (ref 12.4–15.4)
PERFORMED ON: ABNORMAL
PLATELET # BLD: 242 K/UL (ref 135–450)
PMV BLD AUTO: 6.5 FL (ref 5–10.5)
POTASSIUM REFLEX MAGNESIUM: 4.5 MMOL/L (ref 3.5–5.1)
PROCALCITONIN: 0.04 NG/ML (ref 0–0.15)
PROTHROMBIN TIME: 15.6 SEC (ref 9.9–12.7)
RBC # BLD: 4.6 M/UL (ref 4–5.2)
SODIUM BLD-SCNC: 139 MMOL/L (ref 136–145)
TOTAL PROTEIN: 6.9 G/DL (ref 6.4–8.2)
WBC # BLD: 13.2 K/UL (ref 4–11)

## 2021-08-20 PROCEDURE — XW033H5 INTRODUCTION OF TOCILIZUMAB INTO PERIPHERAL VEIN, PERCUTANEOUS APPROACH, NEW TECHNOLOGY GROUP 5: ICD-10-PCS | Performed by: INTERNAL MEDICINE

## 2021-08-20 PROCEDURE — 96376 TX/PRO/DX INJ SAME DRUG ADON: CPT

## 2021-08-20 PROCEDURE — 85384 FIBRINOGEN ACTIVITY: CPT

## 2021-08-20 PROCEDURE — 84145 PROCALCITONIN (PCT): CPT

## 2021-08-20 PROCEDURE — 6370000000 HC RX 637 (ALT 250 FOR IP): Performed by: INTERNAL MEDICINE

## 2021-08-20 PROCEDURE — 36415 COLL VENOUS BLD VENIPUNCTURE: CPT

## 2021-08-20 PROCEDURE — 99223 1ST HOSP IP/OBS HIGH 75: CPT | Performed by: INTERNAL MEDICINE

## 2021-08-20 PROCEDURE — 85610 PROTHROMBIN TIME: CPT

## 2021-08-20 PROCEDURE — 96372 THER/PROPH/DIAG INJ SC/IM: CPT

## 2021-08-20 PROCEDURE — 94761 N-INVAS EAR/PLS OXIMETRY MLT: CPT

## 2021-08-20 PROCEDURE — 85379 FIBRIN DEGRADATION QUANT: CPT

## 2021-08-20 PROCEDURE — 2580000003 HC RX 258: Performed by: INTERNAL MEDICINE

## 2021-08-20 PROCEDURE — 85730 THROMBOPLASTIN TIME PARTIAL: CPT

## 2021-08-20 PROCEDURE — 85025 COMPLETE CBC W/AUTO DIFF WBC: CPT

## 2021-08-20 PROCEDURE — 6360000002 HC RX W HCPCS: Performed by: INTERNAL MEDICINE

## 2021-08-20 PROCEDURE — 86140 C-REACTIVE PROTEIN: CPT

## 2021-08-20 PROCEDURE — G0378 HOSPITAL OBSERVATION PER HR: HCPCS

## 2021-08-20 PROCEDURE — 2700000000 HC OXYGEN THERAPY PER DAY

## 2021-08-20 PROCEDURE — 99254 IP/OBS CNSLTJ NEW/EST MOD 60: CPT | Performed by: INTERNAL MEDICINE

## 2021-08-20 PROCEDURE — 1200000000 HC SEMI PRIVATE

## 2021-08-20 PROCEDURE — 80053 COMPREHEN METABOLIC PANEL: CPT

## 2021-08-20 PROCEDURE — 96366 THER/PROPH/DIAG IV INF ADDON: CPT

## 2021-08-20 PROCEDURE — 96365 THER/PROPH/DIAG IV INF INIT: CPT

## 2021-08-20 RX ORDER — INSULIN GLARGINE 100 [IU]/ML
20 INJECTION, SOLUTION SUBCUTANEOUS NIGHTLY
Status: DISCONTINUED | OUTPATIENT
Start: 2021-08-20 | End: 2021-08-22

## 2021-08-20 RX ORDER — METHYLPREDNISOLONE SODIUM SUCCINATE 40 MG/ML
40 INJECTION, POWDER, LYOPHILIZED, FOR SOLUTION INTRAMUSCULAR; INTRAVENOUS EVERY 6 HOURS
Status: DISCONTINUED | OUTPATIENT
Start: 2021-08-20 | End: 2021-08-26

## 2021-08-20 RX ADMIN — CLOPIDOGREL BISULFATE 75 MG: 75 TABLET ORAL at 08:30

## 2021-08-20 RX ADMIN — GUAIFENESIN 600 MG: 600 TABLET, EXTENDED RELEASE ORAL at 08:29

## 2021-08-20 RX ADMIN — METOPROLOL TARTRATE 25 MG: 25 TABLET, FILM COATED ORAL at 08:39

## 2021-08-20 RX ADMIN — BENZONATATE 200 MG: 100 CAPSULE ORAL at 13:28

## 2021-08-20 RX ADMIN — INSULIN LISPRO 5 UNITS: 100 INJECTION, SOLUTION INTRAVENOUS; SUBCUTANEOUS at 13:30

## 2021-08-20 RX ADMIN — PANTOPRAZOLE SODIUM 40 MG: 40 TABLET, DELAYED RELEASE ORAL at 06:21

## 2021-08-20 RX ADMIN — ROSUVASTATIN CALCIUM 10 MG: 10 TABLET, FILM COATED ORAL at 08:39

## 2021-08-20 RX ADMIN — INSULIN LISPRO 5 UNITS: 100 INJECTION, SOLUTION INTRAVENOUS; SUBCUTANEOUS at 18:00

## 2021-08-20 RX ADMIN — METHYLPREDNISOLONE SODIUM SUCCINATE 40 MG: 40 INJECTION, POWDER, LYOPHILIZED, FOR SOLUTION INTRAMUSCULAR; INTRAVENOUS at 08:30

## 2021-08-20 RX ADMIN — INSULIN LISPRO 5 UNITS: 100 INJECTION, SOLUTION INTRAVENOUS; SUBCUTANEOUS at 13:29

## 2021-08-20 RX ADMIN — INSULIN LISPRO 5 UNITS: 100 INJECTION, SOLUTION INTRAVENOUS; SUBCUTANEOUS at 08:34

## 2021-08-20 RX ADMIN — METHYLPREDNISOLONE SODIUM SUCCINATE 40 MG: 40 INJECTION, POWDER, LYOPHILIZED, FOR SOLUTION INTRAMUSCULAR; INTRAVENOUS at 17:55

## 2021-08-20 RX ADMIN — ENOXAPARIN SODIUM 30 MG: 30 INJECTION SUBCUTANEOUS at 08:29

## 2021-08-20 RX ADMIN — ASPIRIN 81 MG: 81 TABLET, CHEWABLE ORAL at 08:30

## 2021-08-20 RX ADMIN — TOCILIZUMAB 486 MG: 180 INJECTION, SOLUTION SUBCUTANEOUS at 17:58

## 2021-08-20 RX ADMIN — Medication 10 ML: at 23:18

## 2021-08-20 RX ADMIN — Medication 10 ML: at 08:32

## 2021-08-20 RX ADMIN — ENOXAPARIN SODIUM 30 MG: 30 INJECTION SUBCUTANEOUS at 23:18

## 2021-08-20 RX ADMIN — INSULIN LISPRO 1 UNITS: 100 INJECTION, SOLUTION INTRAVENOUS; SUBCUTANEOUS at 23:19

## 2021-08-20 RX ADMIN — METHYLPREDNISOLONE SODIUM SUCCINATE 40 MG: 40 INJECTION, POWDER, LYOPHILIZED, FOR SOLUTION INTRAMUSCULAR; INTRAVENOUS at 23:18

## 2021-08-20 RX ADMIN — GUAIFENESIN AND DEXTROMETHORPHAN 5 ML: 100; 10 SYRUP ORAL at 17:58

## 2021-08-20 RX ADMIN — INSULIN LISPRO 2 UNITS: 100 INJECTION, SOLUTION INTRAVENOUS; SUBCUTANEOUS at 08:34

## 2021-08-20 RX ADMIN — INSULIN LISPRO 6 UNITS: 100 INJECTION, SOLUTION INTRAVENOUS; SUBCUTANEOUS at 18:01

## 2021-08-20 RX ADMIN — SODIUM CHLORIDE 25 ML: 9 INJECTION, SOLUTION INTRAVENOUS at 17:55

## 2021-08-20 RX ADMIN — GUAIFENESIN 600 MG: 600 TABLET, EXTENDED RELEASE ORAL at 23:18

## 2021-08-20 RX ADMIN — Medication 2000 UNITS: at 08:30

## 2021-08-20 NOTE — PROGRESS NOTES
Patients spo2 89% on 6 liters of oxygen. Tubing changed to high flow tubing and oxygen flow rate increased to 7 liters/min. Patients spo2 94%. Patient continue to lay on her left side. Denies any needs.

## 2021-08-20 NOTE — PROGRESS NOTES
Pt A&O x4. VSS. The patients spo2 is 93% on 6 liters of oxygen while laying on her left side. While sitting up, her spo2 will drop to 88%. Pt reports passing flatus. Assessment is as charted. Call light and bedside table within reach, wheels locked, bed in lowest position, Pt instructed to call out for assistance and expressed understanding, calls out appropriately.

## 2021-08-20 NOTE — CONSULTS
Infectious Diseases   Consult Note      Reason for Consult:  COVID    Requesting Physician:  Dr. Ronit Marino         Date of Admission: 2021  Subjective:   CHIEF COMPLAINT:   None given       HPI:   Alice Kessler is a 58yoF with history of ILD, sarcoidosis, CAD, DM, HLD    Admitted through ED 21 with fatigue, malaise, cough, SOB. Symptom onset 21  CXR with diffuse GGO   COVID +  Started on steroids     Worsening respiratory status, from 5L NC overnight to 12L HFNC currently   Ddimer consistently low  CRP is not too high                    Current abx:  None     Solumedrol 40 q12        Past Surgical History:       Diagnosis Date    CAD (coronary artery disease)     1 stent    [facility]     Choking sensation     COVID-19 08/15/2021    Diabetes mellitus ([facility]     DM2 (diabetes mellitus, type 2) ([facility]     Hyperlipidemia     Prolonged emergence from general anesthesia     Sarcoid     lungs         Procedure Laterality Date    CARDIAC SURGERY  2020    stent     SECTION      x3    CHOLECYSTECTOMY      COLONOSCOPY      COLONOSCOPY  13    normal    COLONOSCOPY  10/19/2018    1 polyp    COLONOSCOPY N/A 10/19/2018    COLONOSCOPY POLYPECTOMY SNARE/COLD BIOPSY performed by Brad Yan MD at [facility] Right 2020    RIGHT LONG TRIGGER FINGER RELEASE performed by Jeramie Bhatt MD at [facility] HYSTEROSCOPY  2013    Laprascopic supracervical hysterectomy       Social History:    TOBACCO:   reports that she has never smoked. She has never used smokeless tobacco.  ETOH:   reports no history of alcohol use. There is no history of illicit drug use or other significant epidemiologic exposures.       Family History:       Problem Relation Age of Onset    Diabetes Mother     Heart Disease Mother     Cancer Father         colon       Current Medications:    Current Facility-Administered Medications: insulin glargine (LANTUS) injection vial 20 Units, 20 Units, Subcutaneous, Nightly  insulin lispro (HUMALOG) injection vial 0-12 Units, 0-12 Units, Subcutaneous, TID WC  insulin lispro (HUMALOG) injection vial 0-6 Units, 0-6 Units, Subcutaneous, Nightly  tocilizumab (ACTEMRA) 486 mg in sodium chloride 0.9 % 100 mL IVPB, 486 mg, Intravenous, Once  albuterol sulfate  (90 Base) MCG/ACT inhaler 2 puff, 2 puff, Inhalation, Q6H PRN  guaiFENesin (MUCINEX) extended release tablet 600 mg, 600 mg, Oral, BID  aspirin chewable tablet 81 mg, 81 mg, Oral, Daily  Vitamin D (CHOLECALCIFEROL) tablet 2,000 Units, 2,000 Units, Oral, Daily  clopidogrel (PLAVIX) tablet 75 mg, 75 mg, Oral, Daily  metoprolol tartrate (LOPRESSOR) tablet 25 mg, 25 mg, Oral, BID  pantoprazole (PROTONIX) tablet 40 mg, 40 mg, Oral, QAM AC  rosuvastatin (CRESTOR) tablet 10 mg, 10 mg, Oral, Daily  glucose (GLUTOSE) 40 % oral gel 15 g, 15 g, Oral, PRN  dextrose 50 % IV solution, 12.5 g, Intravenous, PRN  glucagon (rDNA) injection 1 mg, 1 mg, Intramuscular, PRN  dextrose 5 % solution, 100 mL/hr, Intravenous, PRN  sodium chloride flush 0.9 % injection 10 mL, 10 mL, Intravenous, PRN  0.9 % sodium chloride infusion, 25 mL, Intravenous, PRN  potassium chloride (KLOR-CON M) extended release tablet 40 mEq, 40 mEq, Oral, PRN **OR** potassium bicarb-citric acid (EFFER-K) effervescent tablet 40 mEq, 40 mEq, Oral, PRN **OR** potassium chloride 10 mEq/100 mL IVPB (Peripheral Line), 10 mEq, Intravenous, PRN  magnesium sulfate 1000 mg in dextrose 5% 100 mL IVPB, 1,000 mg, Intravenous, PRN  ondansetron (ZOFRAN-ODT) disintegrating tablet 4 mg, 4 mg, Oral, Q8H PRN **OR** ondansetron (ZOFRAN) injection 4 mg, 4 mg, Intravenous, Q6H PRN  enoxaparin (LOVENOX) injection 30 mg, 30 mg, Subcutaneous, BID  acetaminophen (TYLENOL) tablet 650 mg, 650 mg, Oral, Q6H PRN **OR** acetaminophen (TYLENOL) suppository 650 mg, 650 mg, Rectal, Q6H PRN  insulin lispro (HUMALOG) injection vial 5 Units, 0.08 Units/kg, Subcutaneous, TID WC  guaiFENesin-dextromethorphan (ROBITUSSIN DM) 100-10 MG/5ML syrup 5 mL, 5 mL, Oral, Q4H PRN  methylPREDNISolone sodium (SOLU-MEDROL) injection 40 mg, 40 mg, Intravenous, Q12H  melatonin tablet 3 mg, 3 mg, Oral, Nightly PRN  albuterol sulfate  (90 Base) MCG/ACT inhaler 2 puff, 2 puff, Inhalation, Q4H PRN  benzonatate (TESSALON) capsule 200 mg, 200 mg, Oral, TID PRN      Allergies   Allergen Reactions    Antivert [Meclizine]     Codeine Nausea And Vomiting    Phenergan [Promethazine Hcl] Nausea And Vomiting    Influenza Vaccines      Arm hot , red pain, sick    Pneumococcal Vaccines      Arm hot pain, sick    Vicodin [Hydrocodone-Acetaminophen]      itching    Hydrocodone-Acetaminophen Nausea And Vomiting    Oxycodone Nausea And Vomiting     hallucianitions    Promethazine Nausea And Vomiting        REVIEW OF SYSTEMS:    CONSTITUTIONAL:  Per HPI    EYES:  negative for blurred vision, eye discharge, visual disturbance and icterus  HEENT:  negative for hearing loss, tinnitus, ear drainage, sinus pressure, nasal congestion, epistaxis and snoring  RESPIRATORY:   Per HPI   CARDIOVASCULAR:  negative for chest pain, palpitations, exertional chest pressure/discomfort, edema, syncope  GASTROINTESTINAL:  negative for nausea, vomiting, diarrhea, constipation, blood in stool and abdominal pain  GENITOURINARY:  negative for frequency, dysuria, urinary incontinence, decreased urine volume, and hematuria  HEMATOLOGIC/LYMPHATIC:  negative for easy bruising, bleeding and lymphadenopathy  ALLERGIC/IMMUNOLOGIC:  negative for recurrent infections, angioedema, anaphylaxis and drug reactions  ENDOCRINE:  negative for weight changes and diabetic symptoms including polyuria, polydipsia and polyphagia  MUSCULOSKELETAL:  negative for acute joint swelling, decreased range of motion and muscle weakness  NEUROLOGICAL:  negative for headaches, slurred speech, unilateral weakness  PSYCHIATRIC/BEHAVIORAL: negative for hallucinations, behavioral problems, confusion and agitation. Objective:   PHYSICAL EXAM:      VITALS:  /65   Pulse 61   Temp 97.9 °F (36.6 °C) (Oral)   Resp 19   Ht 5' 1\" (1.549 m)   Wt 134 lb 11.2 oz (61.1 kg)   LMP 12/21/2012   SpO2 95%   BMI 25.45 kg/m²      24HR INTAKE/OUTPUT:      Intake/Output Summary (Last 24 hours) at 8/20/2021 1649  Last data filed at 8/20/2021 1335  Gross per 24 hour   Intake 340 ml   Output    Net 340 ml     CONSTITUTIONAL:  Awake, alert, cooperative, no apparent distress, and appears stated age  [de-identified]: NCAT, PERRL, EOMI. Sclera white, conjunctiva full. OP with moist mucosal membranes, no thrush, tongue protrudes midline  NECK:  Supple, symmetrical, trachea midline, no adenopathy  LUNGS:  no increased work of breathing, upper lobes clear ruby   CARDIOVASCULAR:  RRR  ABDOMEN:  normal bowel sounds, non-tender, non-distended, no hepatosplenomegaly  LYMPHADENOPATHY:  no axillary or supraclavicular adenopathy. No cervical adnenopathy  PSYCHIATRIC: Oriented to person place and time. No obvious depression or anxiety. MUSCULOSKELETAL: No obvious misalignment or effusion of the joints. No clubbing, cyanosis of the digits. SKIN:  normal skin color, texture, turgor and no redness, warmth, or swelling.  No palpable nodules or stigmata of embolic phenomenon  NEUROLOGIC: nonfocal exam  ACCESS:  IV site ok       DATA:    Old records have been reviewed    CBC:  Recent Labs     08/18/21 0617 08/19/21  0512 08/20/21  0736   WBC 9.8 12.6* 13.2*   RBC 4.71 4.48 4.60   HGB 12.8 12.2 12.4   HCT 38.3 36.1 36.7    219 242   MCV 81.2 80.6 79.8*   MCH 27.1 27.2 27.0   MCHC 33.4 33.8 33.8   RDW 15.3 15.8* 15.4      BMP:  Recent Labs     08/18/21  0617 08/19/21  0512 08/20/21  0737    136 139   K 4.6 4.6 4.5    103 103   CO2 18* 22 22   BUN 30* 29* 27*   CREATININE 0.8 0.8 0.6   CALCIUM 9.0 8.7 8.8 GLUCOSE 270* 240* 212*        Cultures:   8/17 COVID NAAT+   UA neg       Radiology Review:  All pertinent images / reports were reviewed as a part of this visit. Assessment:     Patient Active Problem List   Diagnosis    TIA (transient ischemic attack)    SOB (shortness of breath)    Arrhythmia    Blurred vision, right eye    Palpitations    Abnormal CT scan, chest    Dyspnea    Mediastinal adenopathy    Pulmonary nodules    Abnormal EKG    Precordial pain    Dizziness    Acute chest pain    Coronary artery disease involving native coronary artery of native heart with unstable angina pectoris (HCC)    Mixed hyperlipidemia    Type 2 diabetes mellitus without complication, with long-term current use of insulin (HCC)    Trigger finger, right middle finger    Hand arthritis    Essential hypertension    Interstitial lung disease ([facility]    Sarcoidosis    CAD S/P percutaneous coronary angioplasty    Acute respiratory failure due to severe acute respiratory syndrome coronavirus 2 (SARS-CoV-2) infection ([facility])       History of ILD, poss sarcoidosis, CAD, DM, HLD      COVID-19   Unvaccinated host   Date of symptom onset 8/11/21  Date of diagnosis and admission 8/17/21  Worsening hypoxemia despite steroid    -continue solumedrol, increase dose  -give dose of IL6 inh  -supportive care  -DVT ppx  -trend CRP, ddimer   -encourage side, prone positioning   -isolation through 8/31/21      Discussed with patient, questions addressed  D/w  at patient's request        Soren Chicas M.D. Thank you for the opportunity to participate in the care of your patient.     Please do not hesitate to contact me:   [phone] office

## 2021-08-20 NOTE — CARE COORDINATION
Chart reviewed day 3. Patient covid +. O2 needs increased to 12LHF. . following for DCP needs.  Nimesh Conryo RN

## 2021-08-20 NOTE — PROGRESS NOTES
ID consult received  Patient seen and examined     58yoF with history of ILD, poss sarcoidosis, CAD, DM, HLD     Admitted 8/16/21 with COVID pneumonia  Not previously vaccinated   Date of symptom onset 8/11/21  Date of diagnosis 8/17/21    On solumedrol 40 q12, vit D supplement    Worsening respiratory status, from 5L NC overnight to 12L HFNC currently   Ddimer consistently low  CRP is not too high     Last CXR 8/16/21    -increase steroid dose and give dose of tocilizumab   -may need vapotherm and high risk of progression needing MV  -supportive care  -Pulm to see    I called pt's  at her request  D/w RN     Full note to follow  Yu Draper MD

## 2021-08-20 NOTE — CONSULTS
INPATIENT PULMONARY CRITICAL CARE CONSULT NOTE      Chief Complaint/Referring Provider:  Patient is being seen at the request of Dr. Sheeba Nayak for a consultation for COVID. Hx of ILD/possible Sarcoidosis. Worsening hypoxia. Presenting HPI: Jacqui Kovacs. Maria M Flores is a 29-year-old female living in [facility] with her spouse. The patient is a lifelong non-smoker, does not drink alcohol. She has extensive past medical history of diabetes mellitus, hyperlipidemia, CVA, CAD and pulmonary sarcoidosis. She has undergone multiple surgeries and multiple colonoscopy procedures. 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 She has 3 children of her own. Regarding her pulmonary sarcoidosis, this was diagnosed by bronchoscopy by Dr. Tobin Wallis during her hospitalization in September 2014. Bronchoscopy with EBUS showed noncaseating granulomas with negative stains for organisms. She was recommended PFT, 6-minute walk test.  She declined to take steroids on account of her diabetes mellitus, alternative treatment was being considered but it appears she did not follow-up. She does not remember details, but saw Dr. Kizzy Person in June last year, and again in June of this year. She had PFT on both occasions, she is not on any active treatment. The patient was in her usual state of health until last Thursday. She does not take vaccines as she has \"allergies\" to multiple vaccines including influenza and pneumonia vaccine. Reportedly her  is also ill with symptoms, this test is pending. She does not know that she could have contracted COVID-19 infection, but did go to [employer] on Sunday where several people were coughing. The patient presented to the ER on 8/16/2021 as she suspected she had COVID-19 infection. She had fever, cough, malaise, body ache. She lost taste and smell. She was afebrile but ill-appearing. SaO2 was 94%.   Labs were drawn, chest x-ray was found to be compatible with Covid disease)     1 stent    Cerebral [facility] occlusion with [facility] infarction ([facility] [address]     Choking sensation     COVID-19 08/15/2021    Diabetes mellitus ([facility] [address] [facility]     DM2 (diabetes mellitus, type 2) ([facility] [facility]     Hyperlipidemia     Prolonged emergence from general anesthesia     Sarcoid     lungs        Past Surgical History:   Procedure Laterality Date    CARDIAC SURGERY  2020    stent     SECTION      x3    CHOLECYSTECTOMY      COLONOSCOPY      COLONOSCOPY  13    normal    COLONOSCOPY  10/19/2018    1 polyp    COLONOSCOPY N/A 10/19/2018    COLONOSCOPY POLYPECTOMY SNARE/COLD BIOPSY performed by Denilson Le MD at [address] Right 2020    RIGHT LONG TRIGGER FINGER RELEASE performed by Rivka Marinelli MD at [facility] HYSTEROSCOPY  2013    Laprascopic supracervical hysterectomy        Family History   Problem Relation Age of Onset    Diabetes Mother     Heart Disease Mother     Cancer Father         colon        Social History     Tobacco Use    Smoking status: Never Smoker    Smokeless tobacco: Never Used   Substance Use Topics    Alcohol use: No        Allergies   Allergen Reactions    Antivert [Meclizine]     Codeine Nausea And Vomiting    Phenergan [Promethazine Hcl] Nausea And Vomiting    Influenza Vaccines      Arm hot , red pain, sick    Pneumococcal Vaccines      Arm hot pain, sick    Vicodin [Hydrocodone-Acetaminophen]      itching    Hydrocodone-Acetaminophen Nausea And Vomiting    Oxycodone Nausea And Vomiting     hallucianitions    Promethazine Nausea And Vomiting     Physical Exam:  Blood pressure 110/65, pulse 61, temperature 97.9 °F (36.6 °C), temperature source Oral, resp. rate 19, height 5' 1\" (1.549 m), weight 134 lb 11.2 oz (61.1 kg), last menstrual period 2012, SpO2 95 %.'   Constitutional: Small built, ill-appearing, no acute distress.    HENT:  Oropharynx is clear and moist. Class III airway  Eyes:  Conjunctivae are normal. Pupils equal, round, and reactive to light. No scleral icterus. Neck: No JVD. No tracheal deviation present. No obvious thyroid mass. Cardiovascular: Normal rate, regular rhythm, normal heart sounds. No lower extremity edema. Pulmonary/Chest: No wheezes. Bilateral infrascapular crackles. No accessory muscle usage or stridor. Abdominal:  Musculoskeletal: No cyanosis. No clubbing. No obvious joint deformity. Lymphadenopathy: Deferred  Skin: Skin is warm and dry. No rash or nodules on the exposed extremities. Psychiatric: Normal mood and affect. Behavior is normal.  No anxiety. Neurologic: Alert, awake and oriented. PERRL. Speech fluent. No obvious focal deficit, detailed exam not performed        Results:  CBC:   Recent Labs     08/18/21 0617 08/19/21  0512 08/20/21  0736   WBC 9.8 12.6* 13.2*   HGB 12.8 12.2 12.4   HCT 38.3 36.1 36.7   MCV 81.2 80.6 79.8*    219 242     BMP:   Recent Labs     08/18/21  0617 08/19/21  0512 08/20/21  0737    136 139   K 4.6 4.6 4.5    103 103   CO2 18* 22 22   BUN 30* 29* 27*   CREATININE 0.8 0.8 0.6     LIVER PROFILE:   Recent Labs     08/18/21 0617 08/19/21  0512 08/20/21  0737   AST 19 17 14*   ALT 11 9* 9*   BILITOT 0.5 0.5 0.5   ALKPHOS 80 73 72     PT/INR:   Recent Labs     08/18/21 0617 08/19/21  0512 08/20/21  0736   PROTIME 16.0* 15.3* 15.6*   INR 1.40* 1.34* 1.36*     APTT:   Recent Labs     08/18/21 0617 08/19/21  0512 08/20/21  0736   APTT 33.7 33.0 31.0       Imaging:  I have reviewed radiology images personally. XR CHEST PORTABLE   Final Result   Bibasilar airspace disease not significantly changed from earlier today. Correlate clinically for possible COVID disease. XR CHEST (2 VW)    Result Date: 8/16/2021  EXAMINATION: TWO XRAY VIEWS OF THE CHEST 8/16/2021 11:15 am COMPARISON: None.  HISTORY: ORDERING SYSTEM PROVIDED HISTORY: Viral illness TECHNOLOGIST PROVIDED HISTORY: RDCS, CNMT, ARRT   Nurse              Kasi Jay RN Interpreting        [address]           Lynn YOUNG MD   Ordering Physician Nancy Dallas CNP   The procedure was explained in detail to the patient. Risks,  complications and alternative treatments were reviewed. Written consent  was obtained. Procedure Procedure Type:   Nuclear Stress Test:Pharmacological, NM MYOCARDIAL SPECT REST EXERCISE OR  RX   Study location: [address] [place] [address],[address] Nuclear Medicine   Indications: Chest pain. Hospital Status: Outpatient. Height: 60 inches Weight: 141 pounds  Risk Factors   The patient risk factors include:prior PCI on  05/20/2020;hypercholesterolemia, hypertension, family history of premature  CAD and diabetes mellitus. Conclusions   Summary  Normal LV function. There is normal isotope uptake at stress and rest. There is no evidence of  myocardial ischemia or scar. Normal myocardial perfusion study. Stress Protocols   Resting ECG  NSR, baseline artifact   Resting HR:64 bpm  Resting BP:108/64 mmHg  Stress Protocol:Pharmacologic - Lexiscan's  Peak HR:93 bpm                           HR/BP product:11047  Peak BP:127/71 mmHg  Predicted HR: 160 bpm  % of predicted HR: 58  Test duration: 4 min  Reason for termination:Completed   ECG Findings  No ischemic EKG changes. Arrhythmias  None   Symptoms  Patient developed shortness of breath, likely related to lexiscan. Symptoms resolved with rest.   Complications  Procedure complication was none. Stress Interpretation  Normal LVEF >60%  Normal wall motion  No ischemia  Procedure Medications   - Lexiscan I.V. 0.4 mg.  Imaging Protocols   - One Day   Rest                          Stress   Isotope:Myoview/Tetrofosmin   Isotope: Myoview/Tetrofosmin  Isotope dose:11 mCi           Isotope dose:34 mCi  Administration Route:I.V.      Administration have examined the patient, reviewed labs, images and medical records. My impression and recommendations are as above. We will follow with you, thank you for the consultation. Discussed with the patient and nursing.       Electronically signed by:  Karli Pearce MD    8/20/2021    5:13 PM.

## 2021-08-20 NOTE — PROGRESS NOTES
Hospitalist Progress Note    Date of Admission: 8/16/2021    Chief Complaint: Shortness of breath    Hospital Course:   Ethan Arrieta is a 62 y.o. female. She presents feeling generally ill. Symptoms just began Thursday night. She has felt very fatigued w/ poor appetite, loss of taste, generalized myalgias, and worsening nonproductive cough. Subjective: Still with some mild SOB. Now with slight worsening of hypoxia, worse with exertion. Labs:   Recent Labs     08/18/21 0617 08/19/21  0512   WBC 9.8 12.6*   HGB 12.8 12.2   HCT 38.3 36.1    219     Recent Labs     08/18/21 0617 08/19/21  0512    136   K 4.6 4.6    103   CO2 18* 22   BUN 30* 29*   CREATININE 0.8 0.8   CALCIUM 9.0 8.7     Recent Labs     08/18/21 0617 08/19/21  0512   AST 19 17   ALT 11 9*   BILITOT 0.5 0.5   ALKPHOS 80 73     Recent Labs     08/17/21  0230 08/18/21 0617 08/19/21  0512   INR 1.34* 1.40* 1.34*       Physical Exam Performed:    /71   Pulse 73   Temp 97.3 °F (36.3 °C) (Oral)   Resp 19   Ht 5' 1\" (1.549 m)   Wt 134 lb 11.2 oz (61.1 kg)   LMP 12/21/2012   SpO2 (!) 87%   BMI 25.45 kg/m²     General appearance: No apparent distress, appears stated age and cooperative. HEENT: Pupils equal, round, and reactive to light. Conjunctivae/corneas clear. Neck: Supple, no jugular venous distention. Trachea midline with full range of motion. Respiratory:  Normal respiratory effort. Clear to auscultation, bilaterally without Rales/Wheezes/Rhonchi. Cardiovascular: Regular rate and rhythm with normal S1/S2 without murmurs, rubs or gallops. Abdomen: Soft, non-tender, non-distended with normal bowel sounds. Musculoskelatal: No clubbing, cyanosis or edema bilaterally. Full range of motion without deformity. Neurologic:  Neurovascularly intact without any focal sensory/motor deficits.  Cranial nerves: II-XII intact, grossly non-focal.  Psychiatric: Alert and oriented, thought content appropriate, normal insight  Skin: Skin color, texture, turgor normal.  No rashes or lesions. Capillary Refill: Brisk,< 3 seconds   Peripheral Pulses: +2 palpable, equal bilaterally       Assessment/Plan:    Active Hospital Problems    Diagnosis     CAD S/P percutaneous coronary angioplasty [I25.10, Z98.61]     Acute respiratory failure due to severe acute respiratory syndrome coronavirus 2 (SARS-CoV-2) infection ([facility]) [U07.1, J96.00]     Sarcoidosis [D86.9]     Essential hypertension [I10]     Type 2 diabetes mellitus without complication, with long-term current use of insulin ([facility]) [E11.9, Z79.4]      COVID-19 Pneumonia, hx of Pulmonary sarcoidosis  -  Symptom onset:  8/12/21.  -  On presentation was as low as 90% on room air while at rest.    -  Mild hypoxia has developed, worse with exertion  -  Continue solumedrol 40mg IV q12h and Remdesivir.  -  Pulmonary toilet    CAD s/p PCI  -  S/p CHRISTIAN to LAD 5/2020.  -  Normal Olga/MPI on 7/28/21.  -  Continue ASA, plavix, statin, metoprolol.  -  Hold lisinopril while acutely ill. DM2  -  Holding all oral antidiabetic agents. Continue Insulin regimen. DVT Prophylaxis: Lovenox  Diet: ADULT DIET; Regular; 4 carb choices (60 gm/meal);  Low Fat/Low Chol/High Fiber/DMITRI  Code Status: Full Code  PT/OT Eval Status: NA    Dispo - 1-2 days     Shira Fontaine MD

## 2021-08-21 LAB
A/G RATIO: 0.9 (ref 1.1–2.2)
ALBUMIN SERPL-MCNC: 3.1 G/DL (ref 3.4–5)
ALP BLD-CCNC: 70 U/L (ref 40–129)
ALT SERPL-CCNC: 7 U/L (ref 10–40)
ANION GAP SERPL CALCULATED.3IONS-SCNC: 14 MMOL/L (ref 3–16)
APTT: 30.5 SEC (ref 26.2–38.6)
AST SERPL-CCNC: 13 U/L (ref 15–37)
BASOPHILS ABSOLUTE: 0 K/UL (ref 0–0.2)
BASOPHILS RELATIVE PERCENT: 0.2 %
BILIRUB SERPL-MCNC: 0.5 MG/DL (ref 0–1)
BUN BLDV-MCNC: 29 MG/DL (ref 7–20)
CALCIUM SERPL-MCNC: 8.8 MG/DL (ref 8.3–10.6)
CHLORIDE BLD-SCNC: 104 MMOL/L (ref 99–110)
CO2: 23 MMOL/L (ref 21–32)
CREAT SERPL-MCNC: 0.7 MG/DL (ref 0.6–1.2)
D DIMER: 221 NG/ML DDU (ref 0–229)
EOSINOPHILS ABSOLUTE: 0 K/UL (ref 0–0.6)
EOSINOPHILS RELATIVE PERCENT: 0 %
FIBRINOGEN: 729 MG/DL (ref 200–397)
GFR AFRICAN AMERICAN: >60
GFR NON-AFRICAN AMERICAN: >60
GLOBULIN: 3.5 G/DL
GLUCOSE BLD-MCNC: 197 MG/DL (ref 70–99)
GLUCOSE BLD-MCNC: 222 MG/DL (ref 70–99)
GLUCOSE BLD-MCNC: 226 MG/DL (ref 70–99)
GLUCOSE BLD-MCNC: 286 MG/DL (ref 70–99)
GLUCOSE BLD-MCNC: 292 MG/DL (ref 70–99)
GLUCOSE BLD-MCNC: 372 MG/DL (ref 70–99)
HCT VFR BLD CALC: 39.7 % (ref 36–48)
HEMOGLOBIN: 13.5 G/DL (ref 12–16)
INR BLD: 1.4 (ref 0.88–1.12)
LYMPHOCYTES ABSOLUTE: 0.8 K/UL (ref 1–5.1)
LYMPHOCYTES RELATIVE PERCENT: 6.7 %
MCH RBC QN AUTO: 27.7 PG (ref 26–34)
MCHC RBC AUTO-ENTMCNC: 33.9 G/DL (ref 31–36)
MCV RBC AUTO: 81.7 FL (ref 80–100)
MONOCYTES ABSOLUTE: 0.3 K/UL (ref 0–1.3)
MONOCYTES RELATIVE PERCENT: 2.7 %
NEUTROPHILS ABSOLUTE: 10.6 K/UL (ref 1.7–7.7)
NEUTROPHILS RELATIVE PERCENT: 90.4 %
PDW BLD-RTO: 15.6 % (ref 12.4–15.4)
PERFORMED ON: ABNORMAL
PLATELET # BLD: 295 K/UL (ref 135–450)
PMV BLD AUTO: 6.1 FL (ref 5–10.5)
POTASSIUM REFLEX MAGNESIUM: 4.3 MMOL/L (ref 3.5–5.1)
PROTHROMBIN TIME: 16.1 SEC (ref 9.9–12.7)
RBC # BLD: 4.86 M/UL (ref 4–5.2)
SODIUM BLD-SCNC: 141 MMOL/L (ref 136–145)
TOTAL PROTEIN: 6.6 G/DL (ref 6.4–8.2)
WBC # BLD: 11.7 K/UL (ref 4–11)

## 2021-08-21 PROCEDURE — 2700000000 HC OXYGEN THERAPY PER DAY

## 2021-08-21 PROCEDURE — 36415 COLL VENOUS BLD VENIPUNCTURE: CPT

## 2021-08-21 PROCEDURE — 6360000002 HC RX W HCPCS: Performed by: INTERNAL MEDICINE

## 2021-08-21 PROCEDURE — 6370000000 HC RX 637 (ALT 250 FOR IP): Performed by: INTERNAL MEDICINE

## 2021-08-21 PROCEDURE — 94761 N-INVAS EAR/PLS OXIMETRY MLT: CPT

## 2021-08-21 PROCEDURE — 80053 COMPREHEN METABOLIC PANEL: CPT

## 2021-08-21 PROCEDURE — G0378 HOSPITAL OBSERVATION PER HR: HCPCS

## 2021-08-21 PROCEDURE — 85610 PROTHROMBIN TIME: CPT

## 2021-08-21 PROCEDURE — 99232 SBSQ HOSP IP/OBS MODERATE 35: CPT | Performed by: INTERNAL MEDICINE

## 2021-08-21 PROCEDURE — 85379 FIBRIN DEGRADATION QUANT: CPT

## 2021-08-21 PROCEDURE — 94640 AIRWAY INHALATION TREATMENT: CPT

## 2021-08-21 PROCEDURE — 2060000000 HC ICU INTERMEDIATE R&B

## 2021-08-21 PROCEDURE — 96372 THER/PROPH/DIAG INJ SC/IM: CPT

## 2021-08-21 PROCEDURE — 85025 COMPLETE CBC W/AUTO DIFF WBC: CPT

## 2021-08-21 PROCEDURE — 85730 THROMBOPLASTIN TIME PARTIAL: CPT

## 2021-08-21 PROCEDURE — 96376 TX/PRO/DX INJ SAME DRUG ADON: CPT

## 2021-08-21 PROCEDURE — 85384 FIBRINOGEN ACTIVITY: CPT

## 2021-08-21 RX ADMIN — ASPIRIN 81 MG: 81 TABLET, CHEWABLE ORAL at 09:06

## 2021-08-21 RX ADMIN — CLOPIDOGREL BISULFATE 75 MG: 75 TABLET ORAL at 09:06

## 2021-08-21 RX ADMIN — INSULIN LISPRO 5 UNITS: 100 INJECTION, SOLUTION INTRAVENOUS; SUBCUTANEOUS at 13:41

## 2021-08-21 RX ADMIN — INSULIN LISPRO 5 UNITS: 100 INJECTION, SOLUTION INTRAVENOUS; SUBCUTANEOUS at 17:59

## 2021-08-21 RX ADMIN — PANTOPRAZOLE SODIUM 40 MG: 40 TABLET, DELAYED RELEASE ORAL at 05:38

## 2021-08-21 RX ADMIN — GUAIFENESIN 600 MG: 600 TABLET, EXTENDED RELEASE ORAL at 20:37

## 2021-08-21 RX ADMIN — ROSUVASTATIN CALCIUM 10 MG: 10 TABLET, FILM COATED ORAL at 09:05

## 2021-08-21 RX ADMIN — INSULIN LISPRO 6 UNITS: 100 INJECTION, SOLUTION INTRAVENOUS; SUBCUTANEOUS at 17:59

## 2021-08-21 RX ADMIN — INSULIN GLARGINE 20 UNITS: 100 INJECTION, SOLUTION SUBCUTANEOUS at 20:44

## 2021-08-21 RX ADMIN — METOPROLOL TARTRATE 25 MG: 25 TABLET, FILM COATED ORAL at 20:37

## 2021-08-21 RX ADMIN — INSULIN LISPRO 3 UNITS: 100 INJECTION, SOLUTION INTRAVENOUS; SUBCUTANEOUS at 20:44

## 2021-08-21 RX ADMIN — METHYLPREDNISOLONE SODIUM SUCCINATE 40 MG: 40 INJECTION, POWDER, LYOPHILIZED, FOR SOLUTION INTRAMUSCULAR; INTRAVENOUS at 05:39

## 2021-08-21 RX ADMIN — METHYLPREDNISOLONE SODIUM SUCCINATE 40 MG: 40 INJECTION, POWDER, LYOPHILIZED, FOR SOLUTION INTRAMUSCULAR; INTRAVENOUS at 17:39

## 2021-08-21 RX ADMIN — METOPROLOL TARTRATE 25 MG: 25 TABLET, FILM COATED ORAL at 09:05

## 2021-08-21 RX ADMIN — INSULIN LISPRO 10 UNITS: 100 INJECTION, SOLUTION INTRAVENOUS; SUBCUTANEOUS at 13:33

## 2021-08-21 RX ADMIN — METHYLPREDNISOLONE SODIUM SUCCINATE 40 MG: 40 INJECTION, POWDER, LYOPHILIZED, FOR SOLUTION INTRAMUSCULAR; INTRAVENOUS at 11:17

## 2021-08-21 RX ADMIN — Medication 2000 UNITS: at 09:05

## 2021-08-21 RX ADMIN — GUAIFENESIN AND DEXTROMETHORPHAN 5 ML: 100; 10 SYRUP ORAL at 17:35

## 2021-08-21 RX ADMIN — ENOXAPARIN SODIUM 30 MG: 30 INJECTION SUBCUTANEOUS at 09:06

## 2021-08-21 RX ADMIN — GUAIFENESIN 600 MG: 600 TABLET, EXTENDED RELEASE ORAL at 09:06

## 2021-08-21 RX ADMIN — INSULIN LISPRO 4 UNITS: 100 INJECTION, SOLUTION INTRAVENOUS; SUBCUTANEOUS at 09:09

## 2021-08-21 RX ADMIN — ACETAMINOPHEN 650 MG: 325 TABLET ORAL at 17:36

## 2021-08-21 RX ADMIN — METHYLPREDNISOLONE SODIUM SUCCINATE 40 MG: 40 INJECTION, POWDER, LYOPHILIZED, FOR SOLUTION INTRAMUSCULAR; INTRAVENOUS at 23:40

## 2021-08-21 RX ADMIN — ENOXAPARIN SODIUM 30 MG: 30 INJECTION SUBCUTANEOUS at 20:37

## 2021-08-21 RX ADMIN — INSULIN LISPRO 5 UNITS: 100 INJECTION, SOLUTION INTRAVENOUS; SUBCUTANEOUS at 09:09

## 2021-08-21 RX ADMIN — Medication 2 PUFF: at 18:04

## 2021-08-21 ASSESSMENT — PAIN SCALES - GENERAL
PAINLEVEL_OUTOF10: 5
PAINLEVEL_OUTOF10: 0
PAINLEVEL_OUTOF10: 0

## 2021-08-21 NOTE — PLAN OF CARE
Problem: Falls - Risk of:  Goal: Will remain free from falls  Description: Will remain free from falls  Outcome: Ongoing   Pt free from falls and injury. Call light within reach and bed alarm on. Pt enc to call for assistance with ambulation. Jovita Sarah RN   Problem: Pain:  Goal: Pain level will decrease  Description: Pain level will decrease  Outcome: Ongoing   Pt denies having pain. Jovita Sarah RN

## 2021-08-21 NOTE — PROGRESS NOTES
08/21/21 0536   Oxygen Therapy/Pulse Ox   O2 Therapy Oxygen humidified   O2 Device Heated high flow cannula   O2 Flow Rate (L/min) 40 L/min   FiO2  80 %   SpO2 93 %   Humidification Source Heated wire   Humidification Temp 37

## 2021-08-21 NOTE — PROGRESS NOTES
08/21/21 1201   Oxygen Therapy/Pulse Ox   O2 Therapy Oxygen   O2 Device Heated high flow cannula   O2 Flow Rate (L/min) 35 L/min   FiO2  70 %   SpO2 100 %   Humidification Source Heated wire

## 2021-08-21 NOTE — PROGRESS NOTES
Patient found to have spo2 of 83% while on 15 liters of oxygen. Non-rebreather mask placed on the patient with additional 15 liters of oxygen. Patients spo2 increased to 95%.

## 2021-08-21 NOTE — PROGRESS NOTES
changed from earlier today. Correlate clinically for possible COVID disease. XR CHEST (2 VW)    Result Date: 8/16/2021  EXAMINATION: TWO XRAY VIEWS OF THE CHEST 8/16/2021 11:15 am COMPARISON: None. HISTORY: ORDERING SYSTEM PROVIDED HISTORY: Viral illness TECHNOLOGIST PROVIDED HISTORY: Reason for Exam: viral illness cough since wednesday Acuity: Acute Type of Exam: Initial FINDINGS: Lower lung volumes on today's study. Heart size felt to be stable when accounting for differences in lung volumes. There are chronic reticular interstitial opacities in the lower lung zones. There are new ground-glass opacities in the peripheral right lower lung. No obvious change is noted on the left. Costophrenic angles are sharp     Ground-glass opacities in the peripheral right lower lung suspicious for atypical pneumonia, on a background of chronic lower lung zone interstitial disease     XR CHEST PORTABLE    Result Date: 8/16/2021  EXAMINATION: ONE XRAY VIEW OF THE CHEST 8/16/2021 10:48 pm COMPARISON: Prior studies including 08/16/2021 at 1124 hours HISTORY: ORDERING SYSTEM PROVIDED HISTORY: concern for covid TECHNOLOGIST PROVIDED HISTORY: Reason for exam:->concern for covid Reason for Exam: concern for COVID-19, cough Acuity: Acute Type of Exam: Initial FINDINGS: Study was obtained at low lung volumes with crowding of lung markings at the bases. Bibasilar ill-defined airspace disease has not changed significantly from earlier today taking into account varying degrees of inspiration. There is no pneumothorax or pleural fluid. Heart size is within normal limits. Bibasilar airspace disease not significantly changed from earlier today. Correlate clinically for possible COVID disease.      NM Cardiac Stress Test Nuclear Imaging    Result Date: 7/28/2021  Cardiac Perfusion Imaging  Demographics   Patient Name       John BALDERAS   Date of Study      07/28/2021         Gender              Female   Patient Number [id number]         Date of Birth       1960   Visit Number       [id number]          Age                 62 year(s)   Accession Number   [id number]         Room Number         OP   Corporate ID       [id number]           NM Technician       Danny Arzate,                                                            DARSHANA, CNMT, ARRT   Nurse              Edwige Harris RN Interpreting        [address]           Colleen YOUNG MD   Ordering Physician Ioana Mcgraw CNP   The procedure was explained in detail to the patient. Risks,  complications and alternative treatments were reviewed. Written consent  was obtained. Procedure Procedure Type:   Nuclear Stress Test:Pharmacological, NM MYOCARDIAL SPECT REST EXERCISE OR  RX   Study location: [address] [place] [address],[address] Nuclear Medicine   Indications: Chest pain. Hospital Status: Outpatient. Height: 60 inches Weight: 141 pounds  Risk Factors   The patient risk factors include:prior PCI on  05/20/2020;hypercholesterolemia, hypertension, family history of premature  CAD and diabetes mellitus. Conclusions   Summary  Normal LV function. There is normal isotope uptake at stress and rest. There is no evidence of  myocardial ischemia or scar. Normal myocardial perfusion study. Stress Protocols   Resting ECG  NSR, baseline artifact   Resting HR:64 bpm  Resting BP:108/64 mmHg  Stress Protocol:Pharmacologic - Lexiscan's  Peak HR:93 bpm                           HR/BP product:[id number]  Peak BP:127/71 mmHg  Predicted HR: 160 bpm  % of predicted HR: 58  Test duration: 4 min  Reason for termination:Completed   ECG Findings  No ischemic EKG changes. Arrhythmias  None   Symptoms  Patient developed shortness of breath, likely related to lexiscan. Symptoms resolved with rest.   Complications  Procedure complication was none.    Stress Interpretation  Normal LVEF >60%  Normal wall motion  No ischemia  Procedure Medications   - Lexiscan I.V. 0.4 mg.  Imaging Protocols   - One Day   Rest                          Stress   Isotope:Myoview/Tetrofosmin   Isotope: Myoview/Tetrofosmin  Isotope dose:11 mCi           Isotope dose:34 mCi  Administration Route:I.V. Administration Route:I.V.  Date:07/28/2021 08:40         Date:07/28/2021 09:50                                 Technique:      Gated  Imaging Results    Stress ejection    Ejection fraction:72 %    EDV :50 ml    ESV :14 ml    Stroke volume :36 ml    LV mass :86 gr  Medical History   Additional Medical History   Stroke   Signatures   ------------------------------------------------------------------  Electronically signed by Michelle Eaton MD (Interpreting  physician) on 07/28/2021 at 13:09  ------------------------------------------------------------------      Assessment and plan:  Acute respiratory failure, hypoxemic. Significant rapid increase in oxygen requirement. Will need to monitor closely. She has needed Vapotherm. Discussed at length with the patient that if she could not maintain oxygenation with Vapotherm/NIV, mechanical ventilation would have to be considered as a lifesaving measure. She is in agreement, expressed understanding. Acute COVID-19 pneumonia. On steroid, remdesivir. ID ordered Tocilizumab. Imaging findings are not related to increase in sarcoidosis  Pulmonary sarcoidosis. CT findings are classic for pulmonary sarcoidosis. It appears the disease is inactive, she has not been treated with immunosuppression  DM2, HTN, HLD. Per IM  DVT prophylaxis. On appropriate dose of Lovenox  PUD prophylaxis.   On PPI        Electronically signed by:  Leilani Cason MD    8/21/2021    11:16 AM.

## 2021-08-21 NOTE — PROGRESS NOTES
Hospitalist Progress Note    Date of Admission: 8/16/2021    Chief Complaint: Shortness of breath    Hospital Course:   Abel Amaya is a 62 y.o. female. PMHx of ILD, followed by Dr. Everardo Fletcher. She presents feeling generally ill. Symptoms began 8/11/21 according to her . She has felt very fatigued w/ poor appetite, loss of taste, generalized myalgias, and worsening nonproductive cough. Subjective:   Currently on Vapotherm  Still has cough and yellow-colored sputum  No fever  No change in mental status  Appetite is not that great    Labs:   Recent Labs     08/19/21  0512 08/20/21  0736 08/21/21  0548   WBC 12.6* 13.2* 11.7*   HGB 12.2 12.4 13.5   HCT 36.1 36.7 39.7    242 295     Recent Labs     08/19/21  0512 08/20/21  0737 08/21/21  0548    139 141   K 4.6 4.5 4.3    103 104   CO2 22 22 23   BUN 29* 27* 29*   CREATININE 0.8 0.6 0.7   CALCIUM 8.7 8.8 8.8     Recent Labs     08/19/21  0512 08/20/21  0737 08/21/21  0548   AST 17 14* 13*   ALT 9* 9* 7*   BILITOT 0.5 0.5 0.5   ALKPHOS 73 72 70     Recent Labs     08/19/21  0512 08/20/21  0736 08/21/21  0548   INR 1.34* 1.36* 1.40*       Physical Exam Performed:    BP (!) 108/52   Pulse 58   Temp 97.3 °F (36.3 °C) (Oral)   Resp 24   Ht 5' 1\" (1.549 m)   Wt 137 lb 12.6 oz (62.5 kg)   LMP 12/21/2012   SpO2 93%   BMI 26.03 kg/m²     General appearance: No apparent distress, appears stated age and cooperative. On Repatha  HEENT: Pupils equal, round, and reactive to light. Conjunctivae/corneas clear. Neck: Supple, no jugular venous distention. Trachea midline with full range of motion. Respiratory:  ., bilaterally witht Rales/ no Wheezes/Rhonchi. Cardiovascular: Regular rate and rhythm with normal S1/S2 without murmurs, rubs or gallops. Abdomen: Soft, non-tender, non-distended with normal bowel sounds. Musculoskelatal: No clubbing, cyanosis or edema bilaterally. Full range of motion without deformity.   Neurologic: Neurovascularly intact without any focal sensory/motor deficits. .  Psychiatric: Alert and oriented, thought content appropriate, normal insight  Skin: Skin color, texture, turgor normal.  No rashes or lesions. Capillary Refill: Brisk,< 3 seconds   Peripheral Pulses: +2 palpable, equal bilaterally       Assessment/Plan:    Active Hospital Problems    Diagnosis     CAD S/P percutaneous coronary angioplasty [I25.10, Z98.61]     Pneumonia due to COVID-19 virus [U07.1, J12.82]     Pulmonary sarcoidosis ([facility]) [D86.0]     Essential hypertension [I10]     Type 2 diabetes mellitus without complication, with long-term current use of insulin ([facility]) [E11.9, Z79.4]      COVID-19 Pneumonia with Acute Respiratory Failure with hypoxia  -Currently on Vapotherm  - hx of ILD, possible Sarcoidosis;   -  Symptom onset:  8/11/21 according to her .  -   Solumedrol 40mg IV q12h.-, Tocilizumab-  - Pulmonology and ID input appreciated  - Pulmonary toilet, incentive spirometry    CAD s/p PCI  -  S/p CHRISTIAN to LAD 5/2020.  -  Normal Olga/MPI on 7/28/21.  -  Continue ASA, plavix, statin, metoprolol.  -  Hold lisinopril while acutely ill. DM2  -  Holding all oral antidiabetic agents. Continue Insulin regimen. Currently on Lantus 20 with sliding scale insulin, blood sugar is not controlled well, possibly secondary to steroid, monitor closely      DVT Prophylaxis: Lovenox  Diet: ADULT DIET; Regular; 4 carb choices (60 gm/meal);  Low Fat/Low Chol/High Fiber/DMITRI  Code Status: Full Code  PT/OT Eval Status: NA    Dispo -pending improvement    Dona Jones MD

## 2021-08-22 PROBLEM — R91.8 PULMONARY INFILTRATES: Status: ACTIVE | Noted: 2021-08-22

## 2021-08-22 PROBLEM — D72.829 LEUKOCYTOSIS: Status: ACTIVE | Noted: 2021-08-22

## 2021-08-22 PROBLEM — J96.01 ACUTE HYPOXEMIC RESPIRATORY FAILURE DUE TO SEVERE ACUTE RESPIRATORY SYNDROME CORONAVIRUS 2 (SARS-COV-2) DISEASE (HCC): Status: ACTIVE | Noted: 2021-08-17

## 2021-08-22 LAB
A/G RATIO: 1 (ref 1.1–2.2)
ALBUMIN SERPL-MCNC: 3.1 G/DL (ref 3.4–5)
ALP BLD-CCNC: 68 U/L (ref 40–129)
ALT SERPL-CCNC: 9 U/L (ref 10–40)
ANION GAP SERPL CALCULATED.3IONS-SCNC: 10 MMOL/L (ref 3–16)
APTT: 29.1 SEC (ref 26.2–38.6)
AST SERPL-CCNC: 11 U/L (ref 15–37)
BASOPHILS ABSOLUTE: 0 K/UL (ref 0–0.2)
BASOPHILS RELATIVE PERCENT: 0.3 %
BILIRUB SERPL-MCNC: 0.4 MG/DL (ref 0–1)
BUN BLDV-MCNC: 26 MG/DL (ref 7–20)
CALCIUM SERPL-MCNC: 8.9 MG/DL (ref 8.3–10.6)
CHLORIDE BLD-SCNC: 105 MMOL/L (ref 99–110)
CO2: 24 MMOL/L (ref 21–32)
CREAT SERPL-MCNC: 0.7 MG/DL (ref 0.6–1.2)
D DIMER: 202 NG/ML DDU (ref 0–229)
EOSINOPHILS ABSOLUTE: 0 K/UL (ref 0–0.6)
EOSINOPHILS RELATIVE PERCENT: 0 %
FIBRINOGEN: 582 MG/DL (ref 200–397)
GFR AFRICAN AMERICAN: >60
GFR NON-AFRICAN AMERICAN: >60
GLOBULIN: 3.2 G/DL
GLUCOSE BLD-MCNC: 187 MG/DL (ref 70–99)
GLUCOSE BLD-MCNC: 211 MG/DL (ref 70–99)
GLUCOSE BLD-MCNC: 237 MG/DL (ref 70–99)
GLUCOSE BLD-MCNC: 271 MG/DL (ref 70–99)
GLUCOSE BLD-MCNC: 345 MG/DL (ref 70–99)
HCT VFR BLD CALC: 36.8 % (ref 36–48)
HEMOGLOBIN: 12.4 G/DL (ref 12–16)
INR BLD: 1.45 (ref 0.88–1.12)
LYMPHOCYTES ABSOLUTE: 0.8 K/UL (ref 1–5.1)
LYMPHOCYTES RELATIVE PERCENT: 5.7 %
MCH RBC QN AUTO: 27 PG (ref 26–34)
MCHC RBC AUTO-ENTMCNC: 33.6 G/DL (ref 31–36)
MCV RBC AUTO: 80.3 FL (ref 80–100)
MONOCYTES ABSOLUTE: 0.6 K/UL (ref 0–1.3)
MONOCYTES RELATIVE PERCENT: 3.9 %
NEUTROPHILS ABSOLUTE: 13.2 K/UL (ref 1.7–7.7)
NEUTROPHILS RELATIVE PERCENT: 90.1 %
PDW BLD-RTO: 15.2 % (ref 12.4–15.4)
PERFORMED ON: ABNORMAL
PLATELET # BLD: 334 K/UL (ref 135–450)
PMV BLD AUTO: 6.3 FL (ref 5–10.5)
POTASSIUM REFLEX MAGNESIUM: 4.2 MMOL/L (ref 3.5–5.1)
PROTHROMBIN TIME: 16.7 SEC (ref 9.9–12.7)
RBC # BLD: 4.59 M/UL (ref 4–5.2)
SODIUM BLD-SCNC: 139 MMOL/L (ref 136–145)
TOTAL PROTEIN: 6.3 G/DL (ref 6.4–8.2)
WBC # BLD: 14.7 K/UL (ref 4–11)

## 2021-08-22 PROCEDURE — 2700000000 HC OXYGEN THERAPY PER DAY

## 2021-08-22 PROCEDURE — 80053 COMPREHEN METABOLIC PANEL: CPT

## 2021-08-22 PROCEDURE — 6360000002 HC RX W HCPCS: Performed by: INTERNAL MEDICINE

## 2021-08-22 PROCEDURE — 94761 N-INVAS EAR/PLS OXIMETRY MLT: CPT

## 2021-08-22 PROCEDURE — 85025 COMPLETE CBC W/AUTO DIFF WBC: CPT

## 2021-08-22 PROCEDURE — 6370000000 HC RX 637 (ALT 250 FOR IP): Performed by: INTERNAL MEDICINE

## 2021-08-22 PROCEDURE — 85379 FIBRIN DEGRADATION QUANT: CPT

## 2021-08-22 PROCEDURE — 96376 TX/PRO/DX INJ SAME DRUG ADON: CPT

## 2021-08-22 PROCEDURE — 36415 COLL VENOUS BLD VENIPUNCTURE: CPT

## 2021-08-22 PROCEDURE — 96372 THER/PROPH/DIAG INJ SC/IM: CPT

## 2021-08-22 PROCEDURE — 99233 SBSQ HOSP IP/OBS HIGH 50: CPT | Performed by: INTERNAL MEDICINE

## 2021-08-22 PROCEDURE — 2060000000 HC ICU INTERMEDIATE R&B

## 2021-08-22 PROCEDURE — 85730 THROMBOPLASTIN TIME PARTIAL: CPT

## 2021-08-22 PROCEDURE — 85610 PROTHROMBIN TIME: CPT

## 2021-08-22 PROCEDURE — 85384 FIBRINOGEN ACTIVITY: CPT

## 2021-08-22 PROCEDURE — G0378 HOSPITAL OBSERVATION PER HR: HCPCS

## 2021-08-22 RX ORDER — INSULIN GLARGINE 100 [IU]/ML
20 INJECTION, SOLUTION SUBCUTANEOUS 2 TIMES DAILY
Status: DISCONTINUED | OUTPATIENT
Start: 2021-08-23 | End: 2021-08-25

## 2021-08-22 RX ADMIN — METHYLPREDNISOLONE SODIUM SUCCINATE 40 MG: 40 INJECTION, POWDER, LYOPHILIZED, FOR SOLUTION INTRAMUSCULAR; INTRAVENOUS at 06:00

## 2021-08-22 RX ADMIN — Medication 2000 UNITS: at 08:54

## 2021-08-22 RX ADMIN — ACETAMINOPHEN 650 MG: 325 TABLET ORAL at 16:06

## 2021-08-22 RX ADMIN — METOPROLOL TARTRATE 25 MG: 25 TABLET, FILM COATED ORAL at 08:54

## 2021-08-22 RX ADMIN — ENOXAPARIN SODIUM 30 MG: 30 INJECTION SUBCUTANEOUS at 08:53

## 2021-08-22 RX ADMIN — ASPIRIN 81 MG: 81 TABLET, CHEWABLE ORAL at 08:54

## 2021-08-22 RX ADMIN — METHYLPREDNISOLONE SODIUM SUCCINATE 40 MG: 40 INJECTION, POWDER, LYOPHILIZED, FOR SOLUTION INTRAMUSCULAR; INTRAVENOUS at 17:48

## 2021-08-22 RX ADMIN — GUAIFENESIN AND DEXTROMETHORPHAN 5 ML: 100; 10 SYRUP ORAL at 08:53

## 2021-08-22 RX ADMIN — GUAIFENESIN 600 MG: 600 TABLET, EXTENDED RELEASE ORAL at 08:54

## 2021-08-22 RX ADMIN — ROSUVASTATIN CALCIUM 10 MG: 10 TABLET, FILM COATED ORAL at 08:54

## 2021-08-22 RX ADMIN — INSULIN LISPRO 6 UNITS: 100 INJECTION, SOLUTION INTRAVENOUS; SUBCUTANEOUS at 12:52

## 2021-08-22 RX ADMIN — INSULIN LISPRO 5 UNITS: 100 INJECTION, SOLUTION INTRAVENOUS; SUBCUTANEOUS at 12:53

## 2021-08-22 RX ADMIN — METHYLPREDNISOLONE SODIUM SUCCINATE 40 MG: 40 INJECTION, POWDER, LYOPHILIZED, FOR SOLUTION INTRAMUSCULAR; INTRAVENOUS at 23:38

## 2021-08-22 RX ADMIN — METOPROLOL TARTRATE 25 MG: 25 TABLET, FILM COATED ORAL at 21:02

## 2021-08-22 RX ADMIN — INSULIN LISPRO 5 UNITS: 100 INJECTION, SOLUTION INTRAVENOUS; SUBCUTANEOUS at 18:00

## 2021-08-22 RX ADMIN — INSULIN GLARGINE 20 UNITS: 100 INJECTION, SOLUTION SUBCUTANEOUS at 21:03

## 2021-08-22 RX ADMIN — METHYLPREDNISOLONE SODIUM SUCCINATE 40 MG: 40 INJECTION, POWDER, LYOPHILIZED, FOR SOLUTION INTRAMUSCULAR; INTRAVENOUS at 12:12

## 2021-08-22 RX ADMIN — ACETAMINOPHEN 650 MG: 325 TABLET ORAL at 08:54

## 2021-08-22 RX ADMIN — GUAIFENESIN AND DEXTROMETHORPHAN 5 ML: 100; 10 SYRUP ORAL at 16:06

## 2021-08-22 RX ADMIN — INSULIN LISPRO 2 UNITS: 100 INJECTION, SOLUTION INTRAVENOUS; SUBCUTANEOUS at 08:57

## 2021-08-22 RX ADMIN — PANTOPRAZOLE SODIUM 40 MG: 40 TABLET, DELAYED RELEASE ORAL at 06:27

## 2021-08-22 RX ADMIN — INSULIN LISPRO 4 UNITS: 100 INJECTION, SOLUTION INTRAVENOUS; SUBCUTANEOUS at 18:00

## 2021-08-22 RX ADMIN — ENOXAPARIN SODIUM 30 MG: 30 INJECTION SUBCUTANEOUS at 21:02

## 2021-08-22 RX ADMIN — INSULIN LISPRO 5 UNITS: 100 INJECTION, SOLUTION INTRAVENOUS; SUBCUTANEOUS at 21:03

## 2021-08-22 RX ADMIN — CLOPIDOGREL BISULFATE 75 MG: 75 TABLET ORAL at 08:54

## 2021-08-22 RX ADMIN — GUAIFENESIN 600 MG: 600 TABLET, EXTENDED RELEASE ORAL at 21:02

## 2021-08-22 RX ADMIN — INSULIN LISPRO 5 UNITS: 100 INJECTION, SOLUTION INTRAVENOUS; SUBCUTANEOUS at 08:57

## 2021-08-22 ASSESSMENT — PAIN DESCRIPTION - PAIN TYPE
TYPE: ACUTE PAIN

## 2021-08-22 ASSESSMENT — PAIN DESCRIPTION - FREQUENCY: FREQUENCY: INTERMITTENT

## 2021-08-22 ASSESSMENT — PAIN DESCRIPTION - DESCRIPTORS
DESCRIPTORS: ACHING
DESCRIPTORS: ACHING;DISCOMFORT

## 2021-08-22 ASSESSMENT — PAIN DESCRIPTION - PROGRESSION
CLINICAL_PROGRESSION: NOT CHANGED
CLINICAL_PROGRESSION: NOT CHANGED

## 2021-08-22 ASSESSMENT — PAIN SCALES - GENERAL
PAINLEVEL_OUTOF10: 3
PAINLEVEL_OUTOF10: 5
PAINLEVEL_OUTOF10: 3

## 2021-08-22 ASSESSMENT — PAIN DESCRIPTION - ORIENTATION
ORIENTATION: LEFT;RIGHT
ORIENTATION: RIGHT;LEFT
ORIENTATION: RIGHT;LEFT

## 2021-08-22 ASSESSMENT — PAIN DESCRIPTION - LOCATION
LOCATION: HIP

## 2021-08-22 ASSESSMENT — PAIN - FUNCTIONAL ASSESSMENT: PAIN_FUNCTIONAL_ASSESSMENT: PREVENTS OR INTERFERES SOME ACTIVE ACTIVITIES AND ADLS

## 2021-08-22 NOTE — PROGRESS NOTES
08/21/21 1710   Oxygen Therapy/Pulse Ox   O2 Therapy Oxygen humidified   O2 Device Heated high flow cannula   O2 Flow Rate (L/min) 35 L/min   FiO2  70 %   SpO2 93 %   Humidification Source Heated wire   Humidification Temp 34

## 2021-08-22 NOTE — PROGRESS NOTES
08/22/21 0432   Oxygen Therapy/Pulse Ox   O2 Therapy Oxygen humidified   O2 Device Heated high flow cannula   O2 Flow Rate (L/min) 35 L/min   FiO2  60 %  (increased to 70)   SpO2 (!) 86 %   Humidification Source Heated wire   Humidification Temp 34

## 2021-08-22 NOTE — PROGRESS NOTES
08/22/21 0844   Oxygen Therapy/Pulse Ox   O2 Therapy Oxygen humidified   $Oxygen $Daily Charge   O2 Device Heated high flow cannula   O2 Flow Rate (L/min) 35 L/min   FiO2  70 %   SpO2 95 %   Humidification Source Heated wire   Humidification Temp Measured 34   $Pulse Oximeter $Spot check (multiple/continuous)

## 2021-08-22 NOTE — PROGRESS NOTES
INPATIENT PULMONARY CRITICAL CARE PROGRESS NOTE      Reason for visit: Acute respiratory failure, acute COVID-19 pneumonia, past and active pulmonary sarcoidosis. History of DM2, HTN, HLD. SUBJECTIVE: Patient when seen this morning continues to be hypoxemic and patient was still on 35 L of flow rate and 60% oxygen on the Vapotherm to maintain saturation which was borderline, patient continues to have some cough with yellow-colored sputum, patient's appetite is stable, patient does not have any fever, patient was hemodynamically maintained, patient's blood sugars are not controlled optimally, patient has adequate urine output overnight, patient's p.o. intake is limited, patient's cumulative fluid balance was +1.7 L, no other pertinent review of system of concern     Physical Exam:  Blood pressure 119/74, pulse 58, temperature 97.9 °F (36.6 °C), temperature source Oral, resp. rate 16, height 5' 1\" (1.549 m), weight 137 lb 12.6 oz (62.5 kg), last menstrual period 12/21/2012, SpO2 94 %.'   Due to the current efforts to prevent transmission of COVID-19 and also the need to preserve PPE for other caregivers, a face-to-face encounter with the patient was not performed. That being said, all relevant records and diagnostic tests were reviewed, including laboratory results and imaging. Please reference any relevant documentation elsewhere. Care will be coordinated with the primary service.     Results:  CBC:   Recent Labs     08/20/21  0736 08/21/21  0548 08/22/21  0456   WBC 13.2* 11.7* 14.7*   HGB 12.4 13.5 12.4   HCT 36.7 39.7 36.8   MCV 79.8* 81.7 80.3    295 334     BMP:   Recent Labs     08/20/21  0737 08/21/21  0548 08/22/21  0456    141 139   K 4.5 4.3 4.2    104 105   CO2 22 23 24   BUN 27* 29* 26*   CREATININE 0.6 0.7 0.7     LIVER PROFILE:   Recent Labs     08/20/21  0737 08/21/21  0548 08/22/21  0456   AST 14* 13* 11*   ALT 9* 7* 9*   BILITOT 0.5 0.5 0.4   ALKPHOS 72 70 68     PT/INR: Recent Labs     08/20/21  0736 08/21/21  0548 08/22/21  0456   PROTIME 15.6* 16.1* 16.7*   INR 1.36* 1.40* 1.45*     APTT:   Recent Labs     08/20/21  0736 08/21/21  0548 08/22/21  0456   APTT 31.0 30.5 29.1       Imaging:  I have reviewed radiology images personally. XR CHEST PORTABLE   Final Result   Bibasilar airspace disease not significantly changed from earlier today. Correlate clinically for possible COVID disease. XR CHEST PORTABLE    (Results Pending)     Results for Sherie Goldberg (MRN [id number]) as of 8/22/2021 22:09   Ref. Range 8/22/2021 04:56 8/22/2021 07:44 8/22/2021 11:45 8/22/2021 16:22 8/22/2021 20:05   Sodium Latest Ref Range: 136 - 145 mmol/L 139       Potassium Latest Ref Range: 3.5 - 5.1 mmol/L 4.2       Chloride Latest Ref Range: 99 - 110 mmol/L 105       CO2 Latest Ref Range: 21 - 32 mmol/L 24       BUN Latest Ref Range: 7 - 20 mg/dL 26 (H)       Creatinine Latest Ref Range: 0.6 - 1.2 mg/dL 0.7       Anion Gap Latest Ref Range: 3 - 16  10       GFR Non- Latest Ref Range: >60  >60       GFR  Latest Ref Range: >60  >60       Glucose Latest Ref Range: 70 - 99 mg/dL 211 (H)       POC Glucose Latest Ref Range: 70 - 99 mg/dl  187 (H) 271 (H) 237 (H) 345 (H)   Calcium Latest Ref Range: 8.3 - 10.6 mg/dL 8.9       Total Protein Latest Ref Range: 6.4 - 8.2 g/dL 6.3 (L)         Results for Sherie Goldberg (MRN [id number]) as of 8/22/2021 22:09   Ref.  Range 8/19/2021 05:12 8/20/2021 07:36 8/21/2021 05:48 8/22/2021 04:56   WBC Latest Ref Range: 4.0 - 11.0 K/uL 12.6 (H) 13.2 (H) 11.7 (H) 14.7 (H)   RBC Latest Ref Range: 4.00 - 5.20 M/uL 4.48 4.60 4.86 4.59   Hemoglobin Quant Latest Ref Range: 12.0 - 16.0 g/dL 12.2 12.4 13.5 12.4   Hematocrit Latest Ref Range: 36.0 - 48.0 % 36.1 36.7 39.7 36.8   MCV Latest Ref Range: 80.0 - 100.0 fL 80.6 79.8 (L) 81.7 80.3   MCH Latest Ref Range: 26.0 - 34.0 pg 27.2 27.0 27.7 27.0   MCHC Latest Ref Range: 31.0 - 36.0 g/dL 33.8 33.8 33.9 33.6   MPV Latest Ref Range: 5.0 - 10.5 fL 6.2 6.5 6.1 6.3   RDW Latest Ref Range: 12.4 - 15.4 % 15.8 (H) 15.4 15.6 (H) 15.2   Platelet Count Latest Ref Range: 135 - 450 K/uL 219 242 295 334   Neutrophils % Latest Units: % 91.8 89.9 90.4 90.1   Lymphocyte % Latest Units: % 5.4 5.8 6.7 5.7   Monocytes % Latest Units: % 2.7 4.2 2.7 3.9   Eosinophils % Latest Units: % 0.0 0.0 0.0 0.0   Basophils % Latest Units: % 0.1 0.1 0.2 0.3   Neutrophils Absolute Latest Ref Range: 1.7 - 7.7 K/uL 11.5 (H) 11.9 (H) 10.6 (H) 13.2 (H)   Lymphocytes Absolute Latest Ref Range: 1.0 - 5.1 K/uL 0.7 (L) 0.8 (L) 0.8 (L) 0.8 (L)     Results for Vickie Boyer (MRN [id number]) as of 8/22/2021 22:09   Ref. Range 8/17/2021 00:45 8/17/2021 04:11   Urine Reflex to Culture Unknown  Not Indicated   SARS-CoV-2, NAAT Latest Ref Range: Not Detected  DETECTED (AA)      Recent Echo-Summary   Normal left ventricle systolic function with an estimated ejection fraction   of 55%. No regional wall motion abnormalities are seen. Normal left ventricular diastolic filling pressure. Mild right ventricular hypertrophy. Mild to moderate eccentric mitral regurgitation. There is a small circumferential pericardial effusion with pericardial fat   pad localized anteriorly. Results for Vickie Boyer (MRN [id number]) as of 8/22/2021 22:09   Ref. Range 8/18/2021 06:17 8/19/2021 05:12 8/20/2021 07:36 8/21/2021 05:48 8/22/2021 04:56   Prothrombin Time Latest Ref Range: 9.9 - 12.7 sec 16.0 (H) 15.3 (H) 15.6 (H) 16.1 (H) 16.7 (H)   INR Latest Ref Range: 0.88 - 1.12  1.40 (H) 1.34 (H) 1.36 (H) 1.40 (H) 1.45 (H)   Fibrinogen Latest Ref Range: 200 - 397 mg/dL 582 (H) 648 (H) 648 (H) 729 (H) 582 (H)   aPTT Latest Ref Range: 26.2 - 38.6 sec 33.7 33.0 31.0 30.5 29.1   D-Dimer, Quant Latest Ref Range: 0 - 229 ng/mL DDU <200 <200 205 221 202   Results for Vickie Boyer (MRN [id number]) as of 8/22/2021 22:09   Ref.  Range 9/4/2014 15:04 8/17/2021 02:30 8/18/2021 06:17 8/19/2021 05:12 8/20/2021 07:37   CRP Latest Ref Range: 0.0 - 5.1 mg/L 3.5 40.2 (H) 16.4 (H) 18.6 (H) 17.5 (H)       Assessment and plan:  Acute respiratory failure, hypoxemic. Supplementation to keep saturation more than 92%  Patient continues to be significantly hypoxemic and patient was still on Vapotherm Vapotherm oxygenation to maintain saturation  Monitor for any worsening respiratory compromise or hypoxemia  Trend inflammatory markers  Pulmonary toilet  Will repeat an x-ray chest in the morning  Acute COVID-19 pneumonia. On steroid,.  ID ordered Tocilizumab. Imaging findings are not related to increase in sarcoidosis  Pulmonary sarcoidosis. CT findings from 2020 are classic for pulmonary sarcoidosis. It appears the disease is inactive, she has not been treated with immunosuppression  DM2, HTN, HLD. Patient has uncontrolled diabetes mellitus and partly it may be secondary to IV steroids-patient's Lantus insulin was increased to 20 units twice daily  Titration of Lantus as per blood glucose monitoring as per IM  BGM with SSI  DVT prophylaxis. On appropriate dose of Lovenox  PUD prophylaxis.   On PPI    Case discussed with nursing    Patient's clinical status remains precarious and has potential for further decompensation and history monitor closely in the progressive care unit        Electronically signed by:  Ellen Haywood MD    8/22/2021    10:15 PM.

## 2021-08-22 NOTE — PROGRESS NOTES
Neurovascularly intact without any focal sensory/motor deficits. .  Psychiatric: Alert and oriented, thought content appropriate, normal insight  Skin: Skin color, texture, turgor normal.  No rashes or lesions. Capillary Refill: Brisk,< 3 seconds   Peripheral Pulses: +2 palpable, equal bilaterally       Assessment/Plan:    Active Hospital Problems    Diagnosis     CAD S/P percutaneous coronary angioplasty [I25.10, Z98.61]     Pneumonia due to COVID-19 virus [U07.1, J12.82]     Pulmonary sarcoidosis ([facility]) [D86.0]     Essential hypertension [I10]     Type 2 diabetes mellitus without complication, with long-term current use of insulin ([facility]) [E11.9, Z79.4]      COVID-19 Pneumonia with Acute Respiratory Failure with hypoxia  -Currently on Vapotherm  - hx of ILD, possible Sarcoidosis;   -  Symptom onset:  8/11/21 according to her .  -   Solumedrol 40mg IV q12h.-, Tocilizumab-  - Pulmonology and ID input appreciated  - Pulmonary toilet, incentive spirometry    CAD s/p PCI  -  S/p CHRISTIAN to LAD 5/2020.  -  Normal Olga/MPI on 7/28/21.  -  Continue ASA, plavix, statin, metoprolol.  -  Hold lisinopril while acutely ill. DM2  -  Holding all oral antidiabetic agents. Continue Insulin regimen. Currently on Lantus 20 with sliding scale insulin, blood sugar is not controlled well, possibly secondary to steroid, monitor closely      DVT Prophylaxis: Lovenox  Diet: ADULT DIET; Regular; 4 carb choices (60 gm/meal);  Low Fat/Low Chol/High Fiber/DMITRI  Code Status: Full Code  PT/OT Eval Status: NA    Dispo -pending improvement    Kd Mcneil MD

## 2021-08-22 NOTE — PROGRESS NOTES
08/21/21 1958   Oxygen Therapy/Pulse Ox   O2 Therapy Oxygen humidified   O2 Device Heated high flow cannula   O2 Flow Rate (L/min) 35 L/min   FiO2  80 %  (decreased to 70%)   SpO2 99 %   Humidification Source Heated wire   Humidification Temp 34

## 2021-08-23 ENCOUNTER — APPOINTMENT (OUTPATIENT)
Dept: GENERAL RADIOLOGY | Age: 61
DRG: 137 | End: 2021-08-23
Payer: MEDICARE

## 2021-08-23 PROBLEM — D72.825 BANDEMIA: Status: ACTIVE | Noted: 2021-08-23

## 2021-08-23 LAB
A/G RATIO: 1.1 (ref 1.1–2.2)
ALBUMIN SERPL-MCNC: 3.1 G/DL (ref 3.4–5)
ALP BLD-CCNC: 60 U/L (ref 40–129)
ALT SERPL-CCNC: 8 U/L (ref 10–40)
ANION GAP SERPL CALCULATED.3IONS-SCNC: 9 MMOL/L (ref 3–16)
ANISOCYTOSIS: ABNORMAL
APTT: 26.9 SEC (ref 26.2–38.6)
AST SERPL-CCNC: 10 U/L (ref 15–37)
ATYPICAL LYMPHOCYTE RELATIVE PERCENT: 1 % (ref 0–6)
BANDED NEUTROPHILS RELATIVE PERCENT: 16 % (ref 0–7)
BASOPHILS ABSOLUTE: 0 K/UL (ref 0–0.2)
BASOPHILS RELATIVE PERCENT: 0 %
BILIRUB SERPL-MCNC: 0.3 MG/DL (ref 0–1)
BUN BLDV-MCNC: 24 MG/DL (ref 7–20)
CALCIUM SERPL-MCNC: 8.6 MG/DL (ref 8.3–10.6)
CHLORIDE BLD-SCNC: 106 MMOL/L (ref 99–110)
CO2: 25 MMOL/L (ref 21–32)
CREAT SERPL-MCNC: 0.6 MG/DL (ref 0.6–1.2)
D DIMER: <200 NG/ML DDU (ref 0–229)
EOSINOPHILS ABSOLUTE: 0 K/UL (ref 0–0.6)
EOSINOPHILS RELATIVE PERCENT: 0 %
FIBRINOGEN: 426 MG/DL (ref 200–397)
GFR AFRICAN AMERICAN: >60
GFR NON-AFRICAN AMERICAN: >60
GLOBULIN: 2.9 G/DL
GLUCOSE BLD-MCNC: 153 MG/DL (ref 70–99)
GLUCOSE BLD-MCNC: 166 MG/DL (ref 70–99)
GLUCOSE BLD-MCNC: 186 MG/DL (ref 70–99)
GLUCOSE BLD-MCNC: 214 MG/DL (ref 70–99)
GLUCOSE BLD-MCNC: 350 MG/DL (ref 70–99)
HCT VFR BLD CALC: 36.5 % (ref 36–48)
HEMOGLOBIN: 12.4 G/DL (ref 12–16)
INR BLD: 1.44 (ref 0.88–1.12)
LYMPHOCYTES ABSOLUTE: 1.1 K/UL (ref 1–5.1)
LYMPHOCYTES RELATIVE PERCENT: 6 %
MACROCYTES: ABNORMAL
MCH RBC QN AUTO: 27.3 PG (ref 26–34)
MCHC RBC AUTO-ENTMCNC: 34.1 G/DL (ref 31–36)
MCV RBC AUTO: 80 FL (ref 80–100)
MONOCYTES ABSOLUTE: 1 K/UL (ref 0–1.3)
MONOCYTES RELATIVE PERCENT: 6 %
NEUTROPHILS ABSOLUTE: 14.3 K/UL (ref 1.7–7.7)
NEUTROPHILS RELATIVE PERCENT: 71 %
OVALOCYTES: ABNORMAL
PDW BLD-RTO: 15.3 % (ref 12.4–15.4)
PERFORMED ON: ABNORMAL
PLATELET # BLD: 386 K/UL (ref 135–450)
PMV BLD AUTO: 6 FL (ref 5–10.5)
POTASSIUM REFLEX MAGNESIUM: 3.9 MMOL/L (ref 3.5–5.1)
PROTHROMBIN TIME: 16.5 SEC (ref 9.9–12.7)
RBC # BLD: 4.56 M/UL (ref 4–5.2)
SODIUM BLD-SCNC: 140 MMOL/L (ref 136–145)
TOTAL PROTEIN: 6 G/DL (ref 6.4–8.2)
WBC # BLD: 16.4 K/UL (ref 4–11)

## 2021-08-23 PROCEDURE — 2060000000 HC ICU INTERMEDIATE R&B

## 2021-08-23 PROCEDURE — 85610 PROTHROMBIN TIME: CPT

## 2021-08-23 PROCEDURE — G0378 HOSPITAL OBSERVATION PER HR: HCPCS

## 2021-08-23 PROCEDURE — 6370000000 HC RX 637 (ALT 250 FOR IP): Performed by: INTERNAL MEDICINE

## 2021-08-23 PROCEDURE — 6360000002 HC RX W HCPCS: Performed by: INTERNAL MEDICINE

## 2021-08-23 PROCEDURE — 96376 TX/PRO/DX INJ SAME DRUG ADON: CPT

## 2021-08-23 PROCEDURE — 36415 COLL VENOUS BLD VENIPUNCTURE: CPT

## 2021-08-23 PROCEDURE — 94761 N-INVAS EAR/PLS OXIMETRY MLT: CPT

## 2021-08-23 PROCEDURE — 2700000000 HC OXYGEN THERAPY PER DAY

## 2021-08-23 PROCEDURE — 96367 TX/PROPH/DG ADDL SEQ IV INF: CPT

## 2021-08-23 PROCEDURE — 85730 THROMBOPLASTIN TIME PARTIAL: CPT

## 2021-08-23 PROCEDURE — 99233 SBSQ HOSP IP/OBS HIGH 50: CPT | Performed by: INTERNAL MEDICINE

## 2021-08-23 PROCEDURE — 96372 THER/PROPH/DIAG INJ SC/IM: CPT

## 2021-08-23 PROCEDURE — 80053 COMPREHEN METABOLIC PANEL: CPT

## 2021-08-23 PROCEDURE — 85025 COMPLETE CBC W/AUTO DIFF WBC: CPT

## 2021-08-23 PROCEDURE — 85379 FIBRIN DEGRADATION QUANT: CPT

## 2021-08-23 PROCEDURE — 71045 X-RAY EXAM CHEST 1 VIEW: CPT

## 2021-08-23 PROCEDURE — 2580000003 HC RX 258: Performed by: INTERNAL MEDICINE

## 2021-08-23 PROCEDURE — 85384 FIBRINOGEN ACTIVITY: CPT

## 2021-08-23 RX ADMIN — INSULIN LISPRO 5 UNITS: 100 INJECTION, SOLUTION INTRAVENOUS; SUBCUTANEOUS at 12:42

## 2021-08-23 RX ADMIN — METOPROLOL TARTRATE 25 MG: 25 TABLET, FILM COATED ORAL at 09:50

## 2021-08-23 RX ADMIN — INSULIN LISPRO 2 UNITS: 100 INJECTION, SOLUTION INTRAVENOUS; SUBCUTANEOUS at 17:32

## 2021-08-23 RX ADMIN — INSULIN GLARGINE 20 UNITS: 100 INJECTION, SOLUTION SUBCUTANEOUS at 10:29

## 2021-08-23 RX ADMIN — INSULIN GLARGINE 20 UNITS: 100 INJECTION, SOLUTION SUBCUTANEOUS at 22:43

## 2021-08-23 RX ADMIN — ROSUVASTATIN CALCIUM 10 MG: 10 TABLET, FILM COATED ORAL at 09:50

## 2021-08-23 RX ADMIN — ASPIRIN 81 MG: 81 TABLET, CHEWABLE ORAL at 09:50

## 2021-08-23 RX ADMIN — METHYLPREDNISOLONE SODIUM SUCCINATE 40 MG: 40 INJECTION, POWDER, LYOPHILIZED, FOR SOLUTION INTRAMUSCULAR; INTRAVENOUS at 22:08

## 2021-08-23 RX ADMIN — INSULIN LISPRO 5 UNITS: 100 INJECTION, SOLUTION INTRAVENOUS; SUBCUTANEOUS at 17:32

## 2021-08-23 RX ADMIN — GUAIFENESIN 600 MG: 600 TABLET, EXTENDED RELEASE ORAL at 09:50

## 2021-08-23 RX ADMIN — INSULIN LISPRO 2 UNITS: 100 INJECTION, SOLUTION INTRAVENOUS; SUBCUTANEOUS at 22:44

## 2021-08-23 RX ADMIN — CLOPIDOGREL BISULFATE 75 MG: 75 TABLET ORAL at 09:50

## 2021-08-23 RX ADMIN — ENOXAPARIN SODIUM 30 MG: 30 INJECTION SUBCUTANEOUS at 09:50

## 2021-08-23 RX ADMIN — GUAIFENESIN 600 MG: 600 TABLET, EXTENDED RELEASE ORAL at 22:07

## 2021-08-23 RX ADMIN — ENOXAPARIN SODIUM 30 MG: 30 INJECTION SUBCUTANEOUS at 22:07

## 2021-08-23 RX ADMIN — INSULIN LISPRO 2 UNITS: 100 INJECTION, SOLUTION INTRAVENOUS; SUBCUTANEOUS at 10:28

## 2021-08-23 RX ADMIN — CEFTRIAXONE SODIUM 1000 MG: 1 INJECTION, POWDER, FOR SOLUTION INTRAMUSCULAR; INTRAVENOUS at 22:06

## 2021-08-23 RX ADMIN — METHYLPREDNISOLONE SODIUM SUCCINATE 40 MG: 40 INJECTION, POWDER, LYOPHILIZED, FOR SOLUTION INTRAMUSCULAR; INTRAVENOUS at 05:56

## 2021-08-23 RX ADMIN — Medication 2000 UNITS: at 09:50

## 2021-08-23 RX ADMIN — INSULIN LISPRO 10 UNITS: 100 INJECTION, SOLUTION INTRAVENOUS; SUBCUTANEOUS at 12:41

## 2021-08-23 RX ADMIN — PANTOPRAZOLE SODIUM 40 MG: 40 TABLET, DELAYED RELEASE ORAL at 05:56

## 2021-08-23 RX ADMIN — METHYLPREDNISOLONE SODIUM SUCCINATE 40 MG: 40 INJECTION, POWDER, LYOPHILIZED, FOR SOLUTION INTRAMUSCULAR; INTRAVENOUS at 09:50

## 2021-08-23 RX ADMIN — METOPROLOL TARTRATE 25 MG: 25 TABLET, FILM COATED ORAL at 22:07

## 2021-08-23 RX ADMIN — INSULIN LISPRO 5 UNITS: 100 INJECTION, SOLUTION INTRAVENOUS; SUBCUTANEOUS at 10:28

## 2021-08-23 RX ADMIN — METHYLPREDNISOLONE SODIUM SUCCINATE 40 MG: 40 INJECTION, POWDER, LYOPHILIZED, FOR SOLUTION INTRAMUSCULAR; INTRAVENOUS at 17:32

## 2021-08-23 ASSESSMENT — PAIN DESCRIPTION - PROGRESSION
CLINICAL_PROGRESSION: NOT CHANGED

## 2021-08-23 ASSESSMENT — PAIN SCALES - GENERAL: PAINLEVEL_OUTOF10: 0

## 2021-08-23 NOTE — PROGRESS NOTES
08/22/21 2008   Oxygen Therapy/Pulse Ox   O2 Therapy Oxygen humidified   O2 Device Heated high flow cannula   O2 Flow Rate (L/min) 30 L/min   FiO2  50 %   SpO2 94 %   Equipment Changed Humidification   Humidification Source Heated wire   Humidification Temp 34   Humidification Temp Measured 34

## 2021-08-23 NOTE — PROGRESS NOTES
Hospitalist Progress Note    Date of Admission: 8/16/2021    Chief Complaint: Shortness of breath    Hospital Course:   Shankea Berg is a 62 y.o. female. PMHx of ILD, followed by Dr. Mango Higgins. She presents feeling generally ill. Symptoms began 8/11/21 according to her . She has felt very fatigued w/ poor appetite, loss of taste, generalized myalgias, and worsening nonproductive cough. Subjective:   Currently on Vapotherm  No fever  No change in mental status  Appetite is ok  Daily complain about the care over here      Labs:   Recent Labs     08/22/21  0456 08/23/21  0547 08/24/21  0555   WBC 14.7* 16.4* 16.9*   HGB 12.4 12.4 12.6   HCT 36.8 36.5 37.1    386 406     Recent Labs     08/22/21  0456 08/23/21  0547 08/24/21  0555    140 139   K 4.2 3.9 4.2    106 103   CO2 24 25 26   BUN 26* 24* 22*   CREATININE 0.7 0.6 0.7   CALCIUM 8.9 8.6 8.5     Recent Labs     08/22/21  0456 08/23/21  0547 08/24/21  0555   AST 11* 10* 11*   ALT 9* 8* 8*   BILITOT 0.4 0.3 <0.2   ALKPHOS 68 60 58     Recent Labs     08/22/21  0456 08/23/21  0547 08/24/21  0555   INR 1.45* 1.44* 1.39*       Physical Exam Performed:    /65   Pulse 67   Temp 98.1 °F (36.7 °C) (Oral)   Resp 18   Ht 5' 1\" (1.549 m)   Wt 137 lb 12.6 oz (62.5 kg)   LMP 12/21/2012   SpO2 98%   BMI 26.03 kg/m²     General appearance: No apparent distress, appears stated age and cooperative. On Vapotherm  HEENT: Pupils equal, round, and reactive to light. Conjunctivae/corneas clear. Neck: Supple, no jugular venous distention. Trachea midline with full range of motion. Respiratory:  ., bilaterally witht Rales/ no Wheezes/Rhonchi. Cardiovascular: Regular rate and rhythm with normal S1/S2 without murmurs, rubs or gallops. Abdomen: Soft, non-tender, non-distended with normal bowel sounds. Musculoskelatal: No clubbing, cyanosis or edema bilaterally. Full range of motion without deformity.   Neurologic:  Neurovascularly intact without any focal sensory/motor deficits. .  Psychiatric: Alert and oriented, thought content appropriate, normal insight  Skin: Skin color, texture, turgor normal.  No rashes or lesions. Capillary Refill: Brisk,< 3 seconds   Peripheral Pulses: +2 palpable, equal bilaterally       Assessment/Plan:    Active Hospital Problems    Diagnosis     Bandemia [D72.825]     Pulmonary infiltrates [R91.8]     DM (diabetes mellitus), secondary uncontrolled ([facility] [address] [E13.65]     Leukocytosis [D72.829]     CAD S/P percutaneous coronary angioplasty [I25.10, Z98.61]     Acute hypoxemic respiratory failure due to severe acute respiratory syndrome coronavirus 2 (SARS-CoV-2) disease ([facility]) [U07.1, J96.01]     Pulmonary sarcoidosis ([facility]) [D86.0]     Essential hypertension [I10]     Type 2 diabetes mellitus without complication, with long-term current use of insulin ([facility]) [E11.9, Z79.4]      COVID-19 Pneumonia with Acute Respiratory Failure with hypoxia  -Currently on Vapotherm  - hx of ILD, possible Sarcoidosis;   -  Symptom onset:  8/11/21 according to her .  -   Solumedrol 40mg IV q12h.-, Tocilizumab-  - Pulmonology and ID input appreciated and following  - Pulmonary toilet, incentive spirometry  -Repeat chest x-ray-not that much improvement    CAD s/p PCI  -  S/p CHRISTIAN to LAD 5/2020.  -  Normal Olga/MPI on 7/28/21.  -  Continue ASA, plavix, statin, metoprolol.  -  Hold lisinopril while acutely ill. DM2  -  Holding all oral antidiabetic agents. Continue Insulin regimen. Currently on Lantus 20 with sliding scale insulin, blood sugar is not controlled well, possibly secondary to steroid,   Lantus was increased to 20 twice daily    Leukocytosis-  possibly secondary to steroid, Monitor      DVT Prophylaxis: Lovenox  Diet: ADULT DIET; Regular; 5 carb choices (75 gm/meal);  Low Fat/Low Chol/High Fiber/DMITRI  Code Status: Full Code  PT/OT Eval Status: NA    Dispo -pending improvement    Haylie Vaughn MD

## 2021-08-23 NOTE — CONSULTS
- Pharmacy consult was ordered for Remdesivir on 8/17/21- not sure if consult  was d/c'd or pt.didn't meet criteria for Remdesivir ,  was informed via perfect serve on 8/20/21, his note as follow :    Treated with Solumedrol 40mg IV q12h. Remdesivir was not actually given as previously documented, as she had no significant hypoxia initially and was near the end of 1 week since symptom onset. - will close this consult at this time .   Kaur Andrea/Alvina. 8/23/21 9:22 AM EDT

## 2021-08-23 NOTE — CARE COORDINATION
Pt maxed out on vapotherm settings. Receiving Remdesivir. Repeat cxr with little improvement. Leuocytosis uptrending. Pt was IPTA. Following for needs.

## 2021-08-24 LAB
A/G RATIO: 1 (ref 1.1–2.2)
ALBUMIN SERPL-MCNC: 2.9 G/DL (ref 3.4–5)
ALP BLD-CCNC: 58 U/L (ref 40–129)
ALT SERPL-CCNC: 8 U/L (ref 10–40)
ANION GAP SERPL CALCULATED.3IONS-SCNC: 10 MMOL/L (ref 3–16)
ANISOCYTOSIS: ABNORMAL
APTT: 26.9 SEC (ref 26.2–38.6)
AST SERPL-CCNC: 11 U/L (ref 15–37)
BANDED NEUTROPHILS RELATIVE PERCENT: 5 % (ref 0–7)
BASOPHILIC STIPPLING: ABNORMAL
BASOPHILS ABSOLUTE: 0 K/UL (ref 0–0.2)
BASOPHILS RELATIVE PERCENT: 0 %
BILIRUB SERPL-MCNC: <0.2 MG/DL (ref 0–1)
BUN BLDV-MCNC: 22 MG/DL (ref 7–20)
CALCIUM SERPL-MCNC: 8.5 MG/DL (ref 8.3–10.6)
CHLORIDE BLD-SCNC: 103 MMOL/L (ref 99–110)
CO2: 26 MMOL/L (ref 21–32)
CREAT SERPL-MCNC: 0.7 MG/DL (ref 0.6–1.2)
D DIMER: <200 NG/ML DDU (ref 0–229)
EOSINOPHILS ABSOLUTE: 0 K/UL (ref 0–0.6)
EOSINOPHILS RELATIVE PERCENT: 0 %
FIBRINOGEN: 462 MG/DL (ref 200–397)
GFR AFRICAN AMERICAN: >60
GFR NON-AFRICAN AMERICAN: >60
GLOBULIN: 2.9 G/DL
GLUCOSE BLD-MCNC: 133 MG/DL (ref 70–99)
GLUCOSE BLD-MCNC: 171 MG/DL (ref 70–99)
GLUCOSE BLD-MCNC: 172 MG/DL (ref 70–99)
GLUCOSE BLD-MCNC: 210 MG/DL (ref 70–99)
GLUCOSE BLD-MCNC: 227 MG/DL (ref 70–99)
HCT VFR BLD CALC: 37.1 % (ref 36–48)
HEMOGLOBIN: 12.6 G/DL (ref 12–16)
INR BLD: 1.39 (ref 0.88–1.12)
LYMPHOCYTES ABSOLUTE: 1.2 K/UL (ref 1–5.1)
LYMPHOCYTES RELATIVE PERCENT: 7 %
MACROCYTES: ABNORMAL
MCH RBC QN AUTO: 27.4 PG (ref 26–34)
MCHC RBC AUTO-ENTMCNC: 33.8 G/DL (ref 31–36)
MCV RBC AUTO: 81 FL (ref 80–100)
METAMYELOCYTES RELATIVE PERCENT: 1 %
MONOCYTES ABSOLUTE: 1 K/UL (ref 0–1.3)
MONOCYTES RELATIVE PERCENT: 6 %
NEUTROPHILS ABSOLUTE: 14.7 K/UL (ref 1.7–7.7)
NEUTROPHILS RELATIVE PERCENT: 81 %
OVALOCYTES: ABNORMAL
PDW BLD-RTO: 15.4 % (ref 12.4–15.4)
PERFORMED ON: ABNORMAL
PLATELET # BLD: 406 K/UL (ref 135–450)
PMV BLD AUTO: 5.9 FL (ref 5–10.5)
POLYCHROMASIA: ABNORMAL
POTASSIUM REFLEX MAGNESIUM: 4.2 MMOL/L (ref 3.5–5.1)
PROTHROMBIN TIME: 15.9 SEC (ref 9.9–12.7)
RBC # BLD: 4.59 M/UL (ref 4–5.2)
SODIUM BLD-SCNC: 139 MMOL/L (ref 136–145)
TOTAL PROTEIN: 5.8 G/DL (ref 6.4–8.2)
WBC # BLD: 16.9 K/UL (ref 4–11)

## 2021-08-24 PROCEDURE — 96376 TX/PRO/DX INJ SAME DRUG ADON: CPT

## 2021-08-24 PROCEDURE — 2060000000 HC ICU INTERMEDIATE R&B

## 2021-08-24 PROCEDURE — 85610 PROTHROMBIN TIME: CPT

## 2021-08-24 PROCEDURE — 6370000000 HC RX 637 (ALT 250 FOR IP): Performed by: INTERNAL MEDICINE

## 2021-08-24 PROCEDURE — 6360000002 HC RX W HCPCS: Performed by: INTERNAL MEDICINE

## 2021-08-24 PROCEDURE — 85025 COMPLETE CBC W/AUTO DIFF WBC: CPT

## 2021-08-24 PROCEDURE — 99232 SBSQ HOSP IP/OBS MODERATE 35: CPT | Performed by: INTERNAL MEDICINE

## 2021-08-24 PROCEDURE — 2700000000 HC OXYGEN THERAPY PER DAY

## 2021-08-24 PROCEDURE — 85379 FIBRIN DEGRADATION QUANT: CPT

## 2021-08-24 PROCEDURE — 85384 FIBRINOGEN ACTIVITY: CPT

## 2021-08-24 PROCEDURE — 96372 THER/PROPH/DIAG INJ SC/IM: CPT

## 2021-08-24 PROCEDURE — 36415 COLL VENOUS BLD VENIPUNCTURE: CPT

## 2021-08-24 PROCEDURE — 96366 THER/PROPH/DIAG IV INF ADDON: CPT

## 2021-08-24 PROCEDURE — 80053 COMPREHEN METABOLIC PANEL: CPT

## 2021-08-24 PROCEDURE — 85730 THROMBOPLASTIN TIME PARTIAL: CPT

## 2021-08-24 PROCEDURE — 2580000003 HC RX 258: Performed by: INTERNAL MEDICINE

## 2021-08-24 PROCEDURE — G0378 HOSPITAL OBSERVATION PER HR: HCPCS

## 2021-08-24 PROCEDURE — 94761 N-INVAS EAR/PLS OXIMETRY MLT: CPT

## 2021-08-24 RX ADMIN — METOPROLOL TARTRATE 25 MG: 25 TABLET, FILM COATED ORAL at 20:22

## 2021-08-24 RX ADMIN — GUAIFENESIN 600 MG: 600 TABLET, EXTENDED RELEASE ORAL at 20:22

## 2021-08-24 RX ADMIN — CEFTRIAXONE SODIUM 1000 MG: 1 INJECTION, POWDER, FOR SOLUTION INTRAMUSCULAR; INTRAVENOUS at 20:34

## 2021-08-24 RX ADMIN — METHYLPREDNISOLONE SODIUM SUCCINATE 40 MG: 40 INJECTION, POWDER, LYOPHILIZED, FOR SOLUTION INTRAMUSCULAR; INTRAVENOUS at 20:27

## 2021-08-24 RX ADMIN — METHYLPREDNISOLONE SODIUM SUCCINATE 40 MG: 40 INJECTION, POWDER, LYOPHILIZED, FOR SOLUTION INTRAMUSCULAR; INTRAVENOUS at 06:05

## 2021-08-24 RX ADMIN — GUAIFENESIN 600 MG: 600 TABLET, EXTENDED RELEASE ORAL at 08:25

## 2021-08-24 RX ADMIN — CLOPIDOGREL BISULFATE 75 MG: 75 TABLET ORAL at 08:25

## 2021-08-24 RX ADMIN — ASPIRIN 81 MG: 81 TABLET, CHEWABLE ORAL at 08:25

## 2021-08-24 RX ADMIN — ROSUVASTATIN CALCIUM 10 MG: 10 TABLET, FILM COATED ORAL at 08:25

## 2021-08-24 RX ADMIN — INSULIN LISPRO 5 UNITS: 100 INJECTION, SOLUTION INTRAVENOUS; SUBCUTANEOUS at 17:14

## 2021-08-24 RX ADMIN — INSULIN GLARGINE 20 UNITS: 100 INJECTION, SOLUTION SUBCUTANEOUS at 08:48

## 2021-08-24 RX ADMIN — INSULIN GLARGINE 20 UNITS: 100 INJECTION, SOLUTION SUBCUTANEOUS at 20:35

## 2021-08-24 RX ADMIN — ENOXAPARIN SODIUM 30 MG: 30 INJECTION SUBCUTANEOUS at 08:25

## 2021-08-24 RX ADMIN — INSULIN LISPRO 1 UNITS: 100 INJECTION, SOLUTION INTRAVENOUS; SUBCUTANEOUS at 20:35

## 2021-08-24 RX ADMIN — INSULIN LISPRO 2 UNITS: 100 INJECTION, SOLUTION INTRAVENOUS; SUBCUTANEOUS at 08:48

## 2021-08-24 RX ADMIN — PANTOPRAZOLE SODIUM 40 MG: 40 TABLET, DELAYED RELEASE ORAL at 06:05

## 2021-08-24 RX ADMIN — INSULIN LISPRO 5 UNITS: 100 INJECTION, SOLUTION INTRAVENOUS; SUBCUTANEOUS at 12:30

## 2021-08-24 RX ADMIN — INSULIN LISPRO 4 UNITS: 100 INJECTION, SOLUTION INTRAVENOUS; SUBCUTANEOUS at 12:30

## 2021-08-24 RX ADMIN — ENOXAPARIN SODIUM 30 MG: 30 INJECTION SUBCUTANEOUS at 20:27

## 2021-08-24 RX ADMIN — METOPROLOL TARTRATE 25 MG: 25 TABLET, FILM COATED ORAL at 08:25

## 2021-08-24 RX ADMIN — METHYLPREDNISOLONE SODIUM SUCCINATE 40 MG: 40 INJECTION, POWDER, LYOPHILIZED, FOR SOLUTION INTRAMUSCULAR; INTRAVENOUS at 14:31

## 2021-08-24 RX ADMIN — INSULIN LISPRO 5 UNITS: 100 INJECTION, SOLUTION INTRAVENOUS; SUBCUTANEOUS at 08:49

## 2021-08-24 RX ADMIN — Medication 2000 UNITS: at 08:25

## 2021-08-24 RX ADMIN — ACETAMINOPHEN 650 MG: 325 TABLET ORAL at 20:22

## 2021-08-24 ASSESSMENT — PAIN DESCRIPTION - PROGRESSION
CLINICAL_PROGRESSION: NOT CHANGED

## 2021-08-24 ASSESSMENT — PAIN SCALES - GENERAL
PAINLEVEL_OUTOF10: 0

## 2021-08-24 NOTE — PROGRESS NOTES
Comprehensive Nutrition Assessment    Type and Reason for Visit:  Initial    Nutrition Recommendations/Plan:   1. Continue cardiac, carb control diet  2. Encourage po intakes  3. Monitor po intakes, nutrition adequacy, weights, pertinent labs, BMs    Nutrition Assessment:  LOS assessment. Pt admitted with c/o fatigue w/ poor appetite, loss of taste, generalized myalgias, and worsening nonproductive cough. COVID-19+, in droplet plus precautions. Pt reports that her appetite has been reduced over the past week or so d/t covid. Appetite is starting to  per pt. Po intakes % recently. Encouraged po intakes. Pt had no questions. Malnutrition Assessment:  Malnutrition Status: At risk for malnutrition (Comment)      Estimated Daily Nutrient Needs:  Energy (kcal):  9595-8609; Weight Used for Energy Requirements:  Current     Protein (g):  63-75; Weight Used for Protein Requirements:  Current        Fluid (ml/day): 1 ml/kcal      Nutrition Related Findings:  Trace BLE edema      Wounds:  Skin Tears       Current Nutrition Therapies:    ADULT DIET; Regular; 5 carb choices (75 gm/meal); Low Fat/Low Chol/High Fiber/DMITRI    Anthropometric Measures:  · Height: 5' 1\" (154.9 cm)  · Current Body Weight: 137 lb 12.6 oz (62.5 kg)   · Ideal Body Weight: 105 lbs  · BMI: 26  · BMI Categories: Overweight (BMI 25.0-29. 9)       Nutrition Diagnosis:   · Inadequate oral intake related to inadequate protein-energy intake as evidenced by poor intake prior to admission      Nutrition Interventions:   Food and/or Nutrient Delivery:  Continue Current Diet  Nutrition Education/Counseling:  No recommendation at this time   Coordination of Nutrition Care:  Continue to monitor while inpatient    Goals:  Pt will have po intakes 50% or greater this admission       Nutrition Monitoring and Evaluation:   Behavioral-Environmental Outcomes:  None Identified   Food/Nutrient Intake Outcomes:  Food and Nutrient Intake    Discharge Planning: Too soon to determine     Electronically signed by Indira Mars RD, LD on 8/24/21 at 3:43 PM EDT    Contact: [phone]

## 2021-08-24 NOTE — PROGRESS NOTES
16.7* 16.5*   INR 1.40* 1.45* 1.44*     APTT:   Recent Labs     08/21/21  0548 08/22/21  0456 08/23/21  0547   APTT 30.5 29.1 26.9       Imaging:  I have reviewed radiology images personally. XR CHEST PORTABLE   Final Result   Severe perihilar and diffuse bilateral airspace opacities. Pattern likely   represents ARDS or viral pattern of pneumonia. XR CHEST PORTABLE   Final Result   Bibasilar airspace disease not significantly changed from earlier today. Correlate clinically for possible COVID disease. Results for Sherie Goldberg (MRN [id number]) as of 8/23/2021 20:36   Ref. Range 8/23/2021 08:32 8/23/2021 12:24 8/23/2021 16:52 8/23/2021 19:33   POC Glucose Latest Ref Range: 70 - 99 mg/dl 186 (H) 350 (H) 153 (H) 214 (H)     Results for Sherie Goldberg (MRN [id number]) as of 8/23/2021 20:36   Ref.  Range 8/20/2021 07:36 8/21/2021 05:48 8/22/2021 04:56 8/23/2021 05:47   WBC Latest Ref Range: 4.0 - 11.0 K/uL 13.2 (H) 11.7 (H) 14.7 (H) 16.4 (H)   RBC Latest Ref Range: 4.00 - 5.20 M/uL 4.60 4.86 4.59 4.56   Hemoglobin Quant Latest Ref Range: 12.0 - 16.0 g/dL 12.4 13.5 12.4 12.4   Hematocrit Latest Ref Range: 36.0 - 48.0 % 36.7 39.7 36.8 36.5   MCV Latest Ref Range: 80.0 - 100.0 fL 79.8 (L) 81.7 80.3 80.0   MCH Latest Ref Range: 26.0 - 34.0 pg 27.0 27.7 27.0 27.3   MCHC Latest Ref Range: 31.0 - 36.0 g/dL 33.8 33.9 33.6 34.1   MPV Latest Ref Range: 5.0 - 10.5 fL 6.5 6.1 6.3 6.0   RDW Latest Ref Range: 12.4 - 15.4 % 15.4 15.6 (H) 15.2 15.3   Platelet Count Latest Ref Range: 135 - 450 K/uL 242 295 334 386   Neutrophils % Latest Units: % 89.9 90.4 90.1 71.0   Lymphocyte % Latest Units: % 5.8 6.7 5.7 6.0   Monocytes % Latest Units: % 4.2 2.7 3.9 6.0   Eosinophils % Latest Units: % 0.0 0.0 0.0 0.0   Basophils % Latest Units: % 0.1 0.2 0.3 0.0   Neutrophils Absolute Latest Ref Range: 1.7 - 7.7 K/uL 11.9 (H) 10.6 (H) 13.2 (H) 14.3 (H)   Lymphocytes Absolute Latest Ref Range: 1.0 - 5.1 K/uL 0.8 (L) 0.8 (L) 0.8 (L) 1.1   Monocytes Absolute Latest Ref Range: 0.0 - 1.3 K/uL 0.6 0.3 0.6 1.0   Eosinophils Absolute Latest Ref Range: 0.0 - 0.6 K/uL 0.0 0.0 0.0 0.0   Basophils Absolute Latest Ref Range: 0.0 - 0.2 K/uL 0.0 0.0 0.0 0.0   Bands Relative Latest Ref Range: 0 - 7 %    16 (H)     ONE XRAY VIEW OF THE CHEST       8/23/2021 6:17 am       COMPARISON:   08/16/2021 radiograph       HISTORY:   ORDERING SYSTEM PROVIDED HISTORY: RF/COVID   TECHNOLOGIST PROVIDED HISTORY:   Reason for exam:->RF/COVID   Reason for Exam: RF/COVID   Type of Exam: Subsequent/Follow-up       FINDINGS:   The heart and mediastinum are normal.  Severe perihilar opacities have   developed centrally and there are diffuse airspace opacities throughout both   lungs with a mid and lower zone predominance.  Probable small bilateral   pleural effusions.  No skeletal finding.           Impression   Severe perihilar and diffuse bilateral airspace opacities.  Pattern likely   represents ARDS or viral pattern of pneumonia. Results for Vickie Query (MRN [id number]) as of 8/23/2021 20:36   Ref. Range 8/19/2021 05:12 8/20/2021 07:36 8/21/2021 05:48 8/22/2021 04:56 8/23/2021 05:47   Prothrombin Time Latest Ref Range: 9.9 - 12.7 sec 15.3 (H) 15.6 (H) 16.1 (H) 16.7 (H) 16.5 (H)   INR Latest Ref Range: 0.88 - 1.12  1.34 (H) 1.36 (H) 1.40 (H) 1.45 (H) 1.44 (H)   Fibrinogen Latest Ref Range: 200 - 397 mg/dL 648 (H) 648 (H) 729 (H) 582 (H) 426 (H)   aPTT Latest Ref Range: 26.2 - 38.6 sec 33.0 31.0 30.5 29.1 26.9   D-Dimer, Quant Latest Ref Range: 0 - 229 ng/mL DDU <200 205 221 202 <200     Assessment and plan:  Acute respiratory failure, hypoxemic.     Supplementation to keep saturation more than 92%  Patient continues to be significantly hypoxemic and patient was still on Vapotherm Vapotherm oxygenation to maintain saturation and the requirements on vapotherm are increasing   Monitor for any worsening respiratory compromise or hypoxemia which seems to be deteriorating Trend inflammatory markers  Pulmonary toilet  CXR shows worsening with ARDS like picture   Patient also has worsening leukocytosis with bandemia -Will order sputum c/s and start empiric Rocephin and reassess   If patient's hypoxemia does not management noninvasive or invasive  Acute COVID-19 pneumonia. On steroid,.  ID ordered Tocilizumab. Pulmonary sarcoidosis. CT findings from 2020 are classic for pulmonary sarcoidosis. It appears the disease is inactive, she has not been treated with immunosuppression  DM2, HTN, HLD. Patient has uncontrolled diabetes mellitus and partly it may be secondary to IV steroids-patient's Lantus insulin was increased to 20 units twice daily  Titration of Lantus as per blood glucose monitoring as per IM  BGM with SSI  DVT prophylaxis. On appropriate dose of Lovenox  PUD prophylaxis.   On PPI    Case discussed with nursing    Patient's clinical status remains precarious and has potential for further decompensation and history monitor closely in the progressive care unit        Electronically signed by:  Merlinda Pott, MD    8/23/2021    8:34 PM.

## 2021-08-24 NOTE — PROGRESS NOTES
INPATIENT PULMONARY CRITICAL CARE PROGRESS NOTE      Reason for visit: Acute respiratory failure, acute COVID-19 pneumonia, past and active pulmonary sarcoidosis. History of DM2, HTN, HLD. SUBJECTIVE: Patient when seen this morning continues to be hypoxemic and worsened and patient was 40 L of flow rate in 80% oxygen when evaluated this morning with sats of 98%, patient's appetite is on the lower side , patient does not have any fever, patient was hemodynamically maintained, patient's blood sugars are not controlled optimally but better than before , patient has adequate urine output overnight, patient's p.o. intake is limited, patient's cumulative fluid balance was -480m L, no other pertinent review of system of concern     Physical Exam:  Blood pressure 132/65, pulse 67, temperature 98.1 °F (36.7 °C), temperature source Oral, resp. rate 18, height 5' 1\" (1.549 m), weight 137 lb 12.6 oz (62.5 kg), last menstrual period 12/21/2012, SpO2 96 %.'   Due to the current efforts to prevent transmission of COVID-19 and also the need to preserve PPE for other caregivers, a face-to-face encounter with the patient was not performed. That being said, all relevant records and diagnostic tests were reviewed, including laboratory results and imaging. Please reference any relevant documentation elsewhere. Care will be coordinated with the primary service.     Results:  CBC:   Recent Labs     08/22/21 0456 08/23/21  0547 08/24/21  0555   WBC 14.7* 16.4* 16.9*   HGB 12.4 12.4 12.6   HCT 36.8 36.5 37.1   MCV 80.3 80.0 81.0    386 406     BMP:   Recent Labs     08/22/21  0456 08/23/21  0547 08/24/21  0555    140 139   K 4.2 3.9 4.2    106 103   CO2 24 25 26   BUN 26* 24* 22*   CREATININE 0.7 0.6 0.7     LIVER PROFILE:   Recent Labs     08/22/21 0456 08/23/21  0547 08/24/21  0555   AST 11* 10* 11*   ALT 9* 8* 8*   BILITOT 0.4 0.3 <0.2   ALKPHOS 68 60 58     PT/INR:   Recent Labs     08/22/21 0456 08/23/21  0547 08/24/21  0555   PROTIME 16.7* 16.5* 15.9*   INR 1.45* 1.44* 1.39*     APTT:   Recent Labs     08/22/21  0456 08/23/21  0547 08/24/21  0555   APTT 29.1 26.9 26.9       Imaging:  I have reviewed radiology images personally. XR CHEST PORTABLE   Final Result   Severe perihilar and diffuse bilateral airspace opacities. Pattern likely   represents ARDS or viral pattern of pneumonia. XR CHEST PORTABLE   Final Result   Bibasilar airspace disease not significantly changed from earlier today. Correlate clinically for possible COVID disease. ONE XRAY VIEW OF THE CHEST       8/23/2021 6:17 am       COMPARISON:   08/16/2021 radiograph       HISTORY:   ORDERING SYSTEM PROVIDED HISTORY: RF/COVID   TECHNOLOGIST PROVIDED HISTORY:   Reason for exam:->RF/COVID   Reason for Exam: RF/COVID   Type of Exam: Subsequent/Follow-up       FINDINGS:   The heart and mediastinum are normal.  Severe perihilar opacities have   developed centrally and there are diffuse airspace opacities throughout both   lungs with a mid and lower zone predominance.  Probable small bilateral   pleural effusions.  No skeletal finding.           Impression   Severe perihilar and diffuse bilateral airspace opacities.  Pattern likely   represents ARDS or viral pattern of pneumonia. Assessment and plan:  Acute respiratory failure, hypoxemic.     Supplementation to keep saturation more than 92%  Patient continues to be significantly hypoxemic and patient was still on Vapotherm Vapotherm oxygenation to maintain saturation and the requirements on vapotherm are minimally better as compared to yesterday   Monitor for any worsening respiratory compromise or hypoxemia which seems to be deteriorating   Trend inflammatory markers  Pulmonary toilet  CXR shows worsening with ARDS like picture   Patient also has worsening leukocytosis with bandemia -Will order sputum c/s and start empiric Rocephin and reassess -results are pending   If patient's hypoxemia does not management noninvasive or invasive ventilation   Acute COVID-19 pneumonia. On steroid,.  ID ordered Tocilizumab. Pulmonary sarcoidosis. CT findings from 2020 are classic for pulmonary sarcoidosis. It appears the disease is inactive, she has not been treated with immunosuppression  DM2, HTN, HLD. Patient has uncontrolled diabetes mellitus and partly it may be secondary to IV steroids-patient's Lantus insulin was increased to 20 units twice daily  Titration of Lantus as per blood glucose monitoring as per IM  BGM with SSI  DVT prophylaxis. On appropriate dose of Lovenox  PUD prophylaxis.   On PPI      Patient's clinical status remains precarious and has potential for further decompensation and history monitor closely in the progressive care unit        Electronically signed by:  Young Mcmahon MD    8/24/2021    5:31 PM.

## 2021-08-24 NOTE — PROGRESS NOTES
08/24/21 1603   Oxygen Therapy/Pulse Ox   O2 Therapy Oxygen humidified   O2 Device Heated high flow cannula   O2 Flow Rate (L/min) 35 L/min   FiO2  75 %   Resp 18   SpO2 96 %   Skin Assessment Clean, dry, & intact   Humidification Source Heated wire   Humidification Temp 34   Humidification Temp Measured 34

## 2021-08-24 NOTE — PROGRESS NOTES
08/24/21 0443   Oxygen Therapy/Pulse Ox   O2 Therapy Oxygen humidified   O2 Device Heated high flow cannula   O2 Flow Rate (L/min) 36 L/min   FiO2  80 %   Resp 24   SpO2 (!) 88 %  (and coming up, pt repositioned in bed )   Humidification Source Heated wire   Humidification Temp Measured 34   Pulse Oximeter Device Mode Continuous   Blood Gas  Performed?  No

## 2021-08-24 NOTE — PLAN OF CARE
Problem: Nutrition  Goal: Optimal nutrition therapy  Outcome: Ongoing  Note: Nutrition Problem #1: Inadequate oral intake  Intervention: Food and/or Nutrient Delivery: Continue Current Diet  Nutritional Goals: Pt will have po intakes 50% or greater this admission

## 2021-08-25 LAB
A/G RATIO: 1.2 (ref 1.1–2.2)
ALBUMIN SERPL-MCNC: 3.1 G/DL (ref 3.4–5)
ALP BLD-CCNC: 56 U/L (ref 40–129)
ALT SERPL-CCNC: 11 U/L (ref 10–40)
ANION GAP SERPL CALCULATED.3IONS-SCNC: 8 MMOL/L (ref 3–16)
APTT: 26.5 SEC (ref 26.2–38.6)
AST SERPL-CCNC: 12 U/L (ref 15–37)
BASOPHILS ABSOLUTE: 0 K/UL (ref 0–0.2)
BASOPHILS RELATIVE PERCENT: 0 %
BILIRUB SERPL-MCNC: <0.2 MG/DL (ref 0–1)
BUN BLDV-MCNC: 21 MG/DL (ref 7–20)
CALCIUM SERPL-MCNC: 8.7 MG/DL (ref 8.3–10.6)
CHLORIDE BLD-SCNC: 104 MMOL/L (ref 99–110)
CO2: 25 MMOL/L (ref 21–32)
CREAT SERPL-MCNC: <0.5 MG/DL (ref 0.6–1.2)
D DIMER: <200 NG/ML DDU (ref 0–229)
EOSINOPHILS ABSOLUTE: 0 K/UL (ref 0–0.6)
EOSINOPHILS RELATIVE PERCENT: 0 %
FIBRINOGEN: 326 MG/DL (ref 200–397)
GFR AFRICAN AMERICAN: >60
GFR NON-AFRICAN AMERICAN: >60
GLOBULIN: 2.6 G/DL
GLUCOSE BLD-MCNC: 135 MG/DL (ref 70–99)
GLUCOSE BLD-MCNC: 156 MG/DL (ref 70–99)
GLUCOSE BLD-MCNC: 173 MG/DL (ref 70–99)
GLUCOSE BLD-MCNC: 230 MG/DL (ref 70–99)
GLUCOSE BLD-MCNC: 327 MG/DL (ref 70–99)
HCT VFR BLD CALC: 37 % (ref 36–48)
HEMOGLOBIN: 12.8 G/DL (ref 12–16)
INR BLD: 1.33 (ref 0.88–1.12)
LYMPHOCYTES ABSOLUTE: 1.7 K/UL (ref 1–5.1)
LYMPHOCYTES RELATIVE PERCENT: 9 %
MCH RBC QN AUTO: 27.4 PG (ref 26–34)
MCHC RBC AUTO-ENTMCNC: 34.5 G/DL (ref 31–36)
MCV RBC AUTO: 79.4 FL (ref 80–100)
MONOCYTES ABSOLUTE: 0.6 K/UL (ref 0–1.3)
MONOCYTES RELATIVE PERCENT: 3 %
NEUTROPHILS ABSOLUTE: 17 K/UL (ref 1.7–7.7)
NEUTROPHILS RELATIVE PERCENT: 88 %
PDW BLD-RTO: 15 % (ref 12.4–15.4)
PERFORMED ON: ABNORMAL
PLATELET # BLD: 393 K/UL (ref 135–450)
PLATELET SLIDE REVIEW: ADEQUATE
PMV BLD AUTO: 6 FL (ref 5–10.5)
POTASSIUM REFLEX MAGNESIUM: 4.1 MMOL/L (ref 3.5–5.1)
PROTHROMBIN TIME: 15.2 SEC (ref 9.9–12.7)
RBC # BLD: 4.66 M/UL (ref 4–5.2)
SLIDE REVIEW: ABNORMAL
SODIUM BLD-SCNC: 137 MMOL/L (ref 136–145)
TOTAL PROTEIN: 5.7 G/DL (ref 6.4–8.2)
WBC # BLD: 19.3 K/UL (ref 4–11)

## 2021-08-25 PROCEDURE — 85730 THROMBOPLASTIN TIME PARTIAL: CPT

## 2021-08-25 PROCEDURE — 80053 COMPREHEN METABOLIC PANEL: CPT

## 2021-08-25 PROCEDURE — 2060000000 HC ICU INTERMEDIATE R&B

## 2021-08-25 PROCEDURE — G0378 HOSPITAL OBSERVATION PER HR: HCPCS

## 2021-08-25 PROCEDURE — 85025 COMPLETE CBC W/AUTO DIFF WBC: CPT

## 2021-08-25 PROCEDURE — 96376 TX/PRO/DX INJ SAME DRUG ADON: CPT

## 2021-08-25 PROCEDURE — 96366 THER/PROPH/DIAG IV INF ADDON: CPT

## 2021-08-25 PROCEDURE — 99232 SBSQ HOSP IP/OBS MODERATE 35: CPT | Performed by: INTERNAL MEDICINE

## 2021-08-25 PROCEDURE — 85610 PROTHROMBIN TIME: CPT

## 2021-08-25 PROCEDURE — 96372 THER/PROPH/DIAG INJ SC/IM: CPT

## 2021-08-25 PROCEDURE — 2580000003 HC RX 258: Performed by: INTERNAL MEDICINE

## 2021-08-25 PROCEDURE — 2700000000 HC OXYGEN THERAPY PER DAY

## 2021-08-25 PROCEDURE — 36415 COLL VENOUS BLD VENIPUNCTURE: CPT

## 2021-08-25 PROCEDURE — 94761 N-INVAS EAR/PLS OXIMETRY MLT: CPT

## 2021-08-25 PROCEDURE — 6360000002 HC RX W HCPCS: Performed by: INTERNAL MEDICINE

## 2021-08-25 PROCEDURE — 6370000000 HC RX 637 (ALT 250 FOR IP): Performed by: INTERNAL MEDICINE

## 2021-08-25 PROCEDURE — 85379 FIBRIN DEGRADATION QUANT: CPT

## 2021-08-25 PROCEDURE — 85384 FIBRINOGEN ACTIVITY: CPT

## 2021-08-25 RX ORDER — INSULIN GLARGINE 100 [IU]/ML
25 INJECTION, SOLUTION SUBCUTANEOUS 2 TIMES DAILY
Status: DISCONTINUED | OUTPATIENT
Start: 2021-08-25 | End: 2021-08-27

## 2021-08-25 RX ADMIN — INSULIN LISPRO 5 UNITS: 100 INJECTION, SOLUTION INTRAVENOUS; SUBCUTANEOUS at 18:03

## 2021-08-25 RX ADMIN — INSULIN LISPRO 5 UNITS: 100 INJECTION, SOLUTION INTRAVENOUS; SUBCUTANEOUS at 08:55

## 2021-08-25 RX ADMIN — CEFTRIAXONE SODIUM 1000 MG: 1 INJECTION, POWDER, FOR SOLUTION INTRAMUSCULAR; INTRAVENOUS at 21:50

## 2021-08-25 RX ADMIN — ENOXAPARIN SODIUM 30 MG: 30 INJECTION SUBCUTANEOUS at 21:50

## 2021-08-25 RX ADMIN — INSULIN LISPRO 2 UNITS: 100 INJECTION, SOLUTION INTRAVENOUS; SUBCUTANEOUS at 12:30

## 2021-08-25 RX ADMIN — METHYLPREDNISOLONE SODIUM SUCCINATE 40 MG: 40 INJECTION, POWDER, LYOPHILIZED, FOR SOLUTION INTRAMUSCULAR; INTRAVENOUS at 03:11

## 2021-08-25 RX ADMIN — GUAIFENESIN 600 MG: 600 TABLET, EXTENDED RELEASE ORAL at 21:51

## 2021-08-25 RX ADMIN — INSULIN LISPRO 4 UNITS: 100 INJECTION, SOLUTION INTRAVENOUS; SUBCUTANEOUS at 18:03

## 2021-08-25 RX ADMIN — Medication 10 ML: at 21:51

## 2021-08-25 RX ADMIN — BENZONATATE 200 MG: 100 CAPSULE ORAL at 08:53

## 2021-08-25 RX ADMIN — ROSUVASTATIN CALCIUM 10 MG: 10 TABLET, FILM COATED ORAL at 08:53

## 2021-08-25 RX ADMIN — INSULIN LISPRO 5 UNITS: 100 INJECTION, SOLUTION INTRAVENOUS; SUBCUTANEOUS at 12:31

## 2021-08-25 RX ADMIN — METOPROLOL TARTRATE 25 MG: 25 TABLET, FILM COATED ORAL at 08:53

## 2021-08-25 RX ADMIN — METHYLPREDNISOLONE SODIUM SUCCINATE 40 MG: 40 INJECTION, POWDER, LYOPHILIZED, FOR SOLUTION INTRAMUSCULAR; INTRAVENOUS at 14:53

## 2021-08-25 RX ADMIN — METOPROLOL TARTRATE 25 MG: 25 TABLET, FILM COATED ORAL at 21:51

## 2021-08-25 RX ADMIN — ENOXAPARIN SODIUM 30 MG: 30 INJECTION SUBCUTANEOUS at 08:53

## 2021-08-25 RX ADMIN — ASPIRIN 81 MG: 81 TABLET, CHEWABLE ORAL at 08:53

## 2021-08-25 RX ADMIN — Medication 2000 UNITS: at 08:53

## 2021-08-25 RX ADMIN — METHYLPREDNISOLONE SODIUM SUCCINATE 40 MG: 40 INJECTION, POWDER, LYOPHILIZED, FOR SOLUTION INTRAMUSCULAR; INTRAVENOUS at 21:51

## 2021-08-25 RX ADMIN — Medication 10 ML: at 08:54

## 2021-08-25 RX ADMIN — INSULIN GLARGINE 25 UNITS: 100 INJECTION, SOLUTION SUBCUTANEOUS at 22:30

## 2021-08-25 RX ADMIN — INSULIN LISPRO 4 UNITS: 100 INJECTION, SOLUTION INTRAVENOUS; SUBCUTANEOUS at 22:30

## 2021-08-25 RX ADMIN — CLOPIDOGREL BISULFATE 75 MG: 75 TABLET ORAL at 08:53

## 2021-08-25 RX ADMIN — METHYLPREDNISOLONE SODIUM SUCCINATE 40 MG: 40 INJECTION, POWDER, LYOPHILIZED, FOR SOLUTION INTRAMUSCULAR; INTRAVENOUS at 08:53

## 2021-08-25 RX ADMIN — GUAIFENESIN 600 MG: 600 TABLET, EXTENDED RELEASE ORAL at 08:53

## 2021-08-25 RX ADMIN — PANTOPRAZOLE SODIUM 40 MG: 40 TABLET, DELAYED RELEASE ORAL at 06:11

## 2021-08-25 RX ADMIN — INSULIN GLARGINE 20 UNITS: 100 INJECTION, SOLUTION SUBCUTANEOUS at 08:56

## 2021-08-25 ASSESSMENT — PAIN SCALES - GENERAL
PAINLEVEL_OUTOF10: 7
PAINLEVEL_OUTOF10: 0

## 2021-08-25 ASSESSMENT — PAIN DESCRIPTION - PROGRESSION
CLINICAL_PROGRESSION: NOT CHANGED

## 2021-08-25 ASSESSMENT — ENCOUNTER SYMPTOMS: TACHYPNEA: 1

## 2021-08-25 NOTE — CARE COORDINATION
Due to length of stay and patient oxygen requirements, referral made to [facility] with [facility] per permission from MD and Pulmonology. Crystal Arzate states they are able to accept and she is initiating precert at this time even though patient will need to have FiO2 of 60 or less to transport safely.

## 2021-08-25 NOTE — PROGRESS NOTES
Hospitalist Progress Note 8/24/2021     Date of Admission: 8/16/2021     Chief Complaint: Shortness of breath     Hospital Course:   Brandon Shook is a 62 y.o. female. PMHx of ILD, followed by Dr. Yen Zhu. She presents feeling generally ill. Symptoms began 8/11/21 according to her . She has felt very fatigued w/ poor appetite, loss of taste, generalized myalgias, and worsening nonproductive cough.      Subjective:   Currently on Vapotherm  No fever  No change in mental status  Appetite is ok  Daily complain about the care over here        Labs:   Recent Labs     08/22/21  0456 08/23/21  0547 08/24/21  0555   WBC 14.7* 16.4* 16.9*   HGB 12.4 12.4 12.6   HCT 36.8 36.5 37.1    386 406            Recent Labs     08/22/21  0456 08/23/21  0547 08/24/21  0555    140 139   K 4.2 3.9 4.2    106 103   CO2 24 25 26   BUN 26* 24* 22*   CREATININE 0.7 0.6 0.7   CALCIUM 8.9 8.6 8.5            Recent Labs     08/22/21  0456 08/23/21  0547 08/24/21  0555   AST 11* 10* 11*   ALT 9* 8* 8*   BILITOT 0.4 0.3 <0.2   ALKPHOS 68 60 58            Recent Labs     08/22/21  0456 08/23/21  0547 08/24/21  0555   INR 1.45* 1.44* 1.39*         Physical Exam Performed:     /65   Pulse 67   Temp 98.1 °F (36.7 °C) (Oral)   Resp 18   Ht 5' 1\" (1.549 m)   Wt 137 lb 12.6 oz (62.5 kg)   LMP 12/21/2012   SpO2 98%   BMI 26.03 kg/m²      General appearance: No apparent distress, appears stated age and cooperative. On Vapotherm  HEENT: Pupils equal, round, and reactive to light. Conjunctivae/corneas clear. Neck: Supple, no jugular venous distention. Trachea midline with full range of motion. Respiratory:  ., bilaterally witht Rales/ no Wheezes/Rhonchi. Cardiovascular: Regular rate and rhythm with normal S1/S2 without murmurs, rubs or gallops. Abdomen: Soft, non-tender, non-distended with normal bowel sounds. Musculoskelatal: No clubbing, cyanosis or edema bilaterally.   Full range of motion without deformity. Neurologic:  Neurovascularly intact without any focal sensory/motor deficits. .  Psychiatric: Alert and oriented, thought content appropriate, normal insight  Skin: Skin color, texture, turgor normal.  No rashes or lesions. Capillary Refill: Brisk,< 3 seconds   Peripheral Pulses: +2 palpable, equal bilaterally         Assessment/Plan:          Active Hospital Problems     Diagnosis      Bandemia [D72.825]      Pulmonary infiltrates [R91.8]      DM (diabetes mellitus), secondary uncontrolled ([facility] [address] [E13.65]      Leukocytosis [D72.829]      CAD S/P percutaneous coronary angioplasty [I25.10, Z98.61]      Acute hypoxemic respiratory failure due to severe acute respiratory syndrome coronavirus 2 (SARS-CoV-2) disease ([facility]) [U07.1, J96.01]      Pulmonary sarcoidosis ([facility]) [D86.0]      Essential hypertension [I10]      Type 2 diabetes mellitus without complication, with long-term current use of insulin ([facility]) [E11.9, Z79.4]        COVID-19 Pneumonia with Acute Respiratory Failure with hypoxia  -Currently on Vapotherm  - hx of ILD, possible Sarcoidosis;   -  Symptom onset:  8/11/21 according to her .  -   Solumedrol 40mg IV q12h.-, Tocilizumab-  - Pulmonology and ID input appreciated and following  - Pulmonary toilet, incentive spirometry  -Repeat chest x-ray-not that much improvement     CAD s/p PCI  -  S/p CHRISTIAN to LAD 5/2020.  -  Normal Olga/MPI on 7/28/21.  -  Continue ASA, plavix, statin, metoprolol.  -  Hold lisinopril while acutely ill.     DM2  -  Holding all oral antidiabetic agents. Continue Insulin regimen. Currently on Lantus 20 with sliding scale insulin, blood sugar is not controlled well, possibly secondary to steroid,   Lantus was increased to 20 twice daily     Leukocytosis-uptrending, possibly secondary to steroid, given worsening chest x-ray Rocephin was started by intensivist 8/23   Monitor       DVT Prophylaxis: Lovenox  Diet: ADULT DIET;  Regular; 5 carb choices (75 gm/meal);

## 2021-08-25 NOTE — PLAN OF CARE
Problem: Airway Clearance - Ineffective  Goal: Achieve or maintain patent airway  Outcome: Ongoing     Problem: Gas Exchange - Impaired  Goal: Absence of hypoxia  8/25/2021 1023 by Primo Wynn RN  Outcome: Ongoing  8/25/2021 0735 by Chris Matias RN  Outcome: Ongoing  Goal: Promote optimal lung function  8/25/2021 1023 by Primo Wynn RN  Outcome: Ongoing  8/25/2021 0735 by Chris Matias RN  Outcome: Ongoing     Problem: Breathing Pattern - Ineffective  Goal: Ability to achieve and maintain a regular respiratory rate  8/25/2021 1023 by Primo Wynn RN  Outcome: Ongoing  8/25/2021 0735 by Chris Matias RN  Outcome: Ongoing     Problem:  Body Temperature -  Risk of, Imbalanced  Goal: Ability to maintain a body temperature within defined limits  8/25/2021 1023 by Primo Wynn RN  Outcome: Ongoing  8/25/2021 0735 by Chris Matias RN  Outcome: Ongoing  Goal: Will regain or maintain usual level of consciousness  8/25/2021 1023 by Primo Wynn RN  Outcome: Ongoing  8/25/2021 0735 by Chris Matias RN  Outcome: Ongoing  Goal: Complications related to the disease process, condition or treatment will be avoided or minimized  8/25/2021 1023 by Primo Wynn RN  Outcome: Ongoing  8/25/2021 0735 by Chris Matias RN  Outcome: Ongoing     Problem: Risk for Fluid Volume Deficit  Goal: Maintain normal heart rhythm  8/25/2021 1023 by Primo Wynn RN  Outcome: Ongoing  8/25/2021 0735 by Chris Matias RN  Outcome: Ongoing  Goal: Maintain absence of muscle cramping  8/25/2021 1023 by Primo Wynn RN  Outcome: Ongoing  8/25/2021 0735 by Chris Matias RN  Outcome: Ongoing  Goal: Maintain normal serum potassium, sodium, calcium, phosphorus, and pH  8/25/2021 1023 by Primo Wynn RN  Outcome: Ongoing  8/25/2021 0735 by Chris Matias RN  Outcome: Ongoing     Problem: Falls - Risk of:  Goal: Will remain free from falls  Description: Will remain free from falls  8/25/2021 1023 by Germania Rowley RN  Outcome: Ongoing  8/25/2021 0735 by Sanford Gunter RN  Outcome: Ongoing  Goal: Absence of physical injury  Description: Absence of physical injury  8/25/2021 1023 by Germania Rowley RN  Outcome: Ongoing  8/25/2021 0735 by Sanford Gunter RN  Outcome: Ongoing     Problem: Pain:  Description: Pain management should include both nonpharmacologic and pharmacologic interventions.   Goal: Pain level will decrease  Description: Pain level will decrease  8/25/2021 1023 by Germania Rowley RN  Outcome: Ongoing  8/25/2021 0735 by Sanford Gunter RN  Outcome: Ongoing  Goal: Control of acute pain  Description: Control of acute pain  8/25/2021 1023 by Germania Rowley RN  Outcome: Ongoing  8/25/2021 0735 by Sanford Gunter RN  Outcome: Ongoing  Goal: Control of chronic pain  Description: Control of chronic pain  8/25/2021 1023 by Germania Rowley RN  Outcome: Ongoing  8/25/2021 0735 by Sanford Gunter RN  Outcome: Ongoing

## 2021-08-25 NOTE — PROGRESS NOTES
Hospitalist Progress Note    Date of Admission: 8/16/2021    Chief Complaint: Shortness of breath    Hospital Course:   Derrick Fatima is a 62 y.o. female. PMHx of ILD, followed by Dr. Chandu Massey. She presents feeling generally ill. Symptoms began 8/11/21 according to her . She has felt very fatigued w/ poor appetite, loss of taste, generalized myalgias, and worsening nonproductive cough. Subjective:   Currently on Vapotherm  No fever  No change in mental status  Appetite is ok        Labs:   Recent Labs     08/23/21  0547 08/24/21  0555 08/25/21  0519   WBC 16.4* 16.9* 19.3*   HGB 12.4 12.6 12.8   HCT 36.5 37.1 37.0    406 393     Recent Labs     08/23/21  0547 08/24/21  0555 08/25/21  0520    139 137   K 3.9 4.2 4.1    103 104   CO2 25 26 25   BUN 24* 22* 21*   CREATININE 0.6 0.7 <0.5*   CALCIUM 8.6 8.5 8.7     Recent Labs     08/23/21  0547 08/24/21  0555 08/25/21  0520   AST 10* 11* 12*   ALT 8* 8* 11   BILITOT 0.3 <0.2 <0.2   ALKPHOS 60 58 56     Recent Labs     08/23/21  0547 08/24/21  0555 08/25/21  0519   INR 1.44* 1.39* 1.33*       Physical Exam Performed:    BP (!) 159/79   Pulse 52   Temp 98.2 °F (36.8 °C) (Oral)   Resp 16   Ht 5' 1\" (1.549 m)   Wt 137 lb 12.6 oz (62.5 kg)   LMP 12/21/2012   SpO2 95%   BMI 26.03 kg/m²     General appearance: No apparent distress, appears stated age and cooperative. On Vapotherm  HEENT: Pupils equal, round, and reactive to light. Conjunctivae/corneas clear. Neck: Supple, no jugular venous distention. Trachea midline with full range of motion. Respiratory:  ., bilaterally witht Rales/ no Wheezes/Rhonchi. Cardiovascular: Regular rate and rhythm with normal S1/S2 without murmurs, rubs or gallops. Abdomen: Soft, non-tender, non-distended with normal bowel sounds. Musculoskelatal: No clubbing, cyanosis or edema bilaterally. Full range of motion without deformity.   Neurologic:  Neurovascularly intact without any focal sensory/motor deficits. .  Psychiatric: Alert and oriented, thought content appropriate, normal insight  Skin: Skin color, texture, turgor normal.  No rashes or lesions. Capillary Refill: Brisk,< 3 seconds   Peripheral Pulses: +2 palpable, equal bilaterally       Assessment/Plan:    Active Hospital Problems    Diagnosis     Bandemia [D72.825]     Pulmonary infiltrates [R91.8]     DM (diabetes mellitus), secondary uncontrolled ([facility] [E13.65]     Leukocytosis [D72.829]     CAD S/P percutaneous coronary angioplasty [I25.10, Z98.61]     Acute hypoxemic respiratory failure due to severe acute respiratory syndrome coronavirus 2 (SARS-CoV-2) disease ([facility]) [U07.1, J96.01]     Pulmonary sarcoidosis ([facility]) [D86.0]     Essential hypertension [I10]     Type 2 diabetes mellitus without complication, with long-term current use of insulin ([facility]) [E11.9, Z79.4]      COVID-19 Pneumonia with Acute Respiratory Failure with hypoxia  -Currently on Vapotherm  - hx of ILD, possible Sarcoidosis;   -  Symptom onset:  8/11/21 according to her .  -   Solumedrol -, Tocilizumab-  - Pulmonology and ID input appreciated and following  - Pulmonary toilet, incentive spirometry  -Repeat chest x-ray-not that much improvement  Solu-Medrol was increased to every 6 hours    CAD s/p PCI  -  S/p CHRISTIAN to LAD 5/2020.  -  Normal Olga/MPI on 7/28/21.  -  Continue ASA, plavix, statin, metoprolol.  -  Hold lisinopril while acutely ill. DM2  -  Holding all oral antidiabetic agents. Continue Insulin regimen. Currently on Lantus 20 with sliding scale insulin, blood sugar is not controlled well, possibly secondary to steroid,   Lantus was increased to 20 twice daily    Leukocytosis-uptrending, possibly secondary to steroid, given worsening chest x-ray Rocephin was started by intensivist 8/23   Monitor  Will discuss with pulmonology      DVT Prophylaxis: Lovenox  Diet: ADULT DIET; Regular; 5 carb choices (75 gm/meal);  Low Fat/Low Chol/High Fiber/DMITRI  Code Status: Full Code  PT/OT Eval Status: NA    Dispo -pending improvement  ?   Long-term acute care discussed with -    Jose Eduardo Quinteros MD

## 2021-08-25 NOTE — PLAN OF CARE
Problem: Gas Exchange - Impaired  Goal: Absence of hypoxia  Outcome: Ongoing  Goal: Promote optimal lung function  Outcome: Ongoing     Problem: Breathing Pattern - Ineffective  Goal: Ability to achieve and maintain a regular respiratory rate  Outcome: Ongoing     Problem:  Body Temperature -  Risk of, Imbalanced  Goal: Ability to maintain a body temperature within defined limits  Outcome: Ongoing  Goal: Will regain or maintain usual level of consciousness  Outcome: Ongoing  Goal: Complications related to the disease process, condition or treatment will be avoided or minimized  Outcome: Ongoing

## 2021-08-25 NOTE — PROGRESS NOTES
08/25/21 1640   Oxygen Therapy/Pulse Ox   O2 Therapy Oxygen humidified   O2 Device Heated high flow cannula   O2 Flow Rate (L/min) 35 L/min  (attempted 30 desat to 80%)   FiO2  60 %   Resp 22   SpO2 (!) 88 %   patient sat up on edge of bed  SPO2 95% prior

## 2021-08-25 NOTE — DISCHARGE INSTR - COC
Continuity of Care Form    Patient Name: Carolyn Cerda   :  1960  MRN:  [id number]    Admit date:  2021  Discharge date:  2021    Code Status Order: Full Code   Advance Directives:      Admitting Physician:  Lalo Curiel MD  PCP: Alexandrea Rodriguez    [facility]charging [facility]Room[facility] 5752/8621-02  Discharging Unit Phone Number: [phone]    Emergency Contact:   Extended Emergency Contact Information  Primary Emergency Contact: Diogo Brown  Address: [address] Phone: [phone]  Mobile Phone: [phone]  Relation: Spouse  Secondary Emergency Contact: Bridget Berman  Mobile Phone: [phone]  Relation: Child   needed? No    Past Surgical History:  Past Surgical History:   Procedure Laterality Date    CARDIAC SURGERY  2020    stent     SECTION      x3    CHOLECYSTECTOMY      COLONOSCOPY      COLONOSCOPY  13    normal    COLONOSCOPY  10/19/2018    1 polyp    COLONOSCOPY N/A 10/19/2018    COLONOSCOPY POLYPECTOMY SNARE/COLD BIOPSY performed by Rosa Barlow MD at [facility] Right 2020    RIGHT LONG TRIGGER FINGER RELEASE performed by Haven Nguyen MD at [facility] HYSTEROSCOPY  2013    Laprascopic supracervical hysterectomy       Immunization History: There is no immunization history on file for this patient.     Active Problems:  Patient Active Problem List   Diagnosis Code    TIA (transient ischemic attack) G45.9    SOB (shortness of breath) R06.02    Arrhythmia I49.9    Blurred vision, right eye H53.8    Palpitations R00.2    Abnormal CT scan, chest R93.89    Dyspnea R06.00    Mediastinal adenopathy R59.0    Pulmonary nodules R91.8    Abnormal EKG R94.31    Precordial pain R07.2    Dizziness R42    Acute chest pain R07.9    Coronary artery disease involving native coronary artery of native heart with unstable angina pectoris ([facility]) I25.110    Mixed hyperlipidemia E78.2    Type 2 diabetes mellitus without complication, with long-term current use of insulin ([facility]) E11.9, Z79.4    Trigger finger, right middle finger M65.331    Hand arthritis M19.049    Essential hypertension I10    Interstitial lung disease ([facility] [address] J84.9    Pul[facility] sarc[facility]osis [facility] D86.0    CAD S/P percutaneous coronary angioplasty I25.10, Z98.61    Acute hypoxemic respiratory failure due to severe acute respiratory syndrome coronavirus 2 (SARS-CoV-2) disease ([facility]) U07.1, J96.01    Pulmonary infiltrates R91.8    DM (diabetes mellitus), secondary uncontrolled ([facility]) E13.65    Leukocytosis D72.829    Bandemia D72.825       Isolation/Infection:   Isolation            Droplet Plus          Patient Infection Status       Infection Onset Added Last Indicated Last Indicated By Review Planned Expiration Resolved Resolved By    COVID-19 08/17/21 08/17/21 08/17/21 COVID-19, Rapid 08/24/21 08/31/21      Resolved    COVID-19 Rule Out 08/17/21 08/17/21 08/17/21 COVID-19, Rapid (Ordered)   08/17/21 Rule-Out Test Resulted            Nurse Assessment:  Last Vital Signs: BP (!) 159/79   Pulse 52   Temp 98.2 °F (36.8 °C) (Oral)   Resp 16   Ht 5' 1\" (1.549 m)   Wt 137 lb 12.6 oz (62.5 kg)   LMP 12/21/2012   SpO2 98%   BMI 26.03 kg/m²     Last documented pain score (0-10 scale): Pain Level: 0  Last Weight:   Wt Readings from Last 1 Encounters:   08/21/21 137 lb 12.6 oz (62.5 kg)     Mental Status:  alert    IV Access:  - None    Nursing Mobility/ADLs:  Walking   Assisted  Transfer  Assisted  Bathing  Assisted  Dressing  Assisted  Toileting  Assisted  Feeding  Independent  Med Admin  Assisted  Med Delivery   whole    Wound Care Documentation and Therapy:        Elimination:  Continence:   · Bowel: No  · Bladder: No  Urinary Catheter: None   Colostomy/Ileostomy/Ileal Conduit: No       Date of Last BM: 8/31/2021  No intake or output data in the 24 hours ending 08/25/21 1058  I/O last 3 completed shifts: In: 240 [P.O.:240]  Out: -     Safety Concerns: At Risk for Falls    Impairments/Disabilities:      None    Nutrition Therapy:  Current Nutrition Therapy:   - Oral Diet:  Carb Control 5 carbs/meal (2000kcals/day)    Routes of Feeding: Oral  Liquids: No Restrictions  Daily Fluid Restriction: no  Last Modified Barium Swallow with Video (Video Swallowing Test): not done    Treatments at the Time of Hospital Discharge:   Respiratory Treatments:   Oxygen Therapy:  is on oxygen at 2 L/min per nasal cannula. Ventilator:    - No ventilator support    Rehab Therapies: Physical Therapy and Occupational Therapy  Weight Bearing Status/Restrictions: No weight bearing restirctions  Other Medical Equipment (for information only, NOT a DME order):  wheelchair and walker  Other Treatments:     Patient's personal belongings (please select all that are sent with patient):  None    RN SIGNATURE:  Electronically signed by Rossi Knox RN on 9/1/21 at 3:18 PM EDT    CASE MANAGEMENT/SOCIAL WORK SECTION    Inpatient Status Date: 8/17/21    Readmission Risk Assessment Score:  Readmission Risk              Risk of Unplanned Readmission:  16           Discharging to Facility/ Agency   [facility]   [phone]   ·     / signature: Electronically signed by Edna Cotton RN on 9/1/21 at 12:07 PM EDT    PHYSICIAN SECTION    Prognosis: Fair    Condition at Discharge: Stable    Rehab Potential (if transferring to Rehab):  Fair    Recommended Follow-up, Labs or Other Treatments After Discharge:    Continue to wean down oxygen as tolerated             Physician Certification: I certify the above information and transfer of John King  is necessary for the continuing treatment of the diagnosis listed and that she requires Antonio Betts for less 30 days.     Update Admission H&P: No change in H&P    PHYSICIAN SIGNATURE:  Electronically signed by Burke Cannon MD on 9/1/21 at 2:31 PM EDT

## 2021-08-26 LAB
A/G RATIO: 1.2 (ref 1.1–2.2)
ALBUMIN SERPL-MCNC: 3.1 G/DL (ref 3.4–5)
ALP BLD-CCNC: 60 U/L (ref 40–129)
ALT SERPL-CCNC: 11 U/L (ref 10–40)
ANION GAP SERPL CALCULATED.3IONS-SCNC: 11 MMOL/L (ref 3–16)
ANISOCYTOSIS: ABNORMAL
APTT: 26.3 SEC (ref 26.2–38.6)
AST SERPL-CCNC: 11 U/L (ref 15–37)
BANDED NEUTROPHILS RELATIVE PERCENT: 5 % (ref 0–7)
BASOPHILIC STIPPLING: ABNORMAL
BASOPHILS ABSOLUTE: 0 K/UL (ref 0–0.2)
BASOPHILS RELATIVE PERCENT: 0 %
BILIRUB SERPL-MCNC: <0.2 MG/DL (ref 0–1)
BUN BLDV-MCNC: 21 MG/DL (ref 7–20)
CALCIUM SERPL-MCNC: 8.6 MG/DL (ref 8.3–10.6)
CHLORIDE BLD-SCNC: 103 MMOL/L (ref 99–110)
CO2: 26 MMOL/L (ref 21–32)
CREAT SERPL-MCNC: 0.6 MG/DL (ref 0.6–1.2)
D DIMER: <200 NG/ML DDU (ref 0–229)
EOSINOPHILS ABSOLUTE: 0 K/UL (ref 0–0.6)
EOSINOPHILS RELATIVE PERCENT: 0 %
FIBRINOGEN: 321 MG/DL (ref 200–397)
GFR AFRICAN AMERICAN: >60
GFR NON-AFRICAN AMERICAN: >60
GLOBULIN: 2.6 G/DL
GLUCOSE BLD-MCNC: 138 MG/DL (ref 70–99)
GLUCOSE BLD-MCNC: 158 MG/DL (ref 70–99)
GLUCOSE BLD-MCNC: 167 MG/DL (ref 70–99)
GLUCOSE BLD-MCNC: 195 MG/DL (ref 70–99)
GLUCOSE BLD-MCNC: 81 MG/DL (ref 70–99)
HCT VFR BLD CALC: 39.4 % (ref 36–48)
HEMOGLOBIN: 13.2 G/DL (ref 12–16)
INR BLD: 1.36 (ref 0.88–1.12)
LYMPHOCYTES ABSOLUTE: 1.8 K/UL (ref 1–5.1)
LYMPHOCYTES RELATIVE PERCENT: 8 %
MACROCYTES: ABNORMAL
MCH RBC QN AUTO: 27.1 PG (ref 26–34)
MCHC RBC AUTO-ENTMCNC: 33.5 G/DL (ref 31–36)
MCV RBC AUTO: 80.7 FL (ref 80–100)
METAMYELOCYTES RELATIVE PERCENT: 1 %
MICROCYTES: ABNORMAL
MONOCYTES ABSOLUTE: 0.7 K/UL (ref 0–1.3)
MONOCYTES RELATIVE PERCENT: 3 %
NEUTROPHILS ABSOLUTE: 19.5 K/UL (ref 1.7–7.7)
NEUTROPHILS RELATIVE PERCENT: 83 %
OVALOCYTES: ABNORMAL
PDW BLD-RTO: 15.3 % (ref 12.4–15.4)
PERFORMED ON: ABNORMAL
PERFORMED ON: NORMAL
PLATELET # BLD: 414 K/UL (ref 135–450)
PMV BLD AUTO: 5.9 FL (ref 5–10.5)
POLYCHROMASIA: ABNORMAL
POTASSIUM REFLEX MAGNESIUM: 4.2 MMOL/L (ref 3.5–5.1)
PROTHROMBIN TIME: 15.6 SEC (ref 9.9–12.7)
RBC # BLD: 4.88 M/UL (ref 4–5.2)
SODIUM BLD-SCNC: 140 MMOL/L (ref 136–145)
TOTAL PROTEIN: 5.7 G/DL (ref 6.4–8.2)
WBC # BLD: 21.9 K/UL (ref 4–11)

## 2021-08-26 PROCEDURE — 6360000002 HC RX W HCPCS: Performed by: INTERNAL MEDICINE

## 2021-08-26 PROCEDURE — 85610 PROTHROMBIN TIME: CPT

## 2021-08-26 PROCEDURE — 6370000000 HC RX 637 (ALT 250 FOR IP): Performed by: INTERNAL MEDICINE

## 2021-08-26 PROCEDURE — 85384 FIBRINOGEN ACTIVITY: CPT

## 2021-08-26 PROCEDURE — 85025 COMPLETE CBC W/AUTO DIFF WBC: CPT

## 2021-08-26 PROCEDURE — 94761 N-INVAS EAR/PLS OXIMETRY MLT: CPT

## 2021-08-26 PROCEDURE — 2060000000 HC ICU INTERMEDIATE R&B

## 2021-08-26 PROCEDURE — 99232 SBSQ HOSP IP/OBS MODERATE 35: CPT | Performed by: INTERNAL MEDICINE

## 2021-08-26 PROCEDURE — 36415 COLL VENOUS BLD VENIPUNCTURE: CPT

## 2021-08-26 PROCEDURE — G0378 HOSPITAL OBSERVATION PER HR: HCPCS

## 2021-08-26 PROCEDURE — 80053 COMPREHEN METABOLIC PANEL: CPT

## 2021-08-26 PROCEDURE — 85379 FIBRIN DEGRADATION QUANT: CPT

## 2021-08-26 PROCEDURE — 85730 THROMBOPLASTIN TIME PARTIAL: CPT

## 2021-08-26 PROCEDURE — 2580000003 HC RX 258: Performed by: INTERNAL MEDICINE

## 2021-08-26 PROCEDURE — 2700000000 HC OXYGEN THERAPY PER DAY

## 2021-08-26 PROCEDURE — 96372 THER/PROPH/DIAG INJ SC/IM: CPT

## 2021-08-26 PROCEDURE — 96376 TX/PRO/DX INJ SAME DRUG ADON: CPT

## 2021-08-26 RX ORDER — METHYLPREDNISOLONE SODIUM SUCCINATE 40 MG/ML
40 INJECTION, POWDER, LYOPHILIZED, FOR SOLUTION INTRAMUSCULAR; INTRAVENOUS EVERY 12 HOURS
Status: DISCONTINUED | OUTPATIENT
Start: 2021-08-27 | End: 2021-09-01 | Stop reason: HOSPADM

## 2021-08-26 RX ADMIN — INSULIN LISPRO 5 UNITS: 100 INJECTION, SOLUTION INTRAVENOUS; SUBCUTANEOUS at 12:04

## 2021-08-26 RX ADMIN — INSULIN LISPRO 1 UNITS: 100 INJECTION, SOLUTION INTRAVENOUS; SUBCUTANEOUS at 20:32

## 2021-08-26 RX ADMIN — PANTOPRAZOLE SODIUM 40 MG: 40 TABLET, DELAYED RELEASE ORAL at 06:15

## 2021-08-26 RX ADMIN — METHYLPREDNISOLONE SODIUM SUCCINATE 40 MG: 40 INJECTION, POWDER, LYOPHILIZED, FOR SOLUTION INTRAMUSCULAR; INTRAVENOUS at 02:07

## 2021-08-26 RX ADMIN — Medication 10 ML: at 20:32

## 2021-08-26 RX ADMIN — Medication 10 ML: at 02:07

## 2021-08-26 RX ADMIN — INSULIN GLARGINE 25 UNITS: 100 INJECTION, SOLUTION SUBCUTANEOUS at 12:04

## 2021-08-26 RX ADMIN — METHYLPREDNISOLONE SODIUM SUCCINATE 40 MG: 40 INJECTION, POWDER, LYOPHILIZED, FOR SOLUTION INTRAMUSCULAR; INTRAVENOUS at 08:34

## 2021-08-26 RX ADMIN — METOPROLOL TARTRATE 25 MG: 25 TABLET, FILM COATED ORAL at 20:32

## 2021-08-26 RX ADMIN — ENOXAPARIN SODIUM 30 MG: 30 INJECTION SUBCUTANEOUS at 20:32

## 2021-08-26 RX ADMIN — Medication 3 MG: at 20:32

## 2021-08-26 RX ADMIN — INSULIN LISPRO 5 UNITS: 100 INJECTION, SOLUTION INTRAVENOUS; SUBCUTANEOUS at 16:38

## 2021-08-26 RX ADMIN — GUAIFENESIN 600 MG: 600 TABLET, EXTENDED RELEASE ORAL at 20:32

## 2021-08-26 RX ADMIN — ENOXAPARIN SODIUM 30 MG: 30 INJECTION SUBCUTANEOUS at 08:35

## 2021-08-26 RX ADMIN — METHYLPREDNISOLONE SODIUM SUCCINATE 40 MG: 40 INJECTION, POWDER, LYOPHILIZED, FOR SOLUTION INTRAMUSCULAR; INTRAVENOUS at 16:32

## 2021-08-26 RX ADMIN — ASPIRIN 81 MG: 81 TABLET, CHEWABLE ORAL at 08:35

## 2021-08-26 RX ADMIN — GUAIFENESIN 600 MG: 600 TABLET, EXTENDED RELEASE ORAL at 08:34

## 2021-08-26 RX ADMIN — Medication 2000 UNITS: at 08:34

## 2021-08-26 RX ADMIN — INSULIN LISPRO 5 UNITS: 100 INJECTION, SOLUTION INTRAVENOUS; SUBCUTANEOUS at 08:36

## 2021-08-26 RX ADMIN — ROSUVASTATIN CALCIUM 10 MG: 10 TABLET, FILM COATED ORAL at 08:34

## 2021-08-26 RX ADMIN — INSULIN LISPRO 2 UNITS: 100 INJECTION, SOLUTION INTRAVENOUS; SUBCUTANEOUS at 12:04

## 2021-08-26 RX ADMIN — CLOPIDOGREL BISULFATE 75 MG: 75 TABLET ORAL at 08:35

## 2021-08-26 RX ADMIN — INSULIN GLARGINE 25 UNITS: 100 INJECTION, SOLUTION SUBCUTANEOUS at 20:32

## 2021-08-26 RX ADMIN — METOPROLOL TARTRATE 25 MG: 25 TABLET, FILM COATED ORAL at 08:35

## 2021-08-26 ASSESSMENT — PAIN SCALES - GENERAL
PAINLEVEL_OUTOF10: 0
PAINLEVEL_OUTOF10: 0

## 2021-08-26 NOTE — PROGRESS NOTES
INPATIENT PULMONARY CRITICAL CARE PROGRESS NOTE      Reason for visit: Acute respiratory failure, acute COVID-19 pneumonia, past and active pulmonary sarcoidosis. History of DM2, HTN, HLD. SUBJECTIVE: Patient when seen this morning continues to be hypoxemic and worsened and patient was 40 L of flow rate in 75% oxygen when evaluated this morning with sats of 99%, no increased work of breathing when evaluated  , patient does not have any fever, patient was hemodynamically maintained, patient's blood sugars are better controlled  , patient has adequate urine output overnight, patient's p.o. intake is limited,patient has no diarrhea  patient has had good urine o/p with cumulative fluid balance of -2 L; no other pertinent review of system of concern     Physical Exam:  Blood pressure 123/66, pulse 74, temperature 98 °F (36.7 °C), resp. rate 20, height 5' 1\" (1.549 m), weight 147 lb 11.3 oz (67 kg), last menstrual period 12/21/2012, SpO2 90 %.'   Due to the current efforts to prevent transmission of COVID-19 and also the need to preserve PPE for other caregivers, a face-to-face encounter with the patient was not performed. That being said, all relevant records and diagnostic tests were reviewed, including laboratory results and imaging. Please reference any relevant documentation elsewhere. Care will be coordinated with the primary service.     Results:  CBC:   Recent Labs     08/24/21  0555 08/25/21 0519 08/26/21  0542   WBC 16.9* 19.3* 21.9*   HGB 12.6 12.8 13.2   HCT 37.1 37.0 39.4   MCV 81.0 79.4* 80.7    393 414     BMP:   Recent Labs     08/24/21  0555 08/25/21  0520 08/26/21  0542    137 140   K 4.2 4.1 4.2    104 103   CO2 26 25 26   BUN 22* 21* 21*   CREATININE 0.7 <0.5* 0.6     LIVER PROFILE:   Recent Labs     08/24/21  0555 08/25/21  0520 08/26/21  0542   AST 11* 12* 11*   ALT 8* 11 11   BILITOT <0.2 <0.2 <0.2   ALKPHOS 58 56 60     PT/INR:   Recent Labs     08/24/21  0555 08/25/21  0519 08/26/21  0542   PROTIME 15.9* 15.2* 15.6*   INR 1.39* 1.33* 1.36*     APTT:   Recent Labs     08/24/21  0555 08/25/21  0519 08/26/21  0542   APTT 26.9 26.5 26.3       Imaging:  I have reviewed radiology images personally. XR CHEST PORTABLE   Final Result   Severe perihilar and diffuse bilateral airspace opacities. Pattern likely   represents ARDS or viral pattern of pneumonia. XR CHEST PORTABLE   Final Result   Bibasilar airspace disease not significantly changed from earlier today. Correlate clinically for possible COVID disease. Results for Sina Lo (MRN [id number]) as of 8/26/2021 18:14   Ref. Range 8/25/2021 21:04 8/26/2021 05:42 8/26/2021 07:50 8/26/2021 11:57   Sodium Latest Ref Range: 136 - 145 mmol/L  140     Potassium Latest Ref Range: 3.5 - 5.1 mmol/L  4.2     Chloride Latest Ref Range: 99 - 110 mmol/L  103     CO2 Latest Ref Range: 21 - 32 mmol/L  26     BUN Latest Ref Range: 7 - 20 mg/dL  21 (H)     Creatinine Latest Ref Range: 0.6 - 1.2 mg/dL  0.6     Anion Gap Latest Ref Range: 3 - 16   11     GFR Non- Latest Ref Range: >60   >60     GFR  Latest Ref Range: >60   >60     Glucose Latest Ref Range: 70 - 99 mg/dL  167 (H)     POC Glucose Latest Ref Range: 70 - 99 mg/dl 327 (H)  138 (H) 195 (H)   Calcium Latest Ref Range: 8.3 - 10.6 mg/dL  8.6     Total Protein Latest Ref Range: 6.4 - 8.2 g/dL  5.7 (L)       Results for Sina Lo (MRN [id number]) as of 8/26/2021 18:14   Ref.  Range 8/22/2021 04:56 8/23/2021 05:47 8/24/2021 05:55 8/25/2021 05:19 8/26/2021 05:42   WBC Latest Ref Range: 4.0 - 11.0 K/uL 14.7 (H) 16.4 (H) 16.9 (H) 19.3 (H) 21.9 (H)   RBC Latest Ref Range: 4.00 - 5.20 M/uL 4.59 4.56 4.59 4.66 4.88   Hemoglobin Quant Latest Ref Range: 12.0 - 16.0 g/dL 12.4 12.4 12.6 12.8 13.2   Hematocrit Latest Ref Range: 36.0 - 48.0 % 36.8 36.5 37.1 37.0 39.4   MCV Latest Ref Range: 80.0 - 100.0 fL 80.3 80.0 81.0 79.4 (L) 80.7   MCH Latest Ref Range: 26.0 - 34.0 pg 27.0 27.3 27.4 27.4 27.1   MCHC Latest Ref Range: 31.0 - 36.0 g/dL 33.6 34.1 33.8 34.5 33.5   MPV Latest Ref Range: 5.0 - 10.5 fL 6.3 6.0 5.9 6.0 5.9   RDW Latest Ref Range: 12.4 - 15.4 % 15.2 15.3 15.4 15.0 15.3   Platelet Count Latest Ref Range: 135 - 450 K/uL 334 386 406 393 414   Neutrophils % Latest Units: % 90.1 71.0 81.0 88.0 83.0   Lymphocyte % Latest Units: % 5.7 6.0 7.0 9.0 8.0   Monocytes % Latest Units: % 3.9 6.0 6.0 3.0 3.0   Eosinophils % Latest Units: % 0.0 0.0 0.0 0.0 0.0   Basophils % Latest Units: % 0.3 0.0 0.0 0.0 0.0   Neutrophils Absolute Latest Ref Range: 1.7 - 7.7 K/uL 13.2 (H) 14.3 (H) 14.7 (H) 17.0 (H) 19.5 (H)     Results for Chema Ernandez (MRN [id number]) as of 8/26/2021 18:14   Ref. Range 8/22/2021 04:56 8/23/2021 05:47 8/24/2021 05:55 8/25/2021 05:19 8/26/2021 05:42   Prothrombin Time Latest Ref Range: 9.9 - 12.7 sec 16.7 (H) 16.5 (H) 15.9 (H) 15.2 (H) 15.6 (H)   INR Latest Ref Range: 0.88 - 1.12  1.45 (H) 1.44 (H) 1.39 (H) 1.33 (H) 1.36 (H)   Fibrinogen Latest Ref Range: 200 - 397 mg/dL 582 (H) 426 (H) 462 (H) 326 321   aPTT Latest Ref Range: 26.2 - 38.6 sec 29.1 26.9 26.9 26.5 26.3   D-Dimer, Quant Latest Ref Range: 0 - 229 ng/mL  <200 <200 <200 <200     Assessment and plan:  Acute respiratory failure, hypoxemic.     Supplementation to keep saturation more than 92%  Patient continues to be significantly hypoxemic and patient was still on Vapotherm Vapotherm oxygenation to maintain saturation and the requirements on vapotherm are minimally worseas compared to yesterday   Monitor for any worsening respiratory compromise or hypoxemia which seems to be deteriorating   Trend inflammatory markers  Pulmonary toilet  Recent CXR shows worsening with ARDS like picture   Patient also has worsening leukocytosis with bandemia -sputum c/s pending  Rocephin d/lazara by ID  If patient's hypoxemia does not management noninvasive or invasive ventilation   Acute

## 2021-08-26 NOTE — PROGRESS NOTES
Infectious Disease Follow up Notes    CC :  COVID     Antibiotics:   Rocephin 1g q24    Solumedrol 40 q6    Admit Date:   8/16/2021  Hospital Day: 11    Subjective:   She remains AF   Remains on vapotherm 65%/40L  Sitting upright eating, feels comfortable.   SpO2 84-89%  Dry cough  No dysuria, no diarrhea     Objective:     Patient Vitals for the past 8 hrs:   BP Pulse Resp SpO2   08/26/21 1635 123/66 74 20 90 %   08/26/21 1210    99 %   08/26/21 1159 (!) 141/63 68 20 97 %       EXAM:  Alert, oriented, NAD   Breathing non-labored   Abd soft, NT     LINE: IV site ok         Scheduled Meds:   insulin glargine  25 Units Subcutaneous BID    cefTRIAXone (ROCEPHIN) IV  1,000 mg IntraVENous Q24H    insulin lispro  0-12 Units Subcutaneous TID WC    insulin lispro  0-6 Units Subcutaneous Nightly    methylPREDNISolone  40 mg IntraVENous Q6H    guaiFENesin  600 mg Oral BID    aspirin  81 mg Oral Daily    Vitamin D  2,000 Units Oral Daily    clopidogrel  75 mg Oral Daily    metoprolol tartrate  25 mg Oral BID    pantoprazole  40 mg Oral QAM AC    rosuvastatin  10 mg Oral Daily    enoxaparin  30 mg Subcutaneous BID    insulin lispro  0.08 Units/kg Subcutaneous TID WC       Continuous Infusions:   dextrose      sodium chloride 25 mL (08/20/21 2426)          Data Review:    Lab Results   Component Value Date    WBC 21.9 (H) 08/26/2021    HGB 13.2 08/26/2021    HCT 39.4 08/26/2021    MCV 80.7 08/26/2021     08/26/2021     Lab Results   Component Value Date    CREATININE 0.6 08/26/2021    BUN 21 (H) 08/26/2021     08/26/2021    K 4.2 08/26/2021     08/26/2021    CO2 26 08/26/2021       Hepatic Function Panel:   Lab Results   Component Value Date    ALKPHOS 60 08/26/2021    ALT 11 08/26/2021    AST 11 08/26/2021    PROT 5.7 08/26/2021    PROT 5.7 01/26/2013    BILITOT <0.2 08/26/2021    LABALBU 3.1 08/26/2021 MICRO:  8/17 COVID NAAT+   UA neg       IMAGING:  CXR 8/23/21  Impression   Severe perihilar and diffuse bilateral airspace opacities.  Pattern likely   represents ARDS or viral pattern of pneumonia.        Assessment:     Patient Active Problem List    Diagnosis Date Noted   Reg Corderoigh 08/23/2021    Pulmonary infiltrates 08/22/2021    DM (diabetes mellitus), secondary uncontrolled ([address] 08/22/2021    Leukocytosis 08/22/2021    CAD S/P percutaneous coronary angioplasty 08/17/2021     Overview Note:     CHRISTIAN to LAD 5/2020      Acute hypoxemic respiratory failure due to severe acute respiratory syndrome coronavirus 2 (SARS-CoV-2) disease ([address] 08/17/2021    Pulmonary sarcoidosis ([facility] [address] 07/30/2021    Essential hypertension 11/19/2020    Trigger finger, right middle finger 08/24/2020    Hand arthritis 08/24/2020    Interstitial lung disease ([address] 06/09/2020    Coronary artery disease involving native coronary artery of native heart with unstable angina pectoris ([address] 05/12/2020    Mixed hyperlipidemia 05/12/2020    Type 2 diabetes mellitus without complication, with long-term current use of insulin ([address] 05/12/2020    Acute chest pain 05/08/2020    Abnormal EKG 04/29/2020    Precordial pain 04/29/2020    Dizziness 04/29/2020    TIA (transient ischemic attack) 09/04/2014    SOB (shortness of breath) 09/04/2014    Arrhythmia 09/04/2014    Blurred vision, right eye 09/04/2014    Palpitations 09/04/2014    Abnormal CT scan, chest 09/04/2014    Dyspnea 09/04/2014    Mediastinal adenopathy 09/04/2014    Pulmonary nodules 09/04/2014       History of ILD, poss sarcoidosis, CAD, DM, HLD     COVID-19   Unvaccinated host   Date of symptom onset 8/11/21  Date of diagnosis and admission 8/17/21  Remains on vapotherm   -getting steroids  -had dose of tocilizumab 8/20/21    Leukocytosis without fever   Likely steroid induced  Addition of rocephin has not impacted the situation    No abx

## 2021-08-26 NOTE — PROGRESS NOTES
08/26/21 0850   Oxygen Therapy/Pulse Ox   O2 Therapy Oxygen humidified   $Oxygen $Daily Charge   O2 Device Heated high flow cannula   O2 Flow Rate (L/min) 40 L/min   FiO2  100 %   SpO2 94 %   Humidification Source Heated wire   Humidification Temp 34   Humidification Temp Measured 34   $Pulse Oximeter $Spot check (multiple/continuous)

## 2021-08-26 NOTE — PROGRESS NOTES
08/26/21 0442   Oxygen Therapy/Pulse Ox   O2 Therapy Oxygen humidified   O2 Device Heated high flow cannula   O2 Flow Rate (L/min) 35 L/min   FiO2  60 %   Resp 20   SpO2 97 %   Humidification Source Heated wire   Humidification Temp Measured 34   Blood Gas  Performed?  No

## 2021-08-26 NOTE — PROGRESS NOTES
08/25/21 2028   Oxygen Therapy/Pulse Ox   O2 Therapy Oxygen humidified   O2 Device Heated high flow cannula   O2 Flow Rate (L/min) 35 L/min   FiO2  60 %   Resp 20   SpO2 94 %   Humidification Source Heated wire   Humidification Temp 34   Pulse Oximeter Device Mode Continuous

## 2021-08-26 NOTE — CARE COORDINATION
Writer spoke with [facility], they have [place] insurance approval through Saturday but no bed available yet. Of note, pt's FiO2 will need to be 60% or less to safely transport, currently it is 100%. CM will continue to follow pt's progress, and coordinate discharge arrangements. JOSE Tidwell        1005 Addendum:  Writer spoke with [facility], they have bed available for pt but pt's FiO2 is too high now, will try again for tomorrow.   JOSE Tidwell

## 2021-08-26 NOTE — PROGRESS NOTES
Hospitalist Progress Note 8/24/2021     Date of Admission: 8/16/2021     Chief Complaint: Shortness of breath     Hospital Course:   Brandon Shook is a 62 y.o. female. PMHx of ILD, followed by Dr. Yen Zhu. She presents feeling generally ill. Symptoms began 8/11/21 according to her . She has felt very fatigued w/ poor appetite, loss of taste, generalized myalgias, and worsening nonproductive cough.      Subjective:   Currently on Vapotherm  No fever  No change in mental status  Appetite is ok  Daily complain about the care over here        Labs:   Recent Labs     08/22/21  0456 08/23/21  0547 08/24/21  0555   WBC 14.7* 16.4* 16.9*   HGB 12.4 12.4 12.6   HCT 36.8 36.5 37.1    386 406            Recent Labs     08/22/21  0456 08/23/21  0547 08/24/21  0555    140 139   K 4.2 3.9 4.2    106 103   CO2 24 25 26   BUN 26* 24* 22*   CREATININE 0.7 0.6 0.7   CALCIUM 8.9 8.6 8.5            Recent Labs     08/22/21  0456 08/23/21  0547 08/24/21  0555   AST 11* 10* 11*   ALT 9* 8* 8*   BILITOT 0.4 0.3 <0.2   ALKPHOS 68 60 58            Recent Labs     08/22/21  0456 08/23/21  0547 08/24/21  0555   INR 1.45* 1.44* 1.39*         Physical Exam Performed:     /65   Pulse 67   Temp 98.1 °F (36.7 °C) (Oral)   Resp 18   Ht 5' 1\" (1.549 m)   Wt 137 lb 12.6 oz (62.5 kg)   LMP 12/21/2012   SpO2 98%   BMI 26.03 kg/m²      General appearance: No apparent distress, appears stated age and cooperative. On Vapotherm  HEENT: Pupils equal, round, and reactive to light. Conjunctivae/corneas clear. Neck: Supple, no jugular venous distention. Trachea midline with full range of motion. Respiratory:  ., bilaterally witht Rales/ no Wheezes/Rhonchi. Cardiovascular: Regular rate and rhythm with normal S1/S2 without murmurs, rubs or gallops. Abdomen: Soft, non-tender, non-distended with normal bowel sounds. Musculoskelatal: No clubbing, cyanosis or edema bilaterally.   Full range of motion without deformity. Neurologic:  Neurovascularly intact without any focal sensory/motor deficits. .  Psychiatric: Alert and oriented, thought content appropriate, normal insight  Skin: Skin color, texture, turgor normal.  No rashes or lesions. Capillary Refill: Brisk,< 3 seconds   Peripheral Pulses: +2 palpable, equal bilaterally         Assessment/Plan:          Active Hospital Problems     Diagnosis      Bandemia [D72.825]      Pulmonary infiltrates [R91.8]      DM (diabetes mellitus), secondary uncontrolled ([facility] [E13.65]      Leukocytosis [D72.829]      CAD S/P percutaneous coronary angioplasty [I25.10, Z98.61]      Acute hypoxemic respiratory failure due to severe acute respiratory syndrome coronavirus 2 (SARS-CoV-2) disease ([facility]) [U07.1, J96.01]      Pulmonary sarcoidosis ([facility]) [D86.0]      Essential hypertension [I10]      Type 2 diabetes mellitus without complication, with long-term current use of insulin ([facility]) [E11.9, Z79.4]        COVID-19 Pneumonia with Acute Respiratory Failure with hypoxia  -Currently on Vapotherm  - hx of ILD, possible Sarcoidosis;   -  Symptom onset:  8/11/21 according to her .  -   Solumedrol 40mg IV q12h.-, Tocilizumab-  - Pulmonology and ID input appreciated and following  - Pulmonary toilet, incentive spirometry  -Repeat chest x-ray-not that much improvement     CAD s/p PCI  -  S/p CHRISTIAN to LAD 5/2020.  -  Normal Olga/MPI on 7/28/21.  -  Continue ASA, plavix, statin, metoprolol.  -  Hold lisinopril while acutely ill.     DM2  -  Holding all oral antidiabetic agents. Continue Insulin regimen. Currently on Lantus 20 with sliding scale insulin, blood sugar is not controlled well, possibly secondary to steroid,   Lantus was increased to 20 twice daily     Leukocytosis-uptrending, possibly secondary to steroid, given worsening chest x-ray Rocephin was started by intensivist 8/23   Monitor       DVT Prophylaxis: Lovenox  Diet: ADULT DIET;  Regular; 5 carb choices (75 gm/meal); Low Fat/Low Chol/High Fiber/DMITRI  Code Status: Full Code  PT/OT Eval Status: NA     Dispo -pending improvement     Veto Peralta MD

## 2021-08-26 NOTE — PLAN OF CARE
Problem: Airway Clearance - Ineffective  Goal: Achieve or maintain patent airway  Outcome: Ongoing     Problem: Gas Exchange - Impaired  Goal: Absence of hypoxia  Outcome: Ongoing  Goal: Promote optimal lung function  Outcome: Ongoing     Problem: Breathing Pattern - Ineffective  Goal: Ability to achieve and maintain a regular respiratory rate  Outcome: Ongoing     Problem: Risk for Fluid Volume Deficit  Goal: Maintain normal heart rhythm  Outcome: Ongoing  Goal: Maintain absence of muscle cramping  Outcome: Ongoing  Goal: Maintain normal serum potassium, sodium, calcium, phosphorus, and pH  Outcome: Ongoing     Problem: Falls - Risk of:  Goal: Will remain free from falls  Description: Will remain free from falls  Outcome: Ongoing  Goal: Absence of physical injury  Description: Absence of physical injury  Outcome: Ongoing     Problem: Pain:  Description: Pain management should include both nonpharmacologic and pharmacologic interventions.   Goal: Pain level will decrease  Description: Pain level will decrease  Outcome: Ongoing  Goal: Control of acute pain  Description: Control of acute pain  Outcome: Ongoing  Goal: Control of chronic pain  Description: Control of chronic pain  Outcome: Ongoing

## 2021-08-26 NOTE — PROGRESS NOTES
INPATIENT PULMONARY CRITICAL CARE PROGRESS NOTE      Reason for visit: Acute respiratory failure, acute COVID-19 pneumonia, past and active pulmonary sarcoidosis. History of DM2, HTN, HLD. SUBJECTIVE: Patient when seen this morning continues to be hypoxemic and worsened and patient was 35 L of flow rate in 65% oxygen when evaluated this morning with sats of 97%, patient's appetite is better , patient does not have any fever, patient was hemodynamically maintained, patient's blood sugars are not controlled optimally but better than before , patient has adequate urine output overnight, patient's p.o. intake is limited, urine o/p not documented to review; no other pertinent review of system of concern     Physical Exam:  Blood pressure (!) 157/82, pulse 64, temperature 98.2 °F (36.8 °C), temperature source Oral, resp. rate 20, height 5' 1\" (1.549 m), weight 137 lb 12.6 oz (62.5 kg), last menstrual period 12/21/2012, SpO2 93 %.'   Due to the current efforts to prevent transmission of COVID-19 and also the need to preserve PPE for other caregivers, a face-to-face encounter with the patient was not performed. That being said, all relevant records and diagnostic tests were reviewed, including laboratory results and imaging. Please reference any relevant documentation elsewhere. Care will be coordinated with the primary service.     Results:  CBC:   Recent Labs     08/23/21  0547 08/24/21  0555 08/25/21  0519   WBC 16.4* 16.9* 19.3*   HGB 12.4 12.6 12.8   HCT 36.5 37.1 37.0   MCV 80.0 81.0 79.4*    406 393     BMP:   Recent Labs     08/23/21  0547 08/24/21  0555 08/25/21  0520    139 137   K 3.9 4.2 4.1    103 104   CO2 25 26 25   BUN 24* 22* 21*   CREATININE 0.6 0.7 <0.5*     LIVER PROFILE:   Recent Labs     08/23/21  0547 08/24/21  0555 08/25/21  0520   AST 10* 11* 12*   ALT 8* 8* 11   BILITOT 0.3 <0.2 <0.2   ALKPHOS 60 58 56     PT/INR:   Recent Labs     08/23/21  0547 08/24/21  0555 08/25/21  0519   PROTIME 16.5* 15.9* 15.2*   INR 1.44* 1.39* 1.33*     APTT:   Recent Labs     08/23/21  0547 08/24/21  0555 08/25/21  0519   APTT 26.9 26.9 26.5       Imaging:  I have reviewed radiology images personally. XR CHEST PORTABLE   Final Result   Severe perihilar and diffuse bilateral airspace opacities. Pattern likely   represents ARDS or viral pattern of pneumonia. XR CHEST PORTABLE   Final Result   Bibasilar airspace disease not significantly changed from earlier today. Correlate clinically for possible COVID disease. Results for Rolando Seals (MRN [id number]) as of 8/25/2021 20:26   Ref. Range 8/24/2021 20:26 8/25/2021 05:20 8/25/2021 08:13 8/25/2021 12:28 8/25/2021 17:30   Sodium Latest Ref Range: 136 - 145 mmol/L  137      Potassium Latest Ref Range: 3.5 - 5.1 mmol/L  4.1      Chloride Latest Ref Range: 99 - 110 mmol/L  104      CO2 Latest Ref Range: 21 - 32 mmol/L  25      BUN Latest Ref Range: 7 - 20 mg/dL  21 (H)      Creatinine Latest Ref Range: 0.6 - 1.2 mg/dL  <0.5 (L)      Anion Gap Latest Ref Range: 3 - 16   8      GFR Non- Latest Ref Range: >60   >60      GFR  Latest Ref Range: >60   >60      Glucose Latest Ref Range: 70 - 99 mg/dL  156 (H)      POC Glucose Latest Ref Range: 70 - 99 mg/dl 171 (H)  135 (H) 173 (H) 230 (H)   Calcium Latest Ref Range: 8.3 - 10.6 mg/dL  8.7      Total Protein Latest Ref Range: 6.4 - 8.2 g/dL  5.7 (L)      Albumin Latest Ref Range: 3.4 - 5.0 g/dL  3.1 (L)      Globulin Latest Units: g/dL  2.6      Albumin/Globulin Ratio Latest Ref Range: 1.1 - 2.2   1.2      Alk Phos Latest Ref Range: 40 - 129 U/L  56      ALT Latest Ref Range: 10 - 40 U/L  11      AST Latest Ref Range: 15 - 37 U/L  12 (L)      Bilirubin Latest Ref Range: 0.0 - 1.0 mg/dL  <0.2              Assessment and plan:  Acute respiratory failure, hypoxemic.     Supplementation to keep saturation more than 92%  Patient continues to be significantly hypoxemic and patient was still on Vapotherm Vapotherm oxygenation to maintain saturation and the requirements on vapotherm are minimally better as compared to yesterday   Monitor for any worsening respiratory compromise or hypoxemia which seems to be deteriorating   Trend inflammatory markers  Pulmonary toilet  CXR shows worsening with ARDS like picture   Patient also has worsening leukocytosis with bandemia -Will order sputum c/s and start empiric Rocephin and reassess -sputum c/s are pending   If patient's hypoxemia does not management noninvasive or invasive ventilation   Acute COVID-19 pneumonia. On steroid,.  ID ordered Tocilizumab. Pulmonary sarcoidosis. CT findings from 2020 are classic for pulmonary sarcoidosis. It appears the disease is inactive, she has not been treated with immunosuppression  DM2, HTN, HLD. Patient has uncontrolled diabetes mellitus and partly it may be secondary to IV steroids-patient's Lantus insulin was increased to 25 units twice daily  Titration of Lantus as per blood glucose monitoring as per IM  BGM with SSI  DVT prophylaxis. On appropriate dose of Lovenox  PUD prophylaxis.   On PPI      Patient's clinical status remains precarious and has potential for further decompensation and history monitor closely in the progressive care unit  Case d/w case management         Electronically signed by:  Hardik Vivar MD    8/25/2021    8:25 PM.

## 2021-08-26 NOTE — PROGRESS NOTES
08/26/21 1210   Oxygen Therapy/Pulse Ox   O2 Therapy Oxygen humidified   O2 Device Heated high flow cannula   O2 Flow Rate (L/min) 40 L/min   FiO2  70 %   SpO2 99 %   Humidification Source Heated wire   Humidification Temp 34   Humidification Temp Measured 34

## 2021-08-27 LAB
A/G RATIO: 1 (ref 1.1–2.2)
ALBUMIN SERPL-MCNC: 2.7 G/DL (ref 3.4–5)
ALP BLD-CCNC: 53 U/L (ref 40–129)
ALT SERPL-CCNC: 13 U/L (ref 10–40)
ANION GAP SERPL CALCULATED.3IONS-SCNC: 8 MMOL/L (ref 3–16)
APTT: 24.8 SEC (ref 26.2–38.6)
AST SERPL-CCNC: 11 U/L (ref 15–37)
BANDED NEUTROPHILS RELATIVE PERCENT: 8 % (ref 0–7)
BASOPHILIC STIPPLING: ABNORMAL
BASOPHILS ABSOLUTE: 0 K/UL (ref 0–0.2)
BASOPHILS RELATIVE PERCENT: 0 %
BILIRUB SERPL-MCNC: 0.3 MG/DL (ref 0–1)
BUN BLDV-MCNC: 22 MG/DL (ref 7–20)
CALCIUM SERPL-MCNC: 8.3 MG/DL (ref 8.3–10.6)
CHLORIDE BLD-SCNC: 106 MMOL/L (ref 99–110)
CO2: 26 MMOL/L (ref 21–32)
CREAT SERPL-MCNC: <0.5 MG/DL (ref 0.6–1.2)
D DIMER: <200 NG/ML DDU (ref 0–229)
EOSINOPHILS ABSOLUTE: 0 K/UL (ref 0–0.6)
EOSINOPHILS RELATIVE PERCENT: 0 %
FIBRINOGEN: 308 MG/DL (ref 200–397)
GFR AFRICAN AMERICAN: >60
GFR NON-AFRICAN AMERICAN: >60
GLOBULIN: 2.6 G/DL
GLUCOSE BLD-MCNC: 102 MG/DL (ref 70–99)
GLUCOSE BLD-MCNC: 123 MG/DL (ref 70–99)
GLUCOSE BLD-MCNC: 189 MG/DL (ref 70–99)
GLUCOSE BLD-MCNC: 315 MG/DL (ref 70–99)
GLUCOSE BLD-MCNC: 89 MG/DL (ref 70–99)
HCT VFR BLD CALC: 37.1 % (ref 36–48)
HEMOGLOBIN: 12.7 G/DL (ref 12–16)
INR BLD: 1.31 (ref 0.88–1.12)
LYMPHOCYTES ABSOLUTE: 3.3 K/UL (ref 1–5.1)
LYMPHOCYTES RELATIVE PERCENT: 15 %
MCH RBC QN AUTO: 27.2 PG (ref 26–34)
MCHC RBC AUTO-ENTMCNC: 34.2 G/DL (ref 31–36)
MCV RBC AUTO: 79.6 FL (ref 80–100)
MICROCYTES: ABNORMAL
MONOCYTES ABSOLUTE: 1.8 K/UL (ref 0–1.3)
MONOCYTES RELATIVE PERCENT: 8 %
NEUTROPHILS ABSOLUTE: 16.9 K/UL (ref 1.7–7.7)
NEUTROPHILS RELATIVE PERCENT: 69 %
OVALOCYTES: ABNORMAL
PDW BLD-RTO: 15.2 % (ref 12.4–15.4)
PERFORMED ON: ABNORMAL
PERFORMED ON: NORMAL
PLATELET # BLD: 393 K/UL (ref 135–450)
PLATELET SLIDE REVIEW: ADEQUATE
PMV BLD AUTO: 6 FL (ref 5–10.5)
POTASSIUM REFLEX MAGNESIUM: 4.2 MMOL/L (ref 3.5–5.1)
PROTHROMBIN TIME: 15 SEC (ref 9.9–12.7)
RBC # BLD: 4.66 M/UL (ref 4–5.2)
SLIDE REVIEW: ABNORMAL
SODIUM BLD-SCNC: 140 MMOL/L (ref 136–145)
TOTAL PROTEIN: 5.3 G/DL (ref 6.4–8.2)
WBC # BLD: 22 K/UL (ref 4–11)

## 2021-08-27 PROCEDURE — 36415 COLL VENOUS BLD VENIPUNCTURE: CPT

## 2021-08-27 PROCEDURE — 96376 TX/PRO/DX INJ SAME DRUG ADON: CPT

## 2021-08-27 PROCEDURE — 85025 COMPLETE CBC W/AUTO DIFF WBC: CPT

## 2021-08-27 PROCEDURE — 85384 FIBRINOGEN ACTIVITY: CPT

## 2021-08-27 PROCEDURE — 85730 THROMBOPLASTIN TIME PARTIAL: CPT

## 2021-08-27 PROCEDURE — 99232 SBSQ HOSP IP/OBS MODERATE 35: CPT | Performed by: INTERNAL MEDICINE

## 2021-08-27 PROCEDURE — 85610 PROTHROMBIN TIME: CPT

## 2021-08-27 PROCEDURE — 85379 FIBRIN DEGRADATION QUANT: CPT

## 2021-08-27 PROCEDURE — 6370000000 HC RX 637 (ALT 250 FOR IP): Performed by: INTERNAL MEDICINE

## 2021-08-27 PROCEDURE — 2060000000 HC ICU INTERMEDIATE R&B

## 2021-08-27 PROCEDURE — 94761 N-INVAS EAR/PLS OXIMETRY MLT: CPT

## 2021-08-27 PROCEDURE — 2700000000 HC OXYGEN THERAPY PER DAY

## 2021-08-27 PROCEDURE — 96372 THER/PROPH/DIAG INJ SC/IM: CPT

## 2021-08-27 PROCEDURE — 2580000003 HC RX 258: Performed by: INTERNAL MEDICINE

## 2021-08-27 PROCEDURE — 80053 COMPREHEN METABOLIC PANEL: CPT

## 2021-08-27 PROCEDURE — G0378 HOSPITAL OBSERVATION PER HR: HCPCS

## 2021-08-27 PROCEDURE — 99231 SBSQ HOSP IP/OBS SF/LOW 25: CPT | Performed by: INTERNAL MEDICINE

## 2021-08-27 PROCEDURE — 6360000002 HC RX W HCPCS: Performed by: INTERNAL MEDICINE

## 2021-08-27 RX ORDER — INSULIN GLARGINE 100 [IU]/ML
30 INJECTION, SOLUTION SUBCUTANEOUS 2 TIMES DAILY
Status: DISCONTINUED | OUTPATIENT
Start: 2021-08-28 | End: 2021-09-01 | Stop reason: HOSPADM

## 2021-08-27 RX ADMIN — INSULIN LISPRO 5 UNITS: 100 INJECTION, SOLUTION INTRAVENOUS; SUBCUTANEOUS at 16:47

## 2021-08-27 RX ADMIN — ENOXAPARIN SODIUM 30 MG: 30 INJECTION SUBCUTANEOUS at 21:54

## 2021-08-27 RX ADMIN — GUAIFENESIN 600 MG: 600 TABLET, EXTENDED RELEASE ORAL at 21:49

## 2021-08-27 RX ADMIN — INSULIN LISPRO 5 UNITS: 100 INJECTION, SOLUTION INTRAVENOUS; SUBCUTANEOUS at 08:08

## 2021-08-27 RX ADMIN — Medication 2000 UNITS: at 08:07

## 2021-08-27 RX ADMIN — INSULIN GLARGINE 25 UNITS: 100 INJECTION, SOLUTION SUBCUTANEOUS at 08:09

## 2021-08-27 RX ADMIN — INSULIN GLARGINE 25 UNITS: 100 INJECTION, SOLUTION SUBCUTANEOUS at 21:55

## 2021-08-27 RX ADMIN — Medication 10 ML: at 05:17

## 2021-08-27 RX ADMIN — METOPROLOL TARTRATE 25 MG: 25 TABLET, FILM COATED ORAL at 21:49

## 2021-08-27 RX ADMIN — ROSUVASTATIN CALCIUM 10 MG: 10 TABLET, FILM COATED ORAL at 08:07

## 2021-08-27 RX ADMIN — Medication 3 MG: at 21:49

## 2021-08-27 RX ADMIN — PANTOPRAZOLE SODIUM 40 MG: 40 TABLET, DELAYED RELEASE ORAL at 05:17

## 2021-08-27 RX ADMIN — INSULIN LISPRO 5 UNITS: 100 INJECTION, SOLUTION INTRAVENOUS; SUBCUTANEOUS at 11:46

## 2021-08-27 RX ADMIN — ASPIRIN 81 MG: 81 TABLET, CHEWABLE ORAL at 08:07

## 2021-08-27 RX ADMIN — METOPROLOL TARTRATE 25 MG: 25 TABLET, FILM COATED ORAL at 08:08

## 2021-08-27 RX ADMIN — ACETAMINOPHEN 650 MG: 325 TABLET ORAL at 08:07

## 2021-08-27 RX ADMIN — GUAIFENESIN 600 MG: 600 TABLET, EXTENDED RELEASE ORAL at 08:08

## 2021-08-27 RX ADMIN — BENZONATATE 200 MG: 100 CAPSULE ORAL at 08:07

## 2021-08-27 RX ADMIN — METHYLPREDNISOLONE SODIUM SUCCINATE 40 MG: 40 INJECTION, POWDER, LYOPHILIZED, FOR SOLUTION INTRAMUSCULAR; INTRAVENOUS at 16:47

## 2021-08-27 RX ADMIN — METHYLPREDNISOLONE SODIUM SUCCINATE 40 MG: 40 INJECTION, POWDER, LYOPHILIZED, FOR SOLUTION INTRAMUSCULAR; INTRAVENOUS at 05:17

## 2021-08-27 RX ADMIN — INSULIN LISPRO 8 UNITS: 100 INJECTION, SOLUTION INTRAVENOUS; SUBCUTANEOUS at 11:46

## 2021-08-27 RX ADMIN — ENOXAPARIN SODIUM 30 MG: 30 INJECTION SUBCUTANEOUS at 08:08

## 2021-08-27 RX ADMIN — INSULIN LISPRO 1 UNITS: 100 INJECTION, SOLUTION INTRAVENOUS; SUBCUTANEOUS at 21:54

## 2021-08-27 RX ADMIN — CLOPIDOGREL BISULFATE 75 MG: 75 TABLET ORAL at 08:07

## 2021-08-27 ASSESSMENT — PAIN SCALES - GENERAL
PAINLEVEL_OUTOF10: 0
PAINLEVEL_OUTOF10: 4
PAINLEVEL_OUTOF10: 0
PAINLEVEL_OUTOF10: 4
PAINLEVEL_OUTOF10: 0
PAINLEVEL_OUTOF10: 0

## 2021-08-27 ASSESSMENT — PAIN DESCRIPTION - PAIN TYPE: TYPE: ACUTE PAIN

## 2021-08-27 NOTE — PROGRESS NOTES
Infectious Disease Follow up Notes    CC :  COVID     Antibiotics:   none    Solumedrol 40 q12    Admit Date:   8/16/2021  Hospital Day: 12    Subjective:   She remains AF   Remains on vapotherm - reduced, now at 50%/40L (from 65%/40L)  No acute interval changes      Objective:     Patient Vitals for the past 8 hrs:   BP Temp Temp src Pulse Resp SpO2 Weight   08/27/21 1151      96 %    08/27/21 1145 127/65   57 18 97 %    08/27/21 0829      92 %    08/27/21 0804 (!) 130/56 98.1 °F (36.7 °C) Oral 56 18 92 %    08/27/21 0458       147 lb (66.7 kg)       EXAM:  Resting comfortably  Breathing non-labored       LINE: PIV       Scheduled Meds:   methylPREDNISolone  40 mg IntraVENous Q12H    insulin glargine  25 Units Subcutaneous BID    insulin lispro  0-12 Units Subcutaneous TID WC    insulin lispro  0-6 Units Subcutaneous Nightly    guaiFENesin  600 mg Oral BID    aspirin  81 mg Oral Daily    Vitamin D  2,000 Units Oral Daily    clopidogrel  75 mg Oral Daily    metoprolol tartrate  25 mg Oral BID    pantoprazole  40 mg Oral QAM AC    rosuvastatin  10 mg Oral Daily    enoxaparin  30 mg Subcutaneous BID    insulin lispro  0.08 Units/kg Subcutaneous TID WC       Continuous Infusions:   dextrose      sodium chloride 25 mL (08/20/21 0140)          Data Review:    Lab Results   Component Value Date    WBC 22.0 (H) 08/27/2021    HGB 12.7 08/27/2021    HCT 37.1 08/27/2021    MCV 79.6 (L) 08/27/2021     08/27/2021     Lab Results   Component Value Date    CREATININE <0.5 (L) 08/27/2021    BUN 22 (H) 08/27/2021     08/27/2021    K 4.2 08/27/2021     08/27/2021    CO2 26 08/27/2021       Hepatic Function Panel:   Lab Results   Component Value Date    ALKPHOS 53 08/27/2021    ALT 13 08/27/2021    AST 11 08/27/2021    PROT 5.3 08/27/2021    PROT 5.7 01/26/2013    BILITOT 0.3 08/27/2021    LABALBU 2.7 08/27/2021         MICRO:  8/17 COVID NAAT+   UA neg       IMAGING:  CXR 8/23/21  Impression   Severe perihilar and diffuse bilateral airspace opacities.  Pattern likely   represents ARDS or viral pattern of pneumonia.        Assessment:     Patient Active Problem List    Diagnosis Date Noted   Julianne Go 08/23/2021    Pulmonary infiltrates 08/22/2021    DM (diabetes mellitus), secondary uncontrolled ([address] 08/22/2021    Leukocytosis 08/22/2021    CAD S/P percutaneous coronary angioplasty 08/17/2021     Overview Note:     CHRISTIAN to LAD 5/2020      Acute hypoxemic respiratory failure due to severe acute respiratory syndrome coronavirus 2 (SARS-CoV-2) disease ([facility] [address] 08/17/2021    Pulmonary sarcoidosis ([facility] [address] 07/30/2021    Essential hypertension 11/19/2020    Trigger finger, right middle finger 08/24/2020    Hand arthritis 08/24/2020    Interstitial lung disease ([address] 06/09/2020    Coronary artery disease involving native coronary artery of native heart with unstable angina pectoris ([address] 05/12/2020    Mixed hyperlipidemia 05/12/2020    Type 2 diabetes mellitus without complication, with long-term current use of insulin ([address] 05/12/2020    Acute chest pain 05/08/2020    Abnormal EKG 04/29/2020    Precordial pain 04/29/2020    Dizziness 04/29/2020    TIA (transient ischemic attack) 09/04/2014    SOB (shortness of breath) 09/04/2014    Arrhythmia 09/04/2014    Blurred vision, right eye 09/04/2014    Palpitations 09/04/2014    Abnormal CT scan, chest 09/04/2014    Dyspnea 09/04/2014    Mediastinal adenopathy 09/04/2014    Pulmonary nodules 09/04/2014       History of ILD, poss sarcoidosis, CAD, DM, HLD     COVID-19   Unvaccinated host   Date of symptom onset 8/11/21  Date of diagnosis and admission 8/17/21  Remains on vapotherm; support has been weaned   Getting steroids, reduced on 8/26/21  Had dose of tocilizumab 8/20/21  -continue supportive care  -steroid duration per IM/Pulm  -isolation through 8/31/21    Leukocytosis without fever   Likely steroid induced  She had 3 doses of ceftriaxone, last was 8/25/21  Without fever and clinically stable     No abx allergies        I will sign off.   Please call with further questions         Hieu Olivas MD  Phone: [phone]   Fax : [phone]

## 2021-08-27 NOTE — PROGRESS NOTES
Hospitalist Progress Note      PCP: Sherryle Boas    Date of Admission: 8/16/2021    Chief Complaint: Shortness of breath    Hospital Course: History and physical reviewed    Subjective: Currently on Vapotherm  No fever worsening cough sputum change in mental status, nausea, vomiting, fever or poor appetite. Medications:  Reviewed    Infusion Medications    dextrose      sodium chloride 25 mL (08/20/21 8125)     Scheduled Medications    methylPREDNISolone  40 mg IntraVENous Q12H    insulin glargine  25 Units Subcutaneous BID    insulin lispro  0-12 Units Subcutaneous TID WC    insulin lispro  0-6 Units Subcutaneous Nightly    guaiFENesin  600 mg Oral BID    aspirin  81 mg Oral Daily    Vitamin D  2,000 Units Oral Daily    clopidogrel  75 mg Oral Daily    metoprolol tartrate  25 mg Oral BID    pantoprazole  40 mg Oral QAM AC    rosuvastatin  10 mg Oral Daily    enoxaparin  30 mg Subcutaneous BID    insulin lispro  0.08 Units/kg Subcutaneous TID WC     PRN Meds: albuterol sulfate HFA, glucose, dextrose, glucagon (rDNA), dextrose, sodium chloride flush, sodium chloride, potassium chloride **OR** potassium alternative oral replacement **OR** potassium chloride, magnesium sulfate, ondansetron **OR** ondansetron, acetaminophen **OR** acetaminophen, guaiFENesin-dextromethorphan, melatonin, albuterol sulfate HFA, benzonatate    No intake or output data in the 24 hours ending 08/27/21 1317    Physical Exam Performed:    /65   Pulse 57   Temp 98.1 °F (36.7 °C) (Oral)   Resp 18   Ht 5' 1\" (1.549 m)   Wt 147 lb (66.7 kg)   [facility] 12/21/2012   SpO2 96%   BMI 27.78 kg/m²     General appearance: No apparent distress, appears stated age and cooperative. On Vapotherm  HEENT: .  Neck: Supple, with full range of motion. No jugular venous distention. Trachea midline. Respiratory:  Normal respiratory effort. , bilaterally with Rales no /Wheezes/Rhonchi.   Cardiovascular: Regular rate and rhythm with normal S1/S2 without murmurs, rubs or gallops. Abdomen: Soft, non-tender, non-distended with normal bowel sounds. Musculoskeletal: No clubbing, cyanosis or edema bilaterally. Skin: Skin color, texture, turgor normal.  No rashes or lesions. Neurologic:  Neurovascularly intact without any focal sensory/motor deficits. l.  Psychiatric: Alert and oriented,  Capillary Refill: Brisk,3 seconds, normal   Peripheral Pulses: +2 palpable, equal bilaterally       Labs:   Recent Labs     08/25/21  0519 08/26/21  0542 08/27/21  0458   WBC 19.3* 21.9* 22.0*   HGB 12.8 13.2 12.7   HCT 37.0 39.4 37.1    414 393     Recent Labs     08/25/21  0520 08/26/21  0542 08/27/21  0458    140 140   K 4.1 4.2 4.2    103 106   CO2 25 26 26   BUN 21* 21* 22*   CREATININE <0.5* 0.6 <0.5*   CALCIUM 8.7 8.6 8.3     Recent Labs     08/25/21  0520 08/26/21  0542 08/27/21  0458   AST 12* 11* 11*   ALT 11 11 13   BILITOT <0.2 <0.2 0.3   ALKPHOS 56 60 53     Recent Labs     08/25/21 0519 08/26/21  0542 08/27/21  0458   INR 1.33* 1.36* 1.31*     No results for input(s): Savbrayden Sebastian in the last 72 hours. Urinalysis:      Lab Results   Component Value Date    NITRU Negative 08/17/2021    WBCUA 6-9 08/17/2021    BACTERIA 1+ 08/17/2021    RBCUA 5-10 08/17/2021    BLOODU SMALL 08/17/2021    SPECGRAV 1.020 08/17/2021    GLUCOSEU >=1000 08/17/2021       Radiology:  XR CHEST PORTABLE   Final Result   Severe perihilar and diffuse bilateral airspace opacities. Pattern likely   represents ARDS or viral pattern of pneumonia. XR CHEST PORTABLE   Final Result   Bibasilar airspace disease not significantly changed from earlier today. Correlate clinically for possible COVID disease.                  Assessment/Plan:    Active Hospital Problems    Diagnosis     Bandemia [D72.825]     Pulmonary infiltrates [R91.8]     DM [facility]di[facility] mellitus[facility] [E13.65]     Leukocytosis [D72.829]     CAD S/P percutaneous coronary angioplasty [I25.10, Z98.61]     Acute hypoxemic respiratory failure due to severe acute respiratory syndrome coronavirus 2 (SARS-CoV-2) disease ([facility]) [U07.1, J96.01]     Pulmonary sarcoidosis ([facility]) [D86.0]     Essential hypertension [I10]     Type 2 diabetes mellitus without complication, with long-term current use of insulin ([facility]) [E11.9, Z79.4]      COVID-19 Pneumonia with Acute Respiratory Failure with hypoxia  -Currently on Vapotherm  -FiO2 55%-improving  - hx of ILD, possible Sarcoidosis;   -  Symptom onset:  8/11/21 according to her .  -   Solumedrol 40mg IV q12h.-,s/p  Tocilizumab-  - Pulmonology and ID input appreciated and following  - Pulmonary toilet, incentive spirometry  -Repeat chest x-ray-not that much improvement     CAD s/p PCI  -  S/p CHRISTIAN to LAD 5/2020.  -  Normal Olga/MPI on 7/28/21.  -  Continue ASA, plavix, statin, metoprolol. -  Hold lisinopril while acutely ill.     DM2  -  Holding all oral antidiabetic agents. Continue Insulin regimen.  Currently on Lantus 20 with sliding scale insulin, blood sugar is not controlled well, possibly secondary to steroid,   Lantus was increased to 25 twice daily     Leukocytosis-uptrending, possibly secondary to steroid, given worsening chest x-ray Rocephin was started by intensivist 8/23   Patient does not have any overt symptoms of sepsis  It was discontinued on 8/26 by ID  ID is following, leukocytosis possibly secondary to steroid  Steroid was cut down      DVT Prophylaxis: Lovenox  Diet: ADULT DIET; Regular; 5 carb choices (75 gm/meal);  Low Fat/Low Chol/High Fiber/DMITRI  Code Status: Full Code    PT/OT Eval Status:     Dispo -pending improvement    Ronalod Zaragoza MD

## 2021-08-27 NOTE — CARE COORDINATION
Care being managed by Mateo Schwartz. LOS 10. [facility] is following and has accepted- cert is good thru Saturday but no bed available. Liaison will advise [facility] if  Bed becomes available over the weekend. Lauren Gates RN     Spoke to pt about DC plan. States she might prefer SNF closer to home, as opposed to [facility] if she is here at [facility] long enough for a SNF to accept due to her covid status. Pt names [facility]- not listed as a [place] provider but call placed to them to check and message left. Pt names [facility] as a second choice- call to admissions and message left. CM will continue to follow.   Lauren Gates, RN

## 2021-08-27 NOTE — PLAN OF CARE
Problem: Airway Clearance - Ineffective  Goal: Achieve or maintain patent airway  Outcome: Ongoing     Problem: Gas Exchange - Impaired  Goal: Absence of hypoxia  Outcome: Ongoing  Goal: Promote optimal lung function  Outcome: Ongoing     Problem: Breathing Pattern - Ineffective  Goal: Ability to achieve and maintain a regular respiratory rate  Outcome: Ongoing     Problem: Risk for Fluid Volume Deficit  Goal: Maintain normal heart rhythm  Outcome: Ongoing  Goal: Maintain absence of muscle cramping  Outcome: Ongoing  Goal: Maintain normal serum potassium, sodium, calcium, phosphorus, and pH  Outcome: Ongoing     Problem: Pain:  Description: Pain management should include both nonpharmacologic and pharmacologic interventions.   Goal: Pain level will decrease  Description: Pain level will decrease  Outcome: Ongoing  Goal: Control of acute pain  Description: Control of acute pain  Outcome: Ongoing  Goal: Control of chronic pain  Description: Control of chronic pain  Outcome: Ongoing

## 2021-08-28 LAB
A/G RATIO: 1.1 (ref 1.1–2.2)
ALBUMIN SERPL-MCNC: 2.7 G/DL (ref 3.4–5)
ALP BLD-CCNC: 50 U/L (ref 40–129)
ALT SERPL-CCNC: 14 U/L (ref 10–40)
ANION GAP SERPL CALCULATED.3IONS-SCNC: 9 MMOL/L (ref 3–16)
APTT: 24.5 SEC (ref 26.2–38.6)
AST SERPL-CCNC: 14 U/L (ref 15–37)
BANDED NEUTROPHILS RELATIVE PERCENT: 9 % (ref 0–7)
BASOPHILS ABSOLUTE: 0 K/UL (ref 0–0.2)
BASOPHILS RELATIVE PERCENT: 0 %
BILIRUB SERPL-MCNC: <0.2 MG/DL (ref 0–1)
BUN BLDV-MCNC: 25 MG/DL (ref 7–20)
CALCIUM SERPL-MCNC: 8 MG/DL (ref 8.3–10.6)
CHLORIDE BLD-SCNC: 101 MMOL/L (ref 99–110)
CO2: 25 MMOL/L (ref 21–32)
CREAT SERPL-MCNC: <0.5 MG/DL (ref 0.6–1.2)
D DIMER: 232 NG/ML DDU (ref 0–229)
EOSINOPHILS ABSOLUTE: 0.2 K/UL (ref 0–0.6)
EOSINOPHILS RELATIVE PERCENT: 1 %
FIBRINOGEN: 281 MG/DL (ref 200–397)
GFR AFRICAN AMERICAN: >60
GFR NON-AFRICAN AMERICAN: >60
GLOBULIN: 2.4 G/DL
GLUCOSE BLD-MCNC: 143 MG/DL (ref 70–99)
GLUCOSE BLD-MCNC: 160 MG/DL (ref 70–99)
GLUCOSE BLD-MCNC: 190 MG/DL (ref 70–99)
GLUCOSE BLD-MCNC: 198 MG/DL (ref 70–99)
GLUCOSE BLD-MCNC: 285 MG/DL (ref 70–99)
HCT VFR BLD CALC: 38.7 % (ref 36–48)
HEMOGLOBIN: 12.8 G/DL (ref 12–16)
INR BLD: 1.32 (ref 0.88–1.12)
LYMPHOCYTES ABSOLUTE: 3.1 K/UL (ref 1–5.1)
LYMPHOCYTES RELATIVE PERCENT: 14 %
MCH RBC QN AUTO: 27.1 PG (ref 26–34)
MCHC RBC AUTO-ENTMCNC: 33 G/DL (ref 31–36)
MCV RBC AUTO: 81.9 FL (ref 80–100)
MONOCYTES ABSOLUTE: 1.1 K/UL (ref 0–1.3)
MONOCYTES RELATIVE PERCENT: 5 %
NEUTROPHILS ABSOLUTE: 17.4 K/UL (ref 1.7–7.7)
NEUTROPHILS RELATIVE PERCENT: 71 %
OVALOCYTES: ABNORMAL
PDW BLD-RTO: 15.1 % (ref 12.4–15.4)
PERFORMED ON: ABNORMAL
PLATELET # BLD: 367 K/UL (ref 135–450)
PMV BLD AUTO: 5.7 FL (ref 5–10.5)
POTASSIUM REFLEX MAGNESIUM: 3.8 MMOL/L (ref 3.5–5.1)
PROTHROMBIN TIME: 15.1 SEC (ref 9.9–12.7)
RBC # BLD: 4.72 M/UL (ref 4–5.2)
SODIUM BLD-SCNC: 135 MMOL/L (ref 136–145)
TOTAL PROTEIN: 5.1 G/DL (ref 6.4–8.2)
WBC # BLD: 21.8 K/UL (ref 4–11)

## 2021-08-28 PROCEDURE — 6370000000 HC RX 637 (ALT 250 FOR IP): Performed by: INTERNAL MEDICINE

## 2021-08-28 PROCEDURE — 96372 THER/PROPH/DIAG INJ SC/IM: CPT

## 2021-08-28 PROCEDURE — 85610 PROTHROMBIN TIME: CPT

## 2021-08-28 PROCEDURE — 2060000000 HC ICU INTERMEDIATE R&B

## 2021-08-28 PROCEDURE — 96376 TX/PRO/DX INJ SAME DRUG ADON: CPT

## 2021-08-28 PROCEDURE — 85384 FIBRINOGEN ACTIVITY: CPT

## 2021-08-28 PROCEDURE — 85025 COMPLETE CBC W/AUTO DIFF WBC: CPT

## 2021-08-28 PROCEDURE — 36415 COLL VENOUS BLD VENIPUNCTURE: CPT

## 2021-08-28 PROCEDURE — 2580000003 HC RX 258: Performed by: INTERNAL MEDICINE

## 2021-08-28 PROCEDURE — 80053 COMPREHEN METABOLIC PANEL: CPT

## 2021-08-28 PROCEDURE — G0378 HOSPITAL OBSERVATION PER HR: HCPCS

## 2021-08-28 PROCEDURE — 6360000002 HC RX W HCPCS: Performed by: INTERNAL MEDICINE

## 2021-08-28 PROCEDURE — 94761 N-INVAS EAR/PLS OXIMETRY MLT: CPT

## 2021-08-28 PROCEDURE — 85379 FIBRIN DEGRADATION QUANT: CPT

## 2021-08-28 PROCEDURE — 85730 THROMBOPLASTIN TIME PARTIAL: CPT

## 2021-08-28 PROCEDURE — 2700000000 HC OXYGEN THERAPY PER DAY

## 2021-08-28 PROCEDURE — 99232 SBSQ HOSP IP/OBS MODERATE 35: CPT | Performed by: INTERNAL MEDICINE

## 2021-08-28 RX ADMIN — INSULIN LISPRO 6 UNITS: 100 INJECTION, SOLUTION INTRAVENOUS; SUBCUTANEOUS at 13:23

## 2021-08-28 RX ADMIN — PANTOPRAZOLE SODIUM 40 MG: 40 TABLET, DELAYED RELEASE ORAL at 05:57

## 2021-08-28 RX ADMIN — INSULIN LISPRO 2 UNITS: 100 INJECTION, SOLUTION INTRAVENOUS; SUBCUTANEOUS at 17:56

## 2021-08-28 RX ADMIN — Medication 10 ML: at 21:55

## 2021-08-28 RX ADMIN — INSULIN LISPRO 5 UNITS: 100 INJECTION, SOLUTION INTRAVENOUS; SUBCUTANEOUS at 13:23

## 2021-08-28 RX ADMIN — ENOXAPARIN SODIUM 30 MG: 30 INJECTION SUBCUTANEOUS at 21:55

## 2021-08-28 RX ADMIN — ROSUVASTATIN CALCIUM 10 MG: 10 TABLET, FILM COATED ORAL at 09:33

## 2021-08-28 RX ADMIN — METHYLPREDNISOLONE SODIUM SUCCINATE 40 MG: 40 INJECTION, POWDER, LYOPHILIZED, FOR SOLUTION INTRAMUSCULAR; INTRAVENOUS at 21:55

## 2021-08-28 RX ADMIN — INSULIN LISPRO 5 UNITS: 100 INJECTION, SOLUTION INTRAVENOUS; SUBCUTANEOUS at 18:21

## 2021-08-28 RX ADMIN — INSULIN LISPRO 2 UNITS: 100 INJECTION, SOLUTION INTRAVENOUS; SUBCUTANEOUS at 21:00

## 2021-08-28 RX ADMIN — INSULIN GLARGINE 30 UNITS: 100 INJECTION, SOLUTION SUBCUTANEOUS at 13:23

## 2021-08-28 RX ADMIN — INSULIN LISPRO 5 UNITS: 100 INJECTION, SOLUTION INTRAVENOUS; SUBCUTANEOUS at 09:34

## 2021-08-28 RX ADMIN — METOPROLOL TARTRATE 25 MG: 25 TABLET, FILM COATED ORAL at 09:33

## 2021-08-28 RX ADMIN — CLOPIDOGREL BISULFATE 75 MG: 75 TABLET ORAL at 09:34

## 2021-08-28 RX ADMIN — METOPROLOL TARTRATE 25 MG: 25 TABLET, FILM COATED ORAL at 21:55

## 2021-08-28 RX ADMIN — ASPIRIN 81 MG: 81 TABLET, CHEWABLE ORAL at 09:33

## 2021-08-28 RX ADMIN — METHYLPREDNISOLONE SODIUM SUCCINATE 40 MG: 40 INJECTION, POWDER, LYOPHILIZED, FOR SOLUTION INTRAMUSCULAR; INTRAVENOUS at 04:30

## 2021-08-28 RX ADMIN — Medication 2000 UNITS: at 09:33

## 2021-08-28 RX ADMIN — INSULIN GLARGINE 30 UNITS: 100 INJECTION, SOLUTION SUBCUTANEOUS at 21:00

## 2021-08-28 RX ADMIN — ENOXAPARIN SODIUM 30 MG: 30 INJECTION SUBCUTANEOUS at 09:33

## 2021-08-28 RX ADMIN — GUAIFENESIN 600 MG: 600 TABLET, EXTENDED RELEASE ORAL at 21:55

## 2021-08-28 RX ADMIN — Medication 3 MG: at 21:55

## 2021-08-28 RX ADMIN — Medication 10 ML: at 09:34

## 2021-08-28 RX ADMIN — GUAIFENESIN 600 MG: 600 TABLET, EXTENDED RELEASE ORAL at 09:33

## 2021-08-28 ASSESSMENT — PAIN SCALES - GENERAL
PAINLEVEL_OUTOF10: 0

## 2021-08-28 NOTE — PROGRESS NOTES
INPATIENT PULMONARY CRITICAL CARE PROGRESS NOTE      Reason for visit: Acute respiratory failure, acute COVID-19 pneumonia, past and active pulmonary sarcoidosis. History of DM2, HTN, HLD. SUBJECTIVE: Patient when seen this morning continues to be hypoxemic and worsened and patient was 40 L of flow rate in 55% oxygen when evaluated this morning with sats of 96%, no increased work of breathing when evaluated  , patient does not have any fever, patient was hemodynamically maintained, patient's blood sugars have been fluctuating, patient has adequate urine output overnight, patient's p.o. intake is limited,patient has no diarrhea  patient has had good urine o/p with cumulative fluid balance of -2 L; no other pertinent review of system of concern     Physical Exam:  Blood pressure 128/72, pulse 70, temperature 98.1 °F (36.7 °C), temperature source Oral, resp. rate 16, height 5' 1\" (1.549 m), weight 147 lb (66.7 kg), last menstrual period 12/21/2012, SpO2 93 %.'   Due to the current efforts to prevent transmission of COVID-19 and also the need to preserve PPE for other caregivers, a face-to-face encounter with the patient was not performed. That being said, all relevant records and diagnostic tests were reviewed, including laboratory results and imaging. Please reference any relevant documentation elsewhere. Care will be coordinated with the primary service.     Results:  CBC:   Recent Labs     08/25/21 0519 08/26/21  0542 08/27/21  0458   WBC 19.3* 21.9* 22.0*   HGB 12.8 13.2 12.7   HCT 37.0 39.4 37.1   MCV 79.4* 80.7 79.6*    414 393     BMP:   Recent Labs     08/25/21  0520 08/26/21  0542 08/27/21  0458    140 140   K 4.1 4.2 4.2    103 106   CO2 25 26 26   BUN 21* 21* 22*   CREATININE <0.5* 0.6 <0.5*     LIVER PROFILE:   Recent Labs     08/25/21  0520 08/26/21  0542 08/27/21  0458   AST 12* 11* 11*   ALT 11 11 13   BILITOT <0.2 <0.2 0.3   ALKPHOS 56 60 53     PT/INR:   Recent Labs 08/25/21 0519 08/26/21  0542 08/27/21 0458   PROTIME 15.2* 15.6* 15.0*   INR 1.33* 1.36* 1.31*     APTT:   Recent Labs     08/25/21 0519 08/26/21 0542 08/27/21 0458   APTT 26.5 26.3 24.8*       Imaging:  I have reviewed radiology images personally. XR CHEST PORTABLE   Final Result   Severe perihilar and diffuse bilateral airspace opacities. Pattern likely   represents ARDS or viral pattern of pneumonia. XR CHEST PORTABLE   Final Result   Bibasilar airspace disease not significantly changed from earlier today. Correlate clinically for possible COVID disease. Assessment and plan:  Acute respiratory failure, hypoxemic. Supplementation to keep saturation more than 92%  Patient continues to be significantly hypoxemic and patient was still on Vapotherm Vapotherm oxygenation to maintain saturation and the requirements are gradually improving  Monitor for any worsening respiratory compromise or hypoxemia   Trend inflammatory markers  Pulmonary toilet  Patient also has worsening leukocytosis with bandemia -sputum c/s pending  Rocephin d/lazara by ID  If patient's hypoxemia does not management noninvasive or invasive ventilation   Acute COVID-19 pneumonia. On steroid,.  ID ordered Tocilizumab. Dose of solumedrol decreased to Q12H  Pulmonary sarcoidosis. CT findings from 2020 are classic for pulmonary sarcoidosis. It appears the disease is inactive, she has not been treated with immunosuppression  DM2, HTN, HLD. Patient has uncontrolled diabetes mellitus and partly it may be secondary to IV steroids-patient's Lantus insulin was increased to 30 units twice daily  Titration of Lantus as per blood glucose monitoring as per IM  BGM with SSI  DVT prophylaxis. On appropriate dose of Lovenox  PUD prophylaxis.   On PPI      Patient's clinical status remains precarious and has potential for further decompensation and neds to be closely  monitor closely in the progressive care unit    Case d/w

## 2021-08-28 NOTE — PROGRESS NOTES
Hospitalist Progress Note      PCP: Marimar Cruz    Date of Admission: 8/16/2021    Chief Complaint: Shortness of breath    Hospital Course:     Subjective: Patient denies any chest pain no shortness of breath no nausea vomiting off Vapotherm currently on 10 L oxygen. Medications:  Reviewed    Infusion Medications    dextrose      sodium chloride 25 mL (08/20/21 0055)     Scheduled Medications    insulin glargine  30 Units Subcutaneous BID    methylPREDNISolone  40 mg IntraVENous Q12H    insulin lispro  0-12 Units Subcutaneous TID WC    insulin lispro  0-6 Units Subcutaneous Nightly    guaiFENesin  600 mg Oral BID    aspirin  81 mg Oral Daily    Vitamin D  2,000 Units Oral Daily    clopidogrel  75 mg Oral Daily    metoprolol tartrate  25 mg Oral BID    pantoprazole  40 mg Oral QAM AC    rosuvastatin  10 mg Oral Daily    enoxaparin  30 mg Subcutaneous BID    insulin lispro  0.08 Units/kg Subcutaneous TID      PRN Meds: albuterol sulfate HFA, glucose, dextrose, glucagon (rDNA), dextrose, sodium chloride flush, sodium chloride, potassium chloride **OR** potassium alternative oral replacement **OR** potassium chloride, magnesium sulfate, ondansetron **OR** ondansetron, acetaminophen **OR** acetaminophen, guaiFENesin-dextromethorphan, melatonin, albuterol sulfate HFA, benzonatate      Intake/Output Summary (Last 24 hours) at 8/28/2021 1030  Last data filed at 8/28/2021 0931  Gross per 24 hour   Intake 240 ml   Output 900 ml   Net -660 ml       Physical Exam Performed:    BP (!) 96/55   Pulse 80   Temp 98.4 °F (36.9 °C) (Oral)   Resp 20   Ht 5' 1\" (1.549 m)   Wt 149 lb 14.6 oz (68 kg)   LMP 12/21/2012   SpO2 94%   BMI 28.33 kg/m²     General appearance: No apparent distress, appears stated age and cooperative. HEENT: Pupils equal, round, and reactive to light. Conjunctivae/corneas clear. Neck: Supple, with full range of motion. No jugular venous distention.  Trachea midline. Respiratory:  Normal respiratory effort. Clear to auscultation, decreased breath sound. Cardiovascular: Regular rate and rhythm with normal S1/S2 without murmurs, rubs or gallops. Abdomen: Soft, non-tender, non-distended with normal bowel sounds. Musculoskeletal: No clubbing, cyanosis or edema bilaterally. Full range of motion without deformity. Skin: Skin color, texture, turgor normal.  No rashes or lesions. Neurologic:  Neurovascularly intact without any focal sensory/motor deficits. Cranial nerves: II-XII intact, grossly non-focal.  Psychiatric: Alert and oriented, thought content appropriate, normal insight  Capillary Refill: Brisk,3 seconds, normal   Peripheral Pulses: +2 palpable, equal bilaterally       Labs:   Recent Labs     08/26/21  0542 08/27/21  0458 08/28/21  0537   WBC 21.9* 22.0* 21.8*   HGB 13.2 12.7 12.8   HCT 39.4 37.1 38.7    393 367     Recent Labs     08/26/21  0542 08/27/21  0458 08/28/21  0537    140 135*   K 4.2 4.2 3.8    106 101   CO2 26 26 25   BUN 21* 22* 25*   CREATININE 0.6 <0.5* <0.5*   CALCIUM 8.6 8.3 8.0*     Recent Labs     08/26/21  0542 08/27/21  0458 08/28/21  0537   AST 11* 11* 14*   ALT 11 13 14   BILITOT <0.2 0.3 <0.2   ALKPHOS 60 53 50     Recent Labs     08/26/21  0542 08/27/21  0458 08/28/21  0537   INR 1.36* 1.31* 1.32*     No results for input(s): CKTOTAL, TROPONINI in the last 72 hours. Urinalysis:      Lab Results   Component Value Date    NITRU Negative 08/17/2021    WBCUA 6-9 08/17/2021    BACTERIA 1+ 08/17/2021    RBCUA 5-10 08/17/2021    BLOODU SMALL 08/17/2021    SPECGRAV 1.020 08/17/2021    GLUCOSEU >=1000 08/17/2021       Radiology:  XR CHEST PORTABLE   Final Result   Severe perihilar and diffuse bilateral airspace opacities. Pattern likely   represents ARDS or viral pattern of pneumonia. XR CHEST PORTABLE   Final Result   Bibasilar airspace disease not significantly changed from earlier today.    Correlate clinically for possible COVID disease. Assessment/Plan:    Active Hospital Problems    Diagnosis     Bandemia [D72.825]     Pulmonary infiltrates [R91.8]     DM (diabetes mellitus), secondary uncontrolled ([address] [E13.65]     Leukocytosis [D72.829]     CAD S/P percutaneous coronary angioplasty [I25.10, Z98.61]     Acute hypoxemic respiratory failure due to severe acute respiratory syndrome coronavirus 2 (SARS-CoV-2) disease ([facility]) [U07.1, J96.01]     Pulmonary sarcoidosis ([facility]) [D86.0]     Essential hypertension [I10]     Type 2 diabetes mellitus without complication, with long-term current use of insulin ([facility]) [E11.9, Z79.4]      1. Admitted with COVID-19 pneumonia with acute respiratory failure with hypoxia continue with supportive care and oxygen. History of ILD possible sarcoidosis. Symptom onset 8/11/2021 according to her  continue with the Solu-Medrol status post Tocilizumab. Pulmonary infectious disease consulted and following. 2.  Coronary artery disease status post PCI. Status post CHRISTIAN to LAD in 5/2020, normal Olga/MPI on 7/28/2021. Continue with aspirin , Plavix, statin, beta-blocker. Holding lisinopril while acutely ill. 3.  Diabetes mellitus type 2 p.o. medication on hold continue with insulin hemoglobin A1c= 8.2 on 8/16/2021.  4.  Leukocytosis due to steroids. DVT Prophylaxis: Lovenox subcu  Diet: ADULT DIET; Regular; 5 carb choices (75 gm/meal);  Low Fat/Low Chol/High Fiber/DMITRI  Code Status: Full Code    PT/OT Eval Status:     Neda Del Castillo MD

## 2021-08-28 NOTE — PROGRESS NOTES
60 53 50     PT/INR:   Recent Labs     08/26/21  0542 08/27/21  0458 08/28/21  0537   PROTIME 15.6* 15.0* 15.1*   INR 1.36* 1.31* 1.32*     APTT:   Recent Labs     08/26/21  0542 08/27/21  0458 08/28/21  0537   APTT 26.3 24.8* 24.5*       Imaging:  I have reviewed radiology images personally. XR CHEST PORTABLE   Final Result   Severe perihilar and diffuse bilateral airspace opacities. Pattern likely   represents ARDS or viral pattern of pneumonia. XR CHEST PORTABLE   Final Result   Bibasilar airspace disease not significantly changed from earlier today. Correlate clinically for possible COVID disease. Assessment and plan:  Acute respiratory failure, hypoxemic. Supplementation to keep saturation more than 92%  Patient continues to be significantly hypoxemic and patient was still on Vapotherm Vapotherm oxygenation to maintain saturation and the requirements are gradually improving  Nursing requested to try high flow oxygen on the patient and see   Monitor for any worsening respiratory compromise or hypoxemia   Trend inflammatory markers  Pulmonary toilet  Patient also has worsening leukocytosis with bandemia -sputum c/s pending  Rocephin d/lazara by ID  Acute COVID-19 pneumonia. On steroid,.  ID ordered Tocilizumab. Dose of solumedrol decreased to Q12H  Pulmonary sarcoidosis. CT findings from 2020 are classic for pulmonary sarcoidosis. It appears the disease is inactive, she has not been treated with immunosuppression  DM2, HTN, HLD. Patient has uncontrolled diabetes mellitus and partly it may be secondary to IV steroids-patient's Lantus insulin was increased to 30 units twice daily  Titration of Lantus as per blood glucose monitoring as per IM  BGM with SSI  DVT prophylaxis. On appropriate dose of Lovenox  PUD prophylaxis.   On PPI    Case d/w nursing    No other recommendations from Pulmonary/CCM stand point -will sign off -please call on PRN basis ,if respiratory status deteriorates         Electronically signed by:  Simone Sigala MD    8/28/2021    11:24 AM.

## 2021-08-28 NOTE — PROGRESS NOTES
08/28/21 1610   Oxygen Therapy/Pulse Ox   O2 Therapy Oxygen humidified   O2 Device High flow nasal cannula   O2 Flow Rate (L/min) 6 L/min   SpO2 95 %

## 2021-08-28 NOTE — PROGRESS NOTES
08/28/21 1153   Oxygen Therapy/Pulse Ox   O2 Therapy Oxygen humidified   O2 Device High flow nasal cannula   O2 Flow Rate (L/min) 10 L/min   SpO2 99 %

## 2021-08-28 NOTE — PROGRESS NOTES
08/28/21 0754   Oxygen Therapy/Pulse Ox   O2 Therapy Oxygen humidified   $Oxygen $Daily Charge   O2 Device Heated high flow cannula   O2 Flow Rate (L/min) 40 L/min   FiO2  45 %   SpO2 (!) 89 %   Humidification Source Heated wire   Humidification Temp 34   Humidification Temp Measured 34   $Pulse Oximeter $Spot check (multiple/continuous)

## 2021-08-29 LAB
A/G RATIO: 1.2 (ref 1.1–2.2)
ALBUMIN SERPL-MCNC: 3 G/DL (ref 3.4–5)
ALP BLD-CCNC: 56 U/L (ref 40–129)
ALT SERPL-CCNC: 19 U/L (ref 10–40)
ANION GAP SERPL CALCULATED.3IONS-SCNC: 9 MMOL/L (ref 3–16)
APTT: 27.2 SEC (ref 26.2–38.6)
AST SERPL-CCNC: 13 U/L (ref 15–37)
BASOPHILS ABSOLUTE: 0 K/UL (ref 0–0.2)
BASOPHILS RELATIVE PERCENT: 0.1 %
BILIRUB SERPL-MCNC: 0.3 MG/DL (ref 0–1)
BUN BLDV-MCNC: 25 MG/DL (ref 7–20)
CALCIUM SERPL-MCNC: 8.6 MG/DL (ref 8.3–10.6)
CHLORIDE BLD-SCNC: 101 MMOL/L (ref 99–110)
CO2: 29 MMOL/L (ref 21–32)
CREAT SERPL-MCNC: 0.6 MG/DL (ref 0.6–1.2)
D DIMER: 216 NG/ML DDU (ref 0–229)
EOSINOPHILS ABSOLUTE: 0.1 K/UL (ref 0–0.6)
EOSINOPHILS RELATIVE PERCENT: 0.3 %
FIBRINOGEN: 281 MG/DL (ref 200–397)
GFR AFRICAN AMERICAN: >60
GFR NON-AFRICAN AMERICAN: >60
GLOBULIN: 2.5 G/DL
GLUCOSE BLD-MCNC: 207 MG/DL (ref 70–99)
GLUCOSE BLD-MCNC: 212 MG/DL (ref 70–99)
GLUCOSE BLD-MCNC: 219 MG/DL (ref 70–99)
GLUCOSE BLD-MCNC: 239 MG/DL (ref 70–99)
GLUCOSE BLD-MCNC: 240 MG/DL (ref 70–99)
HCT VFR BLD CALC: 38.8 % (ref 36–48)
HEMOGLOBIN: 13.2 G/DL (ref 12–16)
INR BLD: 1.25 (ref 0.88–1.12)
LYMPHOCYTES ABSOLUTE: 1.1 K/UL (ref 1–5.1)
LYMPHOCYTES RELATIVE PERCENT: 5.3 %
MCH RBC QN AUTO: 27.9 PG (ref 26–34)
MCHC RBC AUTO-ENTMCNC: 34.1 G/DL (ref 31–36)
MCV RBC AUTO: 81.8 FL (ref 80–100)
MONOCYTES ABSOLUTE: 0.5 K/UL (ref 0–1.3)
MONOCYTES RELATIVE PERCENT: 2.7 %
NEUTROPHILS ABSOLUTE: 18.6 K/UL (ref 1.7–7.7)
NEUTROPHILS RELATIVE PERCENT: 91.6 %
PDW BLD-RTO: 15.6 % (ref 12.4–15.4)
PERFORMED ON: ABNORMAL
PLATELET # BLD: 341 K/UL (ref 135–450)
PMV BLD AUTO: 5.8 FL (ref 5–10.5)
POTASSIUM REFLEX MAGNESIUM: 4.7 MMOL/L (ref 3.5–5.1)
PROTHROMBIN TIME: 14.2 SEC (ref 9.9–12.7)
RBC # BLD: 4.74 M/UL (ref 4–5.2)
SODIUM BLD-SCNC: 139 MMOL/L (ref 136–145)
TOTAL PROTEIN: 5.5 G/DL (ref 6.4–8.2)
WBC # BLD: 20.3 K/UL (ref 4–11)

## 2021-08-29 PROCEDURE — 2060000000 HC ICU INTERMEDIATE R&B

## 2021-08-29 PROCEDURE — 36415 COLL VENOUS BLD VENIPUNCTURE: CPT

## 2021-08-29 PROCEDURE — 80053 COMPREHEN METABOLIC PANEL: CPT

## 2021-08-29 PROCEDURE — 85730 THROMBOPLASTIN TIME PARTIAL: CPT

## 2021-08-29 PROCEDURE — 96372 THER/PROPH/DIAG INJ SC/IM: CPT

## 2021-08-29 PROCEDURE — 2580000003 HC RX 258: Performed by: INTERNAL MEDICINE

## 2021-08-29 PROCEDURE — 2700000000 HC OXYGEN THERAPY PER DAY

## 2021-08-29 PROCEDURE — 6370000000 HC RX 637 (ALT 250 FOR IP): Performed by: HOSPITALIST

## 2021-08-29 PROCEDURE — 6360000002 HC RX W HCPCS: Performed by: INTERNAL MEDICINE

## 2021-08-29 PROCEDURE — 96376 TX/PRO/DX INJ SAME DRUG ADON: CPT

## 2021-08-29 PROCEDURE — 85379 FIBRIN DEGRADATION QUANT: CPT

## 2021-08-29 PROCEDURE — 6370000000 HC RX 637 (ALT 250 FOR IP): Performed by: INTERNAL MEDICINE

## 2021-08-29 PROCEDURE — 85610 PROTHROMBIN TIME: CPT

## 2021-08-29 PROCEDURE — 85384 FIBRINOGEN ACTIVITY: CPT

## 2021-08-29 PROCEDURE — 85025 COMPLETE CBC W/AUTO DIFF WBC: CPT

## 2021-08-29 PROCEDURE — 94761 N-INVAS EAR/PLS OXIMETRY MLT: CPT

## 2021-08-29 PROCEDURE — G0378 HOSPITAL OBSERVATION PER HR: HCPCS

## 2021-08-29 RX ORDER — SENNA PLUS 8.6 MG/1
1 TABLET ORAL 2 TIMES DAILY
Status: DISCONTINUED | OUTPATIENT
Start: 2021-08-29 | End: 2021-09-01 | Stop reason: HOSPADM

## 2021-08-29 RX ADMIN — INSULIN LISPRO 5 UNITS: 100 INJECTION, SOLUTION INTRAVENOUS; SUBCUTANEOUS at 09:55

## 2021-08-29 RX ADMIN — ENOXAPARIN SODIUM 30 MG: 30 INJECTION SUBCUTANEOUS at 21:35

## 2021-08-29 RX ADMIN — Medication 3 MG: at 21:34

## 2021-08-29 RX ADMIN — INSULIN GLARGINE 30 UNITS: 100 INJECTION, SOLUTION SUBCUTANEOUS at 09:55

## 2021-08-29 RX ADMIN — INSULIN LISPRO 4 UNITS: 100 INJECTION, SOLUTION INTRAVENOUS; SUBCUTANEOUS at 13:22

## 2021-08-29 RX ADMIN — ACETAMINOPHEN 650 MG: 325 TABLET ORAL at 21:35

## 2021-08-29 RX ADMIN — Medication 10 ML: at 16:31

## 2021-08-29 RX ADMIN — CLOPIDOGREL BISULFATE 75 MG: 75 TABLET ORAL at 09:55

## 2021-08-29 RX ADMIN — METHYLPREDNISOLONE SODIUM SUCCINATE 40 MG: 40 INJECTION, POWDER, LYOPHILIZED, FOR SOLUTION INTRAMUSCULAR; INTRAVENOUS at 16:31

## 2021-08-29 RX ADMIN — INSULIN LISPRO 5 UNITS: 100 INJECTION, SOLUTION INTRAVENOUS; SUBCUTANEOUS at 13:20

## 2021-08-29 RX ADMIN — ENOXAPARIN SODIUM 30 MG: 30 INJECTION SUBCUTANEOUS at 09:54

## 2021-08-29 RX ADMIN — INSULIN LISPRO 4 UNITS: 100 INJECTION, SOLUTION INTRAVENOUS; SUBCUTANEOUS at 09:55

## 2021-08-29 RX ADMIN — INSULIN LISPRO 2 UNITS: 100 INJECTION, SOLUTION INTRAVENOUS; SUBCUTANEOUS at 21:37

## 2021-08-29 RX ADMIN — SENNOSIDES 8.6 MG: 8.6 TABLET, FILM COATED ORAL at 21:35

## 2021-08-29 RX ADMIN — PANTOPRAZOLE SODIUM 40 MG: 40 TABLET, DELAYED RELEASE ORAL at 06:15

## 2021-08-29 RX ADMIN — GUAIFENESIN 600 MG: 600 TABLET, EXTENDED RELEASE ORAL at 09:55

## 2021-08-29 RX ADMIN — METOPROLOL TARTRATE 25 MG: 25 TABLET, FILM COATED ORAL at 09:55

## 2021-08-29 RX ADMIN — INSULIN GLARGINE 30 UNITS: 100 INJECTION, SOLUTION SUBCUTANEOUS at 21:38

## 2021-08-29 RX ADMIN — ACETAMINOPHEN 650 MG: 325 TABLET ORAL at 06:15

## 2021-08-29 RX ADMIN — METOPROLOL TARTRATE 25 MG: 25 TABLET, FILM COATED ORAL at 21:35

## 2021-08-29 RX ADMIN — GUAIFENESIN 600 MG: 600 TABLET, EXTENDED RELEASE ORAL at 21:35

## 2021-08-29 RX ADMIN — ASPIRIN 81 MG: 81 TABLET, CHEWABLE ORAL at 09:55

## 2021-08-29 RX ADMIN — Medication 2000 UNITS: at 14:28

## 2021-08-29 RX ADMIN — SENNOSIDES 8.6 MG: 8.6 TABLET, FILM COATED ORAL at 14:27

## 2021-08-29 RX ADMIN — METHYLPREDNISOLONE SODIUM SUCCINATE 40 MG: 40 INJECTION, POWDER, LYOPHILIZED, FOR SOLUTION INTRAMUSCULAR; INTRAVENOUS at 06:15

## 2021-08-29 RX ADMIN — ROSUVASTATIN CALCIUM 10 MG: 10 TABLET, FILM COATED ORAL at 09:55

## 2021-08-29 ASSESSMENT — PAIN SCALES - GENERAL
PAINLEVEL_OUTOF10: 2
PAINLEVEL_OUTOF10: 0
PAINLEVEL_OUTOF10: 2
PAINLEVEL_OUTOF10: 5
PAINLEVEL_OUTOF10: 0

## 2021-08-29 ASSESSMENT — PAIN DESCRIPTION - PAIN TYPE: TYPE: ACUTE PAIN

## 2021-08-29 NOTE — PROGRESS NOTES
08/28/21 2008   Oxygen Therapy/Pulse Ox   O2 Therapy Oxygen humidified   O2 Device High flow nasal cannula   O2 Flow Rate (L/min) 6 L/min   SpO2 90 %

## 2021-08-29 NOTE — PLAN OF CARE
Problem: Gas Exchange - Impaired  Goal: Promote optimal lung function  8/28/2021 1819 by Rinku Benoit RN  Outcome: Met This Shift     Problem: Breathing Pattern - Ineffective  Goal: Ability to achieve and maintain a regular respiratory rate  8/28/2021 1819 by Rinku Benoit RN  Outcome: Met This Shift     Problem: Risk for Fluid Volume Deficit  Goal: Maintain normal serum potassium, sodium, calcium, phosphorus, and pH  8/28/2021 1819 by Rinku Benoit RN  Outcome: Met This Shift     Problem: Loneliness or Risk for Loneliness  Goal: Demonstrate positive use of time alone when socialization is not possible  8/28/2021 1819 by Rinku Benoit RN  Outcome: Met This Shift     Problem: Fatigue  Goal: Verbalize increase energy and improved vitality  8/28/2021 1819 by Rinku Benoit RN  Outcome: Met This Shift     Problem: Patient Education: Go to Patient Education Activity  Goal: Patient/Family Education  8/28/2021 1819 by Rinku Benoit RN  Outcome: Met This Shift     Problem: Pain:  Goal: Pain level will decrease  Description: Pain level will decrease  8/28/2021 1819 by Rinku Benoit RN  Outcome: Met This Shift  Goal: Control of acute pain  Description: Control of acute pain  8/28/2021 1819 by Rinku Benoit RN  Outcome: Met This Shift  Goal: Control of chronic pain  Description: Control of chronic pain  8/28/2021 1819 by Rinku Benoit RN  Outcome: Met This Shift     Problem: Nutrition  Goal: Optimal nutrition therapy  8/28/2021 1819 by Rinku Benoit RN  Outcome: Met This Shift

## 2021-08-29 NOTE — PROGRESS NOTES
Hospitalist Progress Note      PCP: Jessie Jean Baptiste    Date of Admission: 8/16/2021    Chief Complaint: Shortness of breath    Hospital Course:     Subjective: Patient says shortness of breath is better ,complains of constipation. Medications:  Reviewed    Infusion Medications    dextrose      sodium chloride 25 mL (08/20/21 4729)     Scheduled Medications    insulin glargine  30 Units Subcutaneous BID    methylPREDNISolone  40 mg IntraVENous Q12H    insulin lispro  0-12 Units Subcutaneous TID WC    insulin lispro  0-6 Units Subcutaneous Nightly    guaiFENesin  600 mg Oral BID    aspirin  81 mg Oral Daily    Vitamin D  2,000 Units Oral Daily    clopidogrel  75 mg Oral Daily    metoprolol tartrate  25 mg Oral BID    pantoprazole  40 mg Oral QAM AC    rosuvastatin  10 mg Oral Daily    enoxaparin  30 mg Subcutaneous BID    insulin lispro  0.08 Units/kg Subcutaneous TID WC     PRN Meds: albuterol sulfate HFA, glucose, dextrose, glucagon (rDNA), dextrose, sodium chloride flush, sodium chloride, potassium chloride **OR** potassium alternative oral replacement **OR** potassium chloride, magnesium sulfate, ondansetron **OR** ondansetron, acetaminophen **OR** acetaminophen, guaiFENesin-dextromethorphan, melatonin, albuterol sulfate HFA, benzonatate      Intake/Output Summary (Last 24 hours) at 8/29/2021 0951  Last data filed at 8/29/2021 0758  Gross per 24 hour   Intake 290 ml   Output 3550 ml   Net -3260 ml       Physical Exam Performed:    BP (!) 157/59   Pulse 61   Temp 97.7 °F (36.5 °C) (Oral)   Resp 16   Ht 5' 1\" (1.549 m)   Wt 143 lb 15.4 oz (65.3 kg)   LMP 12/21/2012   SpO2 94%   BMI 27.20 kg/m²     General appearance: No apparent distress, appears stated age and cooperative. HEENT: Pupils equal, round, and reactive to light. Conjunctivae/corneas clear. Neck: Supple, with full range of motion. No jugular venous distention. Trachea midline. Respiratory:  Normal respiratory effort. Rhonchi occasional.  Cardiovascular: Regular rate and rhythm with normal S1/S2 without murmurs, rubs or gallops. Abdomen: Soft, non-tender, non-distended with normal bowel sounds. Musculoskeletal: No clubbing, cyanosis or edema bilaterally. Full range of motion without deformity. Skin: Skin color, texture, turgor normal.  No rashes or lesions. Neurologic:  Neurovascularly intact without any focal sensory/motor deficits. Cranial nerves: II-XII intact, grossly non-focal.  Psychiatric: Alert and oriented, thought content appropriate, normal insight  Capillary Refill: Brisk,3 seconds, normal   Peripheral Pulses: +2 palpable, equal bilaterally       Labs:   Recent Labs     08/27/21 0458 08/28/21  0537 08/29/21  0534   WBC 22.0* 21.8* 20.3*   HGB 12.7 12.8 13.2   HCT 37.1 38.7 38.8    367 341     Recent Labs     08/27/21 0458 08/28/21  0537 08/29/21  0534    135* 139   K 4.2 3.8 4.7    101 101   CO2 26 25 29   BUN 22* 25* 25*   CREATININE <0.5* <0.5* 0.6   CALCIUM 8.3 8.0* 8.6     Recent Labs     08/27/21  0458 08/28/21  0537 08/29/21  0534   AST 11* 14* 13*   ALT 13 14 19   BILITOT 0.3 <0.2 0.3   ALKPHOS 53 50 56     Recent Labs     08/27/21 0458 08/28/21  0537 08/29/21  0534   INR 1.31* 1.32* 1.25*     No results for input(s): Carlos Enrique Hanna in the last 72 hours. Urinalysis:      Lab Results   Component Value Date    NITRU Negative 08/17/2021    WBCUA 6-9 08/17/2021    BACTERIA 1+ 08/17/2021    RBCUA 5-10 08/17/2021    BLOODU SMALL 08/17/2021    SPECGRAV 1.020 08/17/2021    GLUCOSEU >=1000 08/17/2021       Radiology:  XR CHEST PORTABLE   Final Result   Severe perihilar and diffuse bilateral airspace opacities. Pattern likely   represents ARDS or viral pattern of pneumonia. XR CHEST PORTABLE   Final Result   Bibasilar airspace disease not significantly changed from earlier today. Correlate clinically for possible COVID disease.                  Assessment/Plan:    Active Hospital Problems    Diagnosis     Bandemia [D72.825]     Pulmonary infiltrates [R91.8]     DM (diabetes mellitus), secondary uncontrolled ([facility] [E13.65]     Leukocytosis [D72.829]     CAD S/P percutaneous coronary angioplasty [I25.10, Z98.61]     Acute hypoxemic respiratory failure due to severe acute respiratory syndrome coronavirus 2 (SARS-CoV-2) disease ([facility]) [U07.1, J96.01]     Pulmonary sarcoidosis ([facility]) [D86.0]     Essential hypertension [I10]     Type 2 diabetes mellitus without complication, with long-term current use of insulin ([facility]) [E11.9, Z79.4]      1. Admitted with COVID-19 pneumonia with acute respiratory failure with hypoxia continue with supportive care and oxygen. History of ILD possible sarcoidosis. Symptom onset 8/11/2021 according to her  continue with the Solu-Medrol status post Tocilizumab. Pulmonary infectious disease consulted and following. 2.  Coronary artery disease status post PCI. Status post CHRISTIAN to LAD in 5/2020, normal Olga/MPI on 7/28/2021. Continue with aspirin , Plavix, statin, beta-blocker. Holding lisinopril while acutely ill. 3.  Diabetes mellitus type 2 p.o. medication on hold continue with insulin hemoglobin A1c= 8.2 on 8/16/2021.  4.  Leukocytosis due to steroids. 5.  Constipation start on Senokot 1 p.o. twice daily, Dulcolax x1 and as needed. DVT Prophylaxis: Lovenox subcu  Diet: ADULT DIET; Regular; 5 carb choices (75 gm/meal);  Low Fat/Low Chol/High Fiber/DMITRI  Code Status: Full Code    PT/OT Eval Status:     Rona Nguyen MD

## 2021-08-29 NOTE — PLAN OF CARE
Quiet day remains on 4 liters oxygen per nasal cannula. Sao2 94 % consistently . Walking into the bathroom now on oxygen tolerating well.

## 2021-08-30 LAB
A/G RATIO: 1.2 (ref 1.1–2.2)
ALBUMIN SERPL-MCNC: 2.9 G/DL (ref 3.4–5)
ALP BLD-CCNC: 56 U/L (ref 40–129)
ALT SERPL-CCNC: 18 U/L (ref 10–40)
ANION GAP SERPL CALCULATED.3IONS-SCNC: 9 MMOL/L (ref 3–16)
ANISOCYTOSIS: ABNORMAL
APTT: 26.5 SEC (ref 26.2–38.6)
AST SERPL-CCNC: 10 U/L (ref 15–37)
BASOPHILS ABSOLUTE: 0 K/UL (ref 0–0.2)
BASOPHILS RELATIVE PERCENT: 0 %
BILIRUB SERPL-MCNC: <0.2 MG/DL (ref 0–1)
BUN BLDV-MCNC: 16 MG/DL (ref 7–20)
CALCIUM SERPL-MCNC: 8.6 MG/DL (ref 8.3–10.6)
CHLORIDE BLD-SCNC: 101 MMOL/L (ref 99–110)
CO2: 30 MMOL/L (ref 21–32)
CREAT SERPL-MCNC: 0.6 MG/DL (ref 0.6–1.2)
D DIMER: 303 NG/ML DDU (ref 0–229)
EOSINOPHILS ABSOLUTE: 0 K/UL (ref 0–0.6)
EOSINOPHILS RELATIVE PERCENT: 0 %
FIBRINOGEN: 240 MG/DL (ref 200–397)
GFR AFRICAN AMERICAN: >60
GFR NON-AFRICAN AMERICAN: >60
GLOBULIN: 2.4 G/DL
GLUCOSE BLD-MCNC: 116 MG/DL (ref 70–99)
GLUCOSE BLD-MCNC: 166 MG/DL (ref 70–99)
GLUCOSE BLD-MCNC: 190 MG/DL (ref 70–99)
GLUCOSE BLD-MCNC: 232 MG/DL (ref 70–99)
GLUCOSE BLD-MCNC: 324 MG/DL (ref 70–99)
HCT VFR BLD CALC: 36.8 % (ref 36–48)
HEMOGLOBIN: 12.5 G/DL (ref 12–16)
INR BLD: 1.25 (ref 0.88–1.12)
LYMPHOCYTES ABSOLUTE: 1.5 K/UL (ref 1–5.1)
LYMPHOCYTES RELATIVE PERCENT: 7 %
MCH RBC QN AUTO: 27.5 PG (ref 26–34)
MCHC RBC AUTO-ENTMCNC: 33.8 G/DL (ref 31–36)
MCV RBC AUTO: 81.4 FL (ref 80–100)
MICROCYTES: ABNORMAL
MONOCYTES ABSOLUTE: 0.9 K/UL (ref 0–1.3)
MONOCYTES RELATIVE PERCENT: 4 %
NEUTROPHILS ABSOLUTE: 19.3 K/UL (ref 1.7–7.7)
NEUTROPHILS RELATIVE PERCENT: 89 %
OVALOCYTES: ABNORMAL
PDW BLD-RTO: 15.1 % (ref 12.4–15.4)
PERFORMED ON: ABNORMAL
PLATELET # BLD: 313 K/UL (ref 135–450)
PLATELET SLIDE REVIEW: ADEQUATE
PMV BLD AUTO: 5.8 FL (ref 5–10.5)
POTASSIUM REFLEX MAGNESIUM: 4.1 MMOL/L (ref 3.5–5.1)
PROTHROMBIN TIME: 14.2 SEC (ref 9.9–12.7)
RBC # BLD: 4.53 M/UL (ref 4–5.2)
SLIDE REVIEW: ABNORMAL
SODIUM BLD-SCNC: 140 MMOL/L (ref 136–145)
TOTAL PROTEIN: 5.3 G/DL (ref 6.4–8.2)
WBC # BLD: 21.7 K/UL (ref 4–11)

## 2021-08-30 PROCEDURE — 6370000000 HC RX 637 (ALT 250 FOR IP): Performed by: INTERNAL MEDICINE

## 2021-08-30 PROCEDURE — 6360000002 HC RX W HCPCS: Performed by: INTERNAL MEDICINE

## 2021-08-30 PROCEDURE — 2060000000 HC ICU INTERMEDIATE R&B

## 2021-08-30 PROCEDURE — 85610 PROTHROMBIN TIME: CPT

## 2021-08-30 PROCEDURE — 2580000003 HC RX 258: Performed by: INTERNAL MEDICINE

## 2021-08-30 PROCEDURE — 96376 TX/PRO/DX INJ SAME DRUG ADON: CPT

## 2021-08-30 PROCEDURE — 97116 GAIT TRAINING THERAPY: CPT

## 2021-08-30 PROCEDURE — G0378 HOSPITAL OBSERVATION PER HR: HCPCS

## 2021-08-30 PROCEDURE — 36415 COLL VENOUS BLD VENIPUNCTURE: CPT

## 2021-08-30 PROCEDURE — 80053 COMPREHEN METABOLIC PANEL: CPT

## 2021-08-30 PROCEDURE — 2700000000 HC OXYGEN THERAPY PER DAY

## 2021-08-30 PROCEDURE — 97530 THERAPEUTIC ACTIVITIES: CPT

## 2021-08-30 PROCEDURE — 85025 COMPLETE CBC W/AUTO DIFF WBC: CPT

## 2021-08-30 PROCEDURE — 85384 FIBRINOGEN ACTIVITY: CPT

## 2021-08-30 PROCEDURE — 97162 PT EVAL MOD COMPLEX 30 MIN: CPT

## 2021-08-30 PROCEDURE — 85379 FIBRIN DEGRADATION QUANT: CPT

## 2021-08-30 PROCEDURE — 97166 OT EVAL MOD COMPLEX 45 MIN: CPT

## 2021-08-30 PROCEDURE — 6370000000 HC RX 637 (ALT 250 FOR IP): Performed by: HOSPITALIST

## 2021-08-30 PROCEDURE — 85730 THROMBOPLASTIN TIME PARTIAL: CPT

## 2021-08-30 PROCEDURE — 96372 THER/PROPH/DIAG INJ SC/IM: CPT

## 2021-08-30 PROCEDURE — 94761 N-INVAS EAR/PLS OXIMETRY MLT: CPT

## 2021-08-30 PROCEDURE — 97110 THERAPEUTIC EXERCISES: CPT

## 2021-08-30 RX ADMIN — Medication 10 ML: at 21:29

## 2021-08-30 RX ADMIN — ASPIRIN 81 MG: 81 TABLET, CHEWABLE ORAL at 08:52

## 2021-08-30 RX ADMIN — METHYLPREDNISOLONE SODIUM SUCCINATE 40 MG: 40 INJECTION, POWDER, LYOPHILIZED, FOR SOLUTION INTRAMUSCULAR; INTRAVENOUS at 16:04

## 2021-08-30 RX ADMIN — INSULIN GLARGINE 30 UNITS: 100 INJECTION, SOLUTION SUBCUTANEOUS at 21:30

## 2021-08-30 RX ADMIN — INSULIN LISPRO 5 UNITS: 100 INJECTION, SOLUTION INTRAVENOUS; SUBCUTANEOUS at 16:16

## 2021-08-30 RX ADMIN — ENOXAPARIN SODIUM 30 MG: 30 INJECTION SUBCUTANEOUS at 08:52

## 2021-08-30 RX ADMIN — PANTOPRAZOLE SODIUM 40 MG: 40 TABLET, DELAYED RELEASE ORAL at 06:58

## 2021-08-30 RX ADMIN — INSULIN LISPRO 5 UNITS: 100 INJECTION, SOLUTION INTRAVENOUS; SUBCUTANEOUS at 09:15

## 2021-08-30 RX ADMIN — INSULIN LISPRO 2 UNITS: 100 INJECTION, SOLUTION INTRAVENOUS; SUBCUTANEOUS at 09:15

## 2021-08-30 RX ADMIN — SENNOSIDES 8.6 MG: 8.6 TABLET, FILM COATED ORAL at 08:52

## 2021-08-30 RX ADMIN — Medication 2000 UNITS: at 08:52

## 2021-08-30 RX ADMIN — METOPROLOL TARTRATE 25 MG: 25 TABLET, FILM COATED ORAL at 21:29

## 2021-08-30 RX ADMIN — CLOPIDOGREL BISULFATE 75 MG: 75 TABLET ORAL at 08:52

## 2021-08-30 RX ADMIN — INSULIN GLARGINE 30 UNITS: 100 INJECTION, SOLUTION SUBCUTANEOUS at 09:14

## 2021-08-30 RX ADMIN — INSULIN LISPRO 2 UNITS: 100 INJECTION, SOLUTION INTRAVENOUS; SUBCUTANEOUS at 12:08

## 2021-08-30 RX ADMIN — GUAIFENESIN 600 MG: 600 TABLET, EXTENDED RELEASE ORAL at 08:52

## 2021-08-30 RX ADMIN — INSULIN LISPRO 5 UNITS: 100 INJECTION, SOLUTION INTRAVENOUS; SUBCUTANEOUS at 12:08

## 2021-08-30 RX ADMIN — ROSUVASTATIN CALCIUM 10 MG: 10 TABLET, FILM COATED ORAL at 08:52

## 2021-08-30 RX ADMIN — SENNOSIDES 8.6 MG: 8.6 TABLET, FILM COATED ORAL at 21:29

## 2021-08-30 RX ADMIN — ENOXAPARIN SODIUM 30 MG: 30 INJECTION SUBCUTANEOUS at 21:29

## 2021-08-30 RX ADMIN — GUAIFENESIN 600 MG: 600 TABLET, EXTENDED RELEASE ORAL at 21:29

## 2021-08-30 RX ADMIN — INSULIN LISPRO 4 UNITS: 100 INJECTION, SOLUTION INTRAVENOUS; SUBCUTANEOUS at 21:30

## 2021-08-30 RX ADMIN — METOPROLOL TARTRATE 25 MG: 25 TABLET, FILM COATED ORAL at 08:52

## 2021-08-30 ASSESSMENT — PAIN SCALES - GENERAL
PAINLEVEL_OUTOF10: 0

## 2021-08-30 NOTE — PROGRESS NOTES
Comprehensive Nutrition Assessment    Type and Reason for Visit:  Reassess    Nutrition Recommendations/Plan:   1. Continue carb control, cardiac diet  2. Encourage po intakes  3. Monitor po intakes, nutrition adequacy, weights, pertinent labs, BMs    Nutrition Assessment:  Follow up: Pt improving from a nutritional standpoint AEB po intakes %. Pt reports that her appetite has been improving and she ahs been eating most or all of her meals. Pt family bringing in snacks for her as well. Encouraged po intakes. Pt had no questions. Malnutrition Assessment:  Malnutrition Status: At risk for malnutrition (Comment)      Estimated Daily Nutrient Needs:  Energy (kcal):  3446-5333; Weight Used for Energy Requirements:  Current     Protein (g):  63-75; Weight Used for Protein Requirements:  Current        Fluid (ml/day): 1 ml/kcal      Nutrition Related Findings:  Trace BLE edema      Wounds:  Skin Tears       Current Nutrition Therapies:    ADULT DIET; Regular; 5 carb choices (75 gm/meal); Low Fat/Low Chol/High Fiber/DMITRI    Anthropometric Measures:  · Height: 5' 1\" (154.9 cm)  · Current Body Weight: 143 lb 15.4 oz (65.3 kg)   · Ideal Body Weight: 105 lbs  · BMI: 27.2  · BMI Categories: Overweight (BMI 25.0-29. 9)       Nutrition Diagnosis:   · Inadequate oral intake related to inadequate protein-energy intake as evidenced by poor intake prior to admission      Nutrition Interventions:   Food and/or Nutrient Delivery:  Continue Current Diet  Nutrition Education/Counseling:  No recommendation at this time   Coordination of Nutrition Care:  Continue to monitor while inpatient    Goals:  Pt will have po intakes 50% or greater this admission       Nutrition Monitoring and Evaluation:   Behavioral-Environmental Outcomes:  None Identified   Food/Nutrient Intake Outcomes:  Food and Nutrient Intake  Physical Signs/Symptoms Outcomes:  Weight     Discharge Planning:    Continue current diet     Electronically signed by Kurt Coleman RD, LD on 8/30/21 at 3:55 PM EDT    Contact: [phone]

## 2021-08-30 NOTE — PROGRESS NOTES
Physical Therapy    Facility/Department: [facility] C4 PCU  Initial Assessment and Treatment    NAME: Sarah Morrison  : 1960  MRN: [id number]    Date of Service: 2021    Discharge Recommendations:  24 hour supervision or assist, Home with Home health PT, [address] ( assist and HHPT vs SNF)   PT Equipment Recommendations  Equipment Needed: No    Assessment   Body structures, Functions, Activity limitations: Decreased functional mobility ; Decreased balance;Decreased endurance;Decreased sensation  Assessment: Pt is a 63 y/o female presenting to [facility] with SOB and constipation and COVID+. PTA, pt was IND with all functional mobility without a device. Pt currently is Mod I for bed mobility and SBA for transfers and short ambulation. SpO2 dropped to 86% with ambulation of 12 feet with 5 L O2. Cues for PLB and pacing throughout the session. Pt tolerated seated ther ex with breaks between sets. Pt reports lightheadedness with standing, but BP remained about the same (see activity tolerance). Pt will benefit from continued skilled PT to address the deficits above. Recommend home with 24hr sup/assist and HHPT vs SNF at d/c. Treatment Diagnosis: Decreased functional mobility and endurance. Prognosis: Good  Decision Making: Medium Complexity  PT Education: Goals;PT Role;Plan of Care;Home Exercise Program;Disease Specific Education;General Safety  Patient Education: Educated on repositioning, monitoring O2, and pacing activities; pt verbalized understanding. REQUIRES PT FOLLOW UP: Yes  Activity Tolerance  Activity Tolerance: Patient limited by fatigue;Patient limited by endurance  Activity Tolerance: SpO2 dropping to 86-88% with some mobility; limited by fatigue. BP supine: 101/55, 69 bpm, BP sittin/69, 78 bpm, BP standin/64, 79bpm; pt reports lightheadedness with sitting and standing initially.        Patient Diagnosis(es): The primary encounter diagnosis was Pneumonia due to COVID-19 virus. A diagnosis of Hypoxia was also pertinent to this visit. has a past medical history of CAD (coronary artery disease), Cerebral artery occlusion with cerebral infarction ([facility] [address] Choking sensation, COVID-19, Diabetes mellitus ([address] DM2 (diabetes mellitus, type 2) ([facility] [address] Hyperlipidemia, Prolonged emergence from general anesthesia, and Sarcoid. has a past surgical history that includes Cholecystectomy;  section; hysteroscopy (2013); Hysterectomy; Colonoscopy; Colonoscopy (13); Colonoscopy (10/19/2018); Colonoscopy (N/A, 10/19/2018); Cardiac surgery (2020); and Finger trigger release (Right, 2020). Restrictions  Restrictions/Precautions  Restrictions/Precautions: Isolation, Up as Tolerated  Position Activity Restriction  Other position/activity restrictions: COVID+, 5 L O2  Vision/Hearing  Vision: Impaired  Vision Exceptions: Wears glasses for distance  Hearing: Within functional limits     Subjective  General  Chart Reviewed: Yes  Patient assessed for rehabilitation services?: Yes  Response To Previous Treatment: Not applicable  Family / Caregiver Present: No  Referring Practitioner: Edilberto Mac MD  Referral Date : 21  General Comment  Comments: RN cleared for therapy. Subjective  Subjective: Pt supine in bed; agreeable to therapy.   Pain Screening  Patient Currently in Pain: Denies  Vital Signs  Patient Currently in Pain: Denies       Orientation  Orientation  Overall Orientation Status: Within Normal Limits  Social/Functional History  Social/Functional History  Lives With: Significant other ( and 2 daughters)  Type of Home: Mobile home  Home Layout: One level  Home Access: Stairs to enter with rails  Entrance Stairs - Number of Steps: 2  Bathroom Shower/Tub: Tub/Shower unit  Bathroom Toilet: Handicap height  Home Equipment: Cane  ADL Assistance: Independent  Homemaking Assistance: Independent  Homemaking Responsibilities: Yes  Ambulation Assistance: Independent  Transfer Assistance: Independent  Active : Yes  Additional Comments: reports no falls in the past year  Cognition   Cognition  Overall Cognitive Status: Exceptions  Arousal/Alertness: Appropriate responses to stimuli  Following Commands: Follows one step commands with repetition  Attention Span: Attends with cues to redirect  Safety Judgement: Decreased awareness of need for assistance  Problem Solving: Assistance required to identify errors made;Assistance required to generate solutions  Initiation: Requires cues for some  Sequencing: Requires cues for some  Cognition Comment: verbal cues to redirect conversation    Objective          AROM RLE (degrees)  RLE AROM: WFL  AROM LLE (degrees)  LLE AROM : WFL  Strength RLE  Strength RLE: WFL  Comment: Grossly 4/5  Strength LLE  Strength LLE: WFL  Comment: Grossly 4/5  Tone RLE  RLE Tone: Normotonic  Tone LLE  LLE Tone: Normotonic  Sensation  Overall Sensation Status: Impaired (reports numbness in bilateral feet)  Bed mobility  Supine to Sit: Modified independent (HOB elevated)  Sit to Supine: Unable to assess (Pt up in chair at end of session.)  Transfers  Sit to Stand: Stand by assistance  Stand to sit: Stand by assistance  Bed to Chair: Stand by assistance  Comment: SBA without AD and SpO2 dropping to 86% on 5 L O2. Ambulation  Ambulation?: Yes  More Ambulation?: No  Ambulation 1  Surface: level tile  Device: No Device  Assistance: Stand by assistance  Quality of Gait: Decreased step length, narrow LAN, partial step through pattern, and holding onto foot board. Gait Deviations: Slow Evon;Decreased step length;Decreased step height  Distance: 12 feet  Comments: SpO2 dropped to 86% with cues for PLB.   Stairs/Curb  Stairs?: No     Balance  Posture: Fair  Sitting - Static: Good  Sitting - Dynamic: Good;-  Standing - Static: Fair;+  Standing - Dynamic: Fair;+  Comments: without AD  Exercises  Hip Flexion: x15 BLE seated marches  Hip Abduction: x15 BLE  Knee Long Arc Quad: x15 BLE  Ankle Pumps: x15 BLE  Comments: Cues for rest between sets; cues for sequencing/positioning and rationale for each exercise. Plan   Plan  Times per week: 2-3x  Times per day: Daily  Plan weeks: 1 week, by 9/6/21  Current Treatment Recommendations: Balance Training, Functional Mobility Training, Transfer Training, Endurance Training, Gait Training, Stair training, Home Exercise Program, Safety Education & Training  Safety Devices  Type of devices: All fall risk precautions in place, Call light within reach, Left in chair, Nurse notified  AM-PAC Score  AM-PAC Inpatient Mobility Raw Score : 19 (08/30/21 1717)  AM-PAC Inpatient T-Scale Score : 45.44 (08/30/21 1717)  Mobility Inpatient CMS 0-100% Score: 41.77 (08/30/21 1717)  Mobility Inpatient CMS G-Code Modifier : CK (08/30/21 1717)        Goals  Short term goals  Time Frame for Short term goals: 5-7 days, unless otherwise stated, by 9/6/21  Short term goal 1: Pt will complete sit<>stands with independence. Short term goal 2: Pt will tolerate ambulating 50 feet with no AD and supervision. Short term goal 3: Pt will ascend/descend 2 steps with one UE support and SBA. Short term goal 4: Pt will complete 12-15 reps of BLE ther ex for strength and ROM by 9/2/21. Patient Goals   Patient goals : To go home. Therapy Time   Individual Concurrent Group Co-treatment   Time In 1508         Time Out 1544         Minutes 36         Timed Code Treatment Minutes: 26 Minutes (10 minute eval)     If pt is unable to be seen after this session, please let this note serve as discharge summary. Please see case management note for discharge disposition. Thank you.     Neema Steve, PT

## 2021-08-30 NOTE — PROGRESS NOTES
Occupational Therapy   Occupational Therapy Initial Assessment/Treatment   Date: 2021   Patient Name: Karla Bhakta  MRN: [id number]     : 1960    Date of Service: 2021    Discharge Recommendations:  Continue to assess pending progress, Subacute/Skilled Nursing Facility, 24 hour supervision or assist, Home with Home health OT       Assessment   Performance deficits / Impairments: Decreased functional mobility ; Decreased safe awareness;Decreased balance;Decreased ADL status; Decreased cognition;Decreased strength;Decreased endurance      Assessment: Pt 61 yo female functioning with deficits in the areas listed above following increased weakness with prolonged hospital stay due to [place]. Pt reports IND PLOF and lives at home with family. Pt is currently limited due to respiratory status requiring 5 L O2. Pt required frequent rest breaks and cues for pursed lip breathing. Pt educated on BUE exercises for increased endurance and strength. Pt talked about home DME needed for energy conservation during adls. Pt would benefit from skilled OT services while in acute care prior to d/c. Disease Specific Education: Pt educated on importance of OOB mobility, prevention of complications of bedrest, and general safety during hospitalization. Pt verbalized understanding      Prognosis: Good  Decision Making: Medium Complexity  OT Education: OT Role;Plan of Care;Home Exercise Program;Precautions; ADL Adaptive Strategies; Energy Conservation  REQUIRES OT FOLLOW UP: Yes  Activity Tolerance  Activity Tolerance: Patient Tolerated treatment well;Patient limited by fatigue  Activity Tolerance: O2 93% on 5 L , HR 75 /55, O2 85% with activity requiring seated rest breaks to recover above 90%  Safety Devices  Safety Devices in place: Yes  Type of devices: Call light within reach; Left in chair;Gait belt;Nurse notified (per RN no chair alarm)           Patient Diagnosis(es): The primary encounter diagnosis was Pneumonia due to COVID-19 virus. A diagnosis of Hypoxia was also pertinent to this visit. has a past medical history of CAD (coronary artery disease), Cerebral artery occlusion with cerebral infarction ([facility] [address] Choking sensation, COVID-19, Diabetes mellitus ([facility] [address] DM2 (diabetes mellitus, type 2) ([facility] [address] Hyperlipidemia, Prolonged emergence from general anesthesia, and Sarcoid. has a past surgical history that includes Cholecystectomy;  section; hysteroscopy (2013); Hysterectomy; Colonoscopy; Colonoscopy (13); Colonoscopy (10/19/2018); Colonoscopy (N/A, 10/19/2018); Cardiac surgery (2020); and Finger trigger release (Right, 2020).            Restrictions  Restrictions/Precautions  Restrictions/Precautions: Isolation, Up as Tolerated  Position Activity Restriction  Other position/activity restrictions: COVID+, 5 L O2    Subjective   General  Chart Reviewed: Yes  Patient assessed for rehabilitation services?: Yes  Family / Caregiver Present: No  Referring Practitioner: Evangelist Parsons MD  Diagnosis: weakness, fatigue  Subjective  Subjective: Pt agreeable to therapy  General Comment  Comments: RN approved therapy  Patient Currently in Pain: Denies  Pain Assessment  Pain Assessment: 0-10  Pain Level: 0  Vital Signs  Temp: 97.8 °F (36.6 °C)  Temp Source: Axillary  Pulse: 69  Heart Rate Source: Monitor  Resp: 18  BP: (!) 101/55  BP Location: Right upper arm  MAP (mmHg): 70  Patient Position: Sitting  Orthostatic B/P and Pulse?: Yes  Blood Pressure Lyin/55  Pulse Lyin PER MINUTE  Blood Pressure Sittin/69  Pulse Sittin PER MINUTE  Blood Pressure Standin/64  Pulse Standin PER MINUTE  Level of Consciousness: Alert (0)  Patient Currently in Pain: Denies  Orthostatic B/P and Pulse?: Yes  Oxygen Therapy  SpO2: 95 %  O2 Device: Nasal cannula  O2 Flow Rate (L/min): 5 L/min       Social/Functional History  Social/Functional History  Lives With: Significant other ( and 2 daughters)  Type of Home: Mobile home  Home Layout: One level  Home Access: Stairs to enter with rails  Entrance Stairs - Number of Steps: 2  Bathroom Shower/Tub: Tub/Shower unit  Bathroom Toilet: Handicap height  Home Equipment: [address]  ADL Assistance: Independent  Homemaking Assistance: Independent  Homemaking Responsibilities: Yes  Ambulation Assistance: Independent  Transfer Assistance: Independent  Active : Yes  Additional Comments: reports no falls in the past year       Objective   Vision: Impaired  Vision Exceptions: Wears glasses for distance  Hearing: Within functional limits    Orientation  Overall Orientation Status: Within Functional Limits  Observation/Palpation  Posture: Fair  Balance  Sitting Balance: Supervision  Standing Balance: Stand by assistance  Functional Mobility  Functional - Mobility Device: No device  Activity: Other  Assist Level: Stand by assistance  Functional Mobility Comments: holding onto furniture walking in the room  ADL  LE Dressing: Supervision (seated)  Tone RUE  RUE Tone: Normotonic  Tone LUE  LUE Tone: Normotonic  Coordination  Movements Are Fluid And Coordinated: Yes     Bed mobility  Supine to Sit: Modified independent (HOB elevated)  Sit to Supine: Unable to assess (up in chair at end of session)  Transfers  Sit to stand: Stand by assistance  Stand to sit: Stand by assistance     Cognition  Overall Cognitive Status: Exceptions  Arousal/Alertness: Appropriate responses to stimuli  Following Commands:  Follows one step commands with repetition  Attention Span: Attends with cues to redirect  Safety Judgement: Decreased awareness of need for assistance  Problem Solving: Assistance required to identify errors made;Assistance required to generate solutions  Initiation: Requires cues for some  Sequencing: Requires cues for some  Cognition Comment: verbal cues to redirect conversation        Sensation  Overall Sensation Status: Impaired (reports numbness in bilateral feet)  Type of ROM/Therapeutic Exercise  Type of ROM/Therapeutic Exercise: AROM  Comment: BUE seated  Exercises  Scapular Protraction: x10  Scapular Retraction: x10  Horizontal ABduction: x10  Horizontal ADduction: x10  Elbow Flexion: x10  Elbow Extension: x10     LUE AROM (degrees)  LUE AROM : WFL  RUE AROM (degrees)  RUE AROM : WFL  LUE Strength  Gross LUE Strength: WFL  RUE Strength  Gross RUE Strength: WFL                   Plan   Plan  Times per week: 2-3x/wk  Current Treatment Recommendations: Strengthening, Endurance Training, Balance Training, Functional Mobility Training, Safety Education & Training, Self-Care / ADL      AM-PAC Score        AM-PAC Inpatient Daily Activity Raw Score: 19 (08/30/21 1604)  AM-PAC Inpatient ADL T-Scale Score : 40.22 (08/30/21 1604)  ADL Inpatient CMS 0-100% Score: 42.8 (08/30/21 1604)  ADL Inpatient CMS G-Code Modifier : CK (08/30/21 1604)    Goals  Short term goals  Time Frame for Short term goals: 1 week (9/06/21)  Short term goal 1: Pt will complete toilet transfer with S.  Short term goal 2: Pt will complete LB dressing for pants with S.  Short term goal 3: Pt will complete 15 reps of BUE exercises for increased endurance. (9/2/21)  Short term goal 4: Pt will complete 5 minutes of dynamic standing for adls with SBA with O2 above 90%. Patient Goals   Patient goals : \"to get stronger\"       Therapy Time   Individual Concurrent Group Co-treatment   Time In 1505         Time Out 1540         Minutes 35         Timed Code Treatment Minutes: 25 Minutes (10 minutes for evaluation)       Jennifer Schneider OTR/L    If pt is unable to be seen after this session, please let this note serve as discharge summary. Please see case management note for discharge disposition. Thank you.

## 2021-08-30 NOTE — PROGRESS NOTES
Hospitalist Progress Note      PCP: Ivette Gerber    Date of Admission: 8/16/2021    Chief Complaint: Shortness of breath     Hospital Course:      Subjective: Patient says shortness of breath is better, remains on oxygen         Medications:  Reviewed    Infusion Medications    dextrose      sodium chloride 25 mL (08/20/21 1315)     Scheduled Medications    senna  1 tablet Oral BID    bisacodyl  10 mg Oral Once    insulin glargine  30 Units SubCUTAneous BID    methylPREDNISolone  40 mg IntraVENous Q12H    insulin lispro  0-12 Units SubCUTAneous TID WC    insulin lispro  0-6 Units SubCUTAneous Nightly    guaiFENesin  600 mg Oral BID    aspirin  81 mg Oral Daily    Vitamin D  2,000 Units Oral Daily    clopidogrel  75 mg Oral Daily    metoprolol tartrate  25 mg Oral BID    pantoprazole  40 mg Oral QAM AC    rosuvastatin  10 mg Oral Daily    enoxaparin  30 mg SubCUTAneous BID    insulin lispro  0.08 Units/kg SubCUTAneous TID      PRN Meds: albuterol sulfate HFA, glucose, dextrose, glucagon (rDNA), dextrose, sodium chloride flush, sodium chloride, potassium chloride **OR** potassium alternative oral replacement **OR** potassium chloride, magnesium sulfate, ondansetron **OR** ondansetron, acetaminophen **OR** acetaminophen, guaiFENesin-dextromethorphan, melatonin, albuterol sulfate HFA, benzonatate      Intake/Output Summary (Last 24 hours) at 8/30/2021 1122  Last data filed at 8/30/2021 0756  Gross per 24 hour   Intake    Output 3800 ml   Net -3800 ml       Physical Exam Performed:    /73   Pulse 62   Temp 98 °F (36.7 °C) (Axillary)   Resp 18   Ht 5' 1\" (1.549 m)   Wt 143 lb 15.4 oz (65.3 kg)   LMP 12/21/2012   SpO2 98%   BMI 27.20 kg/m²     General appearance: No apparent distress, appears stated age and cooperative. HEENT: Pupils equal, round, and reactive to light. Conjunctivae/corneas clear. Neck: Supple, with full range of motion. No jugular venous distention.  Trachea midline. Respiratory:  Normal respiratory effort. Clear to auscultation, bilaterally without Rales/Wheezes/Rhonchi. Cardiovascular: Regular rate and rhythm with normal S1/S2 without murmurs, rubs or gallops. Abdomen: Soft, non-tender, non-distended with normal bowel sounds. Musculoskeletal: No clubbing, cyanosis or edema bilaterally. Full range of motion without deformity. Skin: Skin color, texture, turgor normal.  No rashes or lesions. Neurologic:  Neurovascularly intact without any focal sensory/motor deficits. Cranial nerves: II-XII intact, grossly non-focal.  Psychiatric: Alert and oriented, thought content appropriate, normal insight  Capillary Refill: Brisk,3 seconds, normal   Peripheral Pulses: +2 palpable, equal bilaterally       Labs:   Recent Labs     08/28/21 0537 08/29/21  0534 08/30/21  0538   WBC 21.8* 20.3* 21.7*   HGB 12.8 13.2 12.5   HCT 38.7 38.8 36.8    341 313     Recent Labs     08/28/21  0537 08/29/21  0534 08/30/21  0538   * 139 140   K 3.8 4.7 4.1    101 101   CO2 25 29 30   BUN 25* 25* 16   CREATININE <0.5* 0.6 0.6   CALCIUM 8.0* 8.6 8.6     Recent Labs     08/28/21  0537 08/29/21  0534 08/30/21  0538   AST 14* 13* 10*   ALT 14 19 18   BILITOT <0.2 0.3 <0.2   ALKPHOS 50 56 56     Recent Labs     08/28/21  0537 08/29/21  0534 08/30/21  0538   INR 1.32* 1.25* 1.25*     No results for input(s): CKTOTAL, TROPONINI in the last 72 hours. Urinalysis:      Lab Results   Component Value Date    NITRU Negative 08/17/2021    WBCUA 6-9 08/17/2021    BACTERIA 1+ 08/17/2021    RBCUA 5-10 08/17/2021    BLOODU SMALL 08/17/2021    SPECGRAV 1.020 08/17/2021    GLUCOSEU >=1000 08/17/2021       Radiology:  XR CHEST PORTABLE   Final Result   Severe perihilar and diffuse bilateral airspace opacities. Pattern likely   represents ARDS or viral pattern of pneumonia. XR CHEST PORTABLE   Final Result   Bibasilar airspace disease not significantly changed from earlier today. Correlate clinically for possible COVID disease. Assessment/Plan:    Active Hospital Problems    Diagnosis     Bandemia [D72.825]     Pulmonary infiltrates [R91.8]     DM (diabetes mellitus), secondary uncontrolled ([address] [E13.65]     Leukocytosis [D72.829]     CAD S/P percutaneous coronary angioplasty [I25.10, Z98.61]     Acute hypoxemic respiratory failure due to severe acute respiratory syndrome coronavirus 2 (SARS-CoV-2) disease ([facility]) [U07.1, J96.01]     Pulmonary sarcoidosis ([facility]) [D86.0]     Essential hypertension [I10]     Type 2 diabetes mellitus without complication, with long-term current use of insulin ([facility]) [E11.9, Z79.4]             1.  Admitted with COVID-19 pneumonia with acute respiratory failure with hypoxia continue with supportive care and oxygen. History of ILD possible sarcoidosis. Symptom onset 8/11/2021 according to her  continue with the Solu-Medrol,  status post Tocilizumab. Pulmonary/ID consulted and following, apprec recs    2.  Coronary artery disease status post PCI. Status post CHRISTIAN to LAD in 5/2020, normal Olga/MPI on 7/28/2021. Continue with aspirin , Plavix, statin, beta-blocker.  -was holding lisinopril while acutely ill. 3.  Diabetes mellitus type 2 p.o. medication on hold continue with insulin hemoglobin A1c= 8.2 on 8/16/2021. 4.  Leukocytosis -suspect due to steroids. 5.  Constipation start on Senokot 1 p.o. twice daily, Dulcolax x1 and as needed.     DVT Prophylaxis: Lovenox subcu  Diet: ADULT DIET; Regular; 5 carb choices (75 gm/meal);  Low Fat/Low Chol/High Fiber/DMITRI  Code Status: Full Code    PT/OT Eval Status: not ordered    Dispo - pending pulm/id recs, weaned off Taiwo Swain MD

## 2021-08-30 NOTE — CARE COORDINATION
Care being managed by Mateo Schwartz. LOS 13.  Pt is now on 5L 02- [facility] now must decline as pt no longer meeting LTAC criteria per conversation w [facility] liaison. Spoke to pt about DC plan- she is requesting SNF. Referral had been made to [facility] on Fri- call placed to Admissions to check on status. Pt also requesting [facility]- not a provider for [place]- but call placed to check and message left. PT/OT evals will be needed- requested from MD.  Continue to follow. Bravo Del Cid RN     9137 Received call from [facility]- they do not accept [place]. Received call from [facility]- states they can accept Covid pt at 14 days from testing [pt's Covid test done on 8/17- 13 days ago], and can support O2 needs up to 10L. Will need therapy notes to proceed with referral.  Continue to follow. Bravo Del Cid RN     9931 Call to pt to update- she is in agreement to proceed with seeking admission to [facility]. PT OT evals are ordered and  pending. CM continues to follow. Bravo Del Cid RN     9326 Spoke to PT- states pt did fairly well w eval and was assessed as stand by assist.  Call placed to [facility] to notify that therapy eval was done today and documentation will be in Epic soon. Next discussed w pt that if [place] does not cert for SNF- HHC and home O2 would be back up plan. Pt is agreeable to referral to on site liaisons for these services. Explained all to pt's spouse. Calls placed to [facility] and [facility] and messages left. CM will continue to follow. Bravo Del Cid RN     2194 [facility] cannot accept- states [facility] is a provider for [place]. Call to agency and referral made- they can accept pt for [address] services at [place]. Continue to follow.  Bravo Del Cid RN

## 2021-08-31 LAB
A/G RATIO: 1.1 (ref 1.1–2.2)
ALBUMIN SERPL-MCNC: 2.8 G/DL (ref 3.4–5)
ALP BLD-CCNC: 57 U/L (ref 40–129)
ALT SERPL-CCNC: 26 U/L (ref 10–40)
ANION GAP SERPL CALCULATED.3IONS-SCNC: 12 MMOL/L (ref 3–16)
APTT: 24.9 SEC (ref 26.2–38.6)
AST SERPL-CCNC: 14 U/L (ref 15–37)
BASOPHILS ABSOLUTE: 0.1 K/UL (ref 0–0.2)
BASOPHILS RELATIVE PERCENT: 0.5 %
BILIRUB SERPL-MCNC: <0.2 MG/DL (ref 0–1)
BUN BLDV-MCNC: 14 MG/DL (ref 7–20)
CALCIUM SERPL-MCNC: 8.5 MG/DL (ref 8.3–10.6)
CHLORIDE BLD-SCNC: 102 MMOL/L (ref 99–110)
CO2: 22 MMOL/L (ref 21–32)
CREAT SERPL-MCNC: 0.6 MG/DL (ref 0.6–1.2)
D DIMER: 303 NG/ML DDU (ref 0–229)
EOSINOPHILS ABSOLUTE: 0 K/UL (ref 0–0.6)
EOSINOPHILS RELATIVE PERCENT: 0.2 %
FIBRINOGEN: 214 MG/DL (ref 200–397)
GFR AFRICAN AMERICAN: >60
GFR NON-AFRICAN AMERICAN: >60
GLOBULIN: 2.6 G/DL
GLUCOSE BLD-MCNC: 195 MG/DL (ref 70–99)
GLUCOSE BLD-MCNC: 237 MG/DL (ref 70–99)
GLUCOSE BLD-MCNC: 253 MG/DL (ref 70–99)
GLUCOSE BLD-MCNC: 277 MG/DL (ref 70–99)
GLUCOSE BLD-MCNC: 352 MG/DL (ref 70–99)
HCT VFR BLD CALC: 36.4 % (ref 36–48)
HEMOGLOBIN: 12.4 G/DL (ref 12–16)
INR BLD: 1.16 (ref 0.88–1.12)
LYMPHOCYTES ABSOLUTE: 1.4 K/UL (ref 1–5.1)
LYMPHOCYTES RELATIVE PERCENT: 7.3 %
MCH RBC QN AUTO: 27.6 PG (ref 26–34)
MCHC RBC AUTO-ENTMCNC: 34 G/DL (ref 31–36)
MCV RBC AUTO: 81.2 FL (ref 80–100)
MONOCYTES ABSOLUTE: 0.9 K/UL (ref 0–1.3)
MONOCYTES RELATIVE PERCENT: 4.8 %
NEUTROPHILS ABSOLUTE: 16.3 K/UL (ref 1.7–7.7)
NEUTROPHILS RELATIVE PERCENT: 87.2 %
PDW BLD-RTO: 15.7 % (ref 12.4–15.4)
PERFORMED ON: ABNORMAL
PLATELET # BLD: 203 K/UL (ref 135–450)
PMV BLD AUTO: 6.5 FL (ref 5–10.5)
POTASSIUM REFLEX MAGNESIUM: 4 MMOL/L (ref 3.5–5.1)
PROTHROMBIN TIME: 13.2 SEC (ref 9.9–12.7)
RBC # BLD: 4.49 M/UL (ref 4–5.2)
SODIUM BLD-SCNC: 136 MMOL/L (ref 136–145)
TOTAL PROTEIN: 5.4 G/DL (ref 6.4–8.2)
WBC # BLD: 18.7 K/UL (ref 4–11)

## 2021-08-31 PROCEDURE — 80053 COMPREHEN METABOLIC PANEL: CPT

## 2021-08-31 PROCEDURE — 96376 TX/PRO/DX INJ SAME DRUG ADON: CPT

## 2021-08-31 PROCEDURE — 2700000000 HC OXYGEN THERAPY PER DAY

## 2021-08-31 PROCEDURE — 85379 FIBRIN DEGRADATION QUANT: CPT

## 2021-08-31 PROCEDURE — 6360000002 HC RX W HCPCS: Performed by: INTERNAL MEDICINE

## 2021-08-31 PROCEDURE — 6370000000 HC RX 637 (ALT 250 FOR IP): Performed by: HOSPITALIST

## 2021-08-31 PROCEDURE — 85025 COMPLETE CBC W/AUTO DIFF WBC: CPT

## 2021-08-31 PROCEDURE — 1200000000 HC SEMI PRIVATE

## 2021-08-31 PROCEDURE — 36415 COLL VENOUS BLD VENIPUNCTURE: CPT

## 2021-08-31 PROCEDURE — 85610 PROTHROMBIN TIME: CPT

## 2021-08-31 PROCEDURE — 6370000000 HC RX 637 (ALT 250 FOR IP): Performed by: INTERNAL MEDICINE

## 2021-08-31 PROCEDURE — 85384 FIBRINOGEN ACTIVITY: CPT

## 2021-08-31 PROCEDURE — G0378 HOSPITAL OBSERVATION PER HR: HCPCS

## 2021-08-31 PROCEDURE — 94761 N-INVAS EAR/PLS OXIMETRY MLT: CPT

## 2021-08-31 PROCEDURE — 96372 THER/PROPH/DIAG INJ SC/IM: CPT

## 2021-08-31 PROCEDURE — 2580000003 HC RX 258: Performed by: INTERNAL MEDICINE

## 2021-08-31 PROCEDURE — 85730 THROMBOPLASTIN TIME PARTIAL: CPT

## 2021-08-31 RX ADMIN — Medication 1 LOZENGE: at 12:40

## 2021-08-31 RX ADMIN — Medication 2000 UNITS: at 10:17

## 2021-08-31 RX ADMIN — GUAIFENESIN AND DEXTROMETHORPHAN 5 ML: 100; 10 SYRUP ORAL at 17:08

## 2021-08-31 RX ADMIN — GUAIFENESIN AND DEXTROMETHORPHAN 5 ML: 100; 10 SYRUP ORAL at 10:18

## 2021-08-31 RX ADMIN — INSULIN LISPRO 5 UNITS: 100 INJECTION, SOLUTION INTRAVENOUS; SUBCUTANEOUS at 10:19

## 2021-08-31 RX ADMIN — METHYLPREDNISOLONE SODIUM SUCCINATE 40 MG: 40 INJECTION, POWDER, LYOPHILIZED, FOR SOLUTION INTRAMUSCULAR; INTRAVENOUS at 17:08

## 2021-08-31 RX ADMIN — GUAIFENESIN 600 MG: 600 TABLET, EXTENDED RELEASE ORAL at 10:17

## 2021-08-31 RX ADMIN — INSULIN LISPRO 4 UNITS: 100 INJECTION, SOLUTION INTRAVENOUS; SUBCUTANEOUS at 17:10

## 2021-08-31 RX ADMIN — INSULIN GLARGINE 30 UNITS: 100 INJECTION, SOLUTION SUBCUTANEOUS at 10:25

## 2021-08-31 RX ADMIN — METHYLPREDNISOLONE SODIUM SUCCINATE 40 MG: 40 INJECTION, POWDER, LYOPHILIZED, FOR SOLUTION INTRAMUSCULAR; INTRAVENOUS at 04:50

## 2021-08-31 RX ADMIN — METOPROLOL TARTRATE 25 MG: 25 TABLET, FILM COATED ORAL at 10:17

## 2021-08-31 RX ADMIN — SENNOSIDES 8.6 MG: 8.6 TABLET, FILM COATED ORAL at 10:17

## 2021-08-31 RX ADMIN — CLOPIDOGREL BISULFATE 75 MG: 75 TABLET ORAL at 10:18

## 2021-08-31 RX ADMIN — PANTOPRAZOLE SODIUM 40 MG: 40 TABLET, DELAYED RELEASE ORAL at 04:50

## 2021-08-31 RX ADMIN — ROSUVASTATIN CALCIUM 10 MG: 10 TABLET, FILM COATED ORAL at 10:17

## 2021-08-31 RX ADMIN — Medication 10 ML: at 10:18

## 2021-08-31 RX ADMIN — INSULIN LISPRO 5 UNITS: 100 INJECTION, SOLUTION INTRAVENOUS; SUBCUTANEOUS at 17:10

## 2021-08-31 RX ADMIN — ENOXAPARIN SODIUM 30 MG: 30 INJECTION SUBCUTANEOUS at 10:18

## 2021-08-31 RX ADMIN — INSULIN LISPRO 10 UNITS: 100 INJECTION, SOLUTION INTRAVENOUS; SUBCUTANEOUS at 10:19

## 2021-08-31 RX ADMIN — INSULIN LISPRO 6 UNITS: 100 INJECTION, SOLUTION INTRAVENOUS; SUBCUTANEOUS at 12:42

## 2021-08-31 RX ADMIN — INSULIN LISPRO 5 UNITS: 100 INJECTION, SOLUTION INTRAVENOUS; SUBCUTANEOUS at 12:43

## 2021-08-31 RX ADMIN — ASPIRIN 81 MG: 81 TABLET, CHEWABLE ORAL at 10:17

## 2021-08-31 ASSESSMENT — PAIN SCALES - GENERAL: PAINLEVEL_OUTOF10: 0

## 2021-08-31 NOTE — PROGRESS NOTES
Pt moved from C4-433 to C3-333. Bedside report completed with Timothy Caballero. [address] notified. VSS. Pt A&Ox4 transferred on 4L NC with Tele in place.

## 2021-08-31 NOTE — PROGRESS NOTES
Pt is alert and oriented x 4. Vitals signs are stable. Assessment is as charted. Pt remains on 4L O2 with O2 sats in the low 90's after ambulation. Pt does report dyspnea on exertion.

## 2021-08-31 NOTE — PROGRESS NOTES
Pt a/o. Oxygen titrated from 4 L NC to 2 L NC and SpO2 92%. Pt has frequent coughing and requesting cough drops so writer sent a secure message to Dr. Benitez Cos this AM, see new orders. Medicated per [facility] for cough. Appetite good. Call light within reach; will continue to monitor.

## 2021-08-31 NOTE — PLAN OF CARE
Problem: Falls - Risk of:  Goal: Will remain free from falls  Description: Will remain free from falls  Outcome: Ongoing  Note: Pt remains safe. Uses call light appropriately to call out for assistance. Bed locked in lowest position with side rails 2/4. Chair locked. Bedside table and call light within reach of pt.

## 2021-08-31 NOTE — PROGRESS NOTES
[facility] Hospitalist    Pt admitted for Carol on 8/16. Patient currently on 4L. Would like to see if we could get down grade orders on her. New orders given.

## 2021-08-31 NOTE — PROGRESS NOTES
Hospitalist Progress Note      PCP: Marimar Cruz    Date of Admission: 8/16/2021    Chief Complaint: Shortness of breath     Hospital Course:      Subjective: Patient says shortness of breath is better, remains on oxygen at 4L, no overnight issues       Medications:  Reviewed    Infusion Medications    dextrose      sodium chloride 25 mL (08/20/21 1755)     Scheduled Medications    senna  1 tablet Oral BID    bisacodyl  10 mg Oral Once    insulin glargine  30 Units SubCUTAneous BID    methylPREDNISolone  40 mg IntraVENous Q12H    insulin lispro  0-12 Units SubCUTAneous TID WC    insulin lispro  0-6 Units SubCUTAneous Nightly    guaiFENesin  600 mg Oral BID    aspirin  81 mg Oral Daily    Vitamin D  2,000 Units Oral Daily    clopidogrel  75 mg Oral Daily    metoprolol tartrate  25 mg Oral BID    pantoprazole  40 mg Oral QAM AC    rosuvastatin  10 mg Oral Daily    enoxaparin  30 mg SubCUTAneous BID    insulin lispro  0.08 Units/kg SubCUTAneous TID WC     PRN Meds: albuterol sulfate HFA, glucose, dextrose, glucagon (rDNA), dextrose, sodium chloride flush, sodium chloride, potassium chloride **OR** potassium alternative oral replacement **OR** potassium chloride, magnesium sulfate, ondansetron **OR** ondansetron, acetaminophen **OR** acetaminophen, guaiFENesin-dextromethorphan, melatonin, albuterol sulfate HFA, benzonatate      Intake/Output Summary (Last 24 hours) at 8/31/2021 0913  Last data filed at 8/31/2021 0002  Gross per 24 hour   Intake 480 ml   Output 0 ml   Net 480 ml       Physical Exam Performed:    /62   Pulse 72   Temp 98.2 °F (36.8 °C) (Oral)   Resp 16   Ht 5' 1\" (1.549 m)   Wt 143 lb 15.4 oz (65.3 kg)   LMP 12/21/2012   SpO2 91%   BMI 27.20 kg/m²     General appearance: No apparent distress, appears stated age and cooperative. HEENT: Pupils equal, round, and reactive to light. Conjunctivae/corneas clear. Neck: Supple, with full range of motion.  No jugular venous distention. Trachea midline. Respiratory:  Normal respiratory effort. Clear to auscultation, bilaterally without Rales/Wheezes/Rhonchi. Cardiovascular: Regular rate and rhythm with normal S1/S2 without murmurs, rubs or gallops. Abdomen: Soft, non-tender, non-distended with normal bowel sounds. Musculoskeletal: No clubbing, cyanosis or edema bilaterally. Full range of motion without deformity. Skin: Skin color, texture, turgor normal.  No rashes or lesions. Neurologic:  Neurovascularly intact without any focal sensory/motor deficits. Cranial nerves: II-XII intact, grossly non-focal.  Psychiatric: Alert and oriented, thought content appropriate, normal insight  Capillary Refill: Brisk,3 seconds, normal   Peripheral Pulses: +2 palpable, equal bilaterally       Labs:   Recent Labs     08/29/21  0534 08/30/21  0538 08/31/21  0505   WBC 20.3* 21.7* 18.7*   HGB 13.2 12.5 12.4   HCT 38.8 36.8 36.4    313 203     Recent Labs     08/29/21  0534 08/30/21  0538 08/31/21  0505    140 136   K 4.7 4.1 4.0    101 102   CO2 29 30 22   BUN 25* 16 14   CREATININE 0.6 0.6 0.6   CALCIUM 8.6 8.6 8.5     Recent Labs     08/29/21  0534 08/30/21  0538 08/31/21  0505   AST 13* 10* 14*   ALT 19 18 26   BILITOT 0.3 <0.2 <0.2   ALKPHOS 56 56 57     Recent Labs     08/29/21  0534 08/30/21  0538 08/31/21  0505   INR 1.25* 1.25* 1.16*     No results for input(s): Miranda Lowers in the last 72 hours. Urinalysis:      Lab Results   Component Value Date    NITRU Negative 08/17/2021    WBCUA 6-9 08/17/2021    BACTERIA 1+ 08/17/2021    RBCUA 5-10 08/17/2021    BLOODU SMALL 08/17/2021    SPECGRAV 1.020 08/17/2021    GLUCOSEU >=1000 08/17/2021       Radiology:  XR CHEST PORTABLE   Final Result   Severe perihilar and diffuse bilateral airspace opacities. Pattern likely   represents ARDS or viral pattern of pneumonia.          XR CHEST PORTABLE   Final Result   Bibasilar airspace disease not significantly changed from earlier today. Correlate clinically for possible COVID disease. Assessment/Plan:    Active Hospital Problems    Diagnosis     Bandemia [D72.825]     Pulmonary infiltrates [R91.8]     DM (diabetes mellitus), secondary uncontrolled ([address] [E13.65]     Leukocytosis [D72.829]     CAD S/P percutaneous coronary angioplasty [I25.10, Z98.61]     Acute hypoxemic respiratory failure due to severe acute respiratory syndrome coronavirus 2 (SARS-CoV-2) disease ([facility]) [U07.1, J96.01]     Pulmonary sarcoidosis ([facility]) [D86.0]     Essential hypertension [I10]     Type 2 diabetes mellitus without complication, with long-term current use of insulin ([facility]) [E11.9, Z79.4]          1.  Admitted with COVID-19 pneumonia with acute respiratory failure with hypoxia continue with supportive care and oxygen. History of ILD possible sarcoidosis. Symptom onset 8/11/2021 according to her  continue with the Solu-Medrol,  status post Tocilizumab. Pulmonary/ID consulted and following, apprec recs     2.  Coronary artery disease status post PCI. Status post CHRISTIAN to LAD in 5/2020, normal Olga/MPI on 7/28/2021. Continue with aspirin , Plavix, statin, beta-blocker.  -was holding lisinopril while acutely ill.     3.  Diabetes mellitus type 2 p.o. medication on hold continue with insulin hemoglobin A1c= 8.2 on 8/16/2021.     4.  Leukocytosis -suspect due to steroids.     5.  Constipation start on Senokot 1 p.o. twice daily, Dulcolax x1 and as needed.     DVT Prophylaxis: Lovenox subcu  Diet: ADULT DIET; Regular; 5 carb choices (75 gm/meal);  Low Fat/Low Chol/High Fiber/DMITRI  Code Status: Full Code    PT/OT Eval Status:  rec snf vs home pt/ot vs 24h sup/assist     Dispo - pending pulm/id recs, CM recs on placement, weaned off Antonio Galindo MD

## 2021-08-31 NOTE — PROGRESS NOTES
Transfer to [facility] from [address] [place] [address], SARAHY and Myron Laguna RN performed complete skin assessment on transfer. Assessment revealed no skin breakdown with the exception of scattered bruising. Upon transferring patient to ordered level of care, patients medications were gathered from the following locations and given to receiving nurse during bedside report:      Lock Box in room :     [x] Yes   [] No   Pyxis Bin:       [x] Yes   [] No      Pyxis Refrigerator:      [x] Yes   [] No   Tube System:       [x] Yes   [] No   LDA's documented:  [x] Yes   [] No          The following paperwork was transferred with patient:      Patient belongings:       [x] Yes   [] No      Continuous pulse ox:   [] Yes   [] No      [x] N/A      Tele monitor number [id number] assigned to patient and placed on patient prior to transfer.     CMU Notified at Transfer: [x] Yes   [] No     Name of [address] [place] [address] Staff:  _____Cristal__________________       MD notified via Perfect Serve:   [x] Yes   [] No    Family notified of transfer:    [] Yes   [x] No    Spoke with:  _Pt requesting this writer wait until later in the morning to call pt's .___________

## 2021-08-31 NOTE — PROGRESS NOTES
4 Eyes Skin Assessment     The patient is being assess for  Transfer to New Unit    I agree that 2 RN's have performed a thorough Head to Toe Skin Assessment on the patient. ALL assessment sites listed below have been assessed. Areas assessed by both nurses: Agueda Holly RN and Mima Kramer RN  [x]   Head, Face, and Ears   [x]   Shoulders, Back, and Chest  [x]   Arms, Elbows, and Hands   [x]   Coccyx, Sacrum, and Ischum  [x]   Legs, Feet, and Heels        Does the Patient have Skin Breakdown?   No         Jeremiah Prevention initiated:  No   Wound Care Orders initiated:  No      [facility] nurse consulted for Pressure Injury (Stage 3,4, Unstageable, DTI, NWPT, and Complex wounds):  No      Nurse 1 eSignature: Electronically signed by Gisele Hatchet, RN on 8/31/21 at 5:24 AM EDT    **SHARE this note so that the co-signing nurse is able to place an eSignature**    Nurse 2 eSignature: Electronically signed by Mary Ann Cantu RN on 8/31/21 at 5:53 AM EDT

## 2021-09-01 VITALS
HEIGHT: 61 IN | HEART RATE: 64 BPM | BODY MASS INDEX: 27.11 KG/M2 | OXYGEN SATURATION: 95 % | RESPIRATION RATE: 18 BRPM | DIASTOLIC BLOOD PRESSURE: 75 MMHG | TEMPERATURE: 98.2 F | SYSTOLIC BLOOD PRESSURE: 152 MMHG | WEIGHT: 143.6 LBS

## 2021-09-01 LAB
A/G RATIO: 1.4 (ref 1.1–2.2)
ALBUMIN SERPL-MCNC: 3.1 G/DL (ref 3.4–5)
ALP BLD-CCNC: 53 U/L (ref 40–129)
ALT SERPL-CCNC: 25 U/L (ref 10–40)
ANION GAP SERPL CALCULATED.3IONS-SCNC: 9 MMOL/L (ref 3–16)
APTT: 26.3 SEC (ref 26.2–38.6)
AST SERPL-CCNC: 12 U/L (ref 15–37)
BASOPHILS ABSOLUTE: 0.1 K/UL (ref 0–0.2)
BASOPHILS RELATIVE PERCENT: 0.3 %
BILIRUB SERPL-MCNC: <0.2 MG/DL (ref 0–1)
BUN BLDV-MCNC: 19 MG/DL (ref 7–20)
CALCIUM SERPL-MCNC: 8.7 MG/DL (ref 8.3–10.6)
CHLORIDE BLD-SCNC: 104 MMOL/L (ref 99–110)
CO2: 28 MMOL/L (ref 21–32)
CREAT SERPL-MCNC: 0.6 MG/DL (ref 0.6–1.2)
D DIMER: 358 NG/ML DDU (ref 0–229)
EOSINOPHILS ABSOLUTE: 0 K/UL (ref 0–0.6)
EOSINOPHILS RELATIVE PERCENT: 0.1 %
FIBRINOGEN: 226 MG/DL (ref 200–397)
GFR AFRICAN AMERICAN: >60
GFR NON-AFRICAN AMERICAN: >60
GLOBULIN: 2.2 G/DL
GLUCOSE BLD-MCNC: 136 MG/DL (ref 70–99)
GLUCOSE BLD-MCNC: 173 MG/DL (ref 70–99)
GLUCOSE BLD-MCNC: 184 MG/DL (ref 70–99)
GLUCOSE BLD-MCNC: 253 MG/DL (ref 70–99)
GLUCOSE BLD-MCNC: 342 MG/DL (ref 70–99)
HCT VFR BLD CALC: 36.4 % (ref 36–48)
HEMOGLOBIN: 12.3 G/DL (ref 12–16)
INR BLD: 1.19 (ref 0.88–1.12)
LYMPHOCYTES ABSOLUTE: 1.7 K/UL (ref 1–5.1)
LYMPHOCYTES RELATIVE PERCENT: 7.5 %
MCH RBC QN AUTO: 27.9 PG (ref 26–34)
MCHC RBC AUTO-ENTMCNC: 33.8 G/DL (ref 31–36)
MCV RBC AUTO: 82.5 FL (ref 80–100)
MONOCYTES ABSOLUTE: 1.2 K/UL (ref 0–1.3)
MONOCYTES RELATIVE PERCENT: 5.5 %
NEUTROPHILS ABSOLUTE: 19.3 K/UL (ref 1.7–7.7)
NEUTROPHILS RELATIVE PERCENT: 86.6 %
PDW BLD-RTO: 16.1 % (ref 12.4–15.4)
PERFORMED ON: ABNORMAL
PLATELET # BLD: 271 K/UL (ref 135–450)
PMV BLD AUTO: 5.9 FL (ref 5–10.5)
POTASSIUM REFLEX MAGNESIUM: 4 MMOL/L (ref 3.5–5.1)
PROTHROMBIN TIME: 13.5 SEC (ref 9.9–12.7)
RBC # BLD: 4.41 M/UL (ref 4–5.2)
SODIUM BLD-SCNC: 141 MMOL/L (ref 136–145)
TOTAL PROTEIN: 5.3 G/DL (ref 6.4–8.2)
WBC # BLD: 22.3 K/UL (ref 4–11)

## 2021-09-01 PROCEDURE — 94761 N-INVAS EAR/PLS OXIMETRY MLT: CPT

## 2021-09-01 PROCEDURE — 6370000000 HC RX 637 (ALT 250 FOR IP): Performed by: INTERNAL MEDICINE

## 2021-09-01 PROCEDURE — 97110 THERAPEUTIC EXERCISES: CPT

## 2021-09-01 PROCEDURE — 97116 GAIT TRAINING THERAPY: CPT

## 2021-09-01 PROCEDURE — 96372 THER/PROPH/DIAG INJ SC/IM: CPT

## 2021-09-01 PROCEDURE — G0378 HOSPITAL OBSERVATION PER HR: HCPCS

## 2021-09-01 PROCEDURE — 6370000000 HC RX 637 (ALT 250 FOR IP): Performed by: HOSPITALIST

## 2021-09-01 PROCEDURE — 80053 COMPREHEN METABOLIC PANEL: CPT

## 2021-09-01 PROCEDURE — 96376 TX/PRO/DX INJ SAME DRUG ADON: CPT

## 2021-09-01 PROCEDURE — 85025 COMPLETE CBC W/AUTO DIFF WBC: CPT

## 2021-09-01 PROCEDURE — 6360000002 HC RX W HCPCS: Performed by: INTERNAL MEDICINE

## 2021-09-01 PROCEDURE — 97530 THERAPEUTIC ACTIVITIES: CPT

## 2021-09-01 PROCEDURE — 85730 THROMBOPLASTIN TIME PARTIAL: CPT

## 2021-09-01 PROCEDURE — 36415 COLL VENOUS BLD VENIPUNCTURE: CPT

## 2021-09-01 PROCEDURE — 2700000000 HC OXYGEN THERAPY PER DAY

## 2021-09-01 PROCEDURE — 85379 FIBRIN DEGRADATION QUANT: CPT

## 2021-09-01 PROCEDURE — 85384 FIBRINOGEN ACTIVITY: CPT

## 2021-09-01 PROCEDURE — 2580000003 HC RX 258: Performed by: INTERNAL MEDICINE

## 2021-09-01 PROCEDURE — 85610 PROTHROMBIN TIME: CPT

## 2021-09-01 PROCEDURE — 97535 SELF CARE MNGMENT TRAINING: CPT

## 2021-09-01 RX ADMIN — Medication 10 ML: at 08:23

## 2021-09-01 RX ADMIN — PANTOPRAZOLE SODIUM 40 MG: 40 TABLET, DELAYED RELEASE ORAL at 08:19

## 2021-09-01 RX ADMIN — GUAIFENESIN 600 MG: 600 TABLET, EXTENDED RELEASE ORAL at 08:19

## 2021-09-01 RX ADMIN — INSULIN LISPRO 5 UNITS: 100 INJECTION, SOLUTION INTRAVENOUS; SUBCUTANEOUS at 12:09

## 2021-09-01 RX ADMIN — METHYLPREDNISOLONE SODIUM SUCCINATE 40 MG: 40 INJECTION, POWDER, LYOPHILIZED, FOR SOLUTION INTRAMUSCULAR; INTRAVENOUS at 15:55

## 2021-09-01 RX ADMIN — INSULIN GLARGINE 30 UNITS: 100 INJECTION, SOLUTION SUBCUTANEOUS at 08:26

## 2021-09-01 RX ADMIN — Medication 10 ML: at 00:24

## 2021-09-01 RX ADMIN — ROSUVASTATIN CALCIUM 10 MG: 10 TABLET, FILM COATED ORAL at 08:19

## 2021-09-01 RX ADMIN — GUAIFENESIN 600 MG: 600 TABLET, EXTENDED RELEASE ORAL at 00:23

## 2021-09-01 RX ADMIN — METOPROLOL TARTRATE 25 MG: 25 TABLET, FILM COATED ORAL at 00:24

## 2021-09-01 RX ADMIN — INSULIN LISPRO 5 UNITS: 100 INJECTION, SOLUTION INTRAVENOUS; SUBCUTANEOUS at 17:29

## 2021-09-01 RX ADMIN — Medication 1 LOZENGE: at 00:25

## 2021-09-01 RX ADMIN — Medication 1 LOZENGE: at 08:22

## 2021-09-01 RX ADMIN — CLOPIDOGREL BISULFATE 75 MG: 75 TABLET ORAL at 08:19

## 2021-09-01 RX ADMIN — GUAIFENESIN AND DEXTROMETHORPHAN 5 ML: 100; 10 SYRUP ORAL at 08:22

## 2021-09-01 RX ADMIN — GUAIFENESIN AND DEXTROMETHORPHAN 5 ML: 100; 10 SYRUP ORAL at 00:26

## 2021-09-01 RX ADMIN — INSULIN LISPRO 6 UNITS: 100 INJECTION, SOLUTION INTRAVENOUS; SUBCUTANEOUS at 08:26

## 2021-09-01 RX ADMIN — ENOXAPARIN SODIUM 30 MG: 30 INJECTION SUBCUTANEOUS at 00:26

## 2021-09-01 RX ADMIN — Medication 2000 UNITS: at 08:19

## 2021-09-01 RX ADMIN — INSULIN LISPRO 2 UNITS: 100 INJECTION, SOLUTION INTRAVENOUS; SUBCUTANEOUS at 17:29

## 2021-09-01 RX ADMIN — ENOXAPARIN SODIUM 30 MG: 30 INJECTION SUBCUTANEOUS at 08:25

## 2021-09-01 RX ADMIN — Medication 3 MG: at 00:23

## 2021-09-01 RX ADMIN — INSULIN LISPRO 5 UNITS: 100 INJECTION, SOLUTION INTRAVENOUS; SUBCUTANEOUS at 08:26

## 2021-09-01 RX ADMIN — METHYLPREDNISOLONE SODIUM SUCCINATE 40 MG: 40 INJECTION, POWDER, LYOPHILIZED, FOR SOLUTION INTRAMUSCULAR; INTRAVENOUS at 08:22

## 2021-09-01 RX ADMIN — ASPIRIN 81 MG: 81 TABLET, CHEWABLE ORAL at 08:19

## 2021-09-01 RX ADMIN — GUAIFENESIN AND DEXTROMETHORPHAN 5 ML: 100; 10 SYRUP ORAL at 12:08

## 2021-09-01 RX ADMIN — METOPROLOL TARTRATE 25 MG: 25 TABLET, FILM COATED ORAL at 08:19

## 2021-09-01 NOTE — PROGRESS NOTES
Pt a/o. PIV removed. Pt updated on plan of discharge and all questions answered. Discharge instructions given to [facility] transport and all questions answered. Pt on 2 L NC osygen and discharged to [facility] with [facility] transport in stable condition.

## 2021-09-01 NOTE — PLAN OF CARE
Problem: Falls - Risk of:  Goal: Will remain free from falls  Description: Will remain free from falls  Outcome: Ongoing  Note: Pt remains safe. Uses call light appropriately to call out for assistance. Bed locked in lowest position; side rails 2/4. Chair locked. Bedside table and call light within reach of pt.

## 2021-09-01 NOTE — DISCHARGE SUMMARY
Hospital Medicine Discharge Summary    Patient ID: Kirsty Landin      Patient's PCP: Asia Bhaskar    Admit Date: 8/16/2021     Discharge Date: 9/1/2021      Admitting Physician: Sue Tavarez MD     Discharge Physician: Yuliet Noland MD     Discharge Diagnoses: Active Hospital Problems    Diagnosis     Bandemia [D72.825]     Pulmonary infiltrates [R91.8]     DM (diabetes mellitus), secondary uncontrolled ([facility] [address] [E13.65]     Leukocytosis [D72.829]     CAD S/P percutaneous coronary angioplasty [I25.10, Z98.61]     Acute hypoxemic respiratory failure due to severe acute respiratory syndrome coronavirus 2 (SARS-CoV-2) disease ([facility]) [U07.1, J96.01]     Pulmonary sarcoidosis ([facility]) [D86.0]     Essential hypertension [I10]     Type 2 diabetes mellitus without complication, with long-term current use of insulin ([facility]) [E11.9, Z79.4]        The patient was seen and examined on day of discharge and this discharge summary is in conjunction with any daily progress note from day of discharge. Hospital Course:   HISTORY OF PRESENT ILLNESS:    Kirsty Landin is a 62 y.o. female. She presents feeling generally ill. Symptoms just began Thursday night. She has felt very fatigued w/ poor appetite, loss of taste, generalized myalgias, and worsening nonproductive cough. Nausea and diarrhea have not been prominent. She apparently also had a fever at home tonight. History is somewhat limited because patient seems very fatigued and does not volunteer much information. She does respond appropriately to direct questioning though.       1.  Admitted with COVID-19 pneumonia with acute respiratory failure with hypoxia continued with supportive care and oxygen. History of ILD possible sarcoidosis. Symptom onset 8/11/2021 according to her  continued with the Solu-Medrol,  status post Tocilizumab.   Pulmonary/ID consulted and following, apprec recs   -weaned to 2L oxygen in discharge, continue weaning as outpt    2.  Coronary artery disease status post PCI. Status post CHRISTIAN to LAD in 5/2020, normal Olga/MPI on 7/28/2021. Continue with aspirin , Plavix, statin, beta-blocker.  -was holding lisinopril while acutely ill(resumed on dc)     3.  Diabetes mellitus type 2 p.o. medication on hold continue with insulin hemoglobin A1c= 8.2 on 8/16/2021.     4.  Leukocytosis -suspect due to steroids.     5.  Constipation start on Senokot 1 p.o. twice daily, Dulcolax x1 and as needed. Physical Exam Performed:     BP (!) 152/75   Pulse 64   Temp 98.2 °F (36.8 °C) (Oral)   Resp 18   Ht 5' 1\" (1.549 m)   Wt 143 lb 9.6 oz (65.1 kg)   LMP 12/21/2012   SpO2 95%   BMI 27.13 kg/m²       General appearance:  No apparent distress, appears stated age and cooperative. HEENT:  Normal cephalic, atraumatic without obvious deformity. Pupils equal, round, and reactive to light. Extra ocular muscles intact. Conjunctivae/corneas clear. Neck: Supple, with full range of motion. No jugular venous distention. Trachea midline. Respiratory:  Normal respiratory effort. Clear to auscultation, bilaterally without Rales/Wheezes/Rhonchi. Cardiovascular:  Regular rate and rhythm with normal S1/S2 without murmurs, rubs or gallops. Abdomen: Soft, non-tender, non-distended with normal bowel sounds. Musculoskeletal:  No clubbing, cyanosis or edema bilaterally. Full range of motion without deformity. Skin: Skin color, texture, turgor normal.  No rashes or lesions. Neurologic:  Neurovascularly intact without any focal sensory/motor deficits. Cranial nerves: II-XII intact, grossly non-focal.  Psychiatric:  Alert and oriented, thought content appropriate, normal insight  Capillary Refill: Brisk,< 3 seconds   Peripheral Pulses: +2 palpable, equal bilaterally       Labs:  For convenience and continuity at follow-up the following most recent labs are provided:      CBC:    Lab Results   Component Value Date    WBC 22.3 09/01/2021    HGB 12.3 09/01/2021    HCT 36.4 09/01/2021     09/01/2021       Renal:    Lab Results   Component Value Date     09/01/2021    K 4.0 09/01/2021     09/01/2021    CO2 28 09/01/2021    BUN 19 09/01/2021    CREATININE 0.6 09/01/2021    CALCIUM 8.7 09/01/2021    PHOS 3.4 05/11/2020         Significant Diagnostic Studies    Radiology:   XR CHEST PORTABLE   Final Result   Severe perihilar and diffuse bilateral airspace opacities. Pattern likely   represents ARDS or viral pattern of pneumonia. XR CHEST PORTABLE   Final Result   Bibasilar airspace disease not significantly changed from earlier today. Correlate clinically for possible COVID disease. Consults:     IP CONSULT TO PHARMACY  IP CONSULT TO INFECTIOUS DISEASES  IP CONSULT TO PULMONOLOGY    Disposition:  SNF     Condition at Discharge: Stable    Discharge Instructions/Follow-up:  Pulm as outpt, wean down oxygen as outpt    Code Status:  Full Code     Activity: activity as tolerated    Diet: diabetic diet      Discharge Medications:     Discharge Medication List as of 9/1/2021  3:20 PM           Details   aspirin (ASPIRIN LOW DOSE) 81 MG chewable tablet Take 1 tablet by mouth daily, Disp-90 tablet, R-3Normal      clopidogrel (PLAVIX) 75 MG tablet Take 1 tablet by mouth daily, Disp-90 tablet, R-3Normal      metoprolol tartrate (LOPRESSOR) 25 MG tablet Take 1 tablet by mouth 2 times daily, Disp-180 tablet, R-3Normal      rosuvastatin (CRESTOR) 10 MG tablet Take 1 tablet by mouth daily, Disp-90 tablet, R-3Normal      nitroGLYCERIN (NITROSTAT) 0.4 MG SL tablet up to max of 3 total doses.  If no relief after 1 dose, call 911., Disp-25 tablet, R-3Normal      ibuprofen (ADVIL;MOTRIN) 600 MG tablet Take 1 tablet by mouth every 8 hours as needed for Pain, Disp-40 tablet, R-0Normal      TRESIBA FLEXTOUCH 100 UNIT/ML SOPN DAWHistorical Med      pantoprazole (PROTONIX) 40 MG tablet Historical Med      Cholecalciferol (VITAMIN D) 2000 UNITS CAPS capsule Take 2,000 Units by mouth daily. Historical Med      lisinopril (PRINIVIL;ZESTRIL) 2.5 MG tablet Take 2.5 mg by mouth daily. Historical Med      metformin (GLUCOPHAGE) 500 MG tablet Take 500 mg by mouth 2 times daily (with meals) Historical Med             Time Spent on discharge is more than 30 minutes in the examination, evaluation, counseling and review of medications and discharge plan. Signed:    So Martin MD   9/1/2021      Thank you lAec Holman for the opportunity to be involved in this patient's care. If you have any questions or concerns please feel free to contact me at [phone].

## 2021-09-01 NOTE — PROGRESS NOTES
Pt is alert and oriented x 4. Vitals signs are stable. Pt remains on 2L O2 with sats in the mid 90's. Assessment is as charted.

## 2021-09-01 NOTE — PROGRESS NOTES
Occupational Therapy  Facility/Department: [facility] C3 TELE/MED SURG/ONC  Daily Treatment Note  NAME: Beni Ayers  : 1960  MRN: [id number]    Date of Service: 2021    Discharge Recommendations:  Subacute/Skilled Nursing Facility     Assessment   Performance deficits / Impairments: Decreased functional mobility ; Decreased safe awareness;Decreased balance;Decreased ADL status; Decreased cognition;Decreased strength;Decreased endurance  Assessment: Pt is progressing toward OT goals. She is presently SBA to occassional CGA for functional mobility, standing balance and UE/LE ADLs when standing. Verbal cues needed for safety. O2 sats did decrease to 87% with activity. Pt instructed on PLB and energy conservation. O2 sats 91% end of session. Cont skilled OT in acute care. Pt plans to d/c to SNF to improve to baseline function. Prognosis: Good  OT Education: OT Role;Transfer Training;Equipment; Energy Conservation; ADL Adaptive Strategies  Disease Specific Education: Pt educated on importance of OOB mobility, prevention of complications of bedrest, and general safety during hospitalization. Pt verbalized understanding  REQUIRES OT FOLLOW UP: Yes  Activity Tolerance  Activity Tolerance: Treatment limited secondary to medical complications (free text)  Activity Tolerance: Uses 2LO2. Decrease in O2 sats to 87% during activity but improved to 91% with rest break  Safety Devices  Safety Devices in place: Yes  Type of devices: Call light within reach; Left in chair;Gait belt;Nurse notified (No chair alarm per RN)       Patient Diagnosis(es): The primary encounter diagnosis was Pneumonia due to COVID-19 virus. A diagnosis of Hypoxia was also pertinent to this visit.       has a past medical history of CAD (coronary artery disease), Cerebral artery occlusion with cerebral infarction ([facility] [address] Choking sensation, COVID-19, Diabetes mellitus ([facility] [address] DM2 (diabetes mellitus, type 2) ([facility] [address] Hyperlipidemia, Prolonged emergence from general anesthesia, and Sarcoid. has a past surgical history that includes Cholecystectomy;  section; hysteroscopy (2013); Hysterectomy; Colonoscopy; Colonoscopy (13); Colonoscopy (10/19/2018); Colonoscopy (N/A, 10/19/2018); Cardiac surgery (2020); and Finger trigger release (Right, 2020). Restrictions  Restrictions/Precautions  Restrictions/Precautions: Isolation, Up as Tolerated  Position Activity Restriction  Other position/activity restrictions: COVID+, 2L O2  Subjective   General  Chart Reviewed: Yes  Patient assessed for rehabilitation services?: Yes  Family / Caregiver Present: No  Referring Practitioner: Rebecca Castillo MD  Diagnosis: hypoxia  Subjective  Subjective: Pt agreeable to therapy  General Comment  Comments: RN approved therapy  Vital Signs  Patient Currently in Pain: Denies   Orientation  Orientation  Overall Orientation Status: Within Functional Limits  Objective    ADL  Feeding: Independent  Grooming: Stand by assistance;Contact guard assistance  LE Dressing: Stand by assistance;Contact guard assistance  Toileting: Stand by assistance;Contact guard assistance  Additional Comments: Instruction provided on energy conservation and PLB. O2 sats decreased to 87% om 2L O2 with activity but improved with seated rest break to 91%. Balance  Sitting Balance: Supervision  Standing Balance: Stand by assistance  Functional Mobility  Functional - Mobility Device: No device  Activity: Other; To/from bathroom  Assist Level: Stand by assistance  Toilet Transfers  Toilet - Technique: Ambulating  Equipment Used: Standard toilet  Toilet Transfer: Stand by assistance  Bed mobility  Supine to Sit: Modified independent  Sit to Supine: Unable to assess  Transfers  Stand Pivot Transfers: Stand by assistance  Sit to stand: Stand by assistance  Stand to sit: Stand by assistance         Cognition  Overall Cognitive Status: Exceptions  Arousal/Alertness: Appropriate responses to stimuli  Following Commands: Follows one step commands consistently  Attention Span: Attends with cues to redirect  Safety Judgement: Decreased awareness of need for safety  Problem Solving: Assistance required to correct errors made  Initiation: Requires cues for some  Sequencing: Requires cues for some      Type of ROM/Therapeutic Exercise  Type of ROM/Therapeutic Exercise: AROM  Comment: BUE seated  Exercises  Shoulder Elevation: 10x  Horizontal ABduction: 10x  Horizontal ADduction: 10x  Elbow Flexion: 10x  Elbow Extension: 10x  Grasp/Release: 10x      Plan   Plan  Times per week: 2-3x/wk  Current Treatment Recommendations: Strengthening, Endurance Training, Balance Training, Functional Mobility Training, Safety Education & Training, Self-Care / ADL     AM-PAC Score   AM-PAC Inpatient Daily Activity Raw Score: 19 (09/01/21 1044)  AM-PAC Inpatient ADL T-Scale Score : 40.22 (09/01/21 1044)  ADL Inpatient CMS 0-100% Score: 42.8 (09/01/21 1044)  ADL Inpatient CMS G-Code Modifier : CK (09/01/21 1044)  Goals  Short term goals  Time Frame for Short term goals: 1 week (9/06/21)  Short term goal 1: Pt will complete toilet transfer with S.-ongoing, SBA 9/1  Short term goal 2: Pt will complete LB dressing for pants with S.-ongoing, SBA/CGA 9/1  Short term goal 3: Pt will complete 15 reps of BUE exercises for increased endurance. (9/2/21)-ongoing, 10x 9/1  Short term goal 4: Pt will complete 5 minutes of dynamic standing for adls with SBA with O2 above 90%. -ongoing 9/1  Patient Goals   Patient goals : \"to get stronger\"     Therapy Time   Individual Concurrent Group Co-treatment   Time In 0816         Time Out 1382         Minutes 39         Timed Code Treatment Minutes: 44 Minutes    If pt is discharged prior to next OT session, this note will serve as the discharge summary.   Edilberto Bryan OT

## 2021-09-01 NOTE — PROGRESS NOTES
Physical Therapy  Facility/Department: [facility] C3 TELE/MED SURG/ONC  Daily Treatment Note  NAME: Kevin Zimmerman  : 1960  MRN: [id number]    Date of Service: 2021    Discharge Recommendations:  24 hour supervision or assist, Home with Home health PT   PT Equipment Recommendations  Equipment Needed: No    Assessment   Body structures, Functions, Activity limitations: Decreased functional mobility ; Decreased balance;Decreased endurance;Decreased sensation  Assessment: Pt is now at mod I for bed mobility, and supervision for transfers and gait x 50 ft on 2L, with decreased endurance, SOB and SpO2 dropped to 86% after mobility. Cont per POC to address deficits and progress endurance with mobility  Treatment Diagnosis: Decreased functional mobility and endurance. Prognosis: Good  Decision Making: Medium Complexity  PT Education: Goals;PT Role;Plan of Care;Home Exercise Program;Disease Specific Education;General Safety  Patient Education: Educated on repositioning, monitoring O2, and pacing activities; pt verbalized understanding. Barriers to Learning: no  REQUIRES PT FOLLOW UP: Yes  Activity Tolerance  Activity Tolerance: Patient limited by fatigue;Patient limited by endurance  Activity Tolerance: SpO2 dropping to 86% with gait x 50 ft; limited by fatigue. BP supine: 156/74, 73 bpm, SpO2 on entry 93% on 2L, and 92% at EOS     Patient Diagnosis(es): The primary encounter diagnosis was Pneumonia due to COVID-19 virus. A diagnosis of Hypoxia was also pertinent to this visit. has a past medical history of CAD (coronary artery disease), Cerebral artery occlusion with cerebral infarction ([facility] [address] Choking sensation, COVID-19, Diabetes mellitus ([facility] [address] DM2 (diabetes mellitus, type 2) ([facility] [address] Hyperlipidemia, Prolonged emergence from general anesthesia, and Sarcoid. has a past surgical history that includes Cholecystectomy;  section; hysteroscopy (2013); Hysterectomy; Colonoscopy;  Colonoscopy (13); Colonoscopy (10/19/2018); Colonoscopy (N/A, 10/19/2018); Cardiac surgery (05/2020); and Finger trigger release (Right, 8/31/2020). Restrictions  Restrictions/Precautions  Restrictions/Precautions: Isolation, Up as Tolerated  Position Activity Restriction  Other position/activity restrictions: COVID+, 2L O2  Subjective   General  Chart Reviewed: Yes  Response To Previous Treatment: Patient with no complaints from previous session. Family / Caregiver Present: No  Referring Practitioner: Richard Goldstein MD  Subjective  Subjective: Pt supine in bed; reluctantly agreeable to therapy. General Comment  Comments: RN cleared for therapy.   Pain Screening  Patient Currently in Pain: Denies  Vital Signs  Patient Currently in Pain: Denies       Orientation  Orientation  Overall Orientation Status: Within Normal Limits       Objective   Bed mobility  Supine to Sit: Modified independent  Sit to Supine: Modified independent  Scooting: Modified independent (to EOB and to scoot up in bed)  Transfers  Sit to Stand: Supervision  Stand to sit: Supervision  Bed to Chair: Supervision  Ambulation  Ambulation?: Yes  Ambulation 1  Surface: level tile  Device: No Device  Assistance: Supervision  Quality of Gait: normal cammie and pace, no LOB, only mildly unsteady  Distance: 50 ft  Comments: SpO2 dropped to 87% after ambulation with noticeable SOB, but returned to 90% within 30 seconeds and PLB     Balance  Posture: Fair  Sitting - Static: Good  Sitting - Dynamic: Good  Standing - Static: Good;-  Standing - Dynamic: Good;-  Comments: without AD  Exercises  Bridging:  (x 10 B)  Straight Leg Raise: x 10 SLR scissors using abdominals  Quad Sets: x 20 B  Heelslides: 2 x 10 B  Hip Flexion: 2 x 10 hooklying marches  Hip Extension/Leg Presses: 2 x 10 hip flexion/knee extension semireclined in bed  Hip Abduction: 2 x 10 B clamshells with pillow squeeze  Ankle Pumps: 2 x 10 B              AM-PAC Score  AM-PAC Inpatient Mobility Raw Score : 20 (09/01/21 1507)  AM-PAC Inpatient T-Scale Score : 47.67 (09/01/21 1507)  Mobility Inpatient CMS 0-100% Score: 35.83 (09/01/21 1507)  Mobility Inpatient CMS G-Code Modifier : CJ (09/01/21 1507)          Goals  Short term goals  Time Frame for Short term goals: 5-7 days, unless otherwise stated, by 9/6/21  Short term goal 1: Pt will complete sit<>stands with independence.;9/1 supervision  Short term goal 2: Pt will tolerate ambulating 50 feet with no AD and supervision.; 9/1 met with mod I x 50 ft without AD, new goal- 75 ft with mod I and without AD  Short term goal 3: Pt will ascend/descend 2 steps with one UE support and SBA.;9/1 NT  Short term goal 4: Pt will complete 12-15 reps of BLE ther ex for strength and ROM by 9/2/21.; 9/1 goal met, continue  Patient Goals   Patient goals : To go home. Plan    Plan  Times per week: 2-3x  Times per day: Daily  Plan weeks: 1 week, by 9/6/21  Current Treatment Recommendations: Balance Training, Functional Mobility Training, Transfer Training, Endurance Training, Gait Training, Stair training, Home Exercise Program, Safety Education & Training  Safety Devices  Type of devices: All fall risk precautions in place, Call light within reach, Nurse notified, Bed alarm in place, Chair alarm in place, Gait belt, Left in bed     Therapy Time   Individual Concurrent Group Co-treatment   Time In 0943         Time Out 1021         Minutes 38         Timed Code Treatment Minutes: 45 Minutes    If pt is discharged prior to next therapy session, this note will serve as discharge summary.     Rubio Bellamy, PT

## 2021-09-01 NOTE — PROGRESS NOTES
Writer [facility] and gave report on pt to [facility]. No answer. Message left. Pt a/o. Stated no pain, no nausea; no emesis. SpO2 90s on 2 L NC. Call light within reach; will continue to monitor.

## 2021-09-01 NOTE — CARE COORDINATION
CASE MANAGEMENT DISCHARGE SUMMARY      Discharge to: skilled at [facility]    Precertification completed: yes  Hospital Exemption Notification (HENS) completed: yes    IMM given: (date) na    New Durable Medical Equipment ordered/agency: none    Transportation:    Family/car:   Medical Transport explained to [employer]. Pt/family voice no agency preference. [facility]   time:4pm   Ambulance form completed: Yes    Confirmed discharge plan with:     Patient: yes         Facility/Agency, name:  [id number]   Phone number for report to facility: [phone]     RN, name: Duranalexia Kimble    Note: Discharging nurse to complete [facility], reconcile [facility], and place final copy with patient's discharge packet. RN to ensure that written prescriptions for  Level II medications are sent with patient to the facility as per protocol.     Addie Calero RN

## 2021-09-30 ENCOUNTER — HOSPITAL ENCOUNTER (OUTPATIENT)
Dept: GENERAL RADIOLOGY | Age: 61
Discharge: HOME | End: 2021-09-30
Payer: MEDICARE

## 2021-09-30 ENCOUNTER — HOSPITAL ENCOUNTER (OUTPATIENT)
Age: 61
Discharge: HOME | End: 2021-09-30
Payer: MEDICARE

## 2021-09-30 DIAGNOSIS — B34.9 VIRAL ILLNESS: ICD-10-CM

## 2021-09-30 PROCEDURE — 71046 X-RAY EXAM CHEST 2 VIEWS: CPT

## 2022-01-20 ENCOUNTER — OFFICE VISIT (OUTPATIENT)
Dept: CARDIOLOGY CLINIC | Age: 62
End: 2022-01-20
Payer: MEDICAID

## 2022-01-20 VITALS
BODY MASS INDEX: 25.86 KG/M2 | WEIGHT: 137 LBS | SYSTOLIC BLOOD PRESSURE: 128 MMHG | HEIGHT: 61 IN | HEART RATE: 86 BPM | OXYGEN SATURATION: 99 % | DIASTOLIC BLOOD PRESSURE: 62 MMHG

## 2022-01-20 DIAGNOSIS — I34.0 NONRHEUMATIC MITRAL VALVE REGURGITATION: ICD-10-CM

## 2022-01-20 DIAGNOSIS — J84.9 INTERSTITIAL LUNG DISEASE (HCC): ICD-10-CM

## 2022-01-20 DIAGNOSIS — E11.9 TYPE 2 DIABETES MELLITUS WITHOUT COMPLICATION, WITH LONG-TERM CURRENT USE OF INSULIN (HCC): ICD-10-CM

## 2022-01-20 DIAGNOSIS — D86.9 SARCOIDOSIS: ICD-10-CM

## 2022-01-20 DIAGNOSIS — Z79.4 TYPE 2 DIABETES MELLITUS WITHOUT COMPLICATION, WITH LONG-TERM CURRENT USE OF INSULIN (HCC): ICD-10-CM

## 2022-01-20 DIAGNOSIS — I10 ESSENTIAL HYPERTENSION: ICD-10-CM

## 2022-01-20 DIAGNOSIS — E78.2 MIXED HYPERLIPIDEMIA: ICD-10-CM

## 2022-01-20 DIAGNOSIS — I25.10 CORONARY ARTERY DISEASE INVOLVING NATIVE CORONARY ARTERY OF NATIVE HEART WITHOUT ANGINA PECTORIS: Primary | ICD-10-CM

## 2022-01-20 PROBLEM — M65.331 ACQUIRED TRIGGER FINGER OF RIGHT MIDDLE FINGER: Status: ACTIVE | Noted: 2022-01-20

## 2022-01-20 PROBLEM — J12.82 PNEUMONIA DUE TO COVID-19 VIRUS: Status: ACTIVE | Noted: 2021-12-30

## 2022-01-20 PROBLEM — L03.032 CELLULITIS OF TOE OF LEFT FOOT: Status: ACTIVE | Noted: 2022-01-20

## 2022-01-20 PROBLEM — U07.1 PNEUMONIA DUE TO COVID-19 VIRUS: Status: ACTIVE | Noted: 2021-12-30

## 2022-01-20 PROCEDURE — 2022F DILAT RTA XM EVC RTNOPTHY: CPT | Performed by: NURSE PRACTITIONER

## 2022-01-20 PROCEDURE — 3046F HEMOGLOBIN A1C LEVEL >9.0%: CPT | Performed by: NURSE PRACTITIONER

## 2022-01-20 PROCEDURE — G8427 DOCREV CUR MEDS BY ELIG CLIN: HCPCS | Performed by: NURSE PRACTITIONER

## 2022-01-20 PROCEDURE — G8484 FLU IMMUNIZE NO ADMIN: HCPCS | Performed by: NURSE PRACTITIONER

## 2022-01-20 PROCEDURE — 1036F TOBACCO NON-USER: CPT | Performed by: NURSE PRACTITIONER

## 2022-01-20 PROCEDURE — 3017F COLORECTAL CA SCREEN DOC REV: CPT | Performed by: NURSE PRACTITIONER

## 2022-01-20 PROCEDURE — 99214 OFFICE O/P EST MOD 30 MIN: CPT | Performed by: NURSE PRACTITIONER

## 2022-01-20 PROCEDURE — G8419 CALC BMI OUT NRM PARAM NOF/U: HCPCS | Performed by: NURSE PRACTITIONER

## 2022-01-20 RX ORDER — PEN NEEDLE, DIABETIC 31 GX5/16"
NEEDLE, DISPOSABLE MISCELLANEOUS
COMMUNITY
Start: 2021-10-26

## 2022-01-20 RX ORDER — LANCETS 33 GAUGE
EACH MISCELLANEOUS
COMMUNITY
Start: 2021-11-20

## 2022-01-20 RX ORDER — PEN NEEDLE, DIABETIC 32GX 5/32"
NEEDLE, DISPOSABLE MISCELLANEOUS
COMMUNITY
Start: 2021-11-24

## 2022-01-20 RX ORDER — BLOOD SUGAR DIAGNOSTIC
STRIP MISCELLANEOUS
COMMUNITY
Start: 2021-11-20

## 2022-01-20 NOTE — PATIENT INSTRUCTIONS
1. Stop the aspirin. Stay on the clopidogrel (Plavix) 75 mg daily  2. Continue metoprolol, lisinopril and rosuvastatin  3. Echocardiogram to check heart function, heart valves and structure - call [address] [place] [address] ([phone]) to schedule   4.  Follow up with Dr. Kinza Wolfe in 6 months

## 2022-01-20 NOTE — PROGRESS NOTES
[address]   Cardiac Evaluation    Primary Care Doctor:  Jenna Hearn    Chief Complaint   Patient presents with    Follow-up        Assessment:    1. Coronary artery disease involving native coronary artery of native heart without angina pectoris    2. Essential hypertension    3. Mixed hyperlipidemia    4. Type 2 diabetes mellitus without complication, with long-term current use of insulin ([facility] [address]    5. Sarcoidosis    6. Interstitial lung disease ([facility] [address]        Plan:   1. Stop the aspirin. Stay on the clopidogrel (Plavix) 75 mg daily  2. Continue metoprolol, lisinopril and rosuvastatin  3. Echocardiogram to check heart function, heart valves and structure - call [address] [place] [address] [phone]) to schedule   4. Follow up with Dr. Reggie Mcbride in 6 months     Vitals:    01/20/22 1353   BP: 128/62   Pulse: 86   SpO2: 99%   Weight: 137 lb (62.1 kg)   Height: 5' 1\" (1.549 m)       History of Present Illness:   I had the pleasure of seeing Nubia Castro in follow up for CAD s/p PCI to LAD in May 2020, RVE, mod MR, HTN, HLD as well as DM, ILD and pulmonary sarcoid. She was hospitalized in August 2021 with COVID PNA associated with hypoxia. She has undergone CT chest per pulmonology with concern for progressive covid pulmonary fibrosis, small right pleural effusion. She also had PFT's completed on 12/30/21. She is using NuStep at home as well as acapella and IS. She is doing 10 minutes per day on the NuStep. She is asking if alternative to fam aspirin to avoid risk of irritation to stomach. She is on metformin and insulin for diabetes. Was poorly controlled after hospitalization but improving now. Also on Jardience. To see hand surgeon next week for trigger finger. Also has appointment to see KRISTEN NEFF [facility] provider. Has colonoscopy scheduled for 2/10/22. Weight at home staying 130 - 135 lbs.      Nubia Castro describes symptoms including dyspnea, fatigue but denies chest pain, palpitations, orthopnea, PND, early saiety, edema, syncope. NYHA:   II  ACC/ AHA Stage:    C    Past Medical History:   has a past medical history of CAD (coronary artery disease), Cerebral artery occlusion with cerebral infarction ([facility] [address] Choking sensation, COVID-19, Diabetes mellitus ([facility] [address] DM2 (diabetes mellitus, type 2) ([facility] [address] Hyperlipidemia, Prolonged emergence from general anesthesia, and Sarcoid. Surgical History:   has a past surgical history that includes Cholecystectomy;  section; hysteroscopy (2013); Hysterectomy; Colonoscopy; Colonoscopy (13); Colonoscopy (10/19/2018); Colonoscopy (N/A, 10/19/2018); Cardiac surgery (2020); and Finger trigger release (Right, 2020). Social History:   reports that she has never smoked. She has never used smokeless tobacco. She reports that she does not drink alcohol and does not use drugs. Family History:   Family History   Problem Relation Age of Onset    Diabetes Mother     Heart Disease Mother     Cancer Father         colon       Home Medications:  Prior to Admission medications    Medication Sig Start Date End Date Taking? Authorizing Provider   INSULIN GLARGINE SC Inject 40 Units into the skin every morning   Yes Historical Provider, MD   INSULIN GLARGINE SC Inject 20 Units into the skin nightly   Yes Historical Provider, MD   aspirin (ASPIRIN LOW DOSE) 81 MG chewable tablet Take 1 tablet by mouth daily 21  Yes Jason Dominion, APRN - CNP   clopidogrel (PLAVIX) 75 MG tablet Take 1 tablet by mouth daily 21  Yes Jason Dominion, APRN - CNP   metoprolol tartrate (LOPRESSOR) 25 MG tablet Take 1 tablet by mouth 2 times daily 21  Yes Jason Dominion, APRN - CNP   rosuvastatin (CRESTOR) 10 MG tablet Take 1 tablet by mouth daily 21  Yes DEV Mccrary - CNP   nitroGLYCERIN (NITROSTAT) 0.4 MG SL tablet up to max of 3 total doses.  If no relief after 1 dose, call 911. 21  Yes Jason Dominion, APRN - CNP   ibuprofen (ADVIL;MOTRIN) 600 MG tablet Take 1 tablet by mouth every 8 hours as needed for Pain 5/2/21  Yes Brenden Jhaveri MD   pantoprazole (PROTONIX) 40 MG tablet  7/23/20  Yes Historical Provider, MD   Cholecalciferol (VITAMIN D) 2000 UNITS CAPS capsule Take 2,000 Units by mouth daily. Yes Historical Provider, MD   lisinopril (PRINIVIL;ZESTRIL) 2.5 MG tablet Take 2.5 mg by mouth daily. Yes Historical Provider, MD   metformin (GLUCOPHAGE) 500 MG tablet Take 500 mg by mouth 2 times daily (with meals)    Yes Historical Provider, MD   TRESIBA FLEXTOUCH 100 UNIT/ML SOPN  7/28/20   Historical Provider, MD        Allergies:  Antivert [meclizine], Codeine, Phenergan [promethazine hcl], Influenza vaccines, Pneumococcal vaccines, Vicodin [hydrocodone-acetaminophen], Hydrocodone-acetaminophen, Oxycodone, and Promethazine     Physical Examination:    Vitals:    01/20/22 1353   BP: 128/62   Pulse: 86   SpO2: 99%   Weight: 137 lb (62.1 kg)   Height: 5' 1\" (1.549 m)     Constitutional and General Appearance: Warm and dry, no apparent distress, normal coloration  HEENT:  Normocephalic, atraumatic  Respiratory:  · Normal excursion and expansion without use of accessory muscles  · Resp Auscultation: Clear to auscultation   Cardiovascular:  · The apical impulses not displaced  · Heart tones are crisp and normal  · JVP 8 cm H2O  · Regular rate and rhythm, normal S1S2, no m/g/r  · Peripheral pulses are symmetrical and full  · There is no clubbing, cyanosis of the extremities.   · No BLE edema  · Pedal Pulses: 2+ and equal   Abdomen:  · No masses or tenderness  · Liver/Spleen: No Abnormalities Noted  Neurological/Psychiatric:  · Alert and oriented in all spheres  · Moves all extremities well  · Exhibits normal gait balance and coordination  · No abnormalities of mood, affect, memory, mentation, or behavior are noted    Lab Data:  Most recent lab results below reviewed in office    CBC:   Lab Results   Component Value Date    WBC 22.3 09/01/2021    WBC 18.7 08/31/2021    WBC 21.7 08/30/2021    RBC 4.41 09/01/2021    RBC 4.49 08/31/2021    RBC 4.53 08/30/2021    HGB 12.3 09/01/2021    HGB 12.4 08/31/2021    HGB 12.5 08/30/2021    HCT 36.4 09/01/2021    HCT 36.4 08/31/2021    HCT 36.8 08/30/2021    MCV 82.5 09/01/2021    MCV 81.2 08/31/2021    MCV 81.4 08/30/2021    RDW 16.1 09/01/2021    RDW 15.7 08/31/2021    RDW 15.1 08/30/2021     09/01/2021     08/31/2021     08/30/2021     BMP:  Lab Results   Component Value Date     09/01/2021     08/31/2021     08/30/2021    K 4.0 09/01/2021    K 4.0 08/31/2021    K 4.1 08/30/2021     09/01/2021     08/31/2021     08/30/2021    CO2 28 09/01/2021    CO2 22 08/31/2021    CO2 30 08/30/2021    PHOS 3.4 05/11/2020    PHOS 3.3 05/10/2020    PHOS 4.2 01/22/2013    BUN 19 09/01/2021    BUN 14 08/31/2021    BUN 16 08/30/2021    CREATININE 0.6 09/01/2021    CREATININE 0.6 08/31/2021    CREATININE 0.6 08/30/2021     BNP:   Lab Results   Component Value Date    PROBNP 42 05/28/2020    PROBNP 42 05/08/2020    PROBNP 15 04/29/2020     LIPID:   Lab Results   Component Value Date    TRIG 137 03/30/2021    TRIG 123 05/12/2020    HDL 59 03/30/2021    HDL 37 05/12/2020    LDLCALC 77 03/30/2021    LDLCALC 76 05/12/2020       Cardiac Imaging:  LEXISCAN NUCLEAR STRESS 7/28/2021:        Summary    Normal LV function. There is normal isotope uptake at stress and rest. There is no evidence of    myocardial ischemia or scar. Normal myocardial perfusion study. ECHO 5/28/2021:    Summary   Normal left ventricle systolic function with an estimated ejection fraction of 55%. No regional wall motion abnormalities are seen. Normal left ventricular diastolic filling pressure. Mild right ventricular hypertrophy. Mild to moderate eccentric mitral regurgitation. There is a small circumferential pericardial effusion with pericardial fat pad localized anteriorly.     CARDIAC CATH / PCI 5/12/20:   FINDINGS   LVGRAM   LVEDP  7   GRADIENT ACROSS AORTIC VALVE  none   LV FUNCTION EF 60 %   WALL MOTION  normal   MITRAL REGURGITATION  mild      CORONARY ARTERIES   LM  Short vessel, less than 10% proximalmiddistal stenosis         LAD Small to medium caliber vessel, there is proximalmid 90% stenosis. Distal vessel is tortuous with a 50% stenosis. LCX  Large vessel, dominant, less than 10% proximalmiddistal stenosis. RCA Nondominant, small, proximal spasm is noted which improved with IC vasodilators as well as withdrawal of catheter, there is less than 10% proximalmiddistal stenosis. PERCUTANEOUS INTERVENTION DESCRIPTION   Heparin was used for anticoagulation, a 6 Blogvio MLA 3.5 guiding catheter was used to intubate the left-sided coronary vessels, this selectively cannulated the left circumflex artery and was able to be pulled back for diagnostic angiography. Then a choice floppy wire was advanced into the circumflex for anchoring and with this the guide catheter was withdrawn and then a Giga[employer]ix cougar wire was used to cross into the LAD once the guide was withdrawn from the left main. Wire was used to jump the gap between the guide catheter in the left main and then was advanced successfully into the LAD across the lesion. Lesion in the LAD was then dilated with a 2.5 mm balloon and then the lesion was then stented with a Abbott Xience Ирина 2.5 x 28 mm drug-eluting stent. After ballooning, a LAD dissection was noted which was not unexpected from balloon dilation and this resolved once stenting was accomplished. Stent was postdilated with a 2.5 mm noncompliant balloon. There was 0% residual stenosis. There was NATI 3 flow before and after PCI.     Distal LAD and the tortuous segment was felt to be best treated medically   CONCLUSIONS:   Successful PCI of the LAD with single drug-eluting stent    Stress Test 5/9/2020:  Summary  Normal LVEF >60% Anteroseptal/apical hypokinesis  Mixed anteroseptal/apical defect consistent with mixed ischemia/scar of this  territory    Overall, this would be considered an abnormal, high risk, study       Echo 5/8/2020:   Summary   Normal left ventricle systolic function with an estimated ejection fraction of 55%. No regional wall motion abnormalities are seen. Grade I diastolic dysfunction with normal filing pressure. Mild (eccentric) mitral regurgitation. Right ventricular hypertrophy. Carotid dopplers 2/17/16  CONCLUSIONS    No significant obstructive lesions noted in the extracranial carotid system, bilaterally. Vertebral arteries are patent demonstrating antegrade flow, bilaterally     Carotid dopplers 2014  Opinion: Normal study. No significant atherosclerotic disease noted in the neck. No significant common carotid artery or internal carotid artery stenosis suspected in the neck. Echocardiogram 2014  Summary   Overall left ventricular function is normal.   Ejection fraction is visually estimated to be 55-60 %. Diastolic filling parameters suggests grade I diastolic dysfunction . No obvious cardiac source of emboli noted. There is a small pericardial effusion noted. Stress echocardiogram 2012  Resting echocardiogram shows normal left ventricular systolic function with an estimated ejection fraction of 60%. There were no wall motion abnormalities. Following exercise the left ventricle became uniformly hyperkinetic. There were no wall motion abnormalities. There was appropriate increase in the ejection fraction. ECHOCARDIOGRAPHIC CONCLUSION-  Normal exercise echocardiogram negative for ischemia.           I appreciate the opportunity of cooperating in the care of this individual.    DEV Rendon - CNP, 1/20/2022, 2:03 PM

## 2022-01-26 ENCOUNTER — OFFICE VISIT (OUTPATIENT)
Dept: ORTHOPEDIC SURGERY | Age: 62
End: 2022-01-26
Payer: MEDICAID

## 2022-01-26 VITALS — BODY MASS INDEX: 25.86 KG/M2 | HEIGHT: 61 IN | WEIGHT: 137 LBS | RESPIRATION RATE: 16 BRPM

## 2022-01-26 DIAGNOSIS — M65.30 ACQUIRED TRIGGER FINGER: Primary | ICD-10-CM

## 2022-01-26 PROCEDURE — 20550 NJX 1 TENDON SHEATH/LIGAMENT: CPT | Performed by: ORTHOPAEDIC SURGERY

## 2022-01-26 RX ORDER — TRIAMCINOLONE ACETONIDE 40 MG/ML
20 INJECTION, SUSPENSION INTRA-ARTICULAR; INTRAMUSCULAR ONCE
Status: COMPLETED | OUTPATIENT
Start: 2022-01-26 | End: 2022-01-26

## 2022-01-26 RX ORDER — LIDOCAINE HYDROCHLORIDE 10 MG/ML
0.5 INJECTION, SOLUTION INFILTRATION; PERINEURAL ONCE
Status: COMPLETED | OUTPATIENT
Start: 2022-01-26 | End: 2022-01-26

## 2022-01-26 RX ADMIN — TRIAMCINOLONE ACETONIDE 20 MG: 40 INJECTION, SUSPENSION INTRA-ARTICULAR; INTRAMUSCULAR at 09:57

## 2022-01-26 RX ADMIN — LIDOCAINE HYDROCHLORIDE 0.5 ML: 10 INJECTION, SOLUTION INFILTRATION; PERINEURAL at 09:57

## 2022-01-26 NOTE — PROGRESS NOTES
This 62 y.o., right hand dominant retired woman is seen in referral for [facility] with a chief complaint of pain, swelling, stiffness and intermittant snapping of the left index finger. Symptoms have been present for several weeks. The digit is stiff, especially in the morning and will frequently stick in the palm when flexed and pop when extended. This is often associated with pain. Mild swelling has been noticed. The patient denies discoloration or history of injury or overuse. Treatment has not been prescribed. The problem has worsened recently. The pain assessment has been reviewed and is correct as stated. .    The patient's social history, past medical history, family history, medications, allergies and review of systems, entered 1/26/22, have been reviewed, and dated and are recorded in the chart. On examination the patient is Height: 5' 1\" (154.9 cm) tall and weighs Weight: 137 lb (62.1 kg). Respirations are 18 per minute. The patient is well nourished, is oriented to time and place, demonstrates appropriate mood and affect as well as normal gait and station. There is mild soft tissue swelling of the digit. There is no deformity. There is tenderness, thickening and nodularity at the base of the flexor tendon sheath. Range of motion is slightly limited in flexion and extension. The digit sticks in flexion and pops into extension, accompanied by some pain. Skin is intact without lesions. Distal circulation and sensation are intact. Muscle strength and coordination are normal.  Reflexes are present bilaterally. Joints are stable. There are no subcutaneous nodules or enlarged epitrochlear lymph nodes. I have personally reviewed and interpreted all previous external imaging studies, laboratory tests(glucose,D-dimer,INR), diagnostic proceedures and medical encounters pertinent to this patient's visit today. Impression: Left index finger trigger digit.      The nature of this medical

## 2022-02-01 ENCOUNTER — HOSPITAL ENCOUNTER (OUTPATIENT)
Dept: CARDIOLOGY | Age: 62
Discharge: HOME | End: 2022-02-01
Payer: MEDICAID

## 2022-02-01 DIAGNOSIS — I25.10 CORONARY ARTERY DISEASE INVOLVING NATIVE CORONARY ARTERY OF NATIVE HEART WITHOUT ANGINA PECTORIS: ICD-10-CM

## 2022-02-01 DIAGNOSIS — I34.0 NONRHEUMATIC MITRAL VALVE REGURGITATION: ICD-10-CM

## 2022-02-01 DIAGNOSIS — I10 ESSENTIAL HYPERTENSION: ICD-10-CM

## 2022-02-01 LAB
LV EF: 55 %
LVEF MODALITY: NORMAL

## 2022-02-01 PROCEDURE — 93306 TTE W/DOPPLER COMPLETE: CPT

## 2022-02-02 ENCOUNTER — TELEPHONE (OUTPATIENT)
Dept: CARDIOLOGY CLINIC | Age: 62
End: 2022-02-02

## 2022-02-02 NOTE — TELEPHONE ENCOUNTER
----- Message from DEV Davis CNP sent at 2/1/2022  1:49 PM EST -----  Echo the same as May 2021. No new findings. Stable. Continue current treatment plan.     Thank you, Nimisha Mcgowan

## 2022-02-06 ENCOUNTER — HOSPITAL ENCOUNTER (EMERGENCY)
Age: 62
Discharge: HOME | End: 2022-02-06
Attending: STUDENT IN AN ORGANIZED HEALTH CARE EDUCATION/TRAINING PROGRAM
Payer: MEDICAID

## 2022-02-06 ENCOUNTER — APPOINTMENT (OUTPATIENT)
Dept: CT IMAGING | Age: 62
End: 2022-02-06
Payer: MEDICAID

## 2022-02-06 VITALS
BODY MASS INDEX: 25.49 KG/M2 | DIASTOLIC BLOOD PRESSURE: 71 MMHG | SYSTOLIC BLOOD PRESSURE: 110 MMHG | RESPIRATION RATE: 13 BRPM | HEIGHT: 61 IN | OXYGEN SATURATION: 97 % | HEART RATE: 79 BPM | TEMPERATURE: 98.4 F | WEIGHT: 135 LBS

## 2022-02-06 DIAGNOSIS — R51.9 ACUTE NONINTRACTABLE HEADACHE, UNSPECIFIED HEADACHE TYPE: Primary | ICD-10-CM

## 2022-02-06 LAB
A/G RATIO: 1.8 (ref 1.1–2.2)
ALBUMIN SERPL-MCNC: 4.2 G/DL (ref 3.4–5)
ALP BLD-CCNC: 90 U/L (ref 40–129)
ALT SERPL-CCNC: 14 U/L (ref 10–40)
ANION GAP SERPL CALCULATED.3IONS-SCNC: 10 MMOL/L (ref 3–16)
AST SERPL-CCNC: 17 U/L (ref 15–37)
BASOPHILS ABSOLUTE: 0 K/UL (ref 0–0.2)
BASOPHILS RELATIVE PERCENT: 0.3 %
BILIRUB SERPL-MCNC: <0.2 MG/DL (ref 0–1)
BUN BLDV-MCNC: 25 MG/DL (ref 7–20)
CALCIUM SERPL-MCNC: 9.7 MG/DL (ref 8.3–10.6)
CHLORIDE BLD-SCNC: 102 MMOL/L (ref 99–110)
CO2: 27 MMOL/L (ref 21–32)
CREAT SERPL-MCNC: 0.7 MG/DL (ref 0.6–1.2)
EOSINOPHILS ABSOLUTE: 0.2 K/UL (ref 0–0.6)
EOSINOPHILS RELATIVE PERCENT: 2 %
GFR AFRICAN AMERICAN: >60
GFR NON-AFRICAN AMERICAN: >60
GLUCOSE BLD-MCNC: 225 MG/DL (ref 70–99)
HCT VFR BLD CALC: 40.6 % (ref 36–48)
HEMOGLOBIN: 13.9 G/DL (ref 12–16)
INFLUENZA A: NOT DETECTED
INFLUENZA B: NOT DETECTED
LYMPHOCYTES ABSOLUTE: 2.6 K/UL (ref 1–5.1)
LYMPHOCYTES RELATIVE PERCENT: 21.4 %
MCH RBC QN AUTO: 27.4 PG (ref 26–34)
MCHC RBC AUTO-ENTMCNC: 34.1 G/DL (ref 31–36)
MCV RBC AUTO: 80.4 FL (ref 80–100)
MONOCYTES ABSOLUTE: 0.6 K/UL (ref 0–1.3)
MONOCYTES RELATIVE PERCENT: 4.9 %
NEUTROPHILS ABSOLUTE: 8.8 K/UL (ref 1.7–7.7)
NEUTROPHILS RELATIVE PERCENT: 71.4 %
PDW BLD-RTO: 15.4 % (ref 12.4–15.4)
PLATELET # BLD: 283 K/UL (ref 135–450)
PMV BLD AUTO: 6.5 FL (ref 5–10.5)
POTASSIUM REFLEX MAGNESIUM: 4.1 MMOL/L (ref 3.5–5.1)
RBC # BLD: 5.05 M/UL (ref 4–5.2)
SARS-COV-2 RNA, RT PCR: NOT DETECTED
SEDIMENTATION RATE, ERYTHROCYTE: 17 MM/HR (ref 0–30)
SODIUM BLD-SCNC: 139 MMOL/L (ref 136–145)
TOTAL PROTEIN: 6.6 G/DL (ref 6.4–8.2)
WBC # BLD: 12.3 K/UL (ref 4–11)

## 2022-02-06 PROCEDURE — 85025 COMPLETE CBC W/AUTO DIFF WBC: CPT

## 2022-02-06 PROCEDURE — 70450 CT HEAD/BRAIN W/O DYE: CPT

## 2022-02-06 PROCEDURE — 96375 TX/PRO/DX INJ NEW DRUG ADDON: CPT

## 2022-02-06 PROCEDURE — 80053 COMPREHEN METABOLIC PANEL: CPT

## 2022-02-06 PROCEDURE — 6370000000 HC RX 637 (ALT 250 FOR IP): Performed by: STUDENT IN AN ORGANIZED HEALTH CARE EDUCATION/TRAINING PROGRAM

## 2022-02-06 PROCEDURE — 70498 CT ANGIOGRAPHY NECK: CPT

## 2022-02-06 PROCEDURE — 87636 SARSCOV2 & INF A&B AMP PRB: CPT

## 2022-02-06 PROCEDURE — 85652 RBC SED RATE AUTOMATED: CPT

## 2022-02-06 PROCEDURE — 6360000002 HC RX W HCPCS: Performed by: STUDENT IN AN ORGANIZED HEALTH CARE EDUCATION/TRAINING PROGRAM

## 2022-02-06 PROCEDURE — 96374 THER/PROPH/DIAG INJ IV PUSH: CPT

## 2022-02-06 PROCEDURE — 6360000004 HC RX CONTRAST MEDICATION: Performed by: STUDENT IN AN ORGANIZED HEALTH CARE EDUCATION/TRAINING PROGRAM

## 2022-02-06 PROCEDURE — 99284 EMERGENCY DEPT VISIT MOD MDM: CPT

## 2022-02-06 RX ORDER — DIPHENHYDRAMINE HYDROCHLORIDE 50 MG/ML
25 INJECTION INTRAMUSCULAR; INTRAVENOUS ONCE
Status: COMPLETED | OUTPATIENT
Start: 2022-02-06 | End: 2022-02-06

## 2022-02-06 RX ORDER — ACETAMINOPHEN 500 MG
1000 TABLET ORAL ONCE
Status: COMPLETED | OUTPATIENT
Start: 2022-02-06 | End: 2022-02-06

## 2022-02-06 RX ORDER — PROCHLORPERAZINE EDISYLATE 5 MG/ML
10 INJECTION INTRAMUSCULAR; INTRAVENOUS ONCE
Status: COMPLETED | OUTPATIENT
Start: 2022-02-06 | End: 2022-02-06

## 2022-02-06 RX ADMIN — PROCHLORPERAZINE EDISYLATE 10 MG: 5 INJECTION INTRAMUSCULAR; INTRAVENOUS at 19:19

## 2022-02-06 RX ADMIN — ACETAMINOPHEN 1000 MG: 500 TABLET ORAL at 19:18

## 2022-02-06 RX ADMIN — DIPHENHYDRAMINE HYDROCHLORIDE 25 MG: 50 INJECTION INTRAMUSCULAR; INTRAVENOUS at 19:19

## 2022-02-06 RX ADMIN — IOPAMIDOL 85 ML: 755 INJECTION, SOLUTION INTRAVENOUS at 20:25

## 2022-02-06 ASSESSMENT — PAIN DESCRIPTION - PAIN TYPE: TYPE: ACUTE PAIN

## 2022-02-06 ASSESSMENT — PAIN SCALES - GENERAL
PAINLEVEL_OUTOF10: 0
PAINLEVEL_OUTOF10: 3
PAINLEVEL_OUTOF10: 7

## 2022-02-06 ASSESSMENT — PAIN DESCRIPTION - LOCATION: LOCATION: HEAD

## 2022-02-06 NOTE — ED PROVIDER NOTES
Mother     Cancer Father         colon     Social History     Socioeconomic History    Marital status:      Spouse name: Not on file    Number of children: Not on file    Years of education: Not on file    Highest education level: Not on file   Occupational History    Not on file   Tobacco Use    Smoking status: Never Smoker    Smokeless tobacco: Never Used   Vaping Use    Vaping Use: Never used   Substance and Sexual Activity    Alcohol use: No    Drug use: No    Sexual activity: Not on file   Other Topics Concern    Not on file   Social History Narrative    Not on file     Social Determinants of Health     Financial Resource Strain:     Difficulty of Paying Living Expenses: Not on file   Food Insecurity:     Worried About Running Out of Food in the Last Year: Not on file    Tatiana of Food in the Last Year: Not on file   Transportation Needs:     Lack of Transportation (Medical): Not on file    Lack of Transportation (Non-Medical): Not on file   Physical Activity:     Days of Exercise per Week: Not on file    Minutes of Exercise per Session: Not on file   Stress:     Feeling of Stress : Not on file   Social Connections:     Frequency of Communication with Friends and Family: Not on file    Frequency of Social Gatherings with Friends and Family: Not on file    Attends Taoist Services: Not on file    Active Member of [address] [place] [address] [employer] or Organizations: Not on file    Attends Club or Organization Meetings: Not on file    Marital Status: Not on file   Intimate Partner Violence:     Fear of Current or Ex-Partner: Not on file    Emotionally Abused: Not on file    Physically Abused: Not on file    Sexually Abused: Not on file   Housing Stability:     Unable to Pay for Housing in the Last Year: Not on file    Number of [place] in the Last Year: Not on file    Unstable Housing in the Last Year: Not on file     No current facility-administered medications for this encounter.      Current Outpatient Medications   Medication Sig Dispense Refill    INSULIN GLARGINE SC Inject 40 Units into the skin every morning      INSULIN GLARGINE SC Inject 20 Units into the skin nightly      B-D ULTRAFINE III SHORT PEN 31G X 8 MM MISC USE FOR DAILY INSULIN TWICE DAILY **50 DAY SUPPLY**      TRUEPLUS 5-BEVEL PEN NEEDLES 32G X 4 MM MISC FOR USE WITH TRESIBA      ONETOUCH ULTRA strip USE TO TEST BLOOD SUGAR 3 TIMES EVERY DAY      Lancets (ONETOUCH DELICA PLUS [id number]) MISC USE TO CHECK FSBS THREE TIMES DAILY **33 DAY SUPPLY**      metFORMIN (GLUCOPHAGE) 1000 MG tablet TAKE 1 TABLET BY MOUTH TWO TIMES A DAY WITH MORNING AND EVENING MEALS      clopidogrel (PLAVIX) 75 MG tablet Take 1 tablet by mouth daily 90 tablet 3    metoprolol tartrate (LOPRESSOR) 25 MG tablet Take 1 tablet by mouth 2 times daily 180 tablet 3    rosuvastatin (CRESTOR) 10 MG tablet Take 1 tablet by mouth daily 90 tablet 3    nitroGLYCERIN (NITROSTAT) 0.4 MG SL tablet up to max of 3 total doses. If no relief after 1 dose, call 911. 25 tablet 3    ibuprofen (ADVIL;MOTRIN) 600 MG tablet Take 1 tablet by mouth every 8 hours as needed for Pain 40 tablet 0    TRESIBA FLEXTOUCH 100 UNIT/ML SOPN  (Patient not taking: Reported on 1/20/2022)      pantoprazole (PROTONIX) 40 MG tablet       Cholecalciferol (VITAMIN D) 2000 UNITS CAPS capsule Take 2,000 Units by mouth daily.  lisinopril (PRINIVIL;ZESTRIL) 2.5 MG tablet Take 2.5 mg by mouth daily.        Allergies   Allergen Reactions    Antivert [Meclizine]     Codeine Nausea And Vomiting    Phenergan [Promethazine Hcl] Nausea And Vomiting    Influenza Vaccines      Arm hot , red pain, sick    Meclizine Hcl Nausea Only    Other     Pneumococcal Vaccines      Arm hot pain, sick    Vicodin [Hydrocodone-Acetaminophen]      itching    Hydrocodone-Acetaminophen Nausea And Vomiting    Oxycodone Nausea And Vomiting     hallucianitions    Promethazine Nausea And Vomiting       REVIEW OF SYSTEMS  10 systems reviewed, pertinent positives per HPI otherwise noted to be negative. PHYSICAL EXAM  /67   Pulse 84   Temp 98.4 °F (36.9 °C) (Oral)   Resp 14   Ht 5' 1\" (1.549 m)   Wt 135 lb (61.2 kg)   LMP 12/21/2012   SpO2 97%   BMI 25.51 kg/m²    GENERAL APPEARANCE: Awake and alert. Cooperative. No acute distress. HENT: Normocephalic. Atraumatic. No tenderness palpation over the temporal areas bilaterally. NECK: Supple. EYES: PERRL. EOM's grossly intact. HEART/CHEST: RRR. No murmurs. LUNGS: Respirations unlabored. CTAB. Good air exchange. Speaking comfortably in full sentences. ABDOMEN: No tenderness. Soft. Non-distended. No masses. No organomegaly. No guarding or rebound. MUSCULOSKELETAL: No extremity edema. Compartments soft. No deformity. No tenderness in the extremities. All extremities neurovascularly intact. SKIN: Warm and dry. No acute rashes. NEUROLOGICAL: Alert and oriented. CN's 2-12 intact. No gross facial drooping. Strength 5/5, sensation intact. Gait normal.  PSYCHIATRIC: Normal mood and affect. LABS  I have reviewed all labs for this visit.    Results for orders placed or performed during the hospital encounter of 02/06/22   COVID-19 & Influenza Combo    Specimen: Nasopharyngeal Swab; Nasal   Result Value Ref Range    SARS-CoV-2 RNA, RT PCR NOT DETECTED NOT DETECTED    INFLUENZA A NOT DETECTED NOT DETECTED    INFLUENZA B NOT DETECTED NOT DETECTED   CBC Auto Differential   Result Value Ref Range    WBC 12.3 (H) 4.0 - 11.0 K/uL    RBC 5.05 4.00 - 5.20 M/uL    Hemoglobin 13.9 12.0 - 16.0 g/dL    Hematocrit 40.6 36.0 - 48.0 %    MCV 80.4 80.0 - 100.0 fL    MCH 27.4 26.0 - 34.0 pg    MCHC 34.1 31.0 - 36.0 g/dL    RDW 15.4 12.4 - 15.4 %    Platelets 778 598 - 927 K/uL    MPV 6.5 5.0 - 10.5 fL    Neutrophils % 71.4 %    Lymphocytes % 21.4 %    Monocytes % 4.9 %    Eosinophils % 2.0 %    Basophils % 0.3 %    Neutrophils Absolute 8.8 (H) 1.7 - 7.7 K/uL    Lymphocytes Absolute 2.6 1.0 - 5.1 K/uL    Monocytes Absolute 0.6 0.0 - 1.3 K/uL    Eosinophils Absolute 0.2 0.0 - 0.6 K/uL    Basophils Absolute 0.0 0.0 - 0.2 K/uL   Comprehensive Metabolic Panel w/ Reflex to MG   Result Value Ref Range    Sodium 139 136 - 145 mmol/L    Potassium reflex Magnesium 4.1 3.5 - 5.1 mmol/L    Chloride 102 99 - 110 mmol/L    CO2 27 21 - 32 mmol/L    Anion Gap 10 3 - 16    Glucose 225 (H) 70 - 99 mg/dL    BUN 25 (H) 7 - 20 mg/dL    CREATININE 0.7 0.6 - 1.2 mg/dL    GFR Non-African American >60 >60    GFR African American >60 >60    Calcium 9.7 8.3 - 10.6 mg/dL    Total Protein 6.6 6.4 - 8.2 g/dL    Albumin 4.2 3.4 - 5.0 g/dL    Albumin/Globulin Ratio 1.8 1.1 - 2.2    Total Bilirubin <0.2 0.0 - 1.0 mg/dL    Alkaline Phosphatase 90 40 - 129 U/L    ALT 14 10 - 40 U/L    AST 17 15 - 37 U/L   Sedimentation Rate   Result Value Ref Range    Sed Rate 17 0 - 30 mm/Hr     RADIOLOGY  CTA HEAD NECK W CONTRAST   Final Result   No acute abnormality in the large arteries of the head and neck. No   significant stenosis, occlusion or aneurysm. This scan was analyzed using Viz. ai contact LVO. Identification of suspected   findings is not for diagnostic use beyond notification. Viz LVO is limited to   analysis of imaging data and should not be used in-lieu of full patient   evaluation or relied upon to make or confirm diagnosis. CT HEAD WO CONTRAST   Final Result   1. No acute intracranial abnormality. ED COURSE/MDM  Patient seen and evaluated. Old records reviewed. Labs and imaging reviewed patient is a 81-year-old female, presenting with concerns for right-sided headache, started this evening. No previous history of similar. Full HPI as detailed above. Upon arrival in the ED, vitals reassuring. Patient is resting comfortably is in no acute distress. Denies any associated vision changes. Pain lasts a few seconds at a time and then resolves.   CT the head without any acute intracranial abnormalities, CTA of the head and neck negative for any acute concerning findings. She has no reproducible tenderness in her temples. Labs are otherwise reassuring, Covid test is negative. Patient was treated with Tylenol, Compazine, and Benadryl with resolution of her headache, she was reassessed and stated that she was feeling better. Her  is not bedside and stated that he noticed that she did have some swelling in her right temple that appears to be new compared to previous. For this reason, ESR was ordered did return normal.  Do not suspect temporal arteritis is etiology of the patient's symptoms given normal ESR no tenderness palpation over the right temporal area. She remained asymptomatic after treatment here in the department. She will be discharged home. Advised follow-up with PCP. Patient is comfortable in agreement with plan of care and was discharged. During the patient's ED course, the patient was given:  Medications   acetaminophen (TYLENOL) tablet 1,000 mg (1,000 mg Oral Given 2/6/22 1918)   prochlorperazine (COMPAZINE) injection 10 mg (10 mg IntraVENous Given 2/6/22 1919)   diphenhydrAMINE (BENADRYL) injection 25 mg (25 mg IntraVENous Given 2/6/22 1919)   iopamidol (ISOVUE-370) 76 % injection 85 mL (85 mLs IntraVENous Given 2/6/22 2025)        CLINICAL IMPRESSION  1. Acute nonintractable headache, unspecified headache type        Blood pressure 106/67, pulse 84, temperature 98.4 °F (36.9 °C), temperature source Oral, resp. rate 14, height 5' 1\" (1.549 m), weight 135 lb (61.2 kg), last menstrual period 12/21/2012, SpO2 97 %. [address] was discharged to home in good condition. Patient was given scripts for the following medications. I counseled patient how to take these medications.    New Prescriptions    No medications on file       Follow-up with:  [address]  [phone]    Schedule an appointment as soon as possible for a visit       [facility] [facility] [place] [facility]  [address] [place] [address]  Go to   If symptoms worsen      DISCLAIMER: This chart was created using Dragon dictation software. Efforts were made by me to ensure accuracy, however some errors may be present due to limitations of this technology and occasionally words are not transcribed correctly.        Puma Ruvalcaba MD  02/06/22 4883

## 2022-02-07 NOTE — ED NOTES
Resting in bed with eyes closed.  at bedside. Room darkened. No distress noted. Denies needs. Side rails x2. Call light in reach.       Yael Reed RN  02/06/22 0837

## 2022-05-27 RX ORDER — CLOPIDOGREL BISULFATE 75 MG/1
75 TABLET ORAL DAILY
Qty: 30 TABLET | Refills: 3 | Status: SHIPPED | OUTPATIENT
Start: 2022-05-27 | End: 2022-09-27

## 2022-07-25 ENCOUNTER — TELEPHONE (OUTPATIENT)
Dept: CARDIOLOGY CLINIC | Age: 62
End: 2022-07-25

## 2022-07-25 ENCOUNTER — OFFICE VISIT (OUTPATIENT)
Dept: CARDIOLOGY CLINIC | Age: 62
End: 2022-07-25
Payer: MEDICAID

## 2022-07-25 ENCOUNTER — HOSPITAL ENCOUNTER (OUTPATIENT)
Dept: VASCULAR LAB | Age: 62
Discharge: HOME | End: 2022-07-25
Payer: MEDICAID

## 2022-07-25 VITALS
WEIGHT: 137 LBS | OXYGEN SATURATION: 98 % | HEIGHT: 61 IN | HEART RATE: 75 BPM | SYSTOLIC BLOOD PRESSURE: 108 MMHG | DIASTOLIC BLOOD PRESSURE: 66 MMHG | BODY MASS INDEX: 25.86 KG/M2

## 2022-07-25 DIAGNOSIS — M79.89 LEG SWELLING: ICD-10-CM

## 2022-07-25 DIAGNOSIS — R06.02 SOB (SHORTNESS OF BREATH): ICD-10-CM

## 2022-07-25 DIAGNOSIS — R07.2 PRECORDIAL PAIN: ICD-10-CM

## 2022-07-25 DIAGNOSIS — I25.110 CORONARY ARTERY DISEASE INVOLVING NATIVE CORONARY ARTERY OF NATIVE HEART WITH UNSTABLE ANGINA PECTORIS (HCC): Primary | ICD-10-CM

## 2022-07-25 PROCEDURE — 99214 OFFICE O/P EST MOD 30 MIN: CPT | Performed by: INTERNAL MEDICINE

## 2022-07-25 PROCEDURE — 93971 EXTREMITY STUDY: CPT

## 2022-07-25 NOTE — PROGRESS NOTES
[address]   Cardiac Followup    Referring Provider:  Jonatan Mackey     Chief Complaint   Patient presents with    Follow-up    Coronary Artery Disease    Hypertension    Edema     Right leg and foot for 1 day redness and swollen    Chest Pain     Dull pain at times    Shortness of Breath     Lung disease      Per Currie   1960      History of Present Illness:   Per Currie is a 65 y.o. female who is here today for follow up for a past medical history of coronary artery disease, abnormal EKG, shortness of breath, hyperlipidemia and dizziness. She had an abnormal stress test on 2020. An echocardiogram showed her EF at 55%. He underwent a left heart cath with Dr. Indra Smith on 2020, PCI of LAD with single drug eluting stent. Today she states she fell around 1.5 weeks ago in her chicken coop. States she thought she had an concussion at the time. She did not note any injury to her foot after her fall. Just yesterday she started to have left leg swelling/pain and redness. States she also has been having dull chest pain that started around one month ago. Pain seems to come on randomly. She also hears her heart beating in her ears when she tries to sleep. She has some SOB as well that is at her baseline. Worse with the high heat. Chest pain and SOB seem to resolve with rest. Patient currently denies any weight gain, edema, dizziness, and syncope. Past Medical History:   has a past medical history of CAD (coronary artery disease), Cerebral artery occlusion with cerebral infarction ([address] Choking sensation, COVID-19, Diabetes mellitus ([address] DM2 (diabetes mellitus, type 2) ([address] Hyperlipidemia, Prolonged emergence from general anesthesia, and Sarcoid. Surgical History:   has a past surgical history that includes Cholecystectomy;  section; hysteroscopy (2013); Hysterectomy; Colonoscopy; Colonoscopy (13); Colonoscopy (10/19/2018); Colonoscopy (N/A, 10/19/2018);  Cardiac surgery (05/2020); and Finger trigger release (Right, 8/31/2020). Social History:   reports that she has never smoked. She has never used smokeless tobacco. She reports that she does not drink alcohol and does not use drugs. Family History:  family history includes Cancer in her father; Diabetes in her mother; Heart Disease in her mother. Home Medications:  Prior to Admission medications    Medication Sig Start Date End Date Taking? Authorizing Provider   clopidogrel (PLAVIX) 75 MG tablet TAKE 1 TABLET BY MOUTH DAILY 5/27/22  Yes DEV Merchant CNP   INSULIN GLARGINE SC Inject 40 Units into the skin every morning   Yes Historical Provider, MD   INSULIN GLARGINE SC Inject 20 Units into the skin nightly   Yes Historical Provider, MD AGUIRRE ULTRAFINE III SHORT PEN 31G X 8 MM MISC USE FOR DAILY INSULIN TWICE DAILY **50 DAY SUPPLY** 10/26/21  Yes Historical Provider, MD   TRUEPLUS 5-BEVEL PEN NEEDLES 32G X 4 MM MISC FOR USE WITH TRESIBA 11/24/21  Yes Historical Provider, MD   Josef Hunter strip USE TO TEST BLOOD SUGAR 3 TIMES EVERY DAY 11/20/21  Yes Historical Provider, MD   Lancets ([address]) [address] [facility] [address] USE TO CHECK FSBS THREE TIMES DAILY **33 DAY SUPPLY** 11/20/21  Yes Historical Provider, MD   metFORMIN (GLUCOPHAGE) 1000 MG tablet TAKE 1 TABLET BY MOUTH TWO TIMES A DAY WITH MORNING AND EVENING MEALS 12/23/21  Yes Historical Provider, MD   metoprolol tartrate (LOPRESSOR) 25 MG tablet Take 1 tablet by mouth 2 times daily 7/30/21  Yes DEV Aly CNP   rosuvastatin (CRESTOR) 10 MG tablet Take 1 tablet by mouth daily 7/30/21  Yes DEV Mccrary CNP   nitroGLYCERIN (NITROSTAT) 0.4 MG SL tablet up to max of 3 total doses.  If no relief after 1 dose, call 911. 7/7/21  Yes DEV Aly CNP   ibuprofen (ADVIL;MOTRIN) 600 MG tablet Take 1 tablet by mouth every 8 hours as needed for Pain 5/2/21  Yes Shima Blancas MD   Cholecalciferol (VITAMIN D) 2000 UNITS CAPS capsule Take 2,000 Units by mouth daily. Yes Historical Provider, MD   lisinopril (PRINIVIL;ZESTRIL) 2.5 MG tablet Take 2.5 mg by mouth daily. Yes Historical Provider, MD   TRESIBA FLEXTOUCH 100 UNIT/ML SOPN  7/28/20   Historical Provider, MD   pantoprazole (PROTONIX) 40 MG tablet  7/23/20   Historical Provider, MD        Allergies:  Antivert [meclizine], Codeine, Phenergan [promethazine hcl], Influenza vaccines, Meclizine hcl, Other, Pneumococcal vaccines, Vicodin [hydrocodone-acetaminophen], Hydrocodone-acetaminophen, Oxycodone, and Promethazine     Review of Systems:   Constitutional: there has been no unanticipated weight loss. There's been no change in energy level, sleep pattern, or activity level. Eyes: No visual changes or diplopia. No scleral icterus. ENT: No Headaches, hearing loss or vertigo. No mouth sores or sore throat. Cardiovascular: Reviewed in HPI  Respiratory: No cough or wheezing, no sputum production. No hematemesis. Gastrointestinal: No abdominal pain, appetite loss, blood in stools. No change in bowel or bladder habits. Genitourinary: No dysuria, trouble voiding, or hematuria. Musculoskeletal:  No gait disturbance, weakness or joint complaints. Integumentary: No rash or pruritis. Neurological: No headache, diplopia, change in muscle strength, numbness or tingling. No change in gait, balance, coordination, mood, affect, memory, mentation, behavior. Psychiatric: No anxiety, no depression. Endocrine: No malaise, fatigue or temperature intolerance. No excessive thirst, fluid intake, or urination. No tremor. Hematologic/Lymphatic: No abnormal bruising or bleeding, blood clots or swollen lymph nodes. Allergic/Immunologic: No nasal congestion or hives.     Physical Examination:    Vitals:    07/25/22 0947   BP: 108/66   Pulse: 75   SpO2: 98%        Constitutional and General Appearance: NAD   Respiratory:  Normal excursion and expansion without use of accessory muscles  Resp Auscultation: Normal breath sounds without dullness  Cardiovascular: The apical impulses not displaced  Heart tones are crisp and normal  Cervical veins are not engorged  The carotid upstroke is normal in amplitude and contour without delay or bruit  Normal S1S2, No S3, No Murmur  Peripheral pulses are symmetrical and full  There is no clubbing, cyanosis of the extremities. Right leg redness and swelling edema w/ mild erythema and warmth. Mild tender. Femoral Arteries: 2+ and equal  Pedal Pulses: 2+ and equal   Abdomen:  No masses or tenderness  Liver/Spleen: No Abnormalities Noted  Neurological/Psychiatric:  Alert and oriented in all spheres  Moves all extremities well  Exhibits normal gait balance and coordination  No abnormalities of mood, affect, memory, mentation, or behavior are noted    Stress test 5/8/2020  Conclusions    Summary  Normal LVEF >60%  Anteroseptal/apical hypokinesis  Mixed anteroseptal/apical defect consistent with mixed ischemia/scar of this  territory    Overall, this would be considered an abnormal, high risk, study         EKG 7/20/2021  NSR    Stress test 7/28/2021  Summary  Normal LV function. There is normal isotope uptake at stress and rest. There is no evidence of  myocardial ischemia or scar. Normal myocardial perfusion study. Echocardiogram 2/1/2022  Procedure Date  Date: 02/01/2022 Start: 10:24 AM     [facility]  Technical Quality: Adequate visualization     Additional Indications:Essential hypertension  Coronary artery disease involving native coronary artery of native heart  without angina pectoris  Nonrheumatic mitral valve regurgitation    CT chest 4/6/2022  FINDINGS:   LUNGS AND AIRWAYS: Near identical pattern of disease when compared to most recent prior of January 5, 2022.  This includes perilymphatic nodularity which is most pronounced in the midlung zones, and subpleural bands with associated mild traction bronchiectasis. No air trapping. Assessment:   Chest pain - typical/atypical. Possible angina  Shortness of breath - no sig change post PCI. Consider other noncardiac. Hyperlipidemia - stable. Results reviewed  CAD - stable post PCI LAD 5/2020  Left leg swelling/redness - new. Consider DVT, cellulitis   Exposure to second hand smoke     Plan:  STAT arterial US right leg venous US   Recommend an echocardiogram which is an ultrasound of your heart to evaluate heart function, structures and valves. Recommend a stress test- Lexiscan Myoview to evaluate chest pain and shortness of breath. Patient is not able to walk on a treadmill due to leg swelling   Call PCP -  you need to be seen today regarding right leg swelling and redness. If you can not been seen today then you need to go to urgent care or ER   Cardiac medications reviewed including indications and pertinent side effects. Medication list updated at this visit. No changes. Check blood pressure and heart rate at home a few times per week- keep a log with dates and times and bring to office visit   Regular exercise and following a healthy diet encouraged   Follow up with me in 6 months     Scribe's attestation: This note was scribed in the presence of Dr. Isatu Wolff M.D. By Kiera Roche RN    The scribes documentation has been prepared under my direction and personally reviewed by me in its entirety. I confirm that the note above accurately reflects all work, treatment, procedures, and medical decision making performed by me. Dr. Isatu Wolff MD    Thank you for allowing me to participate in the care of this individual.      Seferino Ludny.  Hiro Villar M.D., Immanuel Delgadillo

## 2022-07-25 NOTE — TELEPHONE ENCOUNTER
Call received from vascular lab. No clot found. Full report to come. Patient has an appointment with PCP today.

## 2022-07-25 NOTE — PATIENT INSTRUCTIONS
Plan: STAT arterial US right leg venous US   Recommend an echocardiogram which is an ultrasound of your heart to evaluate heart function, structures and valves. Recommend a stress test- Lexiscan Myoview to evaluate chest pain and shortness of breath. Patient is not able to walk on a treadmill due to leg swelling   Call Niall Bustamante- you maria a to be seen today regarding right leg swelling and redness. If you can not been seen today then you need to go to urgent care or ER   Cardiac medications reviewed including indications and pertinent side effects. Medication list updated at this visit. Check blood pressure and heart rate at home a few times per week- keep a log with dates and times and bring to office visit   Regular exercise and following a healthy diet encouraged   Follow up with me in 6 months     Your provider has ordered testing for further evaluation. An order/prescription has been included in your paper work. To schedule outpatient testing, contact Central [facility] ([phone]).

## 2022-07-26 ENCOUNTER — TELEPHONE (OUTPATIENT)
Dept: CARDIOLOGY CLINIC | Age: 62
End: 2022-07-26

## 2022-07-26 NOTE — TELEPHONE ENCOUNTER
----- Message from Maxi Wu MD sent at 7/26/2022  7:54 AM EDT -----  Please notify patient that their test shows no clot in leg

## 2022-08-16 ENCOUNTER — HOSPITAL ENCOUNTER (OUTPATIENT)
Dept: CARDIOLOGY | Age: 62
Discharge: HOME | End: 2022-08-16
Payer: MEDICAID

## 2022-08-16 DIAGNOSIS — R07.2 PRECORDIAL PAIN: ICD-10-CM

## 2022-08-16 DIAGNOSIS — R06.02 SOB (SHORTNESS OF BREATH): ICD-10-CM

## 2022-08-16 LAB
LV EF: 55 %
LV EF: 70 %
LVEF MODALITY: NORMAL
LVEF MODALITY: NORMAL

## 2022-08-16 PROCEDURE — 6360000002 HC RX W HCPCS: Performed by: INTERNAL MEDICINE

## 2022-08-16 PROCEDURE — 3430000000 HC RX DIAGNOSTIC RADIOPHARMACEUTICAL: Performed by: INTERNAL MEDICINE

## 2022-08-16 PROCEDURE — A9502 TC99M TETROFOSMIN: HCPCS | Performed by: INTERNAL MEDICINE

## 2022-08-16 PROCEDURE — 93306 TTE W/DOPPLER COMPLETE: CPT

## 2022-08-16 PROCEDURE — 78452 HT MUSCLE IMAGE SPECT MULT: CPT

## 2022-08-16 PROCEDURE — 93017 CV STRESS TEST TRACING ONLY: CPT

## 2022-08-16 RX ADMIN — TETROFOSMIN 32 MILLICURIE: 1.38 INJECTION, POWDER, LYOPHILIZED, FOR SOLUTION INTRAVENOUS at 13:23

## 2022-08-16 RX ADMIN — TETROFOSMIN 11.1 MILLICURIE: 1.38 INJECTION, POWDER, LYOPHILIZED, FOR SOLUTION INTRAVENOUS at 12:00

## 2022-08-16 RX ADMIN — REGADENOSON 0.4 MG: 0.08 INJECTION, SOLUTION INTRAVENOUS at 13:23

## 2022-08-17 ENCOUNTER — TELEPHONE (OUTPATIENT)
Dept: CARDIOLOGY CLINIC | Age: 62
End: 2022-08-17

## 2022-08-17 NOTE — TELEPHONE ENCOUNTER
----- Message from Effie Beckwith MD sent at 8/17/2022  8:08 AM EDT -----  Please let him know I reviewed her echocardiogram which shows normal main pumping chamber function, no significant valve disease and a small amount of fluid around the heart that is unchanged from February. Stable exam.  Follow-up with [facility] [place] for atypical chest discomfort reevaluation and neck steps.

## 2022-08-17 NOTE — TELEPHONE ENCOUNTER
----- Message from Kellen Connor MD sent at 8/16/2022  4:15 PM EDT -----  Please let Jerald Radha know I reviewed her stress test, this is a normal study with no indication of underlying heart blockages. Reassuring results.   [place] [facility] [place]

## 2022-08-30 RX ORDER — ROSUVASTATIN CALCIUM 10 MG/1
10 TABLET, COATED ORAL DAILY
Qty: 90 TABLET | Refills: 3 | Status: SHIPPED | OUTPATIENT
Start: 2022-08-30

## 2022-09-27 RX ORDER — CLOPIDOGREL BISULFATE 75 MG/1
75 TABLET ORAL DAILY
Qty: 90 TABLET | Refills: 3 | Status: SHIPPED | OUTPATIENT
Start: 2022-09-27

## 2022-10-25 ENCOUNTER — TRANSCRIBE ORDERS (OUTPATIENT)
Dept: ADMINISTRATIVE | Age: 62
End: 2022-10-25

## 2022-10-25 DIAGNOSIS — R10.2 PELVIC PAIN IN FEMALE: Primary | ICD-10-CM

## 2022-10-26 ENCOUNTER — HOSPITAL ENCOUNTER (OUTPATIENT)
Dept: WOMENS IMAGING | Age: 62
Discharge: HOME | End: 2022-10-26
Payer: MEDICAID

## 2022-10-26 ENCOUNTER — HOSPITAL ENCOUNTER (OUTPATIENT)
Dept: ULTRASOUND IMAGING | Age: 62
Discharge: HOME | End: 2022-10-26
Payer: MEDICAID

## 2022-10-26 VITALS — WEIGHT: 139 LBS | BODY MASS INDEX: 26.24 KG/M2 | HEIGHT: 61 IN

## 2022-10-26 DIAGNOSIS — R10.2 PELVIC PAIN IN FEMALE: ICD-10-CM

## 2022-10-26 DIAGNOSIS — Z12.31 VISIT FOR SCREENING MAMMOGRAM: ICD-10-CM

## 2022-10-26 PROCEDURE — 76830 TRANSVAGINAL US NON-OB: CPT

## 2022-10-26 PROCEDURE — 77067 SCR MAMMO BI INCL CAD: CPT

## 2023-01-23 NOTE — PROGRESS NOTES
[facility]   Cardiac Followup    Referring Provider:  James Macario     Chief Complaint   Patient presents with    Coronary Artery Disease    Hypertension    Hyperlipidemia        Glenda Edge   1960      History of Present Illness:   Glenda Edge is a 65 y.o. female who is here today for follow up for a past medical history of coronary artery disease, abnormal EKG, shortness of breath, hyperlipidemia and dizziness. She had an abnormal stress test on 5/8/2020. An echocardiogram showed her EF at 55%. He underwent a left heart cath with Dr. Brooklyn Acharya on 5/11/2020, PCI of LAD with single drug eluting stent. Last visit she reported having a fall while in her chicken coop and injured her leg. A few days later, leg became red and swollen. Venous dopplers 7/2022 showed no evidence of DVT. She also reported having chest pain that this visit. Stress test 8/16/2022 showed no ischemia or scar. Echocardiogram 8/16/2022 showed an EF of 55%, small pericardial effusion localized anteriorly, mild mitral and pulmonic regurgitation. Today she states she has been doing well since her last visit. States she is now doing exercises and weights several times per week. She has also cut out coke and coke 0. She states she is feeling well with exercise, trying to turn her fast into muscle. blood sugar is better controlled. She is tolerating her medications, stopped ASA due to stomach upset. BP has been normal.   Stomach feels better off ASA, remains on Plavix. She has been drinking plenty of water. Blood pressure is well controlled. Patient currently denies any weight gain, edema, palpitations, chest pain, shortness of breath, dizziness, and syncope.             Past Medical History:   has a past medical history of CAD (coronary artery disease), Cerebral artery occlusion with cerebral infarction ([address] Choking sensation, COVID-19, Diabetes mellitus ([address] DM2 (diabetes mellitus, type 2) ([address] Hyperlipidemia, Prolonged emergence from general anesthesia, and Sarcoid. Surgical History:   has a past surgical history that includes Cholecystectomy;  section; hysteroscopy (2013); Hysterectomy; Colonoscopy; Colonoscopy (13); Colonoscopy (10/19/2018); Colonoscopy (N/A, 10/19/2018); Cardiac surgery (2020); and Finger trigger release (Right, 2020). Social History:   reports that she has never smoked. She has never used smokeless tobacco. She reports that she does not drink alcohol and does not use drugs. Family History:  family history includes Breast Cancer in her paternal aunt, paternal aunt, and paternal aunt; Cancer in her father; Diabetes in her mother; Heart Disease in her mother; Ovarian Cancer in her mother. Home Medications:  Prior to Admission medications    Medication Sig Start Date End Date Taking?  Authorizing Provider   clopidogrel (PLAVIX) 75 MG tablet TAKE 1 TABLET BY MOUTH DAILY 22  Yes Lisandro Contreras MD   metoprolol tartrate (LOPRESSOR) 25 MG tablet TAKE 1 TABLET BY MOUTH 2 TIMES DAILY 22  Yes Lisandro Contreras MD   rosuvastatin (CRESTOR) 10 MG tablet TAKE 1 TABLET BY MOUTH DAILY 22  Yes Lisandro Contreras MD   INSULIN GLARGINE SC Inject 40 Units into the skin every morning   Yes Historical Provider, MD   INSULIN GLARGINE SC Inject 20 Units into the skin nightly   Yes Historical Provider, MD AGUIRRE ULTRAFINE III SHORT PEN 31G X 8 MM MISC USE FOR DAILY INSULIN TWICE DAILY **50 DAY SUPPLY** 10/26/21  Yes Historical Provider, MD   TRUEPLUS 5-BEVEL PEN NEEDLES 32G X 4 MM MISC FOR USE WITH TRESIBA 21  Yes Historical Provider, MD   ONETOUCH ULTRA strip USE TO TEST BLOOD SUGAR 3 TIMES EVERY DAY 21  Yes Historical Provider, MD   Lancets (ONETOUCH DELICA PLUS SYIPCZ40E) MISC USE TO CHECK FSBS THREE TIMES DAILY **33 DAY SUPPLY** 21  Yes Historical Provider, MD   metFORMIN (GLUCOPHAGE) 1000 MG tablet TAKE 1 TABLET BY MOUTH TWO TIMES A DAY WITH MORNING AND EVENING MEALS 12/23/21  Yes Historical Provider, MD   nitroGLYCERIN (NITROSTAT) 0.4 MG SL tablet up to max of 3 total doses. If no relief after 1 dose, call 911. 7/7/21  Yes DEV Romano - CNP   ibuprofen (ADVIL;MOTRIN) 600 MG tablet Take 1 tablet by mouth every 8 hours as needed for Pain 5/2/21  Yes Misty Payton MD   TRESIBA FLEXTOUCH 100 UNIT/ML SOPN  7/28/20  Yes Historical Provider, MD   pantoprazole (PROTONIX) 40 MG tablet  7/23/20  Yes Historical Provider, MD   Cholecalciferol (VITAMIN D) 2000 UNITS CAPS capsule Take 2,000 Units by mouth daily. Yes Historical Provider, MD   lisinopril (PRINIVIL;ZESTRIL) 2.5 MG tablet Take 2.5 mg by mouth daily. Yes Historical Provider, MD        Allergies:  Antivert [meclizine], Codeine, Phenergan [promethazine hcl], Asa [aspirin], Influenza vaccines, Meclizine hcl, Other, Pneumococcal vaccines, Vicodin [hydrocodone-acetaminophen], Hydrocodone-acetaminophen, Oxycodone, and Promethazine     Review of Systems:   Constitutional: there has been no unanticipated weight loss. There's been no change in energy level, sleep pattern, or activity level. Eyes: No visual changes or diplopia. No scleral icterus. ENT: No Headaches, hearing loss or vertigo. No mouth sores or sore throat. Cardiovascular: Reviewed in HPI  Respiratory: No cough or wheezing, no sputum production. No hematemesis. Gastrointestinal: No abdominal pain, appetite loss, blood in stools. No change in bowel or bladder habits. Genitourinary: No dysuria, trouble voiding, or hematuria. Musculoskeletal:  No gait disturbance, weakness or joint complaints. Integumentary: No rash or pruritis. Neurological: No headache, diplopia, change in muscle strength, numbness or tingling. No change in gait, balance, coordination, mood, affect, memory, mentation, behavior. Psychiatric: No anxiety, no depression. Endocrine: No malaise, fatigue or temperature intolerance.  No excessive thirst, fluid intake, or urination. No tremor. Hematologic/Lymphatic: No abnormal bruising or bleeding, blood clots or swollen lymph nodes. Allergic/Immunologic: No nasal congestion or hives. Physical Examination:    Vitals:    01/25/23 1230   BP: 104/68   Pulse: 84   Resp: 16   SpO2: 98%          Constitutional and General Appearance: NAD   Respiratory:  Normal excursion and expansion without use of accessory muscles  Resp Auscultation: Normal breath sounds without dullness  Cardiovascular: The apical impulses not displaced  Heart tones are crisp and normal  Cervical veins are not engorged  The carotid upstroke is normal in amplitude and contour without delay or bruit  Normal S1S2, No S3, No Murmur  Peripheral pulses are symmetrical and full  There is no clubbing, cyanosis of the extremities. Right leg redness and swelling edema w/ mild erythema and warmth. Mild tender. Femoral Arteries: 2+ and equal  Pedal Pulses: 2+ and equal   Abdomen:  No masses or tenderness  Liver/Spleen: No Abnormalities Noted  Neurological/Psychiatric:  Alert and oriented in all spheres  Moves all extremities well  Exhibits normal gait balance and coordination  No abnormalities of mood, affect, memory, mentation, or behavior are noted    Stress test 5/8/2020  Conclusions    Summary  Normal LVEF >60%  Anteroseptal/apical hypokinesis  Mixed anteroseptal/apical defect consistent with mixed ischemia/scar of this  territory    Overall, this would be considered an abnormal, high risk, study         EKG 7/20/2021  NSR    Stress test 7/28/2021  Summary  Normal LV function. There is normal isotope uptake at stress and rest. There is no evidence of  myocardial ischemia or scar. Normal myocardial perfusion study.        Echocardiogram 2/1/2022  Procedure Date  Date: 02/01/2022 Start: 10:24 AM     Study Location: [address] [place] [address]  Technical Quality: Adequate visualization     Additional Indications:Essential hypertension  Coronary artery disease involving native coronary artery of native heart  without angina pectoris  Nonrheumatic mitral valve regurgitation    CT chest 4/6/2022  FINDINGS:   LUNGS AND AIRWAYS: Near identical pattern of disease when compared to most recent prior of January 5, 2022. This includes perilymphatic nodularity which is most pronounced in the midlung zones, and subpleural bands with associated mild traction   bronchiectasis. No air trapping. Venous dopplers right leg 7/25/2022  Summary    No evidence of deep vein or superficial thrombosis involving the right lower  extremity and the contralateral proximal common femoral vein. Echocardiogram 8/16/2022  Summary   -- Normal left ventricle systolic function with an estimated ejection   fraction of 55%. No regional wall motion abnormalities are seen. Normal left   ventricular diastolic filling pressure. -- Mild mitral and pulmonic regurgitation. -- There is a small pericardial effusion localized anteriorly. 6 minutes walk test 10/13/2022  If the total distance walked is less than 820 ft, impairment is   severe. · A 4-5% drop in O2 saturation is abnormal suggesting lung   disease. · O2 saturations consistently less than 88% warrant consideration   for home oxygen. · Slow recovery time for O2 saturation and heart rate is   associated with worse outcomes. Echocardiogram 8/16/2022  Summary   -- Normal left ventricle systolic function with an estimated ejection   fraction of 55%. No regional wall motion abnormalities are seen. Normal left   ventricular diastolic filling pressure. -- Mild mitral and pulmonic regurgitation. -- There is a small pericardial effusion localized anteriorly. Stress test 8/16/2022  Summary  There is normal isotope uptake at stress and rest. There is no evidence of  myocardial ischemia or scar. Normal wall motion and myocardial wall  thickening. Post-stress LVEF is normal at >70%.      Venous dopplers 7/25/5022  Summary    No evidence of deep vein or superficial thrombosis involving the right lower  extremity and the contralateral proximal common femoral vein. Assessment:   Shortness of breath - no sig now that exercising regularly. Decreased. Hyperlipidemia - stable. Results reviewed  CAD - stable w/o angina  PCI LAD 5/2020  Exposure to second hand smoke   Small pericardial effusion localized anteriorly  Stomach upset with ASA     Plan:  Remain off Aspirin due to stomach upset, continue Plavix. R/B/A/E discussed  Cardiac medications reviewed including indications and pertinent side effects. Medication list updated at this visit. Check blood pressure and heart rate at home a few times per week- keep a log with dates and times and bring to office visit   Regular exercise and following a healthy diet encouraged   Follow up with me   Medications refilled for one year       Scribe's attestation: This note was scribed in the presence of Dr. Edi Tinoco M.D. By Antonio Johnson RN    The scribes documentation has been prepared under my direction and personally reviewed by me in its entirety. I confirm that the note above accurately reflects all work, treatment, procedures, and medical decision making performed by me. Dr. Edi Tinoco MD      Thank you for allowing me to participate in the care of this individual.      Beverly Holman.  Jessicachayito Jacobson M.D., Nadia Moreno

## 2023-01-25 ENCOUNTER — OFFICE VISIT (OUTPATIENT)
Dept: CARDIOLOGY CLINIC | Age: 63
End: 2023-01-25
Payer: MEDICAID

## 2023-01-25 VITALS
DIASTOLIC BLOOD PRESSURE: 68 MMHG | OXYGEN SATURATION: 98 % | RESPIRATION RATE: 16 BRPM | BODY MASS INDEX: 26.3 KG/M2 | SYSTOLIC BLOOD PRESSURE: 104 MMHG | HEIGHT: 61 IN | HEART RATE: 84 BPM | WEIGHT: 139.31 LBS

## 2023-01-25 DIAGNOSIS — I10 ESSENTIAL HYPERTENSION: ICD-10-CM

## 2023-01-25 DIAGNOSIS — I25.110 CORONARY ARTERY DISEASE INVOLVING NATIVE CORONARY ARTERY OF NATIVE HEART WITH UNSTABLE ANGINA PECTORIS (HCC): Primary | ICD-10-CM

## 2023-01-25 DIAGNOSIS — R94.31 ABNORMAL EKG: ICD-10-CM

## 2023-01-25 DIAGNOSIS — E78.2 MIXED HYPERLIPIDEMIA: ICD-10-CM

## 2023-01-25 PROCEDURE — 99214 OFFICE O/P EST MOD 30 MIN: CPT | Performed by: INTERNAL MEDICINE

## 2023-01-25 PROCEDURE — 3017F COLORECTAL CA SCREEN DOC REV: CPT | Performed by: INTERNAL MEDICINE

## 2023-01-25 PROCEDURE — G8419 CALC BMI OUT NRM PARAM NOF/U: HCPCS | Performed by: INTERNAL MEDICINE

## 2023-01-25 PROCEDURE — 3074F SYST BP LT 130 MM HG: CPT | Performed by: INTERNAL MEDICINE

## 2023-01-25 PROCEDURE — G8427 DOCREV CUR MEDS BY ELIG CLIN: HCPCS | Performed by: INTERNAL MEDICINE

## 2023-01-25 PROCEDURE — 1036F TOBACCO NON-USER: CPT | Performed by: INTERNAL MEDICINE

## 2023-01-25 PROCEDURE — 93000 ELECTROCARDIOGRAM COMPLETE: CPT | Performed by: INTERNAL MEDICINE

## 2023-01-25 PROCEDURE — 3078F DIAST BP <80 MM HG: CPT | Performed by: INTERNAL MEDICINE

## 2023-01-25 PROCEDURE — G8484 FLU IMMUNIZE NO ADMIN: HCPCS | Performed by: INTERNAL MEDICINE

## 2023-01-25 RX ORDER — CLOPIDOGREL BISULFATE 75 MG/1
75 TABLET ORAL DAILY
Qty: 90 TABLET | Refills: 3 | Status: SHIPPED | OUTPATIENT
Start: 2023-01-25

## 2023-01-25 RX ORDER — ROSUVASTATIN CALCIUM 10 MG/1
10 TABLET, COATED ORAL DAILY
Qty: 90 TABLET | Refills: 3 | Status: SHIPPED | OUTPATIENT
Start: 2023-01-25

## 2023-01-25 RX ORDER — LISINOPRIL 2.5 MG/1
2.5 TABLET ORAL DAILY
Qty: 90 TABLET | Refills: 3 | Status: SHIPPED | OUTPATIENT
Start: 2023-01-25

## 2023-01-25 NOTE — PATIENT INSTRUCTIONS
Plan:  Remain off Aspirin due to stomach upset, continue Plavix   Cardiac medications reviewed including indications and pertinent side effects. Medication list updated at this visit.    Check blood pressure and heart rate at home a few times per week- keep a log with dates and times and bring to office visit   Regular exercise and following a healthy diet encouraged   Follow up with me   Medications refilled for one year

## 2023-03-29 ENCOUNTER — HOSPITAL ENCOUNTER (OUTPATIENT)
Dept: GENERAL RADIOLOGY | Age: 63
Discharge: HOME | End: 2023-03-29
Payer: MEDICAID

## 2023-03-29 ENCOUNTER — HOSPITAL ENCOUNTER (OUTPATIENT)
Age: 63
Discharge: HOME | End: 2023-03-29
Payer: MEDICAID

## 2023-03-29 DIAGNOSIS — M79.642 BILATERAL HAND PAIN: ICD-10-CM

## 2023-03-29 DIAGNOSIS — M79.641 BILATERAL HAND PAIN: ICD-10-CM

## 2023-03-29 PROCEDURE — 73130 X-RAY EXAM OF HAND: CPT

## 2023-08-08 ENCOUNTER — PROCEDURE VISIT (OUTPATIENT)
Dept: AUDIOLOGY | Age: 63
End: 2023-08-08
Payer: MEDICAID

## 2023-08-08 ENCOUNTER — OFFICE VISIT (OUTPATIENT)
Dept: ENT CLINIC | Age: 63
End: 2023-08-08
Payer: MEDICAID

## 2023-08-08 VITALS
RESPIRATION RATE: 16 BRPM | BODY MASS INDEX: 26.24 KG/M2 | WEIGHT: 139 LBS | HEART RATE: 82 BPM | TEMPERATURE: 97.3 F | SYSTOLIC BLOOD PRESSURE: 101 MMHG | DIASTOLIC BLOOD PRESSURE: 61 MMHG | HEIGHT: 61 IN

## 2023-08-08 DIAGNOSIS — H90.3 SENSORINEURAL HEARING LOSS, BILATERAL: Primary | ICD-10-CM

## 2023-08-08 DIAGNOSIS — H93.13 TINNITUS OF BOTH EARS: Primary | ICD-10-CM

## 2023-08-08 DIAGNOSIS — H93.13 TINNITUS OF BOTH EARS: ICD-10-CM

## 2023-08-08 DIAGNOSIS — J30.0 VASOMOTOR RHINITIS: ICD-10-CM

## 2023-08-08 DIAGNOSIS — H61.23 BILATERAL IMPACTED CERUMEN: ICD-10-CM

## 2023-08-08 DIAGNOSIS — R42 DIZZINESS AND GIDDINESS: ICD-10-CM

## 2023-08-08 DIAGNOSIS — H90.3 SENSORINEURAL HEARING LOSS (SNHL) OF BOTH EARS: ICD-10-CM

## 2023-08-08 PROCEDURE — G8419 CALC BMI OUT NRM PARAM NOF/U: HCPCS | Performed by: OTOLARYNGOLOGY

## 2023-08-08 PROCEDURE — 3078F DIAST BP <80 MM HG: CPT | Performed by: OTOLARYNGOLOGY

## 2023-08-08 PROCEDURE — 92557 COMPREHENSIVE HEARING TEST: CPT | Performed by: AUDIOLOGIST

## 2023-08-08 PROCEDURE — 3017F COLORECTAL CA SCREEN DOC REV: CPT | Performed by: OTOLARYNGOLOGY

## 2023-08-08 PROCEDURE — 3074F SYST BP LT 130 MM HG: CPT | Performed by: OTOLARYNGOLOGY

## 2023-08-08 PROCEDURE — 92567 TYMPANOMETRY: CPT | Performed by: AUDIOLOGIST

## 2023-08-08 PROCEDURE — G8427 DOCREV CUR MEDS BY ELIG CLIN: HCPCS | Performed by: OTOLARYNGOLOGY

## 2023-08-08 PROCEDURE — 1036F TOBACCO NON-USER: CPT | Performed by: OTOLARYNGOLOGY

## 2023-08-08 PROCEDURE — 69210 REMOVE IMPACTED EAR WAX UNI: CPT | Performed by: OTOLARYNGOLOGY

## 2023-08-08 PROCEDURE — 99214 OFFICE O/P EST MOD 30 MIN: CPT | Performed by: OTOLARYNGOLOGY

## 2023-08-08 RX ORDER — IPRATROPIUM BROMIDE 21 UG/1
2 SPRAY, METERED NASAL 4 TIMES DAILY
Qty: 1 EACH | Refills: 3 | Status: SHIPPED | OUTPATIENT
Start: 2023-08-08

## 2023-08-08 RX ORDER — EMPAGLIFLOZIN 25 MG/1
TABLET, FILM COATED ORAL
COMMUNITY
Start: 2023-06-22

## 2023-08-08 ASSESSMENT — ENCOUNTER SYMPTOMS
SINUS PRESSURE: 0
RHINORRHEA: 1
APNEA: 0
COUGH: 0
VOICE CHANGE: 0
FACIAL SWELLING: 0
SORE THROAT: 0
SHORTNESS OF BREATH: 0
TROUBLE SWALLOWING: 0
EYE ITCHING: 0

## 2023-08-08 NOTE — PROGRESS NOTES
hearing loss (SNHL) of both ears  -Audiogram reviewed and independent interpreted normal downsloping to severe high-frequency sensorineural hearing loss with type a tympanogram preserved word recognition score    2. Tinnitus of both ears  -Secondary hearing loss    3. Vasomotor rhinitis    - ipratropium (ATROVENT) 0.03 % nasal spray; 2 sprays by Each Nostril route 4 times daily  Dispense: 1 each; Refill: 3    4. Bilateral impacted cerumen  -Removed without complication          Kelli Murdock DO  8/8/23      Portions of this note were dictated using Dragon.  There may be linguistic errors secondary to the use of this program.

## 2023-08-08 NOTE — PROGRESS NOTES
Maxim Vital   1960, 59 y.o. female   [id number]       Referring Provider: Tash Gardner DO  Referral Type: In an order in [address] [place] [address]    Reason for Visit: Evaluation of the cause of disorders of hearing, tinnitus, or balance. ADULT AUDIOLOGIC EVALUATION      Maxim Vital is a 59 y.o. female seen today, 8/8/2023 , for an initial audiologic evaluation. Patient was seen by Tash Gardner DO following today's evaluation. Patient was accompanied by daughter. AUDIOLOGIC AND OTHER PERTINENT MEDICAL HISTORY:      Maxim Vital noted tinnitus, imbalance, and history of head trauma. Patient reports concerns for hearing loss and tinnitus, bilaterally. She mentioned experiencing an imbalance and stumbling towards the right. Medical history is reportedly significant for testing positive for COVID-19 and multiple concussions. Maxim Vital denied otalgia, aural fullness, history of occupational/recreational noise exposure, history of ear surgery, and family history of hearing loss. Date: 8/8/2023     IMPRESSIONS:      Normal middle ear pressure and compliance, bilaterally. Abnormal high frequency hearing sensitivity, bilaterally, which can coincide with tinnitus. Word understanding was excellent presented at elevated sensation levels, bilaterally. Hearing aids are recommended at this time. Follow medical recommendations of Tash Gardner DO.     ASSESSMENT AND FINDINGS:     Otoscopy revealed: Clear ear canals bilaterally    RIGHT EAR:  Hearing Sensitivity: Normal hearing sensitivity sloping to a severe sensorineural hearing loss from 250-8000 Hz  Speech Recognition Threshold: 20 dB HL  Word Recognition:Excellent (100%), based on NU-6 25-word list at 60 dBHL using recorded speech stimuli. Tympanometry: Normal peak pressure and compliance, Type A tympanogram, consistent with normal middle ear function.       LEFT EAR:  Hearing Sensitivity: Normal hearing sensitivity sloping to a severe sensorineural hearing loss from

## 2023-10-01 NOTE — LETTER
[address] [place] [address] Dr Graeme Haney [facility] [address]  Phone: [phone]  Fax: [phone]    Rashad Cee MD        January 26, 2022     Patient: Donal Leon   YOB: 1960   Date of Visit: 1/26/2022       To Whom It May Concern: It is my medical opinion that Channakita Pay should remain out of work until further notice. If you have any questions or concerns, please don't hesitate to call.     Sincerely,         Rashad Cee MD walker

## 2024-03-19 ENCOUNTER — OFFICE VISIT (OUTPATIENT)
Dept: CARDIOLOGY CLINIC | Age: 64
End: 2024-03-19
Payer: MEDICAID

## 2024-03-19 VITALS
HEART RATE: 85 BPM | DIASTOLIC BLOOD PRESSURE: 72 MMHG | OXYGEN SATURATION: 96 % | WEIGHT: 148 LBS | HEIGHT: 61 IN | SYSTOLIC BLOOD PRESSURE: 120 MMHG | BODY MASS INDEX: 27.94 KG/M2

## 2024-03-19 DIAGNOSIS — M79.89 LEG SWELLING: ICD-10-CM

## 2024-03-19 DIAGNOSIS — R06.02 SOB (SHORTNESS OF BREATH): Primary | ICD-10-CM

## 2024-03-19 DIAGNOSIS — I10 ESSENTIAL HYPERTENSION: ICD-10-CM

## 2024-03-19 DIAGNOSIS — I25.110 CORONARY ARTERY DISEASE INVOLVING NATIVE CORONARY ARTERY OF NATIVE HEART WITH UNSTABLE ANGINA PECTORIS (HCC): ICD-10-CM

## 2024-03-19 PROCEDURE — 3074F SYST BP LT 130 MM HG: CPT | Performed by: INTERNAL MEDICINE

## 2024-03-19 PROCEDURE — 3078F DIAST BP <80 MM HG: CPT | Performed by: INTERNAL MEDICINE

## 2024-03-19 PROCEDURE — 99214 OFFICE O/P EST MOD 30 MIN: CPT | Performed by: INTERNAL MEDICINE

## 2024-03-19 RX ORDER — LISINOPRIL 2.5 MG/1
2.5 TABLET ORAL DAILY
Qty: 90 TABLET | Refills: 3 | Status: SHIPPED | OUTPATIENT
Start: 2024-03-19

## 2024-03-19 RX ORDER — CLOPIDOGREL BISULFATE 75 MG/1
75 TABLET ORAL DAILY
Qty: 90 TABLET | Refills: 3 | Status: SHIPPED | OUTPATIENT
Start: 2024-03-19

## 2024-03-19 RX ORDER — METOPROLOL SUCCINATE 25 MG/1
25 TABLET, EXTENDED RELEASE ORAL DAILY
Qty: 90 TABLET | Refills: 3 | Status: SHIPPED | OUTPATIENT
Start: 2024-03-19

## 2024-03-19 RX ORDER — FUROSEMIDE 20 MG/1
20 TABLET ORAL DAILY
Qty: 90 TABLET | Refills: 3 | Status: SHIPPED | OUTPATIENT
Start: 2024-03-19

## 2024-03-19 RX ORDER — ROSUVASTATIN CALCIUM 10 MG/1
10 TABLET, COATED ORAL DAILY
Qty: 90 TABLET | Refills: 3 | Status: SHIPPED | OUTPATIENT
Start: 2024-03-19

## 2024-03-19 NOTE — PATIENT INSTRUCTIONS
Plan:  ~Start Lasix 20 mg daily   ~Labs in 7-10 days after starting Lasix- CMP and fasting lipids   ~Stop Metoprolol Tartrate and start the extended release version of this medications called Metoprolol Succinate- also called Toprol XL 25 mg daily   Cardiac medications reviewed including indications and pertinent side effects. Medication list updated at this visit.   Check blood pressure and heart rate at home a few times per week- keep a log with dates and times and bring to office visit   Regular exercise and following a healthy diet encouraged   Follow up with NP in 6 months and me 1 year

## 2024-03-19 NOTE — PROGRESS NOTES
[facility]   Cardiac Followup    Referring Provider:  Jane Doyle APRN - NP     Chief Complaint   Patient presents with    Coronary Artery Disease    Follow-up    Hyperlipidemia    Hypertension    Edema     Right leg    Shortness of Breath        Alice Brown   1960      History of Present Illness:   Alice Brown is a 63 y.o. female who is here today for follow up for a past medical history of coronary artery disease, abnormal EKG, shortness of breath, hyperlipidemia and dizziness. She had an abnormal stress test on 5/8/2020. An echocardiogram showed her EF at 55%. He underwent a left heart cath with Dr. Nagy on 5/11/2020, PCI of LAD with single drug eluting stent.    Previous visit- she reported having a fall while in her chicken coop and injured her leg. A few days later, leg became red and swollen. Venous dopplers 7/2022 showed no evidence of DVT. She also reported having chest pain that this visit. Stress test 8/16/2022 showed no ischemia or scar. Echocardiogram 8/16/2022 showed an EF of 55%, small pericardial effusion localized anteriorly, mild mitral and pulmonic regurgitation.    Today she states her medical card was canceled so she has been off several of her medications. She states she now has Cole. She has gained weight and has some leg swelling. She has started an exercise routine at home which includes weights. She has never been on a water pill, creatinine normal. She states her leg swelling started a few months ago. She has SOB that she attributes to her lung diease. Leg cramos intermittent at rest. No pain or cramping when walking. Patient currently denies any palpitations, chest pain, shortness of breath, dizziness, and syncope.      Past Medical History:   has a past medical history of CAD (coronary artery disease), Cerebral artery occlusion with cerebral infarction ([facility]), Choking sensation, COVID-19, Diabetes mellitus ([facility]), DM2 (diabetes mellitus, type 2) ([facility]),

## 2024-03-29 DIAGNOSIS — I25.110 CORONARY ARTERY DISEASE INVOLVING NATIVE CORONARY ARTERY OF NATIVE HEART WITH UNSTABLE ANGINA PECTORIS (HCC): ICD-10-CM

## 2024-03-29 LAB
ALBUMIN SERPL-MCNC: 4.2 G/DL (ref 3.4–5)
ALBUMIN/GLOB SERPL: 2 {RATIO} (ref 1.1–2.2)
ALP SERPL-CCNC: 104 U/L (ref 40–129)
ALT SERPL-CCNC: 15 U/L (ref 10–40)
ANION GAP SERPL CALCULATED.3IONS-SCNC: 9 MMOL/L (ref 3–16)
AST SERPL-CCNC: 18 U/L (ref 15–37)
BILIRUB SERPL-MCNC: 0.3 MG/DL (ref 0–1)
BUN SERPL-MCNC: 21 MG/DL (ref 7–20)
CALCIUM SERPL-MCNC: 9.5 MG/DL (ref 8.3–10.6)
CHLORIDE SERPL-SCNC: 97 MMOL/L (ref 99–110)
CHOLEST SERPL-MCNC: 185 MG/DL (ref 0–199)
CO2 SERPL-SCNC: 31 MMOL/L (ref 21–32)
CREAT SERPL-MCNC: 0.7 MG/DL (ref 0.6–1.2)
GFR SERPLBLD CREATININE-BSD FMLA CKD-EPI: >90 ML/MIN/{1.73_M2}
GLUCOSE SERPL-MCNC: 281 MG/DL (ref 70–99)
HDLC SERPL-MCNC: 55 MG/DL (ref 40–60)
LDLC SERPL CALC-MCNC: 78 MG/DL
POTASSIUM SERPL-SCNC: 4.3 MMOL/L (ref 3.5–5.1)
PROT SERPL-MCNC: 6.3 G/DL (ref 6.4–8.2)
SODIUM SERPL-SCNC: 137 MMOL/L (ref 136–145)
TRIGL SERPL-MCNC: 261 MG/DL (ref 0–150)
VLDLC SERPL CALC-MCNC: 52 MG/DL

## 2024-04-01 ENCOUNTER — TELEPHONE (OUTPATIENT)
Dept: CARDIOLOGY CLINIC | Age: 64
End: 2024-04-01

## 2024-04-01 NOTE — TELEPHONE ENCOUNTER
----- Message from Indra Weiss MD sent at 4/1/2024  8:17 AM EDT -----  Please notify patient that their lab results are stable  TG  elevated  Stress low carb diet

## 2024-04-15 ENCOUNTER — TELEPHONE (OUTPATIENT)
Dept: CARDIOLOGY CLINIC | Age: 64
End: 2024-04-15

## 2024-04-15 NOTE — TELEPHONE ENCOUNTER
Pt would like to know if she is safe to go on a airplane. Pt is concerned because she has a stent. Please advise.

## 2024-05-14 ENCOUNTER — TELEPHONE (OUTPATIENT)
Dept: CARDIOLOGY CLINIC | Age: 64
End: 2024-05-14

## 2024-05-14 NOTE — TELEPHONE ENCOUNTER
Joseline from [facility] contacted office asking if pt needs to be premed before doing anything, if they are going to do anything. [facility] has not seen pt in a few years. Please advise. #: [phone]

## 2024-05-29 ENCOUNTER — OFFICE VISIT (OUTPATIENT)
Dept: ORTHOPEDIC SURGERY | Age: 64
End: 2024-05-29
Payer: MEDICARE

## 2024-05-29 VITALS — WEIGHT: 148 LBS | RESPIRATION RATE: 16 BRPM | HEIGHT: 61 IN | BODY MASS INDEX: 27.94 KG/M2

## 2024-05-29 DIAGNOSIS — M65.30 TRIGGER FINGER, ACQUIRED: Primary | ICD-10-CM

## 2024-05-29 PROCEDURE — G8427 DOCREV CUR MEDS BY ELIG CLIN: HCPCS | Performed by: ORTHOPAEDIC SURGERY

## 2024-05-29 PROCEDURE — 99204 OFFICE O/P NEW MOD 45 MIN: CPT | Performed by: ORTHOPAEDIC SURGERY

## 2024-05-29 PROCEDURE — G8419 CALC BMI OUT NRM PARAM NOF/U: HCPCS | Performed by: ORTHOPAEDIC SURGERY

## 2024-05-29 RX ORDER — DULAGLUTIDE 1.5 MG/.5ML
INJECTION, SOLUTION SUBCUTANEOUS
COMMUNITY
Start: 2024-05-14

## 2024-05-29 NOTE — PATIENT INSTRUCTIONS

## 2024-05-29 NOTE — PROGRESS NOTES
treatment (surgical or otherwise) of her current presenting condition, that due to her coexistent conditions (both diagnosed and undiagnosed), that she is likely to have some permanent residual symptoms related to these conditions that do not improve long term.  I have also explained that maximal recovery of function & symptom improvement may take a full year or longer to realize.  She voiced a clear understanding of this.    I did discuss with Ms. Alice Brown today the possibility that surgical (or other) treatment in a hand which appears to have some Dupuytren's Contracture may run the risk of accellerating or stimulating the progression of her Dupuytren's Contracture and/or worsening her contracture. She voiced an understanding of this possibility and did wish to proceed.    Ms. Alice Brown has been given a full verbal list of instructions and precautions related to her present condition.  I have asked her to followup with me in the office at the prescribed time. She is also specifically requested to call or return to the office sooner if her symptoms change or worsen prior to the next scheduled appointment.

## 2024-05-30 ENCOUNTER — TELEPHONE (OUTPATIENT)
Dept: ORTHOPEDIC SURGERY | Age: 64
End: 2024-05-30

## 2024-05-30 ENCOUNTER — TELEPHONE (OUTPATIENT)
Dept: CARDIOLOGY CLINIC | Age: 64
End: 2024-05-30

## 2024-05-30 ENCOUNTER — PREP FOR PROCEDURE (OUTPATIENT)
Dept: ORTHOPEDIC SURGERY | Age: 64
End: 2024-05-30

## 2024-05-30 PROBLEM — M65.30 TRIGGER FINGER: Status: ACTIVE | Noted: 2024-05-30

## 2024-05-30 NOTE — PROGRESS NOTES
Date and time of surgery : 6/6/24 at 1010             Arrival Time:  0810     Bring Picture ID and insurance card.  Please wear simple, loose fitting clothing to the hospital.   Do not bring valuables (money, credit cards, checkbooks, etc.)   Do not wear any makeup (including  eye makeup) and no nail polish or artificial nails on your fingers or toes.  DO NOT wear any jewelry or piercings on day of surgery.  All body piercing jewelry must be removed.  If you have dentures, they will be removed before going to the OR; we will provide you a container.  If you wear contact lenses or glasses, they will be removed; please bring a case for them.  Shower the evening before or morning of surgery with antibacterial soap.  Nothing to eat or drink after midnight the day before surgery.   You may brush your teeth and gargle the morning of surgery.  DO NOT SWALLOW WATER.   The morning of your  procedure take ny Crestor and Metoprolol with a sip of water  Aspirin, Ibuprofen, Advil, Naproxen, Vitamin E and other Anti-inflammatory products and supplements should be stopped for 5 -7days before surgery or as directed by your physician. Hold Plavix for 5 days.  Last dose Trulicity 5/26/24  The evening before your surgery decrease evening insulin dose in half.  Do not smoke or drink any alcoholic beverages 24 hours prior to surgery.  This includes NA Beer. Refrain from the usage of any recreational drugs, including non-prescribed prescription drugs.   You MUST plan for a responsible adult to stay on site while you are here and take you home after your surgery. You will not be allowed to leave alone or drive yourself home. It is strongly suggested someone stay with you the first 24 hrs. Your surgery will be cancelled if you do not have a ride home.  To help prevent infection, change your sheets the night before surgery.   If you  have a Living Will and Durable Power of  for Healthcare, please bring in a copy.  Notify your

## 2024-05-30 NOTE — TELEPHONE ENCOUNTER
CARDIAC CLEARANCE REQUEST    What type of procedure are you having: left INDEX FINGER TRIGGER FINGER RELEASE     Are you taking any blood thinners: plavix needs to hold 5 days prior    When is your procedure scheduled for: 6/6/24    What physician is performing your procedure: Dr Duran    Phone Number:    Fax number to send the letter:   Notify pt and  Also place clearance in epic

## 2024-05-30 NOTE — PROGRESS NOTES
Alice M Belt    Age 64 y.o.    female    1960    N [id number]    6/6/2024  Arrival Time_____________  OR Time____________20 Min     Procedure(s):  LEFT INDEX AND RING FINGER TRIGGER FINGER RELEASE                      Monitor Anesthesia Care    Surgeon(s):  Juancho Duran, MD       Phone [phone] (home)     Initals  Date  Info Source  Home  Cell         Work  _____________________________________________________________________  _____________________________________________________________________  _____________________________________________________________________  _____________________________________________________________________  _____________________________________________________________________    PCP _____________________________ Phone_________________     H&P  ________________  Bringing      Chart              Epic      DOS      Called________  EKG ________________   Bringing      Chart              Epic      DOS      Called________  LABS________________   Bringing     Chart              Epic      DOS      Called________  Cardiac Clearance ______ Bringing      Chart              Epic      DOS      Called________  Pulmonary Clearance____ Bringing      Chart              Epic      DOS      Called________    Cardiologist________________________ Phone___________________________  Pulmonologist_______________________Phone___________________________    ? Advance Directives   ? [employer] concerns / Waiver on Chart            PAT Communications________________  ? Pre-op Instructions Given /Understood          _________________________________  ? Directions to Surgery Center                          _________________________________  ? Transportation Home_______________      __________________________________  ? Crutches/Walker__________________        __________________________________    Orders: Hard copy/ EPIC                 Transcribed/ EPIC              _______Wt.    ________Pharmacy

## 2024-06-06 ENCOUNTER — ANESTHESIA (OUTPATIENT)
Dept: OPERATING ROOM | Age: 64
End: 2024-06-06
Payer: MEDICARE

## 2024-06-06 ENCOUNTER — HOSPITAL ENCOUNTER (OUTPATIENT)
Age: 64
Setting detail: OUTPATIENT SURGERY
Discharge: HOME | End: 2024-06-06
Attending: ORTHOPAEDIC SURGERY | Admitting: ORTHOPAEDIC SURGERY
Payer: MEDICARE

## 2024-06-06 ENCOUNTER — ANESTHESIA EVENT (OUTPATIENT)
Dept: OPERATING ROOM | Age: 64
End: 2024-06-06
Payer: MEDICARE

## 2024-06-06 VITALS
BODY MASS INDEX: 24.84 KG/M2 | HEART RATE: 71 BPM | HEIGHT: 62 IN | WEIGHT: 135 LBS | TEMPERATURE: 97.1 F | RESPIRATION RATE: 14 BRPM | SYSTOLIC BLOOD PRESSURE: 117 MMHG | OXYGEN SATURATION: 95 % | DIASTOLIC BLOOD PRESSURE: 59 MMHG

## 2024-06-06 LAB
GLUCOSE BLD-MCNC: 170 MG/DL (ref 70–99)
PERFORMED ON: ABNORMAL

## 2024-06-06 PROCEDURE — 7100000011 HC PHASE II RECOVERY - ADDTL 15 MIN: Performed by: ORTHOPAEDIC SURGERY

## 2024-06-06 PROCEDURE — 2580000003 HC RX 258: Performed by: ANESTHESIOLOGY

## 2024-06-06 PROCEDURE — 2580000003 HC RX 258: Performed by: ORTHOPAEDIC SURGERY

## 2024-06-06 PROCEDURE — 2500000003 HC RX 250 WO HCPCS: Performed by: ORTHOPAEDIC SURGERY

## 2024-06-06 PROCEDURE — A4217 STERILE WATER/SALINE, 500 ML: HCPCS | Performed by: ORTHOPAEDIC SURGERY

## 2024-06-06 PROCEDURE — 2709999900 HC NON-CHARGEABLE SUPPLY: Performed by: ORTHOPAEDIC SURGERY

## 2024-06-06 PROCEDURE — 3700000000 HC ANESTHESIA ATTENDED CARE: Performed by: ORTHOPAEDIC SURGERY

## 2024-06-06 PROCEDURE — 6360000002 HC RX W HCPCS: Performed by: ORTHOPAEDIC SURGERY

## 2024-06-06 PROCEDURE — 3600000013 HC SURGERY LEVEL 3 ADDTL 15MIN: Performed by: ORTHOPAEDIC SURGERY

## 2024-06-06 PROCEDURE — 6360000002 HC RX W HCPCS: Performed by: NURSE ANESTHETIST, CERTIFIED REGISTERED

## 2024-06-06 PROCEDURE — 7100000010 HC PHASE II RECOVERY - FIRST 15 MIN: Performed by: ORTHOPAEDIC SURGERY

## 2024-06-06 PROCEDURE — 3700000001 HC ADD 15 MINUTES (ANESTHESIA): Performed by: ORTHOPAEDIC SURGERY

## 2024-06-06 PROCEDURE — 2500000003 HC RX 250 WO HCPCS: Performed by: NURSE ANESTHETIST, CERTIFIED REGISTERED

## 2024-06-06 PROCEDURE — 3600000003 HC SURGERY LEVEL 3 BASE: Performed by: ORTHOPAEDIC SURGERY

## 2024-06-06 RX ORDER — SODIUM CHLORIDE 0.9 % (FLUSH) 0.9 %
5-40 SYRINGE (ML) INJECTION PRN
Status: DISCONTINUED | OUTPATIENT
Start: 2024-06-06 | End: 2024-06-06 | Stop reason: HOSPADM

## 2024-06-06 RX ORDER — SODIUM CHLORIDE 0.9 % (FLUSH) 0.9 %
5-40 SYRINGE (ML) INJECTION EVERY 12 HOURS SCHEDULED
Status: DISCONTINUED | OUTPATIENT
Start: 2024-06-06 | End: 2024-06-06 | Stop reason: HOSPADM

## 2024-06-06 RX ORDER — SODIUM CHLORIDE 9 MG/ML
INJECTION, SOLUTION INTRAVENOUS PRN
Status: DISCONTINUED | OUTPATIENT
Start: 2024-06-06 | End: 2024-06-06 | Stop reason: HOSPADM

## 2024-06-06 RX ORDER — OXYCODONE HYDROCHLORIDE 5 MG/1
10 TABLET ORAL PRN
Status: CANCELLED | OUTPATIENT
Start: 2024-06-06 | End: 2024-06-06

## 2024-06-06 RX ORDER — MEPERIDINE HYDROCHLORIDE 50 MG/ML
12.5 INJECTION INTRAMUSCULAR; INTRAVENOUS; SUBCUTANEOUS EVERY 5 MIN PRN
Status: CANCELLED | OUTPATIENT
Start: 2024-06-06

## 2024-06-06 RX ORDER — SODIUM CHLORIDE 0.9 % (FLUSH) 0.9 %
5-40 SYRINGE (ML) INJECTION EVERY 12 HOURS SCHEDULED
Status: CANCELLED | OUTPATIENT
Start: 2024-06-06

## 2024-06-06 RX ORDER — DIPHENHYDRAMINE HYDROCHLORIDE 50 MG/ML
12.5 INJECTION INTRAMUSCULAR; INTRAVENOUS
Status: CANCELLED | OUTPATIENT
Start: 2024-06-06 | End: 2024-06-07

## 2024-06-06 RX ORDER — LIDOCAINE HYDROCHLORIDE 20 MG/ML
INJECTION, SOLUTION INFILTRATION; PERINEURAL PRN
Status: DISCONTINUED | OUTPATIENT
Start: 2024-06-06 | End: 2024-06-06 | Stop reason: SDUPTHER

## 2024-06-06 RX ORDER — SODIUM CHLORIDE, SODIUM LACTATE, POTASSIUM CHLORIDE, CALCIUM CHLORIDE 600; 310; 30; 20 MG/100ML; MG/100ML; MG/100ML; MG/100ML
INJECTION, SOLUTION INTRAVENOUS CONTINUOUS
Status: DISCONTINUED | OUTPATIENT
Start: 2024-06-06 | End: 2024-06-06 | Stop reason: HOSPADM

## 2024-06-06 RX ORDER — LIDOCAINE HYDROCHLORIDE 10 MG/ML
1 INJECTION, SOLUTION EPIDURAL; INFILTRATION; INTRACAUDAL; PERINEURAL
Status: DISCONTINUED | OUTPATIENT
Start: 2024-06-06 | End: 2024-06-06 | Stop reason: HOSPADM

## 2024-06-06 RX ORDER — MAGNESIUM HYDROXIDE 1200 MG/15ML
LIQUID ORAL CONTINUOUS PRN
Status: COMPLETED | OUTPATIENT
Start: 2024-06-06 | End: 2024-06-06

## 2024-06-06 RX ORDER — SODIUM CHLORIDE 0.9 % (FLUSH) 0.9 %
5-40 SYRINGE (ML) INJECTION PRN
Status: CANCELLED | OUTPATIENT
Start: 2024-06-06

## 2024-06-06 RX ORDER — SODIUM CHLORIDE 9 MG/ML
INJECTION, SOLUTION INTRAVENOUS PRN
Status: CANCELLED | OUTPATIENT
Start: 2024-06-06

## 2024-06-06 RX ORDER — PROPOFOL 10 MG/ML
INJECTION, EMULSION INTRAVENOUS PRN
Status: DISCONTINUED | OUTPATIENT
Start: 2024-06-06 | End: 2024-06-06 | Stop reason: SDUPTHER

## 2024-06-06 RX ORDER — LABETALOL HYDROCHLORIDE 5 MG/ML
5 INJECTION, SOLUTION INTRAVENOUS EVERY 10 MIN PRN
Status: CANCELLED | OUTPATIENT
Start: 2024-06-06

## 2024-06-06 RX ORDER — OXYCODONE HYDROCHLORIDE 5 MG/1
5 TABLET ORAL PRN
Status: CANCELLED | OUTPATIENT
Start: 2024-06-06 | End: 2024-06-06

## 2024-06-06 RX ORDER — ONDANSETRON 2 MG/ML
4 INJECTION INTRAMUSCULAR; INTRAVENOUS
Status: CANCELLED | OUTPATIENT
Start: 2024-06-06

## 2024-06-06 RX ORDER — NALOXONE HYDROCHLORIDE 0.4 MG/ML
INJECTION, SOLUTION INTRAMUSCULAR; INTRAVENOUS; SUBCUTANEOUS PRN
Status: CANCELLED | OUTPATIENT
Start: 2024-06-06

## 2024-06-06 RX ADMIN — SODIUM CHLORIDE, POTASSIUM CHLORIDE, SODIUM LACTATE AND CALCIUM CHLORIDE: 600; 310; 30; 20 INJECTION, SOLUTION INTRAVENOUS at 09:17

## 2024-06-06 RX ADMIN — PROPOFOL 20 MG: 10 INJECTION, EMULSION INTRAVENOUS at 09:33

## 2024-06-06 RX ADMIN — PROPOFOL 50 MG: 10 INJECTION, EMULSION INTRAVENOUS at 09:30

## 2024-06-06 RX ADMIN — PROPOFOL 20 MG: 10 INJECTION, EMULSION INTRAVENOUS at 09:41

## 2024-06-06 RX ADMIN — LIDOCAINE HYDROCHLORIDE 60 MG: 20 INJECTION, SOLUTION INFILTRATION; PERINEURAL at 09:30

## 2024-06-06 RX ADMIN — PROPOFOL 20 MG: 10 INJECTION, EMULSION INTRAVENOUS at 09:40

## 2024-06-06 RX ADMIN — PROPOFOL 20 MG: 10 INJECTION, EMULSION INTRAVENOUS at 09:34

## 2024-06-06 ASSESSMENT — PAIN - FUNCTIONAL ASSESSMENT: PAIN_FUNCTIONAL_ASSESSMENT: 0-10

## 2024-06-06 ASSESSMENT — PAIN DESCRIPTION - LOCATION: LOCATION: HAND

## 2024-06-06 ASSESSMENT — ENCOUNTER SYMPTOMS: SHORTNESS OF BREATH: 1

## 2024-06-06 ASSESSMENT — PAIN SCALES - GENERAL
PAINLEVEL_OUTOF10: 0
PAINLEVEL_OUTOF10: 2
PAINLEVEL_OUTOF10: 0
PAINLEVEL_OUTOF10: 0

## 2024-06-06 ASSESSMENT — PAIN DESCRIPTION - ORIENTATION: ORIENTATION: LEFT

## 2024-06-06 NOTE — OP NOTE
OPERATIVE REPORT          Patient:  Alice Brown    YOB: 1960  Date of Service:  6/6/2024   Location:  [facility]        Preoperative Diagnosis:  Left Index Finger and Ring Finger trigger finger.    Postoperative Diagnosis: Same.    Procedure: Left Index Finger and Ring Finger trigger finger release (A1 pulley release).    Surgeon:    Juancho Duran MD    Surgical Assistant:    Griselda Cervantes PA-C    Anesthesia:  Local with sedation.    Blood Loss:  Minimal.     Complications:  None.      Tourniquet Time:  3 minutes.    Indications:  Ms. Alice Brown  is a 64 y.o.  year old female with a Left Index Finger and Ring Finger trigger finger. I have discussed preoperatively with her the complications, limitations, expectations, alternatives and risks of the planned surgical care.  She has voiced an understanding of our discussion.  All of her questions have been fully answered to her satisfaction, and she has provided written informed consent to proceed.    I did discuss with Ms. Alice Brown today the possibility that surgical (or other) treatment in a hand which appears to have some Dupuytren's Contracture may run the risk of accellerating or stimulating the progression of her Dupuytren's Contracture and/or worsening her contracture. She voiced an understanding of this possibility and did wish to proceed.    Procedure:  After written consent was obtained and the proper operative site was identified and marked, Ms. Alice Brown  was brought to the operating room, placed in the supine position on the operating room table with the Left arm extended upon a hand table. Under an appropriate level of sedation, local anesthetic (1% Lidocaine and 1/2% Marcaine both without Epinephrine) was instilled in the planned surgical field. Her Left upper extremity was prepped and draped in the usual sterile fashion.    After Eshmarch exsanguination the pneumo-tourniquet was inflated to 250 milimeters of mercury.

## 2024-06-06 NOTE — H&P
Pre-operative Update of H&P:    I  have seen & examined Ms. Alice BALDERAS Belt related solely to her hand and upper extremity conditions, prior to the scheduled procedure on the date of her surgery.  The indications for the planned surgical procedure & and her upper-extremity condition are unchanged.

## 2024-06-06 NOTE — ANESTHESIA PRE PROCEDURE
06:33 AM    INR 1.19 09/01/2021 06:33 AM    APTT 26.3 09/01/2021 06:33 AM       HCG (If Applicable):   Lab Results   Component Value Date    PREGTESTUR Negative 06/27/2012    PREGSERUM Neg 01/22/2013        ABGs: No results found for: \"PHART\", \"PO2ART\", \"PHF0RHH\", \"UNV4YUM\", \"BEART\", \"D1DSMHNX\"     Type & Screen (If Applicable):  Lab Results   Component Value Date    LABABO A 01/22/2013       Drug/Infectious Status (If Applicable):  No results found for: \"HIV\", \"HEPCAB\"    COVID-19 Screening (If Applicable):   Lab Results   Component Value Date/Time    COVID19 NOT DETECTED 02/06/2022 07:24 PM    COVID19 NOT DETECTED 08/25/2020 05:53 PM           Anesthesia Evaluation    Airway: Mallampati: II  TM distance: >3 FB   Neck ROM: full  Mouth opening: > = 3 FB   Dental: normal exam         Pulmonary:normal exam  breath sounds clear to auscultation  (+) pneumonia:   shortness of breath:                                    Cardiovascular:    (+) hypertension:, angina:, CAD:        Rhythm: regular  Rate: normal                    Neuro/Psych:   (+) CVA:, TIA, psychiatric history:            GI/Hepatic/Renal:             Endo/Other:    (+) DiabetesType II DM.                 Abdominal:             Vascular:          Other Findings:             Anesthesia Plan      MAC     ASA 3       Induction: intravenous.      Anesthetic plan and risks discussed with patient.      Plan discussed with CRNA.                    Isael He MD   6/6/2024

## 2024-06-06 NOTE — DISCHARGE INSTRUCTIONS
Post-Operative Instructions    Trigger Finger Release:    Keep hand elevated with fingers above eye-level to control swelling.  Keep hand and bandage clean and dry.  Do not change or unwrap bandage.  Please leave bandage in place until your follow-up appointment.  Work hard on finger motion starting immediately.  Several times each hour, fully straighten and fully bend fingers and thumb completely.  You may use your other hand to help as needed.  This may cause some discomfort, but will not damage your surgery.  You may use your operated hand for lightweight tasks (e.g. writing, eating, dressing, etc.).  NO LIFTING, CARRYING OR HEAVY USE.    Most Patients do not have \"Serious Pain\" after this procedure and thus most patients do not require prescription pain medication.  You may take over the counter medication (Tylenol, Advil, Aleve, etc.) as needed.  If you are unable to tolerate the discomfort after your surgery and the OTC medications do not provide some relief, you may contact our office to discuss other options..    Please call the office at [phone]  in 24 - 48 hours to schedule a follow up appointment for approximately 7 - 10 days after surgery.  Please call the office at [phone]  if you have any questions or problems.           Juancho Duran MD    ANESTHESIA DISCHARGE INSTRUCTIONS    You are under the influence of drugs- do not drink alcohol, drive a car, operate machinery(such as power tools, kitchen appliances, etc), sign legal documents, or make any important decisions for 24 hours (or while on pain medications).   Children should not ride bikes or skate boards or play on gym sets  for 24 hours after surgery.  A responsible adult should be with you for 24 hours.  Rest at home today- increase activity as tolerated.  Progress slowly to a regular diet unless your physician has instructed you otherwise. Drink plenty of water.    CALL YOUR DOCTOR IF YOU:  Have moderate to severe nausea or

## 2024-06-06 NOTE — PROGRESS NOTES
Pt advanced from lying to sitting at side of bed without adverse events: VSS/RESP WNL  abd assessment unchanged- denies pain /tolerating oral liquids without ponv    Pt and family verbalized understanding of postop activity restrictions -pt/family denies additional questions    Reviewed discharge instructions with family  And pt  /verbalized understanding - all questions /concerns addressed  Educational materials given to pt and explained- all questions answered    Pain level =0

## 2024-06-06 NOTE — ANESTHESIA POSTPROCEDURE EVALUATION
Department of Anesthesiology  Postprocedure Note    Patient: Alice Brown  MRN: [id number]  YOB: 1960  Date of evaluation: 6/6/2024    Procedure Summary       Date: 06/06/24 Room / Location: [address]    Anesthesia Start: 0927 Anesthesia Stop: 0944    Procedure: LEFT INDEX AND RING FINGER TRIGGER FINGER RELEASE (Left: Fingers) Diagnosis:       Trigger finger      (Trigger finger [M65.30])    Surgeons: Juancho Duran MD Responsible Provider: Isael He MD    Anesthesia Type: MAC ASA Status: 3            Anesthesia Type: No value filed.    Daja Phase I: Daja Score: 10    Daja Phase II: Daja Score: 10    Anesthesia Post Evaluation    Patient location during evaluation: PACU  Patient participation: complete - patient participated  Level of consciousness: awake and alert  Pain score: 0  Airway patency: patent  Nausea & Vomiting: no nausea and no vomiting  Cardiovascular status: blood pressure returned to baseline  Respiratory status: acceptable  Hydration status: stable  Pain management: adequate    No notable events documented.

## 2024-06-17 NOTE — PROGRESS NOTES
Alice M Belt    Age 64 y.o.    female    1960    N [id number]    6/25/2024  Arrival Time_____________  OR Time____________20 Min     Procedure(s):  RIGHT INDEX FINGER TRIGGER FINGER RELEASE                      Monitor Anesthesia Care    Surgeon(s):  Juancho Duran, MD       Phone [phone] (home)     Initals  Date  Info Source  Home  Cell         Work  _____________________________________________________________________  _____________________________________________________________________  _____________________________________________________________________  _____________________________________________________________________  _____________________________________________________________________    PCP _____________________________ Phone_________________     H&P  ________________  Bringing      Chart              Epic      DOS      Called________  EKG ________________   Bringing      Chart              Epic      DOS      Called________  LABS________________   Bringing     Chart              Epic      DOS      Called________  Cardiac Clearance ______ Bringing      Chart              Epic      DOS      Called________  Pulmonary Clearance____ Bringing      Chart              Epic      DOS      Called________    Cardiologist________________________ Phone___________________________  Pulmonologist_______________________Phone___________________________    ? Advance Directives   ? Sabianism concerns / Waiver on Chart            PAT Communications________________  ? Pre-op Instructions Given /Understood          _________________________________  ? Directions to Surgery Center                          _________________________________  ? Transportation Home_______________      __________________________________  ? Crutches/Walker__________________        __________________________________    Orders: Hard copy/ EPIC                 Transcribed/ EPIC              _______Wt.    ________Pharmacy

## 2024-06-18 NOTE — PROGRESS NOTES
Date and time of surgery :  6/25/24 at 0920            Arrival Time: 0720      Bring Picture ID and insurance card.  Please wear simple, loose fitting clothing to the hospital.   Do not bring valuables (money, credit cards, checkbooks, etc.)   Do not wear any makeup (including  eye makeup) and no nail polish or artificial nails on your fingers or toes.  DO NOT wear any jewelry or piercings on day of surgery.  All body piercing jewelry must be removed.  If you have dentures, they will be removed before going to the OR; we will provide you a container.  If you wear contact lenses or glasses, they will be removed; please bring a case for them.  Shower the evening before or morning of surgery with antibacterial soap.  Nothing to eat or drink after midnight the day before surgery.   You may brush your teeth and gargle the morning of surgery.  DO NOT SWALLOW WATER.   The morning of your surgery take only Crestor and Metoprolol with a sip of water  Aspirin, Ibuprofen, Advil, Naproxen, Vitamin E and other Anti-inflammatory products and supplements should be stopped for 5 -7days before surgery or as directed by your physician.  Hold Plavix 5 days  The evening before your surgery decrease Lantus dose by half.  Do not smoke or drink any alcoholic beverages 24 hours prior to surgery.  This includes NA Beer. Refrain from the usage of any recreational drugs, including non-prescribed prescription drugs.   You MUST plan for a responsible adult to stay on site while you are here and take you home after your surgery. You will not be allowed to leave alone or drive yourself home. It is strongly suggested someone stay with you the first 24 hrs. Your surgery will be cancelled if you do not have a ride home.  To help prevent infection, change your sheets the night before surgery.   If you  have a Living Will and Durable Power of  for Healthcare, please bring in a copy.  Notify your Surgeon if you develop any illness between now

## 2024-06-19 ENCOUNTER — OFFICE VISIT (OUTPATIENT)
Dept: ORTHOPEDIC SURGERY | Age: 64
End: 2024-06-19

## 2024-06-19 VITALS — RESPIRATION RATE: 16 BRPM | WEIGHT: 135 LBS | HEIGHT: 61 IN | BODY MASS INDEX: 25.49 KG/M2

## 2024-06-19 DIAGNOSIS — Z98.890 STATUS POST TRIGGER FINGER RELEASE: Primary | ICD-10-CM

## 2024-06-19 PROCEDURE — 99024 POSTOP FOLLOW-UP VISIT: CPT | Performed by: ORTHOPAEDIC SURGERY

## 2024-06-19 NOTE — PATIENT INSTRUCTIONS
Postoperative Instructions After Trigger Finger Release    Dr. Juancho Duran          After bandages are removed one week from surgery, you may chose to wear a small bandage over the incision if you wish, though you do not need to.  Keep incision dry until sutures have fully dissolved  or it has been 14 days since your surgery. Thereafter, you may wash with mild soap and water and shower normally.   Once your stiches have fully disappeared & skin appears normal, you should begin gently massaging the incision with Vitamin E (may use Vitamin E lotion or contents of Vitamin E capsule).   Work hard on motion of the fingers and wrist, straightening each finger fully and bending each finger fully, bending wrist forward and bending wrist backwards. Do not be concerned if you experience discomfort.  This will not damage the surgery.  You may begin using the hand as it feels comfortable beginning 12-14 days from the day of surgery. You may not feel entirely comfortable gripping or lifting heavy objects for several weeks.  You may expect to see some skin “peel” off around the incision.  You may be left with a small area of “pink baby skin”. This is quite normal.    Thank you for choosing [facility] for your Hand and Upper Extremity needs.  If we can be of any further assistance to you, please do not hesitate to contact us.    Office Phone Number:  [phone]  or  [phone]

## 2024-06-24 ENCOUNTER — ANESTHESIA EVENT (OUTPATIENT)
Dept: OPERATING ROOM | Age: 64
End: 2024-06-24
Payer: MEDICARE

## 2024-06-25 ENCOUNTER — HOSPITAL ENCOUNTER (OUTPATIENT)
Age: 64
Setting detail: OUTPATIENT SURGERY
Discharge: HOME | End: 2024-06-25
Attending: ORTHOPAEDIC SURGERY | Admitting: ORTHOPAEDIC SURGERY
Payer: MEDICARE

## 2024-06-25 ENCOUNTER — ANESTHESIA (OUTPATIENT)
Dept: OPERATING ROOM | Age: 64
End: 2024-06-25
Payer: MEDICARE

## 2024-06-25 VITALS
DIASTOLIC BLOOD PRESSURE: 78 MMHG | HEART RATE: 68 BPM | BODY MASS INDEX: 24.84 KG/M2 | WEIGHT: 135 LBS | TEMPERATURE: 98 F | RESPIRATION RATE: 18 BRPM | HEIGHT: 62 IN | SYSTOLIC BLOOD PRESSURE: 103 MMHG | OXYGEN SATURATION: 98 %

## 2024-06-25 LAB
GLUCOSE BLD-MCNC: 263 MG/DL (ref 70–99)
GLUCOSE BLD-MCNC: 331 MG/DL (ref 70–99)
PERFORMED ON: ABNORMAL
PERFORMED ON: ABNORMAL

## 2024-06-25 PROCEDURE — 3600000013 HC SURGERY LEVEL 3 ADDTL 15MIN: Performed by: ORTHOPAEDIC SURGERY

## 2024-06-25 PROCEDURE — 2500000003 HC RX 250 WO HCPCS: Performed by: NURSE ANESTHETIST, CERTIFIED REGISTERED

## 2024-06-25 PROCEDURE — 6370000000 HC RX 637 (ALT 250 FOR IP): Performed by: ANESTHESIOLOGY

## 2024-06-25 PROCEDURE — 2580000003 HC RX 258: Performed by: ORTHOPAEDIC SURGERY

## 2024-06-25 PROCEDURE — 7100000010 HC PHASE II RECOVERY - FIRST 15 MIN: Performed by: ORTHOPAEDIC SURGERY

## 2024-06-25 PROCEDURE — 6360000002 HC RX W HCPCS: Performed by: ORTHOPAEDIC SURGERY

## 2024-06-25 PROCEDURE — 2709999900 HC NON-CHARGEABLE SUPPLY: Performed by: ORTHOPAEDIC SURGERY

## 2024-06-25 PROCEDURE — A4217 STERILE WATER/SALINE, 500 ML: HCPCS | Performed by: ORTHOPAEDIC SURGERY

## 2024-06-25 PROCEDURE — 3600000003 HC SURGERY LEVEL 3 BASE: Performed by: ORTHOPAEDIC SURGERY

## 2024-06-25 PROCEDURE — 3700000000 HC ANESTHESIA ATTENDED CARE: Performed by: ORTHOPAEDIC SURGERY

## 2024-06-25 PROCEDURE — 3700000001 HC ADD 15 MINUTES (ANESTHESIA): Performed by: ORTHOPAEDIC SURGERY

## 2024-06-25 PROCEDURE — 6360000002 HC RX W HCPCS: Performed by: NURSE ANESTHETIST, CERTIFIED REGISTERED

## 2024-06-25 PROCEDURE — 7100000011 HC PHASE II RECOVERY - ADDTL 15 MIN: Performed by: ORTHOPAEDIC SURGERY

## 2024-06-25 PROCEDURE — 2580000003 HC RX 258: Performed by: ANESTHESIOLOGY

## 2024-06-25 PROCEDURE — 2500000003 HC RX 250 WO HCPCS: Performed by: ORTHOPAEDIC SURGERY

## 2024-06-25 RX ORDER — NALOXONE HYDROCHLORIDE 0.4 MG/ML
INJECTION, SOLUTION INTRAMUSCULAR; INTRAVENOUS; SUBCUTANEOUS PRN
Status: CANCELLED | OUTPATIENT
Start: 2024-06-25

## 2024-06-25 RX ORDER — ONDANSETRON 2 MG/ML
4 INJECTION INTRAMUSCULAR; INTRAVENOUS EVERY 30 MIN PRN
Status: CANCELLED | OUTPATIENT
Start: 2024-06-25

## 2024-06-25 RX ORDER — LIDOCAINE HYDROCHLORIDE 10 MG/ML
1 INJECTION, SOLUTION EPIDURAL; INFILTRATION; INTRACAUDAL; PERINEURAL
Status: DISCONTINUED | OUTPATIENT
Start: 2024-06-25 | End: 2024-06-25 | Stop reason: HOSPADM

## 2024-06-25 RX ORDER — LIDOCAINE HYDROCHLORIDE 20 MG/ML
INJECTION, SOLUTION EPIDURAL; INFILTRATION; INTRACAUDAL; PERINEURAL PRN
Status: DISCONTINUED | OUTPATIENT
Start: 2024-06-25 | End: 2024-06-25 | Stop reason: SDUPTHER

## 2024-06-25 RX ORDER — SODIUM CHLORIDE 0.9 % (FLUSH) 0.9 %
5-40 SYRINGE (ML) INJECTION PRN
Status: DISCONTINUED | OUTPATIENT
Start: 2024-06-25 | End: 2024-06-25 | Stop reason: HOSPADM

## 2024-06-25 RX ORDER — PROPOFOL 10 MG/ML
INJECTION, EMULSION INTRAVENOUS PRN
Status: DISCONTINUED | OUTPATIENT
Start: 2024-06-25 | End: 2024-06-25 | Stop reason: SDUPTHER

## 2024-06-25 RX ORDER — ASPIRIN 81 MG/1
81 TABLET ORAL DAILY
COMMUNITY

## 2024-06-25 RX ORDER — SODIUM CHLORIDE 9 MG/ML
INJECTION, SOLUTION INTRAVENOUS PRN
Status: CANCELLED | OUTPATIENT
Start: 2024-06-25

## 2024-06-25 RX ORDER — SODIUM CHLORIDE 9 MG/ML
INJECTION, SOLUTION INTRAVENOUS PRN
Status: DISCONTINUED | OUTPATIENT
Start: 2024-06-25 | End: 2024-06-25 | Stop reason: HOSPADM

## 2024-06-25 RX ORDER — SODIUM CHLORIDE 0.9 % (FLUSH) 0.9 %
5-40 SYRINGE (ML) INJECTION PRN
Status: CANCELLED | OUTPATIENT
Start: 2024-06-25

## 2024-06-25 RX ORDER — LABETALOL HYDROCHLORIDE 5 MG/ML
10 INJECTION, SOLUTION INTRAVENOUS
Status: CANCELLED | OUTPATIENT
Start: 2024-06-25

## 2024-06-25 RX ORDER — SODIUM CHLORIDE, SODIUM LACTATE, POTASSIUM CHLORIDE, CALCIUM CHLORIDE 600; 310; 30; 20 MG/100ML; MG/100ML; MG/100ML; MG/100ML
INJECTION, SOLUTION INTRAVENOUS CONTINUOUS
Status: DISCONTINUED | OUTPATIENT
Start: 2024-06-25 | End: 2024-06-25 | Stop reason: HOSPADM

## 2024-06-25 RX ORDER — SODIUM CHLORIDE 0.9 % (FLUSH) 0.9 %
5-40 SYRINGE (ML) INJECTION EVERY 12 HOURS SCHEDULED
Status: DISCONTINUED | OUTPATIENT
Start: 2024-06-25 | End: 2024-06-25 | Stop reason: HOSPADM

## 2024-06-25 RX ORDER — SODIUM CHLORIDE 0.9 % (FLUSH) 0.9 %
5-40 SYRINGE (ML) INJECTION EVERY 12 HOURS SCHEDULED
Status: CANCELLED | OUTPATIENT
Start: 2024-06-25

## 2024-06-25 RX ORDER — MAGNESIUM HYDROXIDE 1200 MG/15ML
LIQUID ORAL CONTINUOUS PRN
Status: COMPLETED | OUTPATIENT
Start: 2024-06-25 | End: 2024-06-25

## 2024-06-25 RX ADMIN — INSULIN HUMAN 6 UNITS: 100 INJECTION, SOLUTION PARENTERAL at 08:45

## 2024-06-25 RX ADMIN — LIDOCAINE HYDROCHLORIDE 60 MG: 20 INJECTION, SOLUTION EPIDURAL; INFILTRATION; INTRACAUDAL; PERINEURAL at 09:06

## 2024-06-25 RX ADMIN — PROPOFOL 150 MG: 10 INJECTION, EMULSION INTRAVENOUS at 09:06

## 2024-06-25 RX ADMIN — SODIUM CHLORIDE, POTASSIUM CHLORIDE, SODIUM LACTATE AND CALCIUM CHLORIDE: 600; 310; 30; 20 INJECTION, SOLUTION INTRAVENOUS at 08:20

## 2024-06-25 ASSESSMENT — PAIN SCALES - GENERAL
PAINLEVEL_OUTOF10: 0

## 2024-06-25 ASSESSMENT — PAIN - FUNCTIONAL ASSESSMENT
PAIN_FUNCTIONAL_ASSESSMENT: NONE - DENIES PAIN
PAIN_FUNCTIONAL_ASSESSMENT: 0-10

## 2024-06-25 NOTE — ANESTHESIA PRE PROCEDURE
hours.    Coags:   Lab Results   Component Value Date/Time    PROTIME 13.5 09/01/2021 06:33 AM    INR 1.19 09/01/2021 06:33 AM    APTT 26.3 09/01/2021 06:33 AM       HCG (If Applicable):   Lab Results   Component Value Date    PREGTESTUR Negative 06/27/2012    PREGSERUM Neg 01/22/2013        ABGs: No results found for: \"PHART\", \"PO2ART\", \"VIA7QZC\", \"PHM4OTA\", \"BEART\", \"D5OCYRXU\"     Type & Screen (If Applicable):  Lab Results   Component Value Date    LABABO A 01/22/2013       Drug/Infectious Status (If Applicable):  No results found for: \"HIV\", \"HEPCAB\"    COVID-19 Screening (If Applicable):   Lab Results   Component Value Date/Time    COVID19 NOT DETECTED 02/06/2022 07:24 PM    COVID19 NOT DETECTED 08/25/2020 05:53 PM           Anesthesia Evaluation    Airway: Mallampati: III       Mouth opening: < 3 FB   Dental: normal exam         Pulmonary: breath sounds clear to auscultation                             Cardiovascular:            Rhythm: regular  Rate: normal                    Neuro/Psych:               GI/Hepatic/Renal:             Endo/Other:                     Abdominal:             Vascular:          Other Findings:       Anesthesia Plan      MAC     ASA 3                               Francis Minor MD   6/25/2024

## 2024-06-25 NOTE — ANESTHESIA POSTPROCEDURE EVALUATION
Department of Anesthesiology  Postprocedure Note    Patient: Alice Brown  MRN: [id number]  YOB: 1960  Date of evaluation: 6/25/2024    Procedure Summary       Date: 06/25/24 Room / Location: [address]    Anesthesia Start: 0903 Anesthesia Stop: 0920    Procedure: RIGHT INDEX FINGER TRIGGER FINGER RELEASE (Right: Fingers) Diagnosis:       Trigger finger      (Trigger finger [M65.30])    Surgeons: Juancho Duran MD Responsible Provider: Francis Minor MD    Anesthesia Type: MAC ASA Status: 3            Anesthesia Type: No value filed.    Daja Phase I: Daja Score: 10    Daja Phase II: Daja Score: 10    Anesthesia Post Evaluation    Patient location during evaluation: PACU  Level of consciousness: awake  Airway patency: patent  Nausea & Vomiting: no vomiting  Cardiovascular status: blood pressure returned to baseline  Respiratory status: acceptable  Hydration status: stable  Pain management: adequate    No notable events documented.

## 2024-06-25 NOTE — OP NOTE
OPERATIVE REPORT          Patient:  Alice Brown    YOB: 1960  Date of Service:  6/25/2024   Location:  [facility]        Preoperative Diagnosis:  Right Index Finger trigger finger.    Postoperative Diagnosis: Same.    Procedure: Right Index Finger trigger finger release (A1 pulley release).    Surgeon:    Juancho Duran MD    Surgical Assistant:    Hospital Supplied Assistant    Anesthesia:  Local with sedation.    Blood Loss:  Minimal.     Complications:  None.      Tourniquet Time:  2 minutes.    Indications:  Ms. Alice Brown  is a 64 y.o.  year old female with a Right Index Finger trigger finger. I have discussed preoperatively with her the complications, limitations, expectations, alternatives and risks of the planned surgical care.  She has voiced an understanding of our discussion.  All of her questions have been fully answered to her satisfaction, and she has provided written informed consent to proceed.    Procedure:  After written consent was obtained and the proper operative site was identified and marked, Ms. Alice Brown  was brought to the operating room, placed in the supine position on the operating room table with the Right arm extended upon a hand table. Under an appropriate level of sedation, local anesthetic (1% Lidocaine and 1/2% Marcaine both without Epinephrine) was instilled in the planned surgical field. Her Right upper extremity was prepped and draped in the usual sterile fashion.    After Eshmarch exsanguination the pneumo-tourniquet was inflated to 250 milimeters of mercury.      A 1 centimeter oblique incision was fashioned over the base of the flexor tendon sheath of the Right Index Finger.  Dissection was carried carefully through the subcutaneous tissues, taking great care to identify and protect the neurovascular structures.  The flexor tendon sheath was carefully identified and cleared of surrounding soft tissue. The A1 pulley was identified and incised

## 2024-06-25 NOTE — PROGRESS NOTES
Finger stick glucose 268 in post op.  Dr. Minor aware and stated patient may be discharged and monitor and cover at home.

## 2024-06-25 NOTE — DISCHARGE INSTRUCTIONS
Post-Operative Instructions    Trigger Finger Release:    Keep hand elevated with fingers above eye-level to control swelling.  Keep hand and bandage clean and dry.  Do not change or unwrap bandage.  Please leave bandage in place until your follow-up appointment.  Work hard on finger motion starting immediately.  Several times each hour, fully straighten and fully bend fingers and thumb completely.  You may use your other hand to help as needed.  This may cause some discomfort, but will not damage your surgery.  You may use your operated hand for lightweight tasks (e.g. writing, eating, dressing, etc.).  NO LIFTING, CARRYING OR HEAVY USE.    Most Patients do not have \"Serious Pain\" after this procedure and thus most patients do not require prescription pain medication.  You may take over the counter medication (Tylenol, Advil, Aleve, etc.) as needed.  If you are unable to tolerate the discomfort after your surgery and the OTC medications do not provide some relief, you may contact our office to discuss other options..    Please call the office at [phone]  in 24 - 48 hours to schedule a follow up appointment for approximately 7 - 10 days after surgery.  Please call the office at [phone]  if you have any questions or problems.           Juancho Duran MD        ANESTHESIA DISCHARGE INSTRUCTIONS    You are under the influence of drugs- do not drink alcohol, drive a car, operate machinery(such as power tools, kitchen appliances, etc), sign legal documents, or make any important decisions for 24 hours (or while on pain medications).   Children should not ride bikes or skate boards or play on gym sets  for 24 hours after surgery.  A responsible adult should be with you for 24 hours.  Rest at home today- increase activity as tolerated.  Progress slowly to a regular diet unless your physician has instructed you otherwise. Drink plenty of water.    CALL YOUR DOCTOR IF YOU:  Have moderate to severe nausea or

## 2024-07-03 ENCOUNTER — OFFICE VISIT (OUTPATIENT)
Dept: ORTHOPEDIC SURGERY | Age: 64
End: 2024-07-03

## 2024-07-03 VITALS — HEIGHT: 61 IN | RESPIRATION RATE: 16 BRPM | WEIGHT: 135 LBS | BODY MASS INDEX: 25.49 KG/M2

## 2024-07-03 DIAGNOSIS — Z98.890 STATUS POST TRIGGER FINGER RELEASE: Primary | ICD-10-CM

## 2024-07-03 PROCEDURE — 99024 POSTOP FOLLOW-UP VISIT: CPT | Performed by: ORTHOPAEDIC SURGERY

## 2024-07-03 NOTE — PATIENT INSTRUCTIONS
Postoperative Instructions After Trigger Finger Release    Dr. Juancho Duran          After bandages are removed one week from surgery, you may chose to wear a small bandage over the incision if you wish, though you do not need to.  Keep incision dry until sutures have fully dissolved  or it has been 14 days since your surgery. Thereafter, you may wash with mild soap and water and shower normally.   Once your stiches have fully disappeared & skin appears normal, you should begin gently massaging the incision with Vitamin E (may use Vitamin E lotion or contents of Vitamin E capsule).   Work hard on motion of the fingers and wrist, straightening each finger fully and bending each finger fully, bending wrist forward and bending wrist backwards. Do not be concerned if you experience discomfort.  This will not damage the surgery.  You may begin using the hand as it feels comfortable beginning 12-14 days from the day of surgery. You may not feel entirely comfortable gripping or lifting heavy objects for several weeks.  You may expect to see some skin “peel” off around the incision.  You may be left with a small area of “pink baby skin”. This is quite normal.    Thank you for choosing [facility] for your Hand and Upper Extremity needs.  If we can be of any further assistance to you, please do not hesitate to contact us.    Office Phone Number:  [phone]  or  [phone]

## 2024-07-03 NOTE — PROGRESS NOTES
Ms. Alcie Brown returns today in follow-up of her recent right Index Finger A1 Pulley (Trigger Finger) Release done approximately 8 days ago.  She has done well noting mild discomfort and no other reported complications.    She notes pre-operative symptoms to be significantly improved at this time.    Physical Exam:  Bandage intact and well cared for   Skin incision is no significant drainage, no dehiscence, no significant erythema.  Digital range of motion is limited by pain in the Index Finger, normal in all other digits.  Wrist range of motion is limited by pain.  Sensation is normal in the Index Finger.  Vascular examination reveals normal, good capillary refill, and good color.  Swelling is minimal.  Her preoperative triggering is significantly improved.    Impression:  Ms. Alice Brown is doing well after recent right Index Finger Trigger Finger Release.    Plan:  Ms. Alice Brown is instructed in work on Active & Passive range of motion of the digits, wrist, & elbow.  These modalities were specifically demonstrated to her today.  We discussed the appropriateness of gradual resumption of use of the operated hand and the return to normal use as comfort allows.  She is given instructions regarding management of the fresh surgical incision and progressive use of desensitization and tissue massage techniques.  We discussed the appropriate expectations and timeline for symptom improvement.    She is provided a written patient instruction sheet titled: Postoperative Instructions After Trigger Finger Release.    I have asked Ms. Alice Brown to follow-up with me or contact me by telephone over the next 2-4 weeks if her symptoms have not fully resolved or if she has not regained full & painless return of function.     She is also specifically instructed to return to the office or call for an appointment sooner if her symptoms are changing or worsening prior to that time.

## 2024-09-24 ENCOUNTER — OFFICE VISIT (OUTPATIENT)
Dept: CARDIOLOGY CLINIC | Age: 64
End: 2024-09-24
Payer: MEDICARE

## 2024-09-24 VITALS
DIASTOLIC BLOOD PRESSURE: 60 MMHG | OXYGEN SATURATION: 98 % | HEART RATE: 57 BPM | BODY MASS INDEX: 25.96 KG/M2 | SYSTOLIC BLOOD PRESSURE: 116 MMHG | WEIGHT: 137.5 LBS | HEIGHT: 61 IN

## 2024-09-24 DIAGNOSIS — I10 ESSENTIAL HYPERTENSION: ICD-10-CM

## 2024-09-24 DIAGNOSIS — E78.2 MIXED HYPERLIPIDEMIA: ICD-10-CM

## 2024-09-24 DIAGNOSIS — Z79.4 TYPE 2 DIABETES MELLITUS WITHOUT COMPLICATION, WITH LONG-TERM CURRENT USE OF INSULIN (HCC): ICD-10-CM

## 2024-09-24 DIAGNOSIS — J84.9 INTERSTITIAL LUNG DISEASE (HCC): ICD-10-CM

## 2024-09-24 DIAGNOSIS — E11.9 TYPE 2 DIABETES MELLITUS WITHOUT COMPLICATION, WITH LONG-TERM CURRENT USE OF INSULIN (HCC): ICD-10-CM

## 2024-09-24 DIAGNOSIS — I25.110 CORONARY ARTERY DISEASE INVOLVING NATIVE CORONARY ARTERY OF NATIVE HEART WITH UNSTABLE ANGINA PECTORIS (HCC): Primary | ICD-10-CM

## 2024-09-24 DIAGNOSIS — D86.0 PULMONARY SARCOIDOSIS (HCC): ICD-10-CM

## 2024-09-24 PROCEDURE — 1036F TOBACCO NON-USER: CPT | Performed by: NURSE PRACTITIONER

## 2024-09-24 PROCEDURE — 99214 OFFICE O/P EST MOD 30 MIN: CPT | Performed by: NURSE PRACTITIONER

## 2024-09-24 PROCEDURE — 3046F HEMOGLOBIN A1C LEVEL >9.0%: CPT | Performed by: NURSE PRACTITIONER

## 2024-09-24 PROCEDURE — G8419 CALC BMI OUT NRM PARAM NOF/U: HCPCS | Performed by: NURSE PRACTITIONER

## 2024-09-24 PROCEDURE — 3074F SYST BP LT 130 MM HG: CPT | Performed by: NURSE PRACTITIONER

## 2024-09-24 PROCEDURE — 3078F DIAST BP <80 MM HG: CPT | Performed by: NURSE PRACTITIONER

## 2024-09-24 PROCEDURE — G8427 DOCREV CUR MEDS BY ELIG CLIN: HCPCS | Performed by: NURSE PRACTITIONER

## 2024-09-24 PROCEDURE — 3017F COLORECTAL CA SCREEN DOC REV: CPT | Performed by: NURSE PRACTITIONER

## 2024-09-24 PROCEDURE — 2022F DILAT RTA XM EVC RTNOPTHY: CPT | Performed by: NURSE PRACTITIONER

## 2024-09-24 RX ORDER — FUROSEMIDE 20 MG
20 TABLET ORAL PRN
Qty: 90 TABLET | Refills: 3
Start: 2024-09-24

## 2024-09-24 RX ORDER — BUPROPION HYDROCHLORIDE 150 MG/1
TABLET ORAL
COMMUNITY
Start: 2024-07-15

## 2025-01-29 LAB
CHOLESTEROL, TOTAL: 186 MG/DL
CHOLESTEROL/HDL RATIO: NORMAL
HDLC SERPL-MCNC: 60 MG/DL (ref 35–70)
LDL CHOLESTEROL: 106
NONHDLC SERPL-MCNC: NORMAL MG/DL
TRIGL SERPL-MCNC: 110 MG/DL
VLDLC SERPL CALC-MCNC: 20 MG/DL

## 2025-03-04 ENCOUNTER — HOSPITAL ENCOUNTER (OUTPATIENT)
Dept: MAMMOGRAPHY | Age: 65
Discharge: HOME | End: 2025-03-09

## 2025-03-04 VITALS — BODY MASS INDEX: 25.76 KG/M2 | WEIGHT: 140 LBS | HEIGHT: 62 IN

## 2025-03-04 DIAGNOSIS — Z12.31 VISIT FOR SCREENING MAMMOGRAM: ICD-10-CM

## 2025-03-04 PROCEDURE — 77063 BREAST TOMOSYNTHESIS BI: CPT

## 2025-03-11 NOTE — PROGRESS NOTES
[facility]   Cardiac Followup    Referring Provider:  Jane Doyle APRN - NP     Chief Complaint   Patient presents with    6 Month Follow-Up    Coronary Artery Disease    Hyperlipidemia        Alice Brown   1960      History of Present Illness:   Alice Brown is a 64 y.o. female who is here today for follow up for a past medical history of coronary artery disease, abnormal EKG, shortness of breath, hyperlipidemia and dizziness. She had an abnormal stress test on 5/8/2020. An echocardiogram showed her EF at 55%. He underwent a left heart cath with Dr. Nagy on 5/11/2020, PCI of LAD with single drug eluting stent.    Previous visit- she reported having a fall while in her chicken coop and injured her leg. A few days later, leg became red and swollen. Venous dopplers 7/2022 showed no evidence of DVT. She also reported having chest pain that this visit. Stress test 8/16/2022 showed no ischemia or scar. Echocardiogram 8/16/2022 showed an EF of 55%, small pericardial effusion localized anteriorly, mild mitral and pulmonic regurgitation.    Last office visit 3/19/24, She had gained weight and had some leg swelling. She had started an exercise routine at home which included weights. She had never been on a water pill, creatinine normal. She stated her leg swelling started a few months ago. She had SOB that she attributed to her lung diease. Leg cramps intermittent at rest. No pain or cramping when walking. Patient denied any palpitations, chest pain, shortness of breath, dizziness, and syncope.   Today, 3/12/25, she states she is doing well. She reports she has been following with her diabetes specialist and her A1c is improving she brought her lab work and her HDL was 60,  and Triglycerides 128. She states she has been staying active by doing dance. She states she has not taking her lasix in months, and has not had any swelling.       Past Medical History:   has a past medical history of

## 2025-03-12 ENCOUNTER — OFFICE VISIT (OUTPATIENT)
Dept: CARDIOLOGY CLINIC | Age: 65
End: 2025-03-12
Payer: MEDICARE

## 2025-03-12 VITALS
HEART RATE: 83 BPM | SYSTOLIC BLOOD PRESSURE: 132 MMHG | WEIGHT: 139 LBS | HEIGHT: 62 IN | DIASTOLIC BLOOD PRESSURE: 62 MMHG | BODY MASS INDEX: 25.58 KG/M2 | OXYGEN SATURATION: 97 %

## 2025-03-12 DIAGNOSIS — Z79.899 MEDICATION MANAGEMENT: Primary | ICD-10-CM

## 2025-03-12 PROCEDURE — 3078F DIAST BP <80 MM HG: CPT | Performed by: INTERNAL MEDICINE

## 2025-03-12 PROCEDURE — 99214 OFFICE O/P EST MOD 30 MIN: CPT | Performed by: INTERNAL MEDICINE

## 2025-03-12 PROCEDURE — 3075F SYST BP GE 130 - 139MM HG: CPT | Performed by: INTERNAL MEDICINE

## 2025-03-12 RX ORDER — ROSUVASTATIN CALCIUM 40 MG/1
40 TABLET, COATED ORAL NIGHTLY
Qty: 90 TABLET | Refills: 3 | Status: SHIPPED | OUTPATIENT
Start: 2025-03-12

## 2025-03-12 RX ORDER — EMPAGLIFLOZIN 25 MG/1
25 TABLET, FILM COATED ORAL DAILY
COMMUNITY
Start: 2025-01-28

## 2025-03-12 NOTE — PATIENT INSTRUCTIONS
Plan:  ~Increase rosuvastatin (Crestor) to 40 mg nightly.    You can take 4 of your 10 mg tablets you have at home until you run out. A new prescription of the increased dose has been sent to your pharmacy.   ~ Plan to repeat lab work in 2-3 months: Lipid panel. You will need to be fasting for 8 hours prior.   ~ Continue prescribed medications as ordered.   ~Cardiac medications reviewed including indications and pertinent side effects. Medication list updated at this visit.   ~Patient verbalizes understanding of the need for treatment and education has been provided at today's visit. Additional education material will be provided in after visit summary.    ~Check blood pressure and heart rate at home a few times per week- keep a log with dates and times and bring to office visit   ~Regular exercise and following a healthy diet encouraged   ~Follow up with me in 1 year.

## 2025-04-14 ENCOUNTER — TELEPHONE (OUTPATIENT)
Dept: CARDIOLOGY CLINIC | Age: 65
End: 2025-04-14

## 2025-04-14 NOTE — TELEPHONE ENCOUNTER
[facility] asking for patient to have Colonoscopy 6/2/2025.    Asking to hold Plavix for 5 days. Patient last seen 3/13/2025.         Fax letter to She at [phone]       Assessment:   Hyperlipidemia - suboptimal. Results reviewed  CAD - stable w/o angina  PCI LAD 2020  Small pericardial noted back to echo 2014. Stable  Intolerant ASA   Nonsmoker - continued refrain advised

## 2025-04-14 NOTE — TELEPHONE ENCOUNTER
Ok procedure  OK hold plavix x 5 days  Rec take EcASA 81 mg daily while off the plavix due to prior stent

## 2025-04-23 LAB
ALBUMIN: 4.2 G/DL
ALP BLD-CCNC: 111 U/L
ALT SERPL-CCNC: 18 U/L
ANION GAP SERPL CALCULATED.3IONS-SCNC: NORMAL MMOL/L
AST SERPL-CCNC: 23 U/L
BILIRUB SERPL-MCNC: 0.3 MG/DL (ref 0.1–1.4)
BUN BLDV-MCNC: 21 MG/DL
CALCIUM SERPL-MCNC: 9.4 MG/DL
CHLORIDE BLD-SCNC: 104 MMOL/L
CO2: 21 MMOL/L
CREAT SERPL-MCNC: 0.89 MG/DL
ESTIMATED AVERAGE GLUCOSE: NORMAL
GFR, ESTIMATED: 72
GLUCOSE BLD-MCNC: 87 MG/DL
HBA1C MFR BLD: 6.4 %
POTASSIUM SERPL-SCNC: 4.5 MMOL/L
SODIUM BLD-SCNC: 141 MMOL/L
TOTAL PROTEIN: 6.5 G/DL (ref 6.4–8.2)

## 2025-04-30 DIAGNOSIS — I25.110 CORONARY ARTERY DISEASE INVOLVING NATIVE CORONARY ARTERY OF NATIVE HEART WITH UNSTABLE ANGINA PECTORIS (HCC): Primary | ICD-10-CM

## 2025-04-30 RX ORDER — METOPROLOL SUCCINATE 25 MG/1
25 TABLET, EXTENDED RELEASE ORAL DAILY
Qty: 90 TABLET | Refills: 3 | Status: SHIPPED | OUTPATIENT
Start: 2025-04-30

## 2025-04-30 RX ORDER — CLOPIDOGREL BISULFATE 75 MG/1
75 TABLET ORAL DAILY
Qty: 30 TABLET | Refills: 0 | Status: SHIPPED | OUTPATIENT
Start: 2025-04-30

## 2025-04-30 NOTE — TELEPHONE ENCOUNTER
Last Office Visit: 3/12/2025 Provider: [facility] [place]  **Is provider OOT? No    Next Office Visit: no ~Follow up with me in 1 year.       LAST LABS:   CBC:  Lab Results   Component Value Date    WBC 12.3 (H) 02/06/2022    HGB 13.9 02/06/2022    HCT 40.6 02/06/2022    MCV 80.4 02/06/2022     02/06/2022    LYMPHOPCT 21.4 02/06/2022    RBC 5.05 02/06/2022    MCH 27.4 02/06/2022    MCHC 34.1 02/06/2022    RDW 15.4 02/06/2022           CMP:  Lab Results   Component Value Date     04/23/2025    K 4.5 04/23/2025     04/23/2025    CO2 21 04/23/2025    BUN 21 04/23/2025    CREATININE 0.89 04/23/2025    GLUCOSE 87 04/23/2025    CALCIUM 9.4 04/23/2025    BILITOT 0.3 04/23/2025    ALKPHOS 111 04/23/2025    AST 23 04/23/2025    ALT 18 04/23/2025    LABGLOM 72 04/23/2025    GFRAA >60 02/06/2022    AGRATIO 2.0 03/29/2024    GLOB 2.2 09/01/2021     Abstracted labs. Spoke with pt to let her know we need her cbc completed. [email or web address]

## 2025-07-22 LAB
ALBUMIN: 4.1 G/DL
ALP BLD-CCNC: 89 U/L
ALT SERPL-CCNC: 17 U/L
ANION GAP SERPL CALCULATED.3IONS-SCNC: NORMAL MMOL/L
AST SERPL-CCNC: 15 U/L
BASOPHILS ABSOLUTE: 0.1 /ΜL
BASOPHILS RELATIVE PERCENT: 1 %
BILIRUB SERPL-MCNC: 0.3 MG/DL (ref 0.1–1.4)
BUN BLDV-MCNC: 25 MG/DL
CALCIUM SERPL-MCNC: 9.5 MG/DL
CHLORIDE BLD-SCNC: 105 MMOL/L
CO2: 21 MMOL/L
CREAT SERPL-MCNC: 0.77 MG/DL
EOSINOPHILS ABSOLUTE: 0.3 /ΜL
EOSINOPHILS RELATIVE PERCENT: 3 %
ESTIMATED AVERAGE GLUCOSE: NORMAL
GFR, ESTIMATED: 86
GLUCOSE BLD-MCNC: 120 MG/DL
HBA1C MFR BLD: 7 %
HCT VFR BLD CALC: 46.9 % (ref 36–46)
HEMOGLOBIN: 15 G/DL (ref 12–16)
LYMPHOCYTES ABSOLUTE: 2.3 /ΜL
LYMPHOCYTES RELATIVE PERCENT: 23 %
MCH RBC QN AUTO: 28.2 PG
MCHC RBC AUTO-ENTMCNC: 32 G/DL
MCV RBC AUTO: 88 FL
MONOCYTES ABSOLUTE: 0.6 /ΜL
MONOCYTES RELATIVE PERCENT: 6 %
NEUTROPHILS ABSOLUTE: 6.6 /ΜL
NEUTROPHILS RELATIVE PERCENT: 67 %
PLATELET # BLD: 277 K/ΜL
PMV BLD AUTO: ABNORMAL FL
POTASSIUM SERPL-SCNC: 4.2 MMOL/L
RBC # BLD: 5.31 10^6/ΜL
SODIUM BLD-SCNC: 143 MMOL/L
TOTAL PROTEIN: 6.5 G/DL (ref 6.4–8.2)
WBC # BLD: 9.9 10^3/ML

## 2025-08-11 DIAGNOSIS — I25.10 CORONARY ARTERY DISEASE INVOLVING NATIVE CORONARY ARTERY OF NATIVE HEART WITHOUT ANGINA PECTORIS: Primary | ICD-10-CM

## 2025-08-13 RX ORDER — CLOPIDOGREL BISULFATE 75 MG/1
TABLET ORAL
Qty: 90 TABLET | Refills: 2 | Status: SHIPPED | OUTPATIENT
Start: 2025-08-13

## (undated) DEVICE — SYRINGE, LUER LOCK, 10ML: Brand: MEDLINE

## (undated) DEVICE — NEEDLE HYPO 25GA L1.5IN BLU POLYPR HUB S STL REG BVL STR

## (undated) DEVICE — COTTON UNDERCAST PADDING,REGULAR FINISH: Brand: WEBRIL

## (undated) DEVICE — Z DISCONTINUED USE 2275683 GLOVE SURG SZ 6.5 L12IN FNGR THK13MIL WHT ISOLEX

## (undated) DEVICE — BLADE OPHTH 180DEG CUT SURF BLU STR SHRP DBL BVL GRINDLESS

## (undated) DEVICE — PADDING CAST W4INXL4YD NONSTERILE COT RAYON MICROPLEATED

## (undated) DEVICE — Z DISCONTINUED NO SUB IDED GLOVE SURG SZ 6 L12IN FNGR THK13MIL WHT ISOLEX POLYISOPRENE

## (undated) DEVICE — GOWN,SIRUS,NON REINFRCD,LARGE,SET IN SL: Brand: MEDLINE

## (undated) DEVICE — WRAP COHESIVE W2INXL5YD TAN SELF ADH BNDG HND NON STERILE TEAR CARING

## (undated) DEVICE — SYRINGE MED 10ML LUERLOCK TIP W/O SFTY DISP

## (undated) DEVICE — BANDAGE COMPR W4INXL12FT E DISP ESMARCH EVEN

## (undated) DEVICE — UNDERGLOVE SURG SZ 8 FNGR THK0.21MIL GRN LTX BEAD CUF

## (undated) DEVICE — SUTURE ETHLN SZ 4-0 L18IN NONABSORBABLE BLK L19MM PS-2 3/8 1667H

## (undated) DEVICE — UNDERPAD HOSP W30XL36IN WHT SUP ABSRB POLYMER AIR PERM DISP

## (undated) DEVICE — CORD ES L12FT BPLR FRCP

## (undated) DEVICE — WILLIS PACK: Brand: MEDLINE INDUSTRIES, INC.

## (undated) DEVICE — ELECTRODE ECG MONITR FOAM TEAR DROP ADLT RED

## (undated) DEVICE — NEEDLE HYPO 18GA L1.5IN PNK POLYPR HUB S STL REG BVL STR

## (undated) DEVICE — GLOVE ORANGE PI 7   MSG9070

## (undated) DEVICE — ESMARCH P/F TEXTURED 4" X 12' 10/BX

## (undated) DEVICE — ENDO CARRY-ON PROCEDURE KIT INCLUDES SUCTION TUBING, LUBRICANT, GAUZE, BIOHAZARD STICKER, TRANSPORT PAD AND INTERCEPT BEDSIDE KIT.: Brand: ENDO CARRY-ON PROCEDURE KIT

## (undated) DEVICE — GLOVE SURG SZ 75 L12IN FNGR THK94MIL STD WHT LTX FREE

## (undated) DEVICE — TOWEL,OR,DSP,ST,BLUE,STD,4/PK,20PK/CS: Brand: MEDLINE

## (undated) DEVICE — Device

## (undated) DEVICE — Device: Brand: DISPOSABLE ELECTROSURGICAL SNARE

## (undated) DEVICE — Z DISCONTINUED USE 2275686 GLOVE SURG SZ 8 L12IN FNGR THK13MIL WHT ISOLEX POLYISOPRENE

## (undated) DEVICE — STANDARD HYPODERMIC NEEDLE,POLYPROPYLENE HUB: Brand: MONOJECT

## (undated) DEVICE — SOLUTION IV IRRIG 500ML 0.9% SODIUM CHL 2F7123

## (undated) DEVICE — TRAP SURG QUAD PARABOLA SLOT DSGN SFTY SCRN TRAPEASE

## (undated) DEVICE — 60 ML SYRINGE REGULAR TIP: Brand: MONOJECT

## (undated) DEVICE — GLOVE SURG SZ 8 L12IN FNGR THK94MIL STD WHT LTX FREE

## (undated) DEVICE — SOLUTION IRRIG 500ML 0.9% SOD CHLO USP POUR PLAS BTL

## (undated) DEVICE — ZIMMER® STERILE DISPOSABLE TOURNIQUET CUFF WITH PLC, DUAL PORT, SINGLE BLADDER, 18 IN. (46 CM)